# Patient Record
Sex: MALE | Race: WHITE | NOT HISPANIC OR LATINO | Employment: OTHER | URBAN - METROPOLITAN AREA
[De-identification: names, ages, dates, MRNs, and addresses within clinical notes are randomized per-mention and may not be internally consistent; named-entity substitution may affect disease eponyms.]

---

## 2018-01-30 ENCOUNTER — HOSPITAL ENCOUNTER (EMERGENCY)
Facility: CLINIC | Age: 83
Discharge: HOME | End: 2018-01-31
Attending: EMERGENCY MEDICINE | Admitting: EMERGENCY MEDICINE
Payer: MEDICARE
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
## 2018-01-30 DIAGNOSIS — T18.128A ESOPHAGEAL OBSTRUCTION DUE TO FOOD IMPACTION: ICD-10-CM

## 2018-01-30 PROCEDURE — 96374 THER/PROPH/DIAG INJ IV PUSH: CPT

## 2018-01-30 PROCEDURE — 99284 EMERGENCY DEPT VISIT MOD MDM: CPT

## 2018-01-30 NOTE — ED AVS SNAPSHOT
[facility] Emergency Department    [address]    [facility] [address]    Phone:  [phone]    Fax:  [phone]                                       Kraig Genao   MRN: [id number]    Department:  [facility] Emergency Department   Date of Visit:  1/30/2018           After Visit Summary Signature Page     I have received my discharge instructions, and my questions have been answered. I have discussed any challenges I see with this plan with the nurse or doctor.    ..........................................................................................................................................  Patient/Patient Representative Signature      ..........................................................................................................................................  Patient Representative Print Name and Relationship to Patient    ..................................................               ................................................  Date                                            Time    ..........................................................................................................................................  Reviewed by Signature/Title    ...................................................              ..............................................  Date                                                            Time

## 2018-01-30 NOTE — ED AVS SNAPSHOT
[facility] Emergency Department    [address] [place] [address]    Phone:  [phone]    Fax:  [phone]                                       Kraig Genao   MRN: [id number]    Department:  [facility] Emergency Department   Date of Visit:  1/30/2018           Patient Information     Date Of Birth          7/18/1926        Your diagnoses for this visit were:     Esophageal obstruction due to food impaction        You were seen by Toy Oliva MD.      Follow-up Information     Call Mendez Mcknight MD.    Specialty:  Gastroenterology    Why:  this morning for followup endoscopy    Contact information:    [facility]  [address]  [facility] [id number] [phone]          Discharge Instructions         Esophageal Blockage, Resolved  The esophagus is the passage that carries food from the mouth to the stomach. You had a blockage in the esophagus. This can happen after swallowing a large piece of food, taking a large pill, or swallowing foreign objects.  If this is a recurring problem, it can be a sign of disease in the esophagus, such as inflammation (swelling and irritation) or scarring. If you did not have a special procedure (endoscopy) today to treat your condition, further testing will be needed to evaluate this problem.  The blockage has cleared. You should be able to swallow normally again.  Home care    For the next 24 hours you may drink liquids and eat soft foods.    You may have been given medicine today to prevent pain and help you relax. If so, you may feel drowsy for the next 4 to 12 hours. Do not drive or operate dangerous equipment until you feel alert again.    If your esophagus was blocked by food, be sure to cut solid food into small pieces before putting it into your mouth. Chew all foods well before swallowing.    If your esophagus was blocked by an over-the-counter pill (such as a vitamin), avoid this size pill in  the future. If it was blocked by a prescription medicine, ask your healthcare provider for another form of medicine.  Follow-up care  Follow up with your healthcare provider, or as advised. If you continue to have problems, contact your doctor or this facility for advice. If this is a recurring problem, talk to your healthcare provider about it. He or she may suggest having an endoscopy. This is a look in the esophagus with a small camera and light in a narrow, flexible tube).  When to seek medical advice  Call your healthcare provider right away if any of these occur:    Unable to swallow    Significant pain on swallowing    Fever of 100.4 F (38 C) or higher, or as directed by your healthcare provider  Call 911  Call 911 or get immediate medical care if any of the following occur:     Chest pain or shortness of breath    Vomiting blood (red or black)    Blood in your stool (dark red or black color)   Date Last Reviewed: 5/1/2017 2000-2017 [employer]. [address] [place] [address]. All rights reserved. This information is not intended as a substitute for professional medical care. Always follow your healthcare professional's instructions.          24 Hour Appointment Hotline       To make an appointment at any [facility], call [phone] ([phone]). If you don't have a family doctor or clinic, we will help you find one. [facility] are conveniently located to serve the needs of you and your family.             Review of your medicines      Our records show that you are taking the medicines listed below. If these are incorrect, please call your family doctor or clinic.        Dose / Directions Last dose taken    apixaban ANTICOAGULANT 5 MG tablet   Commonly known as:  ELIQUIS   Dose:  5 mg   Quantity:  60 tablet        Take 1 tablet (5 mg) by mouth 2 times daily   Refills:  12        ascorbic acid 500 MG tablet   Commonly known as:  VITAMIN C   Dose:  1000 mg         Take 1,000 mg by mouth daily   Refills:  0        atenolol 50 MG tablet   Commonly known as:  TENORMIN   Dose:  25 mg        Take 25 mg by mouth 2 times daily   Refills:  0        budesonide-formoterol 160-4.5 MCG/ACT Inhaler   Commonly known as:  SYMBICORT   Dose:  2 puff        Inhale 2 puffs into the lungs 2 times daily   Refills:  0        calcium-vitamin D 600-400 MG-UNIT per tablet   Commonly known as:  CALTRATE   Dose:  1 tablet        Take 1 tablet by mouth daily   Refills:  0        fish Oil 1200 MG capsule        Take by mouth daily   Refills:  0        omeprazole 20 MG CR capsule   Commonly known as:  priLOSEC   Dose:  20 mg        Take 20 mg by mouth daily   Refills:  0        ONE-A-DAY MENS PO   Dose:  1 tablet        Take 1 tablet by mouth daily   Refills:  0        oxybutynin 5 MG tablet   Commonly known as:  DITROPAN   Dose:  5 mg        Take 5 mg by mouth 2 times daily   Refills:  0        terazosin 2 MG capsule   Commonly known as:  HYTRIN   Dose:  2 mg        Take 2 mg by mouth At Bedtime   Refills:  0        VITAMIN D3 PO   Dose:  1000 Units        Take 1,000 Units by mouth daily   Refills:  0                Procedures and tests performed during your visit     IV access      Orders Needing Specimen Collection     None      Pending Results     No orders found for last 3 day(s).            Pending Culture Results     No orders found for last 3 day(s).            Pending Results Instructions     If you had any lab results that were not finalized at the time of your Discharge, you can call the ED Lab Result RN at [phone]. You will be contacted by this team for any positive Lab results or changes in treatment. The nurses are available 7 days a week from 10A to 6:30P.  You can leave a message 24 hours per day and they will return your call.        Test Results From Your Hospital Stay               Clinical Quality Measure: Blood Pressure Screening     Your blood pressure was checked while you were  in the emergency department today. The last reading we obtained was  BP: (!) 147/99 . Please read the guidelines below about what these numbers mean and what you should do about them.  If your systolic blood pressure (the top number) is less than 120 and your diastolic blood pressure (the bottom number) is less than 80, then your blood pressure is normal. There is nothing more that you need to do about it.  If your systolic blood pressure (the top number) is 120-139 or your diastolic blood pressure (the bottom number) is 80-89, your blood pressure may be higher than it should be. You should have your blood pressure rechecked within a year by a primary care provider.  If your systolic blood pressure (the top number) is 140 or greater or your diastolic blood pressure (the bottom number) is 90 or greater, you may have high blood pressure. High blood pressure is treatable, but if left untreated over time it can put you at risk for heart attack, stroke, or kidney failure. You should have your blood pressure rechecked by a primary care provider within the next 4 weeks.  If your provider in the emergency department today gave you specific instructions to follow-up with your doctor or provider even sooner than that, you should follow that instruction and not wait for up to 4 weeks for your follow-up visit.        Thank you for choosing [facility]       Thank you for choosing [facility] for your care. Our goal is always to provide you with excellent care. Hearing back from our patients is one way we can continue to improve our services. Please take a few minutes to complete the written survey that you may receive in the mail after you visit with us. Thank you!        [facility] Information     [facility] gives you secure access to your electronic health record. If you see a primary care provider, you can also send messages to your care team and make appointments. If you have questions, please call your primary care clinic.  If you do  not have a primary care provider, please call [phone] and they will assist you.        Care EveryWhere ID     This is your Care EveryWhere ID. This could be used by other organizations to access your [facility] medical records  [id number]        Equal Access to Services     TULIO JAIN : Alvin Vega, pepe gamez, melia hernández. So [facility] [place] [phone].    ATENCIÓN: Si habla español, tiene a funez disposición servicios gratuitos de asistencia lingüística. Llame al [phone].    We comply with applicable federal civil rights laws and [place] laws. We do not discriminate on the basis of race, color, national origin, age, disability, sex, sexual orientation, or gender identity.            After Visit Summary       This is your record. Keep this with you and show to your community pharmacist(s) and doctor(s) at your next visit.

## 2018-01-31 VITALS
SYSTOLIC BLOOD PRESSURE: 151 MMHG | BODY MASS INDEX: 24.33 KG/M2 | DIASTOLIC BLOOD PRESSURE: 109 MMHG | HEART RATE: 100 BPM | RESPIRATION RATE: 16 BRPM | OXYGEN SATURATION: 100 % | WEIGHT: 155 LBS | HEIGHT: 67 IN | TEMPERATURE: 97.9 F

## 2018-01-31 PROCEDURE — 96374 THER/PROPH/DIAG INJ IV PUSH: CPT

## 2018-01-31 PROCEDURE — 25000128 H RX IP 250 OP 636: Performed by: EMERGENCY MEDICINE

## 2018-01-31 RX ADMIN — GLUCAGON HYDROCHLORIDE 0.5 MG: 1 INJECTION, POWDER, FOR SOLUTION INTRAMUSCULAR; INTRAVENOUS; SUBCUTANEOUS at 00:56

## 2018-01-31 ASSESSMENT — ENCOUNTER SYMPTOMS
SHORTNESS OF BREATH: 0
VOMITING: 1
TROUBLE SWALLOWING: 1

## 2018-01-31 NOTE — DISCHARGE INSTRUCTIONS
Esophageal Blockage, Resolved  The esophagus is the passage that carries food from the mouth to the stomach. You had a blockage in the esophagus. This can happen after swallowing a large piece of food, taking a large pill, or swallowing foreign objects.  If this is a recurring problem, it can be a sign of disease in the esophagus, such as inflammation (swelling and irritation) or scarring. If you did not have a special procedure (endoscopy) today to treat your condition, further testing will be needed to evaluate this problem.  The blockage has cleared. You should be able to swallow normally again.  Home care    For the next 24 hours you may drink liquids and eat soft foods.    You may have been given medicine today to prevent pain and help you relax. If so, you may feel drowsy for the next 4 to 12 hours. Do not drive or operate dangerous equipment until you feel alert again.    If your esophagus was blocked by food, be sure to cut solid food into small pieces before putting it into your mouth. Chew all foods well before swallowing.    If your esophagus was blocked by an over-the-counter pill (such as a vitamin), avoid this size pill in the future. If it was blocked by a prescription medicine, ask your healthcare provider for another form of medicine.  Follow-up care  Follow up with your healthcare provider, or as advised. If you continue to have problems, contact your doctor or this facility for advice. If this is a recurring problem, talk to your healthcare provider about it. He or she may suggest having an endoscopy. This is a look in the esophagus with a small camera and light in a narrow, flexible tube).  When to seek medical advice  Call your healthcare provider right away if any of these occur:    Unable to swallow    Significant pain on swallowing    Fever of 100.4 F (38 C) or higher, or as directed by your healthcare provider  Call 911  Call 911 or get immediate medical care if any of the following  occur:     Chest pain or shortness of breath    Vomiting blood (red or black)    Blood in your stool (dark red or black color)   Date Last Reviewed: 5/1/2017 2000-2017 [employer]. [address] [place] [address], [place] [address]. All rights reserved. This information is not intended as a substitute for professional medical care. Always follow your healthcare professional's instructions.

## 2018-01-31 NOTE — ED PROVIDER NOTES
History     Chief Complaint:  Trouble swallowing    HPI   Kraig Genao is a 89-year-old male who presents for evaluation of difficulty swallowing since about 1800 when the patient was eating. The patient reports that he was eating salmon and baked potatoes and shortly after he started to feel like he could not swallow any more. The patient reports that he has tried to drink water since but has been unable to keep this down. He states that he has been spitting up saliva since.  He states that he did have one episode of vomiting since this time; however, even after this he was not able to keep any fluids down.  He states that he has felt better in the last hour but has still been spitting up saliva.  The patient reports that he has had a history of similar problems in the past and required an endoscopy about 1.5 years ago.  The patient denies any pain.  Denies difficulty breathing.    Allergies:  No known drug allergies.      Medications:    Eliquis  Atenolol  Symbicort  Prilosec  Terazosin  Oxybutynin    Past Medical History:    Atrial fibrillation  Dyspepsia  Elevated LFTs  Erectile dysfunction  GERD  H pylori infection  Hypertrophy of prostate  Macular degeneration    Past Surgical History:    Upper GI endoscopy with dilatation  Hernia repair  Cholecystectomy  Total shoulder arthroplasty, right  Shoulder arthroplasty revision, right  [id number]-2016    Family History:    Myocardial infarction-father    Social History:  Marital Status:   Presents to the ED with wife   Tobacco Use: Never  Alcohol Use: Yes   PCP: Griselda Syed      Review of Systems   HENT: Positive for trouble swallowing.    Respiratory: Negative for shortness of breath.    Gastrointestinal: Positive for vomiting (x1).   All other systems reviewed and are negative.    Physical Exam   Patient Vitals for the past 24 hrs:   BP Temp Temp src Pulse Heart Rate Resp SpO2 Height Weight   01/31/18 0100 - - - - 95 - 98 % - -   01/31/18 0045 (!)  "147/99 - - - - - 97 % - -   01/31/18 0030 (!) 156/104 - - - - - 96 % - -   01/31/18 0015 (!) 152/102 - - - - - 97 % - -   01/31/18 0000 (!) 174/117 - - - - - 98 % - -   01/30/18 2345 (!) 169/112 - - - - - 98 % - -   01/30/18 2337 - 97.9  F (36.6  C) Oral 100 - 16 97 % 1.702 m (5' 7\") 70.3 kg (155 lb)     Physical Exam  Vital signs and nursing notes reviewed.     Constitutional: laying on gurney appears comfortable. Occasionally spitting into a bag.   HENT: Oropharynx is clear and moist  Eyes: Conjunctivae are normal bilaterally. Pupils equal  Neck: normal range of motion  Cardiovascular: Normal rate, regular rhythm, normal heart sounds.   Pulmonary/Chest: Effort normal and breath sounds normal. No respiratory distress.   Abdominal: Soft. Bowel sounds are normal. No tenderness to palpation. No rebound or guarding.   Musculoskeletal: No joint swelling or edema.   Neurological: Alert and oriented. No focal weakness  Skin: Skin is warm and dry. No rash noted.   Psych: normal affect     Emergency Department Course   Interventions:  (0056) Glucagon, 0.5 mg, IV     Emergency Department Course:  Nursing notes and vitals reviewed.    (0017) I entered the room with my scribe, obtained the history, and performed an exam of the patient as documented above.    (0133) I went to check in on the patient who feels better after the EZ gas. The patient is tolerating PO fluids. Plan is for discharge and patient is in agreement.     Findings and plan explained to the patient. Patient discharged home with instructions regarding supportive care, medications, and reasons to return. The importance of close follow-up was reviewed.     Impression & Plan    Medical Decision Making:  Kraig Genao is a 89-year-old male who presents for evaluation of difficulty swallowing and one episode of vomiting.  This is consistent with esophageal bolus/foreign body esophagus.  The offending bolus is likely either the salmon or potatoes that he was eating " for dinner.  We attempted passage with glucagon IV and EZ gas.      This medication was effective in passage of the bolus.  Patient was able to drink a large amount of liquids and discomfort was gone indicating that the bolus has passed.  We will continue patient on scheduled PPI and have them follow up with GI this week for endoscopy. Patient follows with Dr. Mcknight and will call him in the morning. Unclear if this is a stricture, mass or other etiology for the esophageal narrowing and patient understands this.  Will need formal diagnosis by endoscopy.   Liquid diet and very soft diet until endoscopy.  Return for fever, shortness of breath, or chest pain.     Diagnosis:    ICD-10-CM   1. Esophageal obstruction due to food impaction K22.2    T18.128A     Disposition:  discharged to home        [facility] EMERGENCY DEPARTMENT      Scribe disclosure:  Herman MADDEN, am serving as a scribe on 1/31/2018 at 12:17 AM to personally document services performed by Dr. Oliva based on my observations and the provider's statements to me.              Toy Oliva MD  01/31/18 4674

## 2018-01-31 NOTE — ED NOTES
Pt in with C/O ? Food bolus. Pt states he was eating a piece of salmon and thinks something got stuck. Pt states hx of stricture, food bolus in the past.  Pt reports glucagon has worked in the past

## 2018-01-31 NOTE — ED NOTES
Pt administered easy gas, unsuccessful on first attempt, second attempt pt able to swallow ez-gas

## 2018-02-11 ENCOUNTER — HOSPITAL ENCOUNTER (EMERGENCY)
Facility: CLINIC | Age: 83
Discharge: HOME | End: 2018-02-12
Attending: EMERGENCY MEDICINE | Admitting: INTERNAL MEDICINE
Payer: MEDICARE
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
## 2018-02-11 DIAGNOSIS — T18.128A FOOD IMPACTION OF ESOPHAGUS, INITIAL ENCOUNTER: ICD-10-CM

## 2018-02-11 LAB — UPPER GI ENDOSCOPY: NORMAL

## 2018-02-11 PROCEDURE — 40000104 ZZH STATISTIC MODERATE SEDATION < 10 MIN: Performed by: INTERNAL MEDICINE

## 2018-02-11 PROCEDURE — 25000128 H RX IP 250 OP 636: Performed by: EMERGENCY MEDICINE

## 2018-02-11 PROCEDURE — 25500045 ZZH RX 255: Performed by: EMERGENCY MEDICINE

## 2018-02-11 PROCEDURE — 25000128 H RX IP 250 OP 636: Performed by: INTERNAL MEDICINE

## 2018-02-11 PROCEDURE — 99285 EMERGENCY DEPT VISIT HI MDM: CPT | Mod: 25

## 2018-02-11 PROCEDURE — 96374 THER/PROPH/DIAG INJ IV PUSH: CPT

## 2018-02-11 PROCEDURE — 25000125 ZZHC RX 250: Performed by: INTERNAL MEDICINE

## 2018-02-11 PROCEDURE — 43247 EGD REMOVE FOREIGN BODY: CPT | Performed by: INTERNAL MEDICINE

## 2018-02-11 RX ORDER — FENTANYL CITRATE 50 UG/ML
25 INJECTION, SOLUTION INTRAMUSCULAR; INTRAVENOUS
Status: DISCONTINUED | OUTPATIENT
Start: 2018-02-11 | End: 2018-02-12 | Stop reason: HOSPADM

## 2018-02-11 RX ORDER — LIDOCAINE 40 MG/G
CREAM TOPICAL
Status: CANCELLED | OUTPATIENT
Start: 2018-02-11

## 2018-02-11 RX ORDER — FENTANYL CITRATE 50 UG/ML
25-50 INJECTION, SOLUTION INTRAMUSCULAR; INTRAVENOUS
Status: COMPLETED | OUTPATIENT
Start: 2018-02-11 | End: 2018-02-11

## 2018-02-11 RX ORDER — NALOXONE HYDROCHLORIDE 0.4 MG/ML
.1-.4 INJECTION, SOLUTION INTRAMUSCULAR; INTRAVENOUS; SUBCUTANEOUS
Status: DISCONTINUED | OUTPATIENT
Start: 2018-02-11 | End: 2018-02-12 | Stop reason: HOSPADM

## 2018-02-11 RX ORDER — FLUMAZENIL 0.1 MG/ML
0.2 INJECTION, SOLUTION INTRAVENOUS
Status: DISCONTINUED | OUTPATIENT
Start: 2018-02-11 | End: 2018-02-12 | Stop reason: HOSPADM

## 2018-02-11 RX ADMIN — GLUCAGON HYDROCHLORIDE 0.5 MG: 1 INJECTION, POWDER, FOR SOLUTION INTRAMUSCULAR; INTRAVENOUS; SUBCUTANEOUS at 21:59

## 2018-02-11 RX ADMIN — FENTANYL CITRATE 50 MCG: 50 INJECTION INTRAMUSCULAR; INTRAVENOUS at 23:41

## 2018-02-11 RX ADMIN — ANTACID/ANTIFLATULENT 4 G: 380; 550; 10; 10 GRANULE, EFFERVESCENT ORAL at 22:11

## 2018-02-11 RX ADMIN — MIDAZOLAM 1 MG: 1 INJECTION INTRAMUSCULAR; INTRAVENOUS at 23:41

## 2018-02-11 ASSESSMENT — ENCOUNTER SYMPTOMS
VOMITING: 1
TROUBLE SWALLOWING: 1

## 2018-02-11 NOTE — ED AVS SNAPSHOT
[facility] Emergency Department    [address]    [facility] [address]    Phone:  [phone]    Fax:  [phone]                                       Kraig Genao   MRN: [id number]    Department:  [facility] Emergency Department   Date of Visit:  2/11/2018           Patient Information     Date Of Birth          7/18/1926        Your diagnoses for this visit were:     Food impaction of esophagus, initial encounter        You were seen by Deja Ray MD.      Follow-up Information     Follow up with Mendez Mcknight MD.    Specialty:  Gastroenterology    Why:  as discussed    Contact information:    [facility]  [address]  [facility] [address] [phone]          Follow up with [facility] Emergency Department.    Specialty:  EMERGENCY MEDICINE    Why:  As needed, If symptoms worsen    Contact information:    [address]  [facility] [address] [phone]        Discharge Instructions         Esophageal Blockage, Resolved  The esophagus is the passage that carries food from the mouth to the stomach. You had a blockage in the esophagus. This can happen after swallowing a large piece of food, taking a large pill, or swallowing foreign objects.  If this is a recurring problem, it can be a sign of disease in the esophagus, such as inflammation (swelling and irritation) or scarring. If you did not have a special procedure (endoscopy) today to treat your condition, further testing will be needed to evaluate this problem.  The blockage has cleared. You should be able to swallow normally again.  Home care    For the next 24 hours you may drink liquids and eat soft foods.    You may have been given medicine today to prevent pain and help you relax. If so, you may feel drowsy for the next 4 to 12 hours. Do not drive or operate dangerous equipment until you feel alert again.    If your esophagus was blocked by  food, be sure to cut solid food into small pieces before putting it into your mouth. Chew all foods well before swallowing.    If your esophagus was blocked by an over-the-counter pill (such as a vitamin), avoid this size pill in the future. If it was blocked by a prescription medicine, ask your healthcare provider for another form of medicine.  Follow-up care  Follow up with your healthcare provider, or as advised. If you continue to have problems, contact your doctor or this facility for advice. If this is a recurring problem, talk to your healthcare provider about it. He or she may suggest having an endoscopy. This is a look in the esophagus with a small camera and light in a narrow, flexible tube).  When to seek medical advice  Call your healthcare provider right away if any of these occur:    Unable to swallow    Significant pain on swallowing    Fever of 100.4 F (38 C) or higher, or as directed by your healthcare provider  Call 911  Call 911 or get immediate medical care if any of the following occur:     Chest pain or shortness of breath    Vomiting blood (red or black)    Blood in your stool (dark red or black color)   Date Last Reviewed: 5/1/2017 2000-2017 [employer]. [address] [place] [address]. All rights reserved. This information is not intended as a substitute for professional medical care. Always follow your healthcare professional's instructions.          24 Hour Appointment Hotline       To make an appointment at any [facility], call [phone] ([phone]). If you don't have a family doctor or clinic, we will help you find one. [facility] are conveniently located to serve the needs of you and your family.             Review of your medicines      Our records show that you are taking the medicines listed below. If these are incorrect, please call your family doctor or clinic.        Dose / Directions Last dose taken    apixaban ANTICOAGULANT 5 MG  tablet   Commonly known as:  ELIQUIS   Dose:  5 mg   Quantity:  60 tablet        Take 1 tablet (5 mg) by mouth 2 times daily   Refills:  12        ascorbic acid 500 MG tablet   Commonly known as:  VITAMIN C   Dose:  1000 mg        Take 1,000 mg by mouth daily   Refills:  0        atenolol 50 MG tablet   Commonly known as:  TENORMIN   Dose:  25 mg        Take 25 mg by mouth 2 times daily   Refills:  0        budesonide-formoterol 160-4.5 MCG/ACT Inhaler   Commonly known as:  SYMBICORT   Dose:  2 puff        Inhale 2 puffs into the lungs 2 times daily   Refills:  0        calcium-vitamin D 600-400 MG-UNIT per tablet   Commonly known as:  CALTRATE   Dose:  1 tablet        Take 1 tablet by mouth daily   Refills:  0        fish Oil 1200 MG capsule        Take by mouth daily   Refills:  0        omeprazole 20 MG CR capsule   Commonly known as:  priLOSEC   Dose:  20 mg        Take 20 mg by mouth daily   Refills:  0        ONE-A-DAY MENS PO   Dose:  1 tablet        Take 1 tablet by mouth daily   Refills:  0        oxybutynin 5 MG tablet   Commonly known as:  DITROPAN   Dose:  5 mg        Take 5 mg by mouth 2 times daily   Refills:  0        terazosin 2 MG capsule   Commonly known as:  HYTRIN   Dose:  2 mg        Take 2 mg by mouth At Bedtime   Refills:  0        VITAMIN D3 PO   Dose:  1000 Units        Take 1,000 Units by mouth daily   Refills:  0                Procedures and tests performed during your visit     Transfer patient    UPPER GI ENDOSCOPY      Orders Needing Specimen Collection     None      Pending Results     No orders found for last 3 day(s).            Pending Culture Results     No orders found for last 3 day(s).            Pending Results Instructions     If you had any lab results that were not finalized at the time of your Discharge, you can call the ED Lab Result RN at [phone]. You will be contacted by this team for any positive Lab results or changes in treatment. The nurses are available 7 days  a week from 10A to 6:30P.  You can leave a message 24 hours per day and they will return your call.        Test Results From Your Hospital Stay               Clinical Quality Measure: Blood Pressure Screening     Your blood pressure was checked while you were in the emergency department today. The last reading we obtained was  BP: (!) 139/99 . Please read the guidelines below about what these numbers mean and what you should do about them.  If your systolic blood pressure (the top number) is less than 120 and your diastolic blood pressure (the bottom number) is less than 80, then your blood pressure is normal. There is nothing more that you need to do about it.  If your systolic blood pressure (the top number) is 120-139 or your diastolic blood pressure (the bottom number) is 80-89, your blood pressure may be higher than it should be. You should have your blood pressure rechecked within a year by a primary care provider.  If your systolic blood pressure (the top number) is 140 or greater or your diastolic blood pressure (the bottom number) is 90 or greater, you may have high blood pressure. High blood pressure is treatable, but if left untreated over time it can put you at risk for heart attack, stroke, or kidney failure. You should have your blood pressure rechecked by a primary care provider within the next 4 weeks.  If your provider in the emergency department today gave you specific instructions to follow-up with your doctor or provider even sooner than that, you should follow that instruction and not wait for up to 4 weeks for your follow-up visit.        Thank you for choosing [facility]       Thank you for choosing [facility] for your care. Our goal is always to provide you with excellent care. Hearing back from our patients is one way we can continue to improve our services. Please take a few minutes to complete the written survey that you may receive in the mail after you visit with us. Thank you!         [employer] Information     [employer] gives you secure access to your electronic health record. If you see a primary care provider, you can also send messages to your care team and make appointments. If you have questions, please call your primary care clinic.  If you do not have a primary care provider, please call [phone] and they will assist you.        Care EveryWhere ID     This is your Care EveryWhere ID. This could be used by other organizations to access your [facility] medical records  [id number]        Equal Access to Services     TULIO JAIN : Hadii donald fernandezo Soalexandria, waaxda luqadaha, qaybta kaalmada maribell, melia love. So [facility] [phone].    ATENCIÓN: Si habla español, tiene a funez disposición servicios gratuitos de asistencia lingüística. Llame al [phone].    We comply with applicable federal civil rights laws and [place] laws. We do not discriminate on the basis of race, color, national origin, age, disability, sex, sexual orientation, or gender identity.            After Visit Summary       This is your record. Keep this with you and show to your community pharmacist(s) and doctor(s) at your next visit.

## 2018-02-11 NOTE — ED AVS SNAPSHOT
[facility] Emergency Department    [address]    [facility] [address]    Phone:  [phone]    Fax:  [phone]                                       Kraig Genao   MRN: [id number]    Department:  [facility] Emergency Department   Date of Visit:  2/11/2018           After Visit Summary Signature Page     I have received my discharge instructions, and my questions have been answered. I have discussed any challenges I see with this plan with the nurse or doctor.    ..........................................................................................................................................  Patient/Patient Representative Signature      ..........................................................................................................................................  Patient Representative Print Name and Relationship to Patient    ..................................................               ................................................  Date                                            Time    ..........................................................................................................................................  Reviewed by Signature/Title    ...................................................              ..............................................  Date                                                            Time

## 2018-02-12 VITALS
RESPIRATION RATE: 18 BRPM | TEMPERATURE: 97.8 F | SYSTOLIC BLOOD PRESSURE: 149 MMHG | HEART RATE: 92 BPM | OXYGEN SATURATION: 94 % | DIASTOLIC BLOOD PRESSURE: 97 MMHG

## 2018-02-12 RX ORDER — FLUMAZENIL 0.1 MG/ML
0.2 INJECTION, SOLUTION INTRAVENOUS
Status: DISCONTINUED | OUTPATIENT
Start: 2018-02-12 | End: 2018-02-12 | Stop reason: HOSPADM

## 2018-02-12 RX ORDER — NALOXONE HYDROCHLORIDE 0.4 MG/ML
.1-.4 INJECTION, SOLUTION INTRAMUSCULAR; INTRAVENOUS; SUBCUTANEOUS
Status: DISCONTINUED | OUTPATIENT
Start: 2018-02-12 | End: 2018-02-12 | Stop reason: HOSPADM

## 2018-02-12 NOTE — OR NURSING
assisted in foreign body removal ,pt tolerated procedure well ,flying vito kincaid monitored pt during procedure and sedation given by flying vito Md spoken to pt and wife post procedure ,also pt was recovered by flying vito kincaid and ER RN jaimie ,pt denies pain see sedation in er mars pt received1 mg of versed and 50 mcg of fentanyl by flying padron Rn ,discharge instruction explained to pt and wife ,wife said pt vomited at home ,during procedure pt kept on his left side and also post procedure to avoid aspiration,pt is visiting with staff of er ,pt denies pain he has and on atrial fib and pt take eleques,pt seen by er Md prior to discharge and walking in hallway by er team ,pt aware he needs to be in soft diet for 1 week and come for recheck

## 2018-02-12 NOTE — ED NOTES
Patient A&Ox4. Assisted patient in sitting straight up in bed. Provided patient with several sips of water. Spouse at bedside.

## 2018-02-12 NOTE — ED PROVIDER NOTES
History     Chief Complaint:  Foreign body in throat    HPI   Kraig Genao is a 89 year old male on Eliquis for atrial fibrillation who presents to the emergency department today for evaluation of foreign body in throat. The patient reports that he was eating dinner tonight when he swallowed and then felt like nothing else could go down. Since then he has vomited up his dinner and is still unable to swallow. The patient reports that a similar situation occurred a week ago where he was given Glucagon IV and EZ gas with relief. He has had esophageal dilation procedures in the past by Dr. Mcknight.    Allergies:  No known allergies     Medications:    Eliquis  Atenolol  Symbicort  Prilosec  Hytrin  Ditropan    Past Medical History:    Atrial fibrillation   Bile duct stone   Chest pain   Dyspepsia and other specified disorders of function of stomach   Elevated LFTs   Erectile dysfunction   Esophageal reflux   Gastro-oesophageal reflux disease   Helicobacter pylori (H. pylori)   Hypertrophy (benign) of prostate   Macular degeneration (senile) of retina, unspecified    Past Surgical History:    L hernia  Lap Choly  R total shoulder  Re-do R shoulder  Endoscopic retrograde cholangiopancreatogram  Endoscopic retrograde cholangiopancreatography, lithotripsy pancrease, combined    Family History:    Father: heart disease    Social History:  The patient was accompanied to the ED by wife.  Smoking Status: Never Smoker  Smokeless Tobacco: Never Used  Alcohol Use: positive    Marital Status:   [2]     Review of Systems   HENT: Positive for trouble swallowing.    Gastrointestinal: Positive for vomiting.   All other systems reviewed and are negative.    Physical Exam     Patient Vitals for the past 24 hrs:   BP Temp Temp src Pulse Heart Rate Resp SpO2   02/12/18 0030 (!) 149/97 - - - 82 - 94 %   02/12/18 0026 - - - - - - 99 %   02/12/18 0025 (!) 139/99 - - - 82 - 94 %   02/12/18 0020 (!) 147/98 - - - 93 18 96 %    02/12/18 0015 106/83 - - - 82 18 96 %   02/12/18 0010 127/87 - - - - 18 95 %   02/12/18 0005 130/86 - - - 83 - 97 %   02/12/18 0004 - - - - 87 - 96 %   02/12/18 0000 (!) 145/92 - - - 92 - 97 %   02/11/18 2355 (!) 149/98 - - - 85 25 96 %   02/11/18 2350 (!) 169/112 - - - 89 22 97 %   02/11/18 2345 (!) 183/123 - - - 93 23 93 %   02/11/18 2340 (!) 184/116 - - - 89 25 90 %   02/11/18 2330 (!) 164/104 - - - 81 25 97 %   02/11/18 2315 - - - - 84 - 96 %   02/11/18 2304 - - - - 85 19 95 %   02/11/18 2302 - - - - - - 96 %   02/11/18 2300 (!) 175/114 - - - - - -   02/11/18 2140 - - - 92 - - 96 %   02/11/18 2121 (!) 196/129 - - - - - -   02/11/18 2117 - 97.8  F (36.6  C) Oral - - 18 100 %     Physical Exam  General: Elderly male sitting upright.  Eyes: PERRL, Conjunctive within normal limits  ENT: Moist mucous membranes, oropharynx clear. No edema. Spitting secretions.  Neck: No palpable mass. No crepitus.  CV: Normal S1S2, no murmur, rub or gallop. Regular rate and rhythm  Resp: Clear to auscultation bilaterally, no wheezes, rales or rhonchi. Normal respiratory effort.  GI: Abdomen is soft, nontender and nondistended. No palpable masses. No rebound or guarding.  MSK: No edema. Nontender. Normal active range of motion.  Skin: Warm and dry. No rashes or lesions or ecchymoses on visible skin.  Neuro: Alert and oriented. Responds appropriately to all questions and commands. No focal findings appreciated. Normal muscle tone.  Psych: Normal mood and affect. Pleasant.    Emergency Department Course     Interventions:  2159 Glucagon 0.5 mg IV  2211 EZ gas 4 g Oral    Emergency Department Course:    2121 Nursing notes and vitals reviewed.    2127 I performed an exam of the patient as documented above.     Interventions as above.    Patient failed to improve. Dr. Mcknight consulted.    Dr. Mcknight to ED for intervention. Please see his note for details.    Endoscopy successful in clearing food bolus.    Patient ambulatory. Able to  take po. Denies any current concerns.    0109 I personally reassessed the patient and answered all related questions prior to discharge.    Impression & Plan      Medical Decision Making:  Kraig Genao is a 89 year old male who presents for evaluation of chest discomfort and vomiting.  This is consistent with esophageal bolus/foreign body esophagus.  The offending bolus is likely meat from his dinner.  We attempted passage with glucagon IV and EZ gas but were unsuccessful.    This medication was not effective in passage of the bolus.  Symptoms continue and contacted GI for endoscopy.  Dr. Mcknight came to ED and performed endoscopy.   Patient was feeling well, ambulatory, and able to swallow liquids prior to discharge. Follow up with GI per their recommendations.  Liquid diet and very soft diet until follow up. Return with recurrence or new symptoms.    Diagnosis:    ICD-10-CM    1. Food impaction of esophagus, initial encounter T18.128A      Disposition:   The patient is discharged to home.      Scribe Disclosure:  I, Dilcia Brown, am serving as a scribe at 10:47 PM on 2/11/2018 to document services personally performed by Deja Ray MD based on my observations and the provider's statements to me.      [facility] EMERGENCY DEPARTMENT       Deja Ray MD  02/17/18 5933

## 2018-02-12 NOTE — DISCHARGE INSTRUCTIONS
Esophageal Blockage, Resolved  The esophagus is the passage that carries food from the mouth to the stomach. You had a blockage in the esophagus. This can happen after swallowing a large piece of food, taking a large pill, or swallowing foreign objects.  If this is a recurring problem, it can be a sign of disease in the esophagus, such as inflammation (swelling and irritation) or scarring. If you did not have a special procedure (endoscopy) today to treat your condition, further testing will be needed to evaluate this problem.  The blockage has cleared. You should be able to swallow normally again.  Home care    For the next 24 hours you may drink liquids and eat soft foods.    You may have been given medicine today to prevent pain and help you relax. If so, you may feel drowsy for the next 4 to 12 hours. Do not drive or operate dangerous equipment until you feel alert again.    If your esophagus was blocked by food, be sure to cut solid food into small pieces before putting it into your mouth. Chew all foods well before swallowing.    If your esophagus was blocked by an over-the-counter pill (such as a vitamin), avoid this size pill in the future. If it was blocked by a prescription medicine, ask your healthcare provider for another form of medicine.  Follow-up care  Follow up with your healthcare provider, or as advised. If you continue to have problems, contact your doctor or this facility for advice. If this is a recurring problem, talk to your healthcare provider about it. He or she may suggest having an endoscopy. This is a look in the esophagus with a small camera and light in a narrow, flexible tube).  When to seek medical advice  Call your healthcare provider right away if any of these occur:    Unable to swallow    Significant pain on swallowing    Fever of 100.4 F (38 C) or higher, or as directed by your healthcare provider  Call 911  Call 911 or get immediate medical care if any of the following  occur:     Chest pain or shortness of breath    Vomiting blood (red or black)    Blood in your stool (dark red or black color)   Date Last Reviewed: 5/1/2017 2000-2017 [employer]. [address] [place] [address], [place] [address]. All rights reserved. This information is not intended as a substitute for professional medical care. Always follow your healthcare professional's instructions.

## 2018-02-12 NOTE — ED NOTES
Pt was eating dinner and now c/o difficulty swallowing. Pt had an episode like this last week and was seen here. Pt told RN that it was relieved with easy gas.  No trouble breathing. 100percent room air. Not able to drink po fluids.

## 2018-02-12 NOTE — ED NOTES
Patient states struggling very much to swallow the water/EZ Gas down. Patient unable to move foreign body in throat after multiple attempts. Spouse at bedside. MD at bedside advising patient of plan.

## 2018-02-12 NOTE — H&P
Pre-Endoscopy History and Physical     Kraig Genao MRN# [id number]   YOB: 1926 Age: 89 year old     Date of Procedure: 2/11/2018  Primary care provider: Griselda Syed  Type of Endoscopy: Esophagogastroduodenoscopy with possible biopsy, possible dilation, possible foreign body removal  Reason for Procedure: foreign body  Type of Anesthesia Anticipated: Conscious Sedation    HPI:    Kraig is a 89 year old male who will be undergoing the above procedure.      A history and physical has been performed. The patient's medications and allergies have been reviewed. The risks and benefits of the procedure and the sedation options and risks were discussed with the patient.  All questions were answered and informed consent was obtained.      He denies a personal or family history of anesthesia complications or bleeding disorders.     Patient Active Problem List   Diagnosis     Atrial fibrillation (H)     Macular degeneration (senile) of retina     Helicobacter pylori infection     Esophageal reflux     CARDIOVASCULAR SCREENING; LDL GOAL LESS THAN 130     Elevated LFTs     Chest pain     ED (erectile dysfunction)     Gastroesophageal reflux disease, esophagitis presence not specified        Past Medical History:   Diagnosis Date     Atrial fibrillation (H)     episodic     Bile duct stone      Chest pain 2/18/2015     Dyspepsia and other specified disorders of function of stomach     dyspepsia; better on zantac     Elevated LFTs      Erectile dysfunction      Esophageal reflux     dilation of stricture 8/06     Gastro-oesophageal reflux disease      Helicobacter pylori (H. pylori)     had UGI endoscopy 10/00 & had LES dilitation     Hypertrophy (benign) of prostate     some voiding sx     Macular degeneration (senile) of retina, unspecified     mainly R eye        Past Surgical History:   Procedure Laterality Date     C NONSPECIFIC PROCEDURE  10/00 & 8/03    UGI endoscopy with dilitation    Abstracted  02     C NONSPECIFIC PROCEDURE      L hernia     C NONSPECIFIC PROCEDURE      lap choly     C NONSPECIFIC PROCEDURE      colonocopy      C NONSPECIFIC PROCEDURE      R total shoulder     C NONSPECIFIC PROCEDURE      re-do R shoulder     ENDOSCOPIC RETROGRADE CHOLANGIOPANCREATOGRAM  2011    Procedure:ENDOSCOPIC RETROGRADE CHOLANGIOPANCREATOGRAM; Endoscopic Retrograde Cholangiopancreatogram (c-arm), baloon dilation, stone retreival and 10fr. Solus stent placement x2 in  bile duct with the spy glass; Surgeon:EDD AUGUST; Location:[facility]     ENDOSCOPIC RETROGRADE CHOLANGIOPANCREATOGRAPHY, LITHOTRIPSY PANCREAS, COMBINED  10/27/2011    Procedure:COMBINED ENDOSCOPIC RETROGRADE CHOLANGIOPANCREATOGRAPHY, LITHOTRIPSY PANCREAS; Endoscopic Retrograde Cholangiopancreatogram with spygass scope ,Electrohydraulic Lithotripsy  baloon dilation of bile duct, stone removal (c-arm); Surgeon:EDD AUGUST; Location:[facility]     ESOPHAGOSCOPY, GASTROSCOPY, DUODENOSCOPY (EGD), COMBINED N/A 2016    Procedure: COMBINED ESOPHAGOSCOPY, GASTROSCOPY, DUODENOSCOPY (EGD), BIOPSY SINGLE OR MULTIPLE;  Surgeon: Mendez Mcknight MD;  Location:  GI       Social History   Substance Use Topics     Smoking status: Never Smoker     Smokeless tobacco: Never Used     Alcohol use 0.0 oz/week     0 Standard drinks or equivalent per week      Comment: 1-2 beer, wine, or mixed drink per night       Family History   Problem Relation Age of Onset     HEART DISEASE Father       M.I.      Mother       late 70's comp's related to hip fx     Esophageal Cancer No family hx of      GASTROINTESTINAL DISEASE No family hx of      Stomach Cancer No family hx of        Prior to Admission medications    Medication Sig Start Date End Date Taking? Authorizing Provider   Omega-3 Fatty Acids (FISH OIL) 1200 MG CAPS Take by mouth daily    Reported, Patient   Cholecalciferol (VITAMIN D3 PO) Take 1,000 Units by mouth daily    Reported,  "Patient   apixaban ANTICOAGULANT (ELIQUIS) 5 MG tablet Take 1 tablet (5 mg) by mouth 2 times daily 4/6/15   Deric Enriquez MD   atenolol (TENORMIN) 50 MG tablet Take 25 mg by mouth 2 times daily    Unknown, Entered By History   budesonide-formoterol (SYMBICORT) 160-4.5 MCG/ACT inhaler Inhale 2 puffs into the lungs 2 times daily    Unknown, Entered By History   omeprazole (PRILOSEC) 20 MG capsule Take 20 mg by mouth daily    Unknown, Entered By History   Multiple Vitamin (ONE-A-DAY MENS PO) Take 1 tablet by mouth daily    Unknown, Entered By History   terazosin (HYTRIN) 2 MG capsule Take 2 mg by mouth At Bedtime    Unknown, Entered By History   calcium-vitamin D (CALTRATE) 600-400 MG-UNIT per tablet Take 1 tablet by mouth daily    Unknown, Entered By History   ascorbic acid (VITAMIN C) 500 MG tablet Take 1,000 mg by mouth daily     Unknown, Entered By History   oxybutynin (DITROPAN) 5 MG tablet Take 5 mg by mouth 2 times daily     Reported, Patient       Allergies   Allergen Reactions     No Known Allergies         REVIEW OF SYSTEMS:   5 point ROS negative except as noted above in HPI, including Gen., Resp., CV, GI &  system review.    PHYSICAL EXAM:   BP (!) 175/114  Pulse 92  Temp 97.8  F (36.6  C) (Oral)  Resp 19  SpO2 96% Estimated body mass index is 24.28 kg/(m^2) as calculated from the following:    Height as of 1/30/18: 1.702 m (5' 7\").    Weight as of 1/30/18: 70.3 kg (155 lb).   GENERAL APPEARANCE: alert, and oriented  MENTAL STATUS: alert  AIRWAY EXAM: Mallampatti Class I (visualization of the soft palate, fauces, uvula, anterior and posterior pillars)  RESP: lungs clear to auscultation - no rales, rhonchi or wheezes  CV: regular rates and rhythm  DIAGNOSTICS:    Not indicated    IMPRESSION   ASA Class 2 - Mild systemic disease    PLAN:   Plan for Esophagogastroduodenoscopy with possible biopsy, possible dilation, possible foreign body removal. We discussed the risks, benefits and alternatives " and the patient wished to proceed.    The above has been forwarded to the consulting provider.      Signed Electronically by: Mendez Mcknight  February 11, 2018

## 2018-02-16 ENCOUNTER — HOSPITAL ENCOUNTER (OUTPATIENT)
Facility: CLINIC | Age: 83
Discharge: HOME | End: 2018-02-16
Attending: INTERNAL MEDICINE | Admitting: INTERNAL MEDICINE
Payer: MEDICARE

## 2018-02-16 VITALS
OXYGEN SATURATION: 95 % | RESPIRATION RATE: 16 BRPM | BODY MASS INDEX: 24.33 KG/M2 | HEIGHT: 67 IN | SYSTOLIC BLOOD PRESSURE: 142 MMHG | HEART RATE: 76 BPM | DIASTOLIC BLOOD PRESSURE: 112 MMHG | WEIGHT: 155 LBS

## 2018-02-16 LAB — UPPER GI ENDOSCOPY: NORMAL

## 2018-02-16 PROCEDURE — 88305 TISSUE EXAM BY PATHOLOGIST: CPT | Performed by: INTERNAL MEDICINE

## 2018-02-16 PROCEDURE — 25000125 ZZHC RX 250: Performed by: INTERNAL MEDICINE

## 2018-02-16 PROCEDURE — 43239 EGD BIOPSY SINGLE/MULTIPLE: CPT | Performed by: INTERNAL MEDICINE

## 2018-02-16 PROCEDURE — 43249 ESOPH EGD DILATION <30 MM: CPT | Performed by: INTERNAL MEDICINE

## 2018-02-16 PROCEDURE — 88305 TISSUE EXAM BY PATHOLOGIST: CPT | Mod: 26 | Performed by: INTERNAL MEDICINE

## 2018-02-16 PROCEDURE — 25000128 H RX IP 250 OP 636: Performed by: INTERNAL MEDICINE

## 2018-02-16 PROCEDURE — 40000104 ZZH STATISTIC MODERATE SEDATION < 10 MIN: Performed by: INTERNAL MEDICINE

## 2018-02-16 RX ORDER — FENTANYL CITRATE 50 UG/ML
INJECTION, SOLUTION INTRAMUSCULAR; INTRAVENOUS PRN
Status: DISCONTINUED | OUTPATIENT
Start: 2018-02-16 | End: 2018-02-16 | Stop reason: HOSPADM

## 2018-02-16 RX ORDER — ONDANSETRON 4 MG/1
4 TABLET, ORALLY DISINTEGRATING ORAL EVERY 6 HOURS PRN
Status: DISCONTINUED | OUTPATIENT
Start: 2018-02-16 | End: 2018-02-16 | Stop reason: HOSPADM

## 2018-02-16 RX ORDER — ONDANSETRON 2 MG/ML
4 INJECTION INTRAMUSCULAR; INTRAVENOUS
Status: DISCONTINUED | OUTPATIENT
Start: 2018-02-16 | End: 2018-02-16 | Stop reason: HOSPADM

## 2018-02-16 RX ORDER — NALOXONE HYDROCHLORIDE 0.4 MG/ML
.1-.4 INJECTION, SOLUTION INTRAMUSCULAR; INTRAVENOUS; SUBCUTANEOUS
Status: DISCONTINUED | OUTPATIENT
Start: 2018-02-16 | End: 2018-02-16 | Stop reason: HOSPADM

## 2018-02-16 RX ORDER — FLUMAZENIL 0.1 MG/ML
0.2 INJECTION, SOLUTION INTRAVENOUS
Status: DISCONTINUED | OUTPATIENT
Start: 2018-02-16 | End: 2018-02-16 | Stop reason: HOSPADM

## 2018-02-16 RX ORDER — ONDANSETRON 2 MG/ML
4 INJECTION INTRAMUSCULAR; INTRAVENOUS EVERY 6 HOURS PRN
Status: DISCONTINUED | OUTPATIENT
Start: 2018-02-16 | End: 2018-02-16 | Stop reason: HOSPADM

## 2018-02-16 RX ORDER — LIDOCAINE 40 MG/G
CREAM TOPICAL
Status: DISCONTINUED | OUTPATIENT
Start: 2018-02-16 | End: 2018-02-16 | Stop reason: HOSPADM

## 2018-02-16 NOTE — LETTER
February 1, 2018      Kraig Genao  [address]  [place] [facility] [id number]        Dear Kraig,         Thank you for choosing [facility]. You are scheduled for the following service.   Date:   February 16, 2018 - Friday      Procedure: UPPER ENDOSCOPY-EGD  Doctor: Mendez Mcknight           Arrival Time:  10:30 am    *check in at Emergency/Endoscopy desk*  Procedure Time: 11:00 am     Location:   [facility]        Endoscopy Department, First Floor (Enter through ER Doors) *         [address] [place], [place] [address]      [phone] or [phone] ([facility]) to reschedule        PRE-PROCEDURE CHECKLIST    If you have diabetes, ask your regular doctor for diet and medication restrictions.  If you take any antiplatelet or anticoagulant medications (such as Coumadin, Lovenox, Plavix, etc.) and have not already discussed this, please call your primary physician for advice on holding this medication.  If you take Aspirin, you may continue to do so.  If you are or may be pregnant, please discuss the risks and benefits of this procedure with your doctor.  You must arrange for a ride for the day of your exam. If you fail to arrange transportation with a responsible adult, your procedure will need to be cancelled and rescheduled. Taxi, bus and medical transport are not acceptable unless you have a responsible adult that you know & trust with you. Please arrange for this  to be able to pick you up in our department, approximately one hour after your scheduled procedure, if they are not able to stay with you.      Canceling or rescheduling   If you must cancel or reschedule your appointment, please call [phone] as soon as possible.      Upper Endoscopy or Esophagogastroduodenoscopy (EGD) is a test performed to evaluate symptoms of persistent abdominal pain, nausea, vomiting and difficulty swallowing. It may also be used to treat various conditions  of the upper gastrointestinal tract, such as bleeding, narrowing or abnormal growths.     What happens during an upper endoscopy?  On the day of your procedure, plan to spend up to one and a half hour after your arrival at the endoscopy center. The exam itself takes about 5 to 10 minutes.    Before the exam:  - You will change into a gown.   - Your medical history and medication list will be reviewed with you, unless it has already been done over the phone.   - A nurse will insert an intravenous (IV) line into your hand or arm.  - The doctor will talk to you and give you a consent form to sign.    During the exam:  - Medicine will be given through the IV line to help you relax and feel comfortable.   - Your heart rate and oxygen levels will be monitored. If your blood pressure is low, you may be given fluids through the IV line.   - The doctor will insert a flexible, hollow tube, called an endoscope, into your mouth and will advance it slowly through the esophagus, stomach and duodenum (the first part of your small intestine).   - You may have a feeling of pressure or fullness.   - If you have difficulty swallowing, and the doctor finds a narrowing in your esophagus, it may be possible for the area to be expanded-dilated during the exam.   - If abnormal tissue is found, the doctor may remove it through the endoscope (biopsy it) for closer examination. The tissue removal is painless.    After the exam:  - Any tissue samples removed during the exam will be sent to a lab for evaluation. It may take 5 to 7 working days for you to be notified of the results  - The doctor will prepare a full report for the physician who referred you for the upper endoscopy.   - The doctor will talk with you about the initial results of your exam.   - You may feel bloated after the procedure. That is normal and should not last long.   - Your throat may feel sore for a short time.   - Following the exam, you may resume your normal diet.  Avoid alcohol until the next day.   - You may resume your regular activities the day after the procedure.   - Medication given during the exam will prohibit you from driving for the rest of the day.  - A nurse will provide you with complete discharge instructions before you leave the endoscopy center. Be sure to ask the nurse for specific instructions if you take blood thinners such as Aspirin , Coumadin , Lovenox , Plavix , etc.       PREPARATION    To ensure a successful exam, please follow all instructions carefully.      The night before your exam:    STOP eating solid foods at 11:45 pm.     Clear liquids are okay to drink (examples: Gatorade , apple juice, clear broth, etc.).     DO NOT drink red liquids or alcoholic beverages.    The day of your exam:    STOP drinking clear liquids 4 hours before your exam.     You may take your usual medications with 4 oz. of water, but it needs to be at least 4 hours prior to your procedure.    When you leave for the procedure:    Bring a list of all of your current medications, including any allergy or over-the-counter medications, unless you have already reviewed that with an Endoscopy RN over the phone.     Bring a photo ID as well as up-to-date insurance information, such as your insurance card and any referral forms that might be required by your payer.           DIRECTIONS TO THE ENDOSCOPY DEPARTMENT    From the north ([facility])  Take [address] south, exit on [address] . Get into the left hand taty, turn left (east), go one-half mile to [address] and turn left. Go north to the first stoplight, take a right on [address] and follow it to the Emergency entrance.    From the south ([facility])  Take [address]N to the [address]E split and exit on [address] . On [address] , turn left (west) to [address]. Turn right (north) on [address]. Go north to the first stoplight, take a right on [address] and follow it to the  Emergency entrance.    From the east via 35E ([facility])  Take [address] south to [address]  exit. Turn right on [address]. Go west to [address]. Turn right (north) on [address]. Go to the first stoplight, take a right and follow on [address] to the Emergency entrance.    From the east via [address] ([facility])  Take [address] to [address]. Turn left (south) on [address] to [address]. Turn left (east) on [address] and follow it to the Emergency entrance.    From the west via [address] ([place])  Take [address] east to [address]. Turn right (south) on [address] to [address]. Turn left (east) on [address] and follow it to the Emergency entrance.

## 2018-02-16 NOTE — H&P
Pre-Endoscopy History and Physical     Kraig Genao MRN# [id number]   YOB: 1926 Age: 89 year old     Date of Procedure: 2/16/2018  Primary care provider: Griselda Syed  Type of Endoscopy: Esophagogastroduodenoscopy with possible biopsy, possible dilation, possible foreign body removal  Reason for Procedure: stricture  Type of Anesthesia Anticipated: Conscious Sedation    HPI:    Kraig is a 89 year old male who will be undergoing the above procedure.      A history and physical has been performed. The patient's medications and allergies have been reviewed. The risks and benefits of the procedure and the sedation options and risks were discussed with the patient.  All questions were answered and informed consent was obtained.      He denies a personal or family history of anesthesia complications or bleeding disorders.     Patient Active Problem List   Diagnosis     Atrial fibrillation (H)     Macular degeneration (senile) of retina     Helicobacter pylori infection     Esophageal reflux     CARDIOVASCULAR SCREENING; LDL GOAL LESS THAN 130     Elevated LFTs     Chest pain     ED (erectile dysfunction)     Gastroesophageal reflux disease, esophagitis presence not specified        Past Medical History:   Diagnosis Date     Atrial fibrillation (H)     episodic     Bile duct stone      Chest pain 2/18/2015     Dyspepsia and other specified disorders of function of stomach     dyspepsia; better on zantac     Elevated LFTs      Erectile dysfunction      Esophageal reflux     dilation of stricture 8/06     Gastro-oesophageal reflux disease      Helicobacter pylori (H. pylori)     had UGI endoscopy 10/00 & had LES dilitation     Hypertrophy (benign) of prostate     some voiding sx     Macular degeneration (senile) of retina, unspecified     mainly R eye        Past Surgical History:   Procedure Laterality Date     C NONSPECIFIC PROCEDURE  10/00 & 8/03    UGI endoscopy with dilitation    Abstracted  07/09/02     C NONSPECIFIC PROCEDURE  01/01    L hernia     C NONSPECIFIC PROCEDURE  06/02    lap choly     C NONSPECIFIC PROCEDURE  4/01    colonocopy 4/01     C NONSPECIFIC PROCEDURE  2/04    R total shoulder     C NONSPECIFIC PROCEDURE  4/05    re-do R shoulder     ENDOSCOPIC RETROGRADE CHOLANGIOPANCREATOGRAM  9/23/2011    Procedure:ENDOSCOPIC RETROGRADE CHOLANGIOPANCREATOGRAM; Endoscopic Retrograde Cholangiopancreatogram (c-arm), baloon dilation, stone retreival and 10fr. Solus stent placement x2 in  bile duct with the spy glass; Surgeon:EDD AUGUST; Location:[facility]     ENDOSCOPIC RETROGRADE CHOLANGIOPANCREATOGRAPHY, LITHOTRIPSY PANCREAS, COMBINED  10/27/2011    Procedure:COMBINED ENDOSCOPIC RETROGRADE CHOLANGIOPANCREATOGRAPHY, LITHOTRIPSY PANCREAS; Endoscopic Retrograde Cholangiopancreatogram with spygass scope ,Electrohydraulic Lithotripsy  baloon dilation of bile duct, stone removal (c-arm); Surgeon:EDD AUGUST; Location:[facility]     ESOPHAGOSCOPY, GASTROSCOPY, DUODENOSCOPY (EGD), COMBINED N/A 5/9/2016    Procedure: COMBINED ESOPHAGOSCOPY, GASTROSCOPY, DUODENOSCOPY (EGD), BIOPSY SINGLE OR MULTIPLE;  Surgeon: Mendez Mcknight MD;  Location:  GI     ESOPHAGOSCOPY, GASTROSCOPY, DUODENOSCOPY (EGD), COMBINED N/A 2/11/2018    Procedure: COMBINED ESOPHAGOSCOPY, GASTROSCOPY, DUODENOSCOPY (EGD), REMOVE FOREIGN BODY;  COMBINED ESOPHAGOSCOPY, GASTROSCOPY, DUODENOSCOPY (EGD) by raptor graspeing ;  Surgeon: Mendez Mcknight MD;  Location:  GI       Social History   Substance Use Topics     Smoking status: Never Smoker     Smokeless tobacco: Never Used     Alcohol use 0.0 oz/week     0 Standard drinks or equivalent per week      Comment: 1-2 beer, wine, or mixed drink per night       Family History   Problem Relation Age of Onset     HEART DISEASE Father       M.I.     Esophageal Cancer No family hx of      GASTROINTESTINAL DISEASE No family hx of      Stomach Cancer No family hx of        Prior to Admission  "medications    Medication Sig Start Date End Date Taking? Authorizing Provider   Omega-3 Fatty Acids (FISH OIL) 1200 MG CAPS Take by mouth daily   Yes Reported, Patient   Cholecalciferol (VITAMIN D3 PO) Take 1,000 Units by mouth daily   Yes Reported, Patient   atenolol (TENORMIN) 50 MG tablet Take 25 mg by mouth 2 times daily   Yes Unknown, Entered By History   budesonide-formoterol (SYMBICORT) 160-4.5 MCG/ACT inhaler Inhale 2 puffs into the lungs 2 times daily   Yes Unknown, Entered By History   omeprazole (PRILOSEC) 20 MG capsule Take 20 mg by mouth daily   Yes Unknown, Entered By History   Multiple Vitamin (ONE-A-DAY MENS PO) Take 1 tablet by mouth daily   Yes Unknown, Entered By History   calcium-vitamin D (CALTRATE) 600-400 MG-UNIT per tablet Take 1 tablet by mouth daily   Yes Unknown, Entered By History   ascorbic acid (VITAMIN C) 500 MG tablet Take 1,000 mg by mouth daily    Yes Unknown, Entered By History   oxybutynin (DITROPAN) 5 MG tablet Take 5 mg by mouth 2 times daily    Yes Reported, Patient   apixaban ANTICOAGULANT (ELIQUIS) 5 MG tablet Take 1 tablet (5 mg) by mouth 2 times daily 4/6/15   Deric Enriquez MD   terazosin (HYTRIN) 2 MG capsule Take 2 mg by mouth At Bedtime    Unknown, Entered By History       Allergies   Allergen Reactions     No Known Allergies         REVIEW OF SYSTEMS:   5 point ROS negative except as noted above in HPI, including Gen., Resp., CV, GI &  system review.    PHYSICAL EXAM:   /90  Pulse 76  Resp 16  Ht 1.702 m (5' 7\")  Wt 70.3 kg (155 lb)  SpO2 98%  BMI 24.28 kg/m2 Estimated body mass index is 24.28 kg/(m^2) as calculated from the following:    Height as of this encounter: 1.702 m (5' 7\").    Weight as of this encounter: 70.3 kg (155 lb).   GENERAL APPEARANCE: alert, and oriented  MENTAL STATUS: alert  AIRWAY EXAM: Mallampatti Class II (visualization of the soft palate, fauces, and uvula)  RESP: lungs clear to auscultation - no rales, rhonchi or " wheezes  CV: regular rates and rhythm  DIAGNOSTICS:    Not indicated    IMPRESSION   ASA Class 3 - Severe systemic disease, but not incapacitating    PLAN:   Plan for Esophagogastroduodenoscopy with possible biopsy, possible dilation, possible foreign body removal. We discussed the risks, benefits and alternatives and the patient wished to proceed.    The above has been forwarded to the consulting provider.      Signed Electronically by: Mendez Mcknight  February 16, 2018

## 2018-02-16 NOTE — IP AVS SNAPSHOT
MRN:[id number]                      After Visit Summary   2/16/2018    Kraig Genao    MRN: [id number]           Thank you!     Thank you for choosing [facility] for your care. Our goal is always to provide you with excellent care. Hearing back from our patients is one way we can continue to improve our services. Please take a few minutes to complete the written survey that you may receive in the mail after you visit. If you would like to speak to someone directly about your visit please contact Patient Relations at [phone]. Thank you!          Patient Information     Date Of Birth          7/18/1926        About your hospital stay     You were admitted on:  February 16, 2018 You last received care in the:  [facility] End[facility]    You were discharged on:  February 16, 2018       Who to Call     For medical emergencies, please call 911.  For non-urgent questions about your medical care, please call your primary care provider or clinic, [phone]  For questions related to your surgery, please call your surgery clinic        Attending Provider     Provider Mendez Goff MD Gastroenterology       Primary Care Provider Office Phone # Fax #    Griselda Abhinav Syed [phone]      Further instructions from your care team         Understanding H. pylori and Ulcers  Traditionally, ulcers, or sores in the lining of your digestive tract, were thought to be caused by too much spicy food, stress, or an anxious personality. We now know that most ulcers are probably due to infection with bacteria known as Helicobacter pylori (H. pylori).     H. pylori invade and disturb the lining of the digestive tract. Acid may weaken the area, causing an ulcer.      Common Ulcer Symptoms  Burning, cramping, or hunger-like pain in the stomach area, often one to three hours after a meal or in the middle of the night  Pain that gets better or worse with  "eating  Nausea or vomiting  Black, tarry, or bloody stools (which means the ulcer is bleeding)  Or you may have no symptoms.     An ulcer can form in two areas of the digestive tract; the stomach and the duodenum (where the stomach meets the small intestine).      Your Evaluation  An evaluation by your doctor can show if you have an ulcer and determine whether it was caused by H. pylori. Your doctor may ask you questions, examine you, and possibly do some tests. These may include:  A special X-ray called a barium upper gastrointestinal series, to help locate an ulcer. During the test, you drink a chalky liquid. This liquid helps the ulcer show up on the X-ray.  An endoscopic exam, done with a long tube passed through your mouth into your stomach, to give the doctor a closer look at your ulcer. You will be lightly sedated for this procedure. Your doctor can also take a tissue sample to test for H. pylori.  Blood, stool, and breath tests are also available to show whether you have H. pylori in your digestive tract.  Your Treatment  To kill H. pylori so your ulcer can heal, your doctor will probably prescribe antibiotics. Other ulcer medications that help reduce stomach acid may also be prescribed as well. Testing after treatment is recommended to be sure the H. pylori infection is gone. Usually, killing H. pylori helps keep the ulcer from returning.    3089-2748 [employer], [address] [place] [address], [place] [address]. All rights reserved. This information is not intended as a substitute for professional medical care. Always follow your healthcare professional's instructions.    Pending Results     No orders found from 2/14/2018 to 2/17/2018.            Admission Information     Date & Time Provider Department Dept. Phone    2/16/2018 Mendez Mcknight MD [facility] Endoscopy [phone]      Your Vitals Were     Blood Pressure Pulse Respirations Height Weight Pulse Oximetry    159/98 76 16 1.702 m (5' 7\") " 70.3 kg (155 lb) 97%    BMI (Body Mass Index)                   24.28 kg/m2           [employer] Information     [employer] gives you secure access to your electronic health record. If you see a primary care provider, you can also send messages to your care team and make appointments. If you have questions, please call your primary care clinic.  If you do not have a primary care provider, please call [phone] and they will assist you.        Care EveryWhere ID     This is your Care EveryWhere ID. This could be used by other organizations to access your [facility] medical records  [id number]        Equal Access to Services     : Hadmurali Vega, pepe gamez, chris reyna, melia love. So [facility] [phone].    ATENCIÓN: Si habla español, tiene a funez disposición servicios gratuitos de asistencia lingüística. [facility] [phone].    We comply with applicable federal civil rights laws and [place] laws. We do not discriminate on the basis of race, color, national origin, age, disability, sex, sexual orientation, or gender identity.               Review of your medicines      CONTINUE these medicines which have NOT CHANGED        Dose / Directions    apixaban ANTICOAGULANT 5 MG tablet   Commonly known as:  ELIQUIS   Used for:  Atrial fibrillation (H)        Dose:  5 mg   Take 1 tablet (5 mg) by mouth 2 times daily   Quantity:  60 tablet   Refills:  12       ascorbic acid 500 MG tablet   Commonly known as:  VITAMIN C        Dose:  1000 mg   Take 1,000 mg by mouth daily   Refills:  0       atenolol 50 MG tablet   Commonly known as:  TENORMIN        Dose:  25 mg   Take 25 mg by mouth 2 times daily   Refills:  0       budesonide-formoterol 160-4.5 MCG/ACT Inhaler   Commonly known as:  SYMBICORT        Dose:  2 puff   Inhale 2 puffs into the lungs 2 times daily   Refills:  0       calcium-vitamin D 600-400 MG-UNIT per tablet   Commonly known as:   CALTRATE        Dose:  1 tablet   Take 1 tablet by mouth daily   Refills:  0       fish Oil 1200 MG capsule        Take by mouth daily   Refills:  0       omeprazole 20 MG CR capsule   Commonly known as:  priLOSEC        Dose:  20 mg   Take 20 mg by mouth daily   Refills:  0       ONE-A-DAY MENS PO        Dose:  1 tablet   Take 1 tablet by mouth daily   Refills:  0       oxybutynin 5 MG tablet   Commonly known as:  DITROPAN        Dose:  5 mg   Take 5 mg by mouth 2 times daily   Refills:  0       terazosin 2 MG capsule   Commonly known as:  HYTRIN        Dose:  2 mg   Take 2 mg by mouth At Bedtime   Refills:  0       VITAMIN D3 PO        Dose:  1000 Units   Take 1,000 Units by mouth daily   Refills:  0                Protect others around you: Learn how to safely use, store and throw away your medicines at www.disposemymeds.org.             Medication List: This is a list of all your medications and when to take them. Check marks below indicate your daily home schedule. Keep this list as a reference.      Medications           Morning Afternoon Evening Bedtime As Needed    apixaban ANTICOAGULANT 5 MG tablet   Commonly known as:  ELIQUIS   Take 1 tablet (5 mg) by mouth 2 times daily                                ascorbic acid 500 MG tablet   Commonly known as:  VITAMIN C   Take 1,000 mg by mouth daily                                atenolol 50 MG tablet   Commonly known as:  TENORMIN   Take 25 mg by mouth 2 times daily                                budesonide-formoterol 160-4.5 MCG/ACT Inhaler   Commonly known as:  SYMBICORT   Inhale 2 puffs into the lungs 2 times daily                                calcium-vitamin D 600-400 MG-UNIT per tablet   Commonly known as:  CALTRATE   Take 1 tablet by mouth daily                                fish Oil 1200 MG capsule   Take by mouth daily                                omeprazole 20 MG CR capsule   Commonly known as:  priLOSEC   Take 20 mg by mouth daily                                 ONE-A-DAY MENS PO   Take 1 tablet by mouth daily                                oxybutynin 5 MG tablet   Commonly known as:  DITROPAN   Take 5 mg by mouth 2 times daily                                terazosin 2 MG capsule   Commonly known as:  HYTRIN   Take 2 mg by mouth At Bedtime                                VITAMIN D3 PO   Take 1,000 Units by mouth daily

## 2018-02-16 NOTE — DISCHARGE INSTRUCTIONS
Understanding H. pylori and Ulcers  Traditionally, ulcers, or sores in the lining of your digestive tract, were thought to be caused by too much spicy food, stress, or an anxious personality. We now know that most ulcers are probably due to infection with bacteria known as Helicobacter pylori (H. pylori).     H. pylori invade and disturb the lining of the digestive tract. Acid may weaken the area, causing an ulcer.      Common Ulcer Symptoms  Burning, cramping, or hunger-like pain in the stomach area, often one to three hours after a meal or in the middle of the night  Pain that gets better or worse with eating  Nausea or vomiting  Black, tarry, or bloody stools (which means the ulcer is bleeding)  Or you may have no symptoms.     An ulcer can form in two areas of the digestive tract; the stomach and the duodenum (where the stomach meets the small intestine).      Your Evaluation  An evaluation by your doctor can show if you have an ulcer and determine whether it was caused by H. pylori. Your doctor may ask you questions, examine you, and possibly do some tests. These may include:  A special X-ray called a barium upper gastrointestinal series, to help locate an ulcer. During the test, you drink a chalky liquid. This liquid helps the ulcer show up on the X-ray.  An endoscopic exam, done with a long tube passed through your mouth into your stomach, to give the doctor a closer look at your ulcer. You will be lightly sedated for this procedure. Your doctor can also take a tissue sample to test for H. pylori.  Blood, stool, and breath tests are also available to show whether you have H. pylori in your digestive tract.  Your Treatment  To kill H. pylori so your ulcer can heal, your doctor will probably prescribe antibiotics. Other ulcer medications that help reduce stomach acid may also be prescribed as well. Testing after treatment is recommended to be sure the H. pylori infection is gone. Usually, killing H. pylori  helps keep the ulcer from returning.    5460-3927 [employer], [address] [place] [address], [place] [address]. All rights reserved. This information is not intended as a substitute for professional medical care. Always follow your healthcare professional's instructions.

## 2018-02-19 LAB — COPATH REPORT: NORMAL

## 2018-10-30 ENCOUNTER — HOSPITAL ENCOUNTER (EMERGENCY)
Facility: CLINIC | Age: 83
Discharge: HOME | End: 2018-10-30
Attending: EMERGENCY MEDICINE | Admitting: EMERGENCY MEDICINE
Payer: MEDICARE

## 2018-10-30 VITALS
RESPIRATION RATE: 16 BRPM | TEMPERATURE: 98.7 F | SYSTOLIC BLOOD PRESSURE: 151 MMHG | HEART RATE: 84 BPM | OXYGEN SATURATION: 97 % | DIASTOLIC BLOOD PRESSURE: 114 MMHG

## 2018-10-30 DIAGNOSIS — R04.0 EPISTAXIS: ICD-10-CM

## 2018-10-30 PROCEDURE — 99284 EMERGENCY DEPT VISIT MOD MDM: CPT

## 2018-10-30 PROCEDURE — 30901 CONTROL OF NOSEBLEED: CPT

## 2018-10-30 RX ORDER — OXYMETAZOLINE HYDROCHLORIDE 0.05 G/100ML
2 SPRAY NASAL ONCE
Status: DISCONTINUED | OUTPATIENT
Start: 2018-10-30 | End: 2018-10-30 | Stop reason: HOSPADM

## 2018-10-30 NOTE — ED TRIAGE NOTES
"Nose bleed 2 nights ago, resolved \"after I shoved kleenex up there\". Today nose bleed started again and was going down the back of his throat, \"still going down there somewhat\". He is on eloquis but he is not sure what for.   "

## 2018-10-30 NOTE — ED AVS SNAPSHOT
[facility] Emergency Department    [address]    [facility] [address]    Phone:  [phone]    Fax:  [phone]                                       Kraig Genao   MRN: [id number]    Department:  [facility] Emergency Department   Date of Visit:  10/30/2018           After Visit Summary Signature Page     I have received my discharge instructions, and my questions have been answered. I have discussed any challenges I see with this plan with the nurse or doctor.    ..........................................................................................................................................  Patient/Patient Representative Signature      ..........................................................................................................................................  Patient Representative Print Name and Relationship to Patient    ..................................................               ................................................  Date                                   Time    ..........................................................................................................................................  Reviewed by Signature/Title    ...................................................              ..............................................  Date                                               Time          22EPIC Rev 08/18

## 2018-10-30 NOTE — ED AVS SNAPSHOT
" [facility] Emergency Department    [address]    [facility] [address]    Phone:  [phone]    Fax:  [phone]                                       Kraig Genao   MRN: [id number]    Department:  [facility] Emergency Department   Date of Visit:  10/30/2018           Patient Information     Date Of Birth          7/18/1926        Your diagnoses for this visit were:     Epistaxis        You were seen by Diana Linares MD.      Follow-up Information     Follow up with Griselda Syed NP.    Specialty:  Nurse Practitioner - Adult Health    Why:  As needed    Contact information:    [address] [facility] [address]  [phone]          Schedule an appointment as soon as possible for a visit with ent specialty care .    Contact information:    Main Phone:  [phone]  Address:  [address] [facility] [address] [place] [address]        Follow up with [facility] Emergency Department.    Specialty:  EMERGENCY MEDICINE    Why:  As needed, If symptoms worsen    Contact information:    [address]  [facility] [id number] [phone]        Discharge Instructions       Discharge Instructions  Epistaxis    Today you were seen for a nosebleed.   Nosebleeds (the medical term is \"epistaxis\") are very common. Almost every person has had at least one in their lifetime.  Although the amount of blood loss can appear dramatic, nosebleeds rarely cause serious problems.  The most common causes are dry air or nose picking, but they also are common in people who have allergies, high blood pressure, or are on blood thinners (such as Coumadin, Aspirin or Plavix). If you or your child gets a nosebleed, the important thing is to know how to take care of it. With the right self-care, most nosebleeds will stop on their own.    Generally, every Emergency Department visit should have a follow-up clinic visit with " either a primary or a specialty clinic/provider. Please follow-up as instructed by your emergency provider today.    Return to the Emergency Department if:    Your nose is bleeding a very large amount of blood and you are unable to stop it.    You get very pale, faint, or tired.    You cannot get the bleeding to stop after following these instructions.    Treatment:    Your provider may tell you to use a decongestant nose spray, like Afrin  (oxymetazoline), in both nostrils in the morning and at night for the next three days. Do not use this medicine for more than three days at a time.  If you do, it will cause nasal congestion.     Use a moisturizer. A small amount of Vaseline  to the inside of your nostrils for moisture before bed is one option. There are nasal sprays available over-the-counter for this purpose as well.  Using a humidifier in your bedroom or home will help as well when the air is dry.    For the next three days, do not blow your nose or put anything in your nose. You may sniffle, or dab the outside of your nose.    Do not bend with your head below your waist for the next three days. Do not lift anything so heavy that you have to strain.     If you received nasal packing, please do not remove the packing until seen by an Ear, Nose, and Throat (ENT) specialist.  If antibiotics have been given with the packing, please take as directed.    If your nose starts to bleed again:    Blow your nose to get rid of some of the clots that have formed inside your nostrils. This may increase the bleeding temporarily, but that is okay.    Spray decongestant nose spray (like Afrin ) into both nostrils to constrict the blood vessels.    Sit or stand while bending forward slightly at the waist. Do not lie down or tilt your head back. This may cause you to swallow blood and can lead to vomiting.     the soft part of BOTH nostrils at the bottom of your nose and squeeze your nose closed for at least 5 minutes (for  children) or 10 to 15 minutes (for adults). Use a clock to time yourself. Do not release the pressure every so often to check whether the bleeding has stopped. Many people hurt their chances of stopping the bleeding by releasing the pressure too soon or too often.    If you follow the steps outlined above, and your nose continues to bleed, repeat all the steps once more. Apply pressure for a total of at least 30 minutes. If you continue to bleed even then, seek medical attention.  If you were given a prescription for medicine here today, be sure to read all of the information (including the package insert) that comes with your prescription.  This will include important information about the medicine, its side effects, and any warnings that you need to know about.  The pharmacist who fills the prescription can provide more information and answer questions you may have about the medicine.  If you have questions or concerns that the pharmacist cannot address, please call or return to the Emergency Department.   Remember that you can always come back to the Emergency Department if you are not able to see your regular provider in the amount of time listed above, if you get any new symptoms, or if there is anything that worries you.      24 Hour Appointment Hotline       To make an appointment at any [facility], call [phone] ([phone]). If you don't have a family doctor or clinic, we will help you find one. [facility] clinics are conveniently located to serve the needs of you and your family.             Review of your medicines      Our records show that you are taking the medicines listed below. If these are incorrect, please call your family doctor or clinic.        Dose / Directions Last dose taken    apixaban ANTICOAGULANT 5 MG tablet   Commonly known as:  ELIQUIS   Dose:  5 mg   Quantity:  60 tablet        Take 1 tablet (5 mg) by mouth 2 times daily   Refills:  12        ascorbic acid 500 MG tablet    Commonly known as:  VITAMIN C   Dose:  1000 mg        Take 1,000 mg by mouth daily   Refills:  0        atenolol 50 MG tablet   Commonly known as:  TENORMIN   Dose:  25 mg        Take 25 mg by mouth 2 times daily   Refills:  0        budesonide-formoterol 160-4.5 MCG/ACT Inhaler   Commonly known as:  SYMBICORT   Dose:  2 puff        Inhale 2 puffs into the lungs 2 times daily   Refills:  0        calcium carbonate 600 mg-vitamin D 400 units 600-400 MG-UNIT per tablet   Commonly known as:  CALTRATE   Dose:  1 tablet        Take 1 tablet by mouth daily   Refills:  0        fish Oil 1200 MG capsule        Take by mouth daily   Refills:  0        omeprazole 20 MG CR capsule   Commonly known as:  priLOSEC   Dose:  20 mg        Take 20 mg by mouth daily   Refills:  0        ONE-A-DAY MENS PO   Dose:  1 tablet        Take 1 tablet by mouth daily   Refills:  0        oxybutynin 5 MG tablet   Commonly known as:  DITROPAN   Dose:  5 mg        Take 5 mg by mouth 2 times daily   Refills:  0        terazosin 2 MG capsule   Commonly known as:  HYTRIN   Dose:  2 mg        Take 2 mg by mouth At Bedtime   Refills:  0        VITAMIN D3 PO   Dose:  1000 Units        Take 1,000 Units by mouth daily   Refills:  0                Orders Needing Specimen Collection     None      Pending Results     No orders found from 10/28/2018 to 10/31/2018.            Pending Culture Results     No orders found from 10/28/2018 to 10/31/2018.            Pending Results Instructions     If you had any lab results that were not finalized at the time of your Discharge, you can call the ED Lab Result RN at [phone]. You will be contacted by this team for any positive Lab results or changes in treatment. The nurses are available 7 days a week from 10A to 6:30P.  You can leave a message 24 hours per day and they will return your call.        Test Results From Your Hospital Stay               Clinical Quality Measure: Blood Pressure Screening     Your  blood pressure was checked while you were in the emergency department today. The last reading we obtained was  BP: (!) 151/114 . Please read the guidelines below about what these numbers mean and what you should do about them.  If your systolic blood pressure (the top number) is less than 120 and your diastolic blood pressure (the bottom number) is less than 80, then your blood pressure is normal. There is nothing more that you need to do about it.  If your systolic blood pressure (the top number) is 120-139 or your diastolic blood pressure (the bottom number) is 80-89, your blood pressure may be higher than it should be. You should have your blood pressure rechecked within a year by a primary care provider.  If your systolic blood pressure (the top number) is 140 or greater or your diastolic blood pressure (the bottom number) is 90 or greater, you may have high blood pressure. High blood pressure is treatable, but if left untreated over time it can put you at risk for heart attack, stroke, or kidney failure. You should have your blood pressure rechecked by a primary care provider within the next 4 weeks.  If your provider in the emergency department today gave you specific instructions to follow-up with your doctor or provider even sooner than that, you should follow that instruction and not wait for up to 4 weeks for your follow-up visit.        Thank you for choosing [facility]       Thank you for choosing [facility] for your care. Our goal is always to provide you with excellent care. Hearing back from our patients is one way we can continue to improve our services. Please take a few minutes to complete the written survey that you may receive in the mail after you visit with us. Thank you!        [employer] Information     [facility] gives you secure access to your electronic health record. If you see a primary care provider, you can also send messages to your care team and make appointments. If you have questions, please  call your primary care clinic.  If you do not have a primary care provider, please call [phone] and they will assist you.        Care EveryWhere ID     This is your Care EveryWhere ID. This could be used by other organizations to access your [place] medical records  [id number]        Equal Access to Services     TULIO JAIN : Alvin Vega, pepe gamez, melia hernández. So [facility] [phone].    ATENCIÓN: Si habla español, tiene a funez disposición servicios gratuitos de asistencia lingüística. Llame al [phone].    We comply with applicable federal civil rights laws and [place] laws. We do not discriminate on the basis of race, color, national origin, age, disability, sex, sexual orientation, or gender identity.            After Visit Summary       This is your record. Keep this with you and show to your community pharmacist(s) and doctor(s) at your next visit.

## 2018-10-31 NOTE — ED PROVIDER NOTES
History     Chief Complaint:  Epistaxis    The history is provided by the patient.      Kraig Genao is a 89 year old male with a history of atrial fibrillation on Eliquis, who presents with his son for the evaluation of epistaxis. The patient reports that two and a half hours ago he started to experience epistaxis out of his right nare that he has been unable to control. The patient notes that two nights ago he had a 45 minute episode of epistaxis as well after sticking his finger in his nose and hitting 'something dry.' The patient denies other trauma to the nose. He reports he typically uses Vaseline on the nares but has not for the last few days. Denies any other concerns at this time.    Allergies:  No known drug allergies     Medications:    Eliquis  Tenormin  Symbicort  Caltrate  Prilosec  Ditropan  Hytrin    Past Medical History:    Atrial fibrillation  Bile duct stone  Chest pain  Dyspepsia  Elevated LFTs  Erectile dysfunction  Esophageal reflux  Gastro-oesophageal reflux  Helicobacter pylori   Hypertrophy  Macular degeneration of retina    Past Surgical History:    The patient does not have any past pertinent medical history.    Family History:    Heart disease    Social History:  Smoking status: Never Smoker  Alcohol use: Yes  Marital Status:    Lives at home with his wife. Presents with son.    Review of Systems   HENT: Positive for nosebleeds.    Hematological: Bruises/bleeds easily.   All other systems reviewed and are negative.    Physical Exam   Patient Vitals for the past 24 hrs:   BP Temp Temp src Pulse Heart Rate Resp SpO2   10/30/18 1838 (!) 186/121 98.7  F (37.1  C) Temporal 84 84 16 99 %       Physical Exam  General: Well-developed and well-nourished. Well appearing elderly  man. Cooperative.  Head:  Atraumatic.  Eyes:  Conjunctivae, lids, and sclerae are normal.  ENT:    Evidence of recent epistaxis from right nare with focal area in Kiesselbach's plexus with slight  oozing. No septal hematoma. Moist mucous membranes.  Neck:  Supple. Normal range of motion.  CV:  Regular rate. Normal heart sounds.  Resp:  No respiratory distress.   GI:  Non-distended.    MS:  Normal ROM.   Skin:  Warm. Non-diaphoretic. No pallor.  Neuro: Awake. A&Ox3. Normal strength.  Psych:  Normal mood and affect. Normal speech.  Vitals reviewed.    Emergency Department Course   Procedure Note:  Epistaxis Management    Informed verbal consent obtained.  Silver nitrate was applied to anterior bleeding area located on septal area.  Bleeding well controlled and watched patient for 15 minutes after with no return in bleeding.   Patient tolerated procedure well, no complications.      Emergency Department Course:  Past medical records, nursing notes, and vitals reviewed.  1902: I performed an exam of the patient and obtained history, as documented above.    Cautery as above.    2010: I rechecked the patient. No bleeding. Explained findings to patient and son.    I rechecked the patient.  Findings and plan explained to the patient and son. Patient discharged home with instructions regarding supportive care, medications, and reasons to return. The importance of close follow-up was reviewed.     Impression & Plan    Medical Decision Making:  Al is a 89-year-old man on Eliquis with a history of atrial fibrillation who presents with epistaxis from the right nare.  He has similar episode a few days ago that resolved after approximately 45 minutes but today's episode of loss of greater than 2 hours prompting his evaluation.  He has no other complaints and on exam appears quite well.  He does have an focal area in Kiesselbach's plexus in the right nare with evidence of recent bleeding with very slight oozing.  It has drastically slowed after the use of an nose clamp and I was then able to cauterize this area with cessation of bleeding.  Patient was monitored in the emergency department and had no return of bleeding and  was feeling quite well.  His initial hypertension improved though he remains with diastolic hypertension.  He has no symptoms with this.  I believe his epistaxis is unlikely to be related to his hypertension and is more likely related to dry air and digital trauma.  This is likely complicated by the fact that he is on Eliquis.  Because patient's bleeding has subsided and he is feeling well, I believe he is safe for discharge. Without other concerns, I do not believe labs are indicated.  I have discussed resuming use of Vaseline in the nares (he has stopped doing this the last week) as well as a humidifier and discussed avoiding digital trauma.  I provided ENT referral as needed should epistaxis become recurrent though I provided strict return precautions in the interim should he have a recurrent epistaxis that will not subside at home.  We did discuss techniques to use at home should he have recurrence and he was discharged with nasal clamp.  All the patient and his son's questions were answered and they verbalized understanding.  Amenable to discharge.    Diagnosis:    ICD-10-CM    1. Epistaxis R04.0        Disposition:  discharged home      Mayco Rodriguez  10/30/2018   [facility] EMERGENCY DEPARTMENT  Scribe Disclosure:  I, Mayco Rodriguez, am serving as a scribe at 7:02 PM on 10/30/2018 to document services personally performed by Diana Linares MD based on my observations and the provider's statements to me.      Diana Linares MD  11/01/18 0970

## 2018-11-01 ASSESSMENT — ENCOUNTER SYMPTOMS: BRUISES/BLEEDS EASILY: 1

## 2019-02-01 ENCOUNTER — OFFICE VISIT (OUTPATIENT)
Dept: CARDIOLOGY | Facility: CLINIC | Age: 84
End: 2019-02-01
Payer: MEDICARE

## 2019-02-01 VITALS
WEIGHT: 164 LBS | HEIGHT: 67 IN | HEART RATE: 78 BPM | SYSTOLIC BLOOD PRESSURE: 130 MMHG | BODY MASS INDEX: 25.74 KG/M2 | DIASTOLIC BLOOD PRESSURE: 80 MMHG

## 2019-02-01 DIAGNOSIS — I48.20 CHRONIC ATRIAL FIBRILLATION (H): Primary | ICD-10-CM

## 2019-02-01 PROCEDURE — 99213 OFFICE O/P EST LOW 20 MIN: CPT | Performed by: INTERNAL MEDICINE

## 2019-02-01 RX ORDER — BIOTIN 1 MG
TABLET ORAL DAILY
COMMUNITY

## 2019-02-01 ASSESSMENT — MIFFLIN-ST. JEOR: SCORE: 1352.53

## 2019-02-01 NOTE — LETTER
2/1/2019    Griselda Syed NP  [address] [facility] [place] [address]    RE: Kraig Genao       Dear Colleague,    I had the pleasure of seeing Kraig Genao in the [facility].    HPI and Plan:   See dictation:[id number]    Orders Placed This Encounter   Procedures     Follow-Up with Cardiologist       Orders Placed This Encounter   Medications     biotin 1000 MCG TABS tablet     Sig: Take by mouth daily       Medications Discontinued During This Encounter   Medication Reason     ascorbic acid (VITAMIN C) 500 MG tablet          Encounter Diagnosis   Name Primary?     Chronic atrial fibrillation (H) Yes       CURRENT MEDICATIONS:  Current Outpatient Medications   Medication Sig Dispense Refill     apixaban ANTICOAGULANT (ELIQUIS) 5 MG tablet Take 1 tablet (5 mg) by mouth 2 times daily 60 tablet 12     atenolol (TENORMIN) 50 MG tablet Take 25 mg by mouth 2 times daily       biotin 1000 MCG TABS tablet Take by mouth daily       budesonide-formoterol (SYMBICORT) 160-4.5 MCG/ACT inhaler Inhale 2 puffs into the lungs 2 times daily       Cholecalciferol (VITAMIN D3 PO) Take 1,000 Units by mouth daily       Multiple Vitamin (ONE-A-DAY MENS PO) Take 1 tablet by mouth daily       omeprazole (PRILOSEC) 20 MG capsule Take 20 mg by mouth daily       terazosin (HYTRIN) 2 MG capsule Take 2 mg by mouth At Bedtime       calcium-vitamin D (CALTRATE) 600-400 MG-UNIT per tablet Take 1 tablet by mouth daily       Omega-3 Fatty Acids (FISH OIL) 1200 MG CAPS Take by mouth daily       oxybutynin (DITROPAN) 5 MG tablet Take 5 mg by mouth 2 times daily          ALLERGIES     Allergies   Allergen Reactions     No Known Allergies        PAST MEDICAL HISTORY:  Past Medical History:   Diagnosis Date     Atrial fibrillation (H)     episodic     Bile duct stone      Chest pain 2/18/2015     Dyspepsia and other specified disorders of function of stomach     dyspepsia; better on zantac     Elevated LFTs       Epistaxis      Erectile dysfunction      Esophageal reflux     dilation of stricture 8/06     Gastroesophageal reflux disease, esophagitis presence not specified 5/4/2016     Helicobacter pylori (H. pylori)     had UGI endoscopy 10/00 & had LES dilitation     Hypertrophy (benign) of prostate     some voiding sx     Macular degeneration (senile) of retina, unspecified     mainly R eye       PAST SURGICAL HISTORY:  Past Surgical History:   Procedure Laterality Date     C NONSPECIFIC PROCEDURE  10/00 & 8/03    UGI endoscopy with dilitation    Abstracted 07/09/02     C NONSPECIFIC PROCEDURE  01/01    L hernia     C NONSPECIFIC PROCEDURE  06/02    lap choly     C NONSPECIFIC PROCEDURE  4/01    colonocopy 4/01     C NONSPECIFIC PROCEDURE  2/04    R total shoulder     C NONSPECIFIC PROCEDURE  4/05    re-do R shoulder     ENDOSCOPIC RETROGRADE CHOLANGIOPANCREATOGRAM  9/23/2011    Procedure:ENDOSCOPIC RETROGRADE CHOLANGIOPANCREATOGRAM; Endoscopic Retrograde Cholangiopancreatogram (c-arm), baloon dilation, stone retreival and 10fr. Solus stent placement x2 in  bile duct with the spy glass; Surgeon:EDD AUGUST; Location:[facility]     ENDOSCOPIC RETROGRADE CHOLANGIOPANCREATOGRAPHY, LITHOTRIPSY PANCREAS, COMBINED  10/27/2011    Procedure:COMBINED ENDOSCOPIC RETROGRADE CHOLANGIOPANCREATOGRAPHY, LITHOTRIPSY PANCREAS; Endoscopic Retrograde Cholangiopancreatogram with spygass scope ,Electrohydraulic Lithotripsy  baloon dilation of bile duct, stone removal (c-arm); Surgeon:EDD AUGUST; Location:[facility]     ESOPHAGOSCOPY, GASTROSCOPY, DUODENOSCOPY (EGD), COMBINED N/A 5/9/2016    Procedure: COMBINED ESOPHAGOSCOPY, GASTROSCOPY, DUODENOSCOPY (EGD), BIOPSY SINGLE OR MULTIPLE;  Surgeon: Mendez Mcknight MD;  Location:  GI     ESOPHAGOSCOPY, GASTROSCOPY, DUODENOSCOPY (EGD), COMBINED N/A 2/11/2018    Procedure: COMBINED ESOPHAGOSCOPY, GASTROSCOPY, DUODENOSCOPY (EGD), REMOVE FOREIGN BODY;  COMBINED ESOPHAGOSCOPY, GASTROSCOPY, DUODENOSCOPY  (EGD) by maury mendoza ;  Surgeon: Mendez Mcknight MD;  Location:  GI     ESOPHAGOSCOPY, GASTROSCOPY, DUODENOSCOPY (EGD), COMBINED Left 2/16/2018    Procedure: COMBINED ESOPHAGOSCOPY, GASTROSCOPY, DUODENOSCOPY (EGD), BIOPSY SINGLE OR MULTIPLE;  ESOPHAGOSCOPY, GASTROSCOPY, DUODENOSCOPY (EGD) (Nicci) with biopsies using cold bx forcep and tts balloon dilation 12-15mm;  Surgeon: Mendez Mcknight MD;  Location:  GI       FAMILY HISTORY:  Family History   Problem Relation Age of Onset     Heart Disease Father          M.I.     Esophageal Cancer No family hx of      Gastrointestinal Disease No family hx of      Stomach Cancer No family hx of        SOCIAL HISTORY:  Social History     Socioeconomic History     Marital status:      Spouse name: Patrica     Number of children: 4     Years of education: None     Highest education level: None   Social Needs     Financial resource strain: None     Food insecurity - worry: None     Food insecurity - inability: None     Transportation needs - medical: None     Transportation needs - non-medical: None   Occupational History     Occupation: administrative; [employer]     Comment: retired   Tobacco Use     Smoking status: Never Smoker     Smokeless tobacco: Never Used   Substance and Sexual Activity     Alcohol use: Yes     Alcohol/week: 0.0 oz     Comment: 1-2 beer, wine, or mixed drink per night     Drug use: No     Sexual activity: None   Other Topics Concern     Parent/sibling w/ CABG, MI or angioplasty before 65F 55M? Not Asked      Service Not Asked     Blood Transfusions Not Asked     Caffeine Concern No     Comment: 4-5 cups daily     Occupational Exposure Not Asked     Hobby Hazards Not Asked     Sleep Concern Not Asked     Stress Concern Not Asked     Weight Concern Not Asked     Special Diet No     Comment: regular     Back Care Not Asked     Exercise Yes     Comment: walking, golfing     Bike Helmet Not Asked     Seat Belt Not Asked      "Self-Exams Not Asked   Social History Narrative     None       Review of Systems:  Skin:  Positive for       Eyes:  Positive for glasses    ENT:  Positive for   nose bleed a couple of weeks ago  Respiratory:  Negative for       Cardiovascular:    Positive for strange feeling in the left chest area and arm  Gastroenterology: Negative for      Genitourinary:  Positive for urinary frequency    Musculoskeletal:  Negative      Neurologic:  Negative      Psychiatric:  Positive for sleep disturbances    Heme/Lymph/Imm:  Positive for allergies    Endocrine:  Negative        Physical Exam:  Vitals: /80   Pulse 78   Ht 1.702 m (5' 7\")   Wt 74.4 kg (164 lb)   BMI 25.69 kg/m       Constitutional:  cooperative, alert and oriented, well developed, well nourished, in no acute distress        Skin:  warm and dry to the touch, no apparent skin lesions or masses noted          Head:  normocephalic, no masses or lesions        Eyes:  pupils equal and round, conjunctivae and lids unremarkable, sclera white, no xanthalasma, EOMS intact, no nystagmus        Lymph:      ENT:  no pallor or cyanosis, dentition good hearing aide(s) present      Neck:  carotid pulses are full and equal bilaterally, JVP normal, no carotid bruit        Respiratory:  normal breath sounds, clear to auscultation, normal A-P diameter, normal symmetry, normal respiratory excursion, no use of accessory muscles         Cardiac: normal S1 and S2;no murmurs, gallops or rubs detected;no S3 or S4;apical impulse not displaced irregular rhythm   no presence of murmur          pulses full and equal, no bruits auscultated                                        GI:  abdomen soft, non-tender, BS normoactive, no mass, no HSM, no bruits        Extremities and Muscular Skeletal:  no deformities, clubbing, cyanosis, erythema observed;no edema              Neurological:  no gross motor deficits;affect appropriate        Psych:  Alert and Oriented x 3        CC  No " referring provider defined for this encounter.                Thank you for allowing me to participate in the care of your patient.      Sincerely,     Pineda Edmondson MD     [facility] [place]    cc:   No referring provider defined for this encounter.

## 2019-02-01 NOTE — PROGRESS NOTES
Service Date: 02/01/2019      HISTORY OF PRESENT ILLNESS:  I had the pleasure of seeing your patient, Kraig Genao, at [facility] for evaluation of chronic atrial fibrillation.  Mr. Genao was previously followed by my associate, Dr. Deric Enriquez.  He saw Dr. Enriquez in 2015 and preoperatively saw my associate, Dr. Clemente, in 2016.  This patient has had documented atrial fibrillation since 2002.  He had not received anticoagulant therapy until he was seen in 2015.  His ventricular response rate was well controlled.  The patient was hospitalized in 02/2015 after experiencing epigastric burning and myocardial infarction was ruled out.  A stress echocardiogram was negative for ischemia and resting echo was unremarkable except for the presence of atrial fibrillation.  Continued medical therapy was advised.  The patient denies exertional angina.  He denies significant shortness of breath.  He has been treated with Eliquis 5 mg twice a day since 2015.  His creatinine clearance has been normal and he weighs greater than 60 kg, suggesting this is the proper dose.  The patient denies bleeding diathesis other than occasional epistaxis.  He denies palpitations except rarely when he is lying in bed or in a chair.  He denies syncope or presyncope.  He notes that he remains active and plays golf in the summertime.  He denies hospitalizations or surgery over the last 1 year.  He notes varicose veins in his left leg but denies significant edema.  He had vein stripping in 09/2016.      His wife is a patient of mine, and when they were here last week, she fell in the parking lot and fractured her left wrist.  I greeted her in the reception area and told her how sorry I was for her fall and fracture.      PHYSICAL EXAMINATION:  As listed below.      ASSESSMENT:   1.  Yehuda Genao is a delightful 89-year-old male with chronic atrial fibrillation.  He is asymptomatic and has excellent ventricular  response rate control.  His CHADS-VASc score is 3 based on hypertension and age of 89.  He continues on his anticoagulant drug, free of any bleeding diathesis.  He is rate controlled with atenolol alone.   2.  Hypertension, currently well controlled.   3.  I have congratulated this patient on his good health and suggested he continue with weight control and exercise.   4.  I have asked the patient to follow up in my office in 1 year to evaluate his ongoing atrial fibrillation.  The patient will call for any bleeding diathesis.      It is my pleasure to assist in the care of Kraig Pena.  All his questions were answered to his satisfaction.      Seth Villa MD      cc:   Griselda Syed NP   [facility]   [address], [place] [address]         [facility] MD, [facility]             D: 2019   T: 2019   MT: ary      Name:     KRAIG PENA   MRN:      [id number]        Account:      [id number]   :      1926           Service Date: 2019      Document: [id number]

## 2019-02-01 NOTE — LETTER
2/1/2019      Griselda Syed NP  [address] [facility] [address]      RE: Kraig Genao       Dear Colleague,    I had the pleasure of seeing Kraig Genao in the [facility].    Service Date: 02/01/2019      HISTORY OF PRESENT ILLNESS:  I had the pleasure of seeing your patient, Kraig Genao, at [facility] for evaluation of chronic atrial fibrillation.  Mr. Genao was previously followed by my associate, Dr. Deric Enriquez.  He saw Dr. Enriquez in 2015 and preoperatively saw my associate, Dr. Clemente, in 2016.  This patient has had documented atrial fibrillation since 2002.  He had not received anticoagulant therapy until he was seen in 2015.  His ventricular response rate was well controlled.  The patient was hospitalized in 02/2015 after experiencing epigastric burning and myocardial infarction was ruled out.  A stress echocardiogram was negative for ischemia and resting echo was unremarkable except for the presence of atrial fibrillation.  Continued medical therapy was advised.  The patient denies exertional angina.  He denies significant shortness of breath.  He has been treated with Eliquis 5 mg twice a day since 2015.  His creatinine clearance has been normal and he weighs greater than 60 kg, suggesting this is the proper dose.  The patient denies bleeding diathesis other than occasional epistaxis.  He denies palpitations except rarely when he is lying in bed or in a chair.  He denies syncope or presyncope.  He notes that he remains active and plays golf in the summertime.  He denies hospitalizations or surgery over the last 1 year.  He notes varicose veins in his left leg but denies significant edema.  He had vein stripping in 09/2016.      His wife is a patient of mine, and when they were here last week, she fell in the parking lot and fractured her left wrist.  I greeted her in the reception area and told her how sorry I was  for her fall and fracture.      PHYSICAL EXAMINATION:  As listed below.      ASSESSMENT:   1.  Yehuda Pena is a delightful 89-year-old male with chronic atrial fibrillation.  He is asymptomatic and has excellent ventricular response rate control.  His CHADS-VASc score is 3 based on hypertension and age of 89.  He continues on his anticoagulant drug, free of any bleeding diathesis.  He is rate controlled with atenolol alone.   2.  Hypertension, currently well controlled.   3.  I have congratulated this patient on his good health and suggested he continue with weight control and exercise.   4.  I have asked the patient to follow up in my office in 1 year to evaluate his ongoing atrial fibrillation.  The patient will call for any bleeding diathesis.      It is my pleasure to assist in the care of Kraig Pena.  All his questions were answered to his satisfaction.      Seth Villa MD      cc:   Griselda Syed NP   [facility]   [address], [facility] [address]   [place]  [address]         SETH VILLA MD, [facility]             D: 2019   T: 2019   MT: ary      Name:     KRAIG PENA   MRN:      [id number]        Account:      [id number]   :      1926           Service Date: 2019      Document: [id number]         Outpatient Encounter Medications as of 2019   Medication Sig Dispense Refill     apixaban ANTICOAGULANT (ELIQUIS) 5 MG tablet Take 1 tablet (5 mg) by mouth 2 times daily 60 tablet 12     atenolol (TENORMIN) 50 MG tablet Take 25 mg by mouth 2 times daily       biotin 1000 MCG TABS tablet Take by mouth daily       budesonide-formoterol (SYMBICORT) 160-4.5 MCG/ACT inhaler Inhale 2 puffs into the lungs 2 times daily       Cholecalciferol (VITAMIN D3 PO) Take 1,000 Units by mouth daily       Multiple Vitamin (ONE-A-DAY MENS PO) Take 1 tablet by mouth daily       omeprazole (PRILOSEC) 20 MG capsule Take 20 mg by mouth daily       terazosin (HYTRIN) 2 MG capsule Take 2  mg by mouth At Bedtime       calcium-vitamin D (CALTRATE) 600-400 MG-UNIT per tablet Take 1 tablet by mouth daily       Omega-3 Fatty Acids (FISH OIL) 1200 MG CAPS Take by mouth daily       oxybutynin (DITROPAN) 5 MG tablet Take 5 mg by mouth 2 times daily        [DISCONTINUED] ascorbic acid (VITAMIN C) 500 MG tablet Take 1,000 mg by mouth daily        No facility-administered encounter medications on file as of 2/1/2019.        Again, thank you for allowing me to participate in the care of your patient.      Sincerely,    Pineda Edmondson MD     [facility]

## 2019-09-06 ENCOUNTER — TELEPHONE (OUTPATIENT)
Dept: CARDIOLOGY | Facility: CLINIC | Age: 84
End: 2019-09-06

## 2019-09-06 DIAGNOSIS — I48.91 A-FIB (H): Primary | ICD-10-CM

## 2019-09-06 NOTE — TELEPHONE ENCOUNTER
"Patient called to report that in the past few weeks he has episodes of intermittent headaches. Patient also reports that he has felt a \"tick\" in his upper left chest and shoulder. Patient denies any SOB, chest pain or pressure either at rest or with activity. Patient unable to report BP or HR at this time. Patient reports he is still playing at least 9 holes of gold a week without issue. Patient wanted evaluation by cardiology just to be sure his symptoms are not related to his heart. RN offered to have patient see one of Dr. Edmondson's RILEY's and patient is in agreement with this plan. RN transferred patient to scheduling to arrange OV with RILEY.         2/1/19  ASSESSMENT:   1.  Yehuda Genao is a delightful 89-year-old male with chronic atrial fibrillation.  He is asymptomatic and has excellent ventricular response rate control.  His CHADS-VASc score is 3 based on hypertension and age of 89.  He continues on his anticoagulant drug, free of any bleeding diathesis.  He is rate controlled with atenolol alone.   2.  Hypertension, currently well controlled.   3.  I have congratulated this patient on his good health and suggested he continue with weight control and exercise.   4.  I have asked the patient to follow up in my office in 1 year to evaluate his ongoing atrial fibrillation.  The patient will call for any bleeding diathesis.      It is my pleasure to assist in the care of Kraig Genao.  All his questions were answered to his satisfaction.      Pineda Edmondson MD  "

## 2019-09-23 ENCOUNTER — OFFICE VISIT (OUTPATIENT)
Dept: CARDIOLOGY | Facility: CLINIC | Age: 84
End: 2019-09-23
Payer: MEDICARE

## 2019-09-23 ENCOUNTER — HOSPITAL ENCOUNTER (OUTPATIENT)
Dept: CARDIOLOGY | Facility: CLINIC | Age: 84
Discharge: HOME | End: 2019-09-23
Attending: NURSE PRACTITIONER | Admitting: NURSE PRACTITIONER
Payer: MEDICARE

## 2019-09-23 VITALS
BODY MASS INDEX: 25.27 KG/M2 | WEIGHT: 161 LBS | HEART RATE: 100 BPM | HEIGHT: 67 IN | DIASTOLIC BLOOD PRESSURE: 60 MMHG | SYSTOLIC BLOOD PRESSURE: 130 MMHG

## 2019-09-23 DIAGNOSIS — R00.2 PALPITATIONS: ICD-10-CM

## 2019-09-23 DIAGNOSIS — I48.20 CHRONIC ATRIAL FIBRILLATION (H): ICD-10-CM

## 2019-09-23 DIAGNOSIS — I48.20 CHRONIC ATRIAL FIBRILLATION (H): Primary | ICD-10-CM

## 2019-09-23 PROCEDURE — 0296T ZIO PATCH HOLTER ADULT PEDIATRIC GREATER THAN 48 HRS: CPT

## 2019-09-23 PROCEDURE — 99214 OFFICE O/P EST MOD 30 MIN: CPT | Mod: 25 | Performed by: NURSE PRACTITIONER

## 2019-09-23 PROCEDURE — 0298T ZIO PATCH HOLTER ADULT PEDIATRIC GREATER THAN 48 HRS: CPT | Performed by: INTERNAL MEDICINE

## 2019-09-23 ASSESSMENT — MIFFLIN-ST. JEOR: SCORE: 1333.92

## 2019-09-23 NOTE — PROGRESS NOTES
HPI and Plan:   I had the pleasure of seeing Kraig Genao today at [facility] for evaluation of chronic atrial fibrillation. He is a pleasant 89 year old patient of Dr. Edmondson.     Mr. Genao has a past medical history significant for chronic atrial fibrillation, CHADS-VASc score is 4, hypertension, and GERD.    Patient has a long-standing history of chronic atrial fibrillation dating back to 2002.  He has a CHADS-VASc score is 3 based on hypertension and age of 89.   He is on Eliquis for stroke prevention. He is rate controlled with atenolol.     The patient does not have a history of coronary disease. He was hospitalized in 02/2015 after experiencing epigastric burning and myocardial infarction was ruled out.  A stress echocardiogram was negative for ischemia and resting echo was unremarkable except for the presence of atrial fibrillation.  Continued medical therapy was advised.      Today he presents the cardiology clinic with complaints of palpitations.  Palpitations have been ongoing for 2 to 3 months.  He states that they are most prominent when lying in bed at night.  He describes it as a headache in his chest lasting less than 5 seconds then subsides.  He denies lightheadedness, dizziness, presyncope or syncopal episodes.  He tells me these episodes are different than atrial fibrillation.  He denies associated symptoms like chest pain shortness of breath.  He continues to remain active.  He walks daily and plays up to 9 holes of golf 2-3 times a week.  He denies palpitations with activity.      Physical Exam  Please see Below     Assessment and Plan  1. Chronic atrial fibrillation with a CHADS-VASc score of 3 based on hypertension and age of 89.  Continue Eliquis for stroke prevention and Atenolol for rate control.    2.  Palpitations.  Ongoing palpitations of the last couple of months.  He notices mostly at night laying in bed.  He describes as a tick in his upper chest lasting  5 seconds.  They occur daily.  He has known chronic atrial fibrillation.  I think he would benefit from a 3-day ZIO patch monitor to rule out PACs/PVCs and/or other arrhythmias.  We discussed staying hydrated and eating potassium and magnesium rich diet.  call him with the results as he does not want to come back for another visit.    3. Hypertension. Well controlled. Continue Atenolol as above.      Thank you for allowing me to care for Kraig Genao today.    TRAMAINE Flynn, CNP  Cardiology    Voice recognition software was used for this note, I have reviewed this note, but errors may have been missed.    Orders Placed This Encounter   Procedures     Zio Patch Holter Adult Pediatric Greater than 48 hrs     No orders of the defined types were placed in this encounter.    There are no discontinued medications.      CURRENT MEDICATIONS:  Current Outpatient Medications   Medication Sig Dispense Refill     apixaban ANTICOAGULANT (ELIQUIS) 5 MG tablet Take 1 tablet (5 mg) by mouth 2 times daily 60 tablet 12     atenolol (TENORMIN) 50 MG tablet Take 25 mg by mouth 2 times daily       biotin 1000 MCG TABS tablet Take by mouth daily       budesonide-formoterol (SYMBICORT) 160-4.5 MCG/ACT inhaler Inhale 2 puffs into the lungs 2 times daily       calcium-vitamin D (CALTRATE) 600-400 MG-UNIT per tablet Take 1 tablet by mouth daily       Cholecalciferol (VITAMIN D3 PO) Take 1,000 Units by mouth daily       Multiple Vitamin (ONE-A-DAY MENS PO) Take 1 tablet by mouth daily       omeprazole (PRILOSEC) 20 MG capsule Take 20 mg by mouth daily       terazosin (HYTRIN) 2 MG capsule Take 2 mg by mouth At Bedtime       Omega-3 Fatty Acids (FISH OIL) 1200 MG CAPS Take by mouth daily       oxybutynin (DITROPAN) 5 MG tablet Take 5 mg by mouth 2 times daily          ALLERGIES     Allergies   Allergen Reactions     No Known Allergies        PAST MEDICAL HISTORY:  Past Medical History:   Diagnosis Date     Atrial fibrillation (H)      episodic     Bile duct stone      Chest pain 2/18/2015     Chronic atrial fibrillation (H)      Dyspepsia and other specified disorders of function of stomach     dyspepsia; better on zantac     Elevated LFTs      Epistaxis      Erectile dysfunction      Esophageal reflux     dilation of stricture 8/06     Gastroesophageal reflux disease, esophagitis presence not specified 5/4/2016     Helicobacter pylori (H. pylori)     had UGI endoscopy 10/00 & had LES dilitation     Hypertrophy (benign) of prostate     some voiding sx     Macular degeneration (senile) of retina, unspecified     mainly R eye       PAST SURGICAL HISTORY:  Past Surgical History:   Procedure Laterality Date     C NONSPECIFIC PROCEDURE  10/00 & 8/03    UGI endoscopy with dilitation    Abstracted 07/09/02     C NONSPECIFIC PROCEDURE  01/01    L hernia     C NONSPECIFIC PROCEDURE  06/02    lap choly     C NONSPECIFIC PROCEDURE  4/01    colonocopy 4/01     C NONSPECIFIC PROCEDURE  2/04    R total shoulder     C NONSPECIFIC PROCEDURE  4/05    re-do R shoulder     ENDOSCOPIC RETROGRADE CHOLANGIOPANCREATOGRAM  9/23/2011    Procedure:ENDOSCOPIC RETROGRADE CHOLANGIOPANCREATOGRAM; Endoscopic Retrograde Cholangiopancreatogram (c-arm), baloon dilation, stone retreival and 10fr. Solus stent placement x2 in  bile duct with the spy glass; Surgeon:EDD AUGUST; Location:[facility]     ENDOSCOPIC RETROGRADE CHOLANGIOPANCREATOGRAPHY, LITHOTRIPSY PANCREAS, COMBINED  10/27/2011    Procedure:COMBINED ENDOSCOPIC RETROGRADE CHOLANGIOPANCREATOGRAPHY, LITHOTRIPSY PANCREAS; Endoscopic Retrograde Cholangiopancreatogram with spygass scope ,Electrohydraulic Lithotripsy  baloon dilation of bile duct, stone removal (c-arm); Surgeon:EDD AUGUST; Location: OR     ESOPHAGOSCOPY, GASTROSCOPY, DUODENOSCOPY (EGD), COMBINED N/A 5/9/2016    Procedure: COMBINED ESOPHAGOSCOPY, GASTROSCOPY, DUODENOSCOPY (EGD), BIOPSY SINGLE OR MULTIPLE;  Surgeon: Mendez Mcknight MD;  Location:  GI      ESOPHAGOSCOPY, GASTROSCOPY, DUODENOSCOPY (EGD), COMBINED N/A 2/11/2018    Procedure: COMBINED ESOPHAGOSCOPY, GASTROSCOPY, DUODENOSCOPY (EGD), REMOVE FOREIGN BODY;  COMBINED ESOPHAGOSCOPY, GASTROSCOPY, DUODENOSCOPY (EGD) by raptor graspeing ;  Surgeon: Mendez Mcknight MD;  Location: [facility]     ESOPHAGOSCOPY, GASTROSCOPY, DUODENOSCOPY (EGD), COMBINED Left 2/16/2018    Procedure: COMBINED ESOPHAGOSCOPY, GASTROSCOPY, DUODENOSCOPY (EGD), BIOPSY SINGLE OR MULTIPLE;  ESOPHAGOSCOPY, GASTROSCOPY, DUODENOSCOPY (EGD) (Nicci) with biopsies using cold bx forcep and tts balloon dilation 12-15mm;  Surgeon: Mendez Mcknight MD;  Location:  GI       FAMILY HISTORY:  Family History   Problem Relation Age of Onset     Heart Disease Father          M.I.     Esophageal Cancer No family hx of      Gastrointestinal Disease No family hx of      Stomach Cancer No family hx of        SOCIAL HISTORY:  Social History     Socioeconomic History     Marital status:      Spouse name: Patrica     Number of children: 4     Years of education: None     Highest education level: None   Occupational History     Occupation: administrative; [employer]     Comment: retired   Social Needs     Financial resource strain: None     Food insecurity:     Worry: None     Inability: None     Transportation needs:     Medical: None     Non-medical: None   Tobacco Use     Smoking status: Never Smoker     Smokeless tobacco: Never Used   Substance and Sexual Activity     Alcohol use: Yes     Alcohol/week: 0.0 standard drinks     Comment: 1-2 beer, wine, or mixed drink per night     Drug use: No     Sexual activity: None   Lifestyle     Physical activity:     Days per week: None     Minutes per session: None     Stress: None   Relationships     Social connections:     Talks on phone: None     Gets together: None     Attends Holiness service: None     Active member of club or organization: None     Attends meetings of clubs or organizations: None      "Relationship status: None     Intimate partner violence:     Fear of current or ex partner: None     Emotionally abused: None     Physically abused: None     Forced sexual activity: None   Other Topics Concern     Parent/sibling w/ CABG, MI or angioplasty before 65F 55M? Not Asked      Service Not Asked     Blood Transfusions Not Asked     Caffeine Concern No     Comment: 4-5 cups daily     Occupational Exposure Not Asked     Hobby Hazards Not Asked     Sleep Concern Not Asked     Stress Concern Not Asked     Weight Concern Not Asked     Special Diet No     Comment: regular     Back Care Not Asked     Exercise Yes     Comment: walking, golfing     Bike Helmet Not Asked     Seat Belt Not Asked     Self-Exams Not Asked   Social History Narrative     None       Review of Systems:  Skin:  Negative for       Eyes:  Positive for glasses    ENT:  Positive for hearing loss    Respiratory:  Negative for       Cardiovascular:    Positive for;palpitations strange feeling in the left chest area and arm  Gastroenterology: Negative for      Genitourinary:  not assessed      Musculoskeletal:  Negative for      Neurologic:  Negative for      Psychiatric:  Negative for      Heme/Lymph/Imm:  Positive for allergies    Endocrine:  Negative for        Physical Exam:  Vitals: /60   Pulse 100   Ht 1.702 m (5' 7\")   Wt 73 kg (161 lb)   BMI 25.22 kg/m      Constitutional:  cooperative, alert and oriented, well developed, well nourished, in no acute distress        Skin:  warm and dry to the touch          Head:  normocephalic        Eyes:  pupils equal and round;sclera white;conjunctivae and lids unremarkable        Lymph:      ENT:  no pallor or cyanosis hearing aide(s) present      Neck:  carotid pulses are full and equal bilaterally, JVP normal, no carotid bruit        Respiratory:  normal breath sounds, clear to auscultation, normal A-P diameter, normal symmetry, normal respiratory excursion, no use of accessory " muscles         Cardiac: normal S1 and S2;no murmurs, gallops or rubs detected;no S3 or S4;apical impulse not displaced irregular rhythm   no presence of murmur          pulses full and equal     right radial artery;2+             left radial artery;2+                    GI:  abdomen soft;non-tender        Extremities and Muscular Skeletal:  no deformities, clubbing, cyanosis, erythema observed;no edema              Neurological:  no gross motor deficits;affect appropriate        Psych:  Alert and Oriented x 3    Encounter Diagnoses   Name Primary?     Palpitations      Chronic atrial fibrillation (H) Yes       Recent Lab Results:  LIPID RESULTS:  Lab Results   Component Value Date    CHOL 172 03/27/2012    HDL 55 03/27/2012    LDL 99 03/27/2012    TRIG 92 03/27/2012    CHOLHDLRATIO 4.6 12/16/2004       LIVER ENZYME RESULTS:  Lab Results   Component Value Date    AST 19 05/19/2015    ALT 20 05/19/2015       CBC RESULTS:  Lab Results   Component Value Date    WBC 6.6 02/18/2015    RBC 4.54 02/18/2015    HGB 15.0 02/18/2015    HCT 44.6 02/18/2015    MCV 98 02/18/2015    MCH 33.0 02/18/2015    MCHC 33.6 02/18/2015    RDW 12.4 02/18/2015     02/18/2015       BMP RESULTS:  Lab Results   Component Value Date     02/18/2015    POTASSIUM 3.7 05/19/2015    CHLORIDE 104 02/18/2015    CO2 30 02/18/2015    ANIONGAP 6 02/18/2015     (A) 05/19/2015    BUN 12 02/18/2015    CR 0.9 05/19/2015    GFRESTIMATED 80 05/19/2015    GFRESTBLACK 69 02/18/2015    RAZ 8.5 02/18/2015        A1C RESULTS:  Lab Results   Component Value Date    A1C 5.5 05/19/2015       INR RESULTS:  Lab Results   Component Value Date    INR 1.04 02/18/2015    INR 1.12 10/27/2011           CC  No referring provider defined for this encounter.

## 2019-09-23 NOTE — LETTER
9/23/2019    Griselda Syed NP  [address] [facility] [address]    RE: Kraig Barryer       Dear Colleague,    I had the pleasure of seeing Kraig Genao in the [facility].    HPI and Plan:   I had the pleasure of seeing Kraig Genao today at [facility] for evaluation of chronic atrial fibrillation. He is a pleasant 89 year old patient of Dr. Edmondson.     Mr. Genao has a past medical history significant for chronic atrial fibrillation, CHADS-VASc score is 4, hypertension, and GERD.    Patient has a long-standing history of chronic atrial fibrillation dating back to 2002.  He has a CHADS-VASc score is 3 based on hypertension and age of 89.   He is on Eliquis for stroke prevention. He is rate controlled with atenolol.     The patient does not have a history of coronary disease. He was hospitalized in 02/2015 after experiencing epigastric burning and myocardial infarction was ruled out.  A stress echocardiogram was negative for ischemia and resting echo was unremarkable except for the presence of atrial fibrillation.  Continued medical therapy was advised.      Today he presents the cardiology clinic with complaints of palpitations.  Palpitations have been ongoing for 2 to 3 months.  He states that they are most prominent when lying in bed at night.  He describes it as a headache in his chest lasting less than 5 seconds then subsides.  He denies lightheadedness, dizziness, presyncope or syncopal episodes.  He tells me these episodes are different than atrial fibrillation.  He denies associated symptoms like chest pain shortness of breath.  He continues to remain active.  He walks daily and plays up to 9 holes of golf 2-3 times a week.  He denies palpitations with activity.      Physical Exam  Please see Below     Assessment and Plan  1. Chronic atrial fibrillation with a CHADS-VASc score of 3 based on hypertension and age of 89.  Continue  Eliquis for stroke prevention and Atenolol for rate control.    2.  Palpitations.  Ongoing palpitations of the last couple of months.  He notices mostly at night laying in bed.  He describes as a tick in his upper chest lasting 5 seconds.  They occur daily.  He has known chronic atrial fibrillation.  I think he would benefit from a 3-day ZIO patch monitor to rule out PACs/PVCs and/or other arrhythmias.  We discussed staying hydrated and eating potassium and magnesium rich diet.  call him with the results as he does not want to come back for another visit.    3. Hypertension. Well controlled. Continue Atenolol as above.      Thank you for allowing me to care for Kraig Genao today.    TRAMAINE Flynn, CNP  Cardiology    Voice recognition software was used for this note, I have reviewed this note, but errors may have been missed.    Orders Placed This Encounter   Procedures     Zio Patch Holter Adult Pediatric Greater than 48 hrs     No orders of the defined types were placed in this encounter.    There are no discontinued medications.      CURRENT MEDICATIONS:  Current Outpatient Medications   Medication Sig Dispense Refill     apixaban ANTICOAGULANT (ELIQUIS) 5 MG tablet Take 1 tablet (5 mg) by mouth 2 times daily 60 tablet 12     atenolol (TENORMIN) 50 MG tablet Take 25 mg by mouth 2 times daily       biotin 1000 MCG TABS tablet Take by mouth daily       budesonide-formoterol (SYMBICORT) 160-4.5 MCG/ACT inhaler Inhale 2 puffs into the lungs 2 times daily       calcium-vitamin D (CALTRATE) 600-400 MG-UNIT per tablet Take 1 tablet by mouth daily       Cholecalciferol (VITAMIN D3 PO) Take 1,000 Units by mouth daily       Multiple Vitamin (ONE-A-DAY MENS PO) Take 1 tablet by mouth daily       omeprazole (PRILOSEC) 20 MG capsule Take 20 mg by mouth daily       terazosin (HYTRIN) 2 MG capsule Take 2 mg by mouth At Bedtime       Omega-3 Fatty Acids (FISH OIL) 1200 MG CAPS Take by mouth daily       oxybutynin  (DITROPAN) 5 MG tablet Take 5 mg by mouth 2 times daily          ALLERGIES     Allergies   Allergen Reactions     No Known Allergies        PAST MEDICAL HISTORY:  Past Medical History:   Diagnosis Date     Atrial fibrillation (H)     episodic     Bile duct stone      Chest pain 2/18/2015     Chronic atrial fibrillation (H)      Dyspepsia and other specified disorders of function of stomach     dyspepsia; better on zantac     Elevated LFTs      Epistaxis      Erectile dysfunction      Esophageal reflux     dilation of stricture 8/06     Gastroesophageal reflux disease, esophagitis presence not specified 5/4/2016     Helicobacter pylori (H. pylori)     had UGI endoscopy 10/00 & had LES dilitation     Hypertrophy (benign) of prostate     some voiding sx     Macular degeneration (senile) of retina, unspecified     mainly R eye       PAST SURGICAL HISTORY:  Past Surgical History:   Procedure Laterality Date     C NONSPECIFIC PROCEDURE  10/00 & 8/03    UGI endoscopy with dilitation    Abstracted 07/09/02     C NONSPECIFIC PROCEDURE  01/01    L hernia     C NONSPECIFIC PROCEDURE  06/02    lap choly     C NONSPECIFIC PROCEDURE  4/01    colonocopy 4/01     C NONSPECIFIC PROCEDURE  2/04    R total shoulder     C NONSPECIFIC PROCEDURE  4/05    re-do R shoulder     ENDOSCOPIC RETROGRADE CHOLANGIOPANCREATOGRAM  9/23/2011    Procedure:ENDOSCOPIC RETROGRADE CHOLANGIOPANCREATOGRAM; Endoscopic Retrograde Cholangiopancreatogram (c-arm), baloon dilation, stone retreival and 10fr. Solus stent placement x2 in  bile duct with the spy glass; Surgeon:EDD AUGUST; Location:[facility]     ENDOSCOPIC RETROGRADE CHOLANGIOPANCREATOGRAPHY, LITHOTRIPSY PANCREAS, COMBINED  10/27/2011    Procedure:COMBINED ENDOSCOPIC RETROGRADE CHOLANGIOPANCREATOGRAPHY, LITHOTRIPSY PANCREAS; Endoscopic Retrograde Cholangiopancreatogram with spygass scope ,Electrohydraulic Lithotripsy  baloon dilation of bile duct, stone removal (c-arm); Surgeon:EDD AUGUST;  Location:[facility]     ESOPHAGOSCOPY, GASTROSCOPY, DUODENOSCOPY (EGD), COMBINED N/A 5/9/2016    Procedure: COMBINED ESOPHAGOSCOPY, GASTROSCOPY, DUODENOSCOPY (EGD), BIOPSY SINGLE OR MULTIPLE;  Surgeon: Mendez Mcknight MD;  Location: [facility]     ESOPHAGOSCOPY, GASTROSCOPY, DUODENOSCOPY (EGD), COMBINED N/A 2/11/2018    Procedure: COMBINED ESOPHAGOSCOPY, GASTROSCOPY, DUODENOSCOPY (EGD), REMOVE FOREIGN BODY;  COMBINED ESOPHAGOSCOPY, GASTROSCOPY, DUODENOSCOPY (EGD) by raptor graspeing ;  Surgeon: Mendez Mcknight MD;  Location: [facility]     ESOPHAGOSCOPY, GASTROSCOPY, DUODENOSCOPY (EGD), COMBINED Left 2/16/2018    Procedure: COMBINED ESOPHAGOSCOPY, GASTROSCOPY, DUODENOSCOPY (EGD), BIOPSY SINGLE OR MULTIPLE;  ESOPHAGOSCOPY, GASTROSCOPY, DUODENOSCOPY (EGD) (Nicci) with biopsies using cold bx forcep and tts balloon dilation 12-15mm;  Surgeon: Mendez Mcknight MD;  Location: [facility]       FAMILY HISTORY:  Family History   Problem Relation Age of Onset     Heart Disease Father          M.I.     Esophageal Cancer No family hx of      Gastrointestinal Disease No family hx of      Stomach Cancer No family hx of        SOCIAL HISTORY:  Social History     Socioeconomic History     Marital status:      Spouse name: Patrica     Number of children: 4     Years of education: None     Highest education level: None   Occupational History     Occupation: administrative; [employer]     Comment: retired   Social Needs     Financial resource strain: None     Food insecurity:     Worry: None     Inability: None     Transportation needs:     Medical: None     Non-medical: None   Tobacco Use     Smoking status: Never Smoker     Smokeless tobacco: Never Used   Substance and Sexual Activity     Alcohol use: Yes     Alcohol/week: 0.0 standard drinks     Comment: 1-2 beer, wine, or mixed drink per night     Drug use: No     Sexual activity: None   Lifestyle     Physical activity:     Days per week: None     Minutes per session: None      "Stress: None   Relationships     Social connections:     Talks on phone: None     Gets together: None     Attends Orthodox service: None     Active member of club or organization: None     Attends meetings of clubs or organizations: None     Relationship status: None     Intimate partner violence:     Fear of current or ex partner: None     Emotionally abused: None     Physically abused: None     Forced sexual activity: None   Other Topics Concern     Parent/sibling w/ CABG, MI or angioplasty before 65F 55M? Not Asked      Service Not Asked     Blood Transfusions Not Asked     Caffeine Concern No     Comment: 4-5 cups daily     Occupational Exposure Not Asked     Hobby Hazards Not Asked     Sleep Concern Not Asked     Stress Concern Not Asked     Weight Concern Not Asked     Special Diet No     Comment: regular     Back Care Not Asked     Exercise Yes     Comment: walking, golfing     Bike Helmet Not Asked     Seat Belt Not Asked     Self-Exams Not Asked   Social History Narrative     None       Review of Systems:  Skin:  Negative for       Eyes:  Positive for glasses    ENT:  Positive for hearing loss    Respiratory:  Negative for       Cardiovascular:    Positive for;palpitations strange feeling in the left chest area and arm  Gastroenterology: Negative for      Genitourinary:  not assessed      Musculoskeletal:  Negative for      Neurologic:  Negative for      Psychiatric:  Negative for      Heme/Lymph/Imm:  Positive for allergies    Endocrine:  Negative for        Physical Exam:  Vitals: /60   Pulse 100   Ht 1.702 m (5' 7\")   Wt 73 kg (161 lb)   BMI 25.22 kg/m       Constitutional:  cooperative, alert and oriented, well developed, well nourished, in no acute distress        Skin:  warm and dry to the touch          Head:  normocephalic        Eyes:  pupils equal and round;sclera white;conjunctivae and lids unremarkable        Lymph:      ENT:  no pallor or cyanosis hearing aide(s) present  "     Neck:  carotid pulses are full and equal bilaterally, JVP normal, no carotid bruit        Respiratory:  normal breath sounds, clear to auscultation, normal A-P diameter, normal symmetry, normal respiratory excursion, no use of accessory muscles         Cardiac: normal S1 and S2;no murmurs, gallops or rubs detected;no S3 or S4;apical impulse not displaced irregular rhythm   no presence of murmur          pulses full and equal     right radial artery;2+             left radial artery;2+                    GI:  abdomen soft;non-tender        Extremities and Muscular Skeletal:  no deformities, clubbing, cyanosis, erythema observed;no edema              Neurological:  no gross motor deficits;affect appropriate        Psych:  Alert and Oriented x 3    Encounter Diagnoses   Name Primary?     Palpitations      Chronic atrial fibrillation (H) Yes       Recent Lab Results:  LIPID RESULTS:  Lab Results   Component Value Date    CHOL 172 03/27/2012    HDL 55 03/27/2012    LDL 99 03/27/2012    TRIG 92 03/27/2012    CHOLHDLRATIO 4.6 12/16/2004       LIVER ENZYME RESULTS:  Lab Results   Component Value Date    AST 19 05/19/2015    ALT 20 05/19/2015       CBC RESULTS:  Lab Results   Component Value Date    WBC 6.6 02/18/2015    RBC 4.54 02/18/2015    HGB 15.0 02/18/2015    HCT 44.6 02/18/2015    MCV 98 02/18/2015    MCH 33.0 02/18/2015    MCHC 33.6 02/18/2015    RDW 12.4 02/18/2015     02/18/2015       BMP RESULTS:  Lab Results   Component Value Date     02/18/2015    POTASSIUM 3.7 05/19/2015    CHLORIDE 104 02/18/2015    CO2 30 02/18/2015    ANIONGAP 6 02/18/2015     (A) 05/19/2015    BUN 12 02/18/2015    CR 0.9 05/19/2015    GFRESTIMATED 80 05/19/2015    GFRESTBLACK 69 02/18/2015    RAZ 8.5 02/18/2015        A1C RESULTS:  Lab Results   Component Value Date    A1C 5.5 05/19/2015       INR RESULTS:  Lab Results   Component Value Date    INR 1.04 02/18/2015    INR 1.12 10/27/2011             Thank you for  allowing me to participate in the care of your patient.    Sincerely,     TRAMAINE Flynn CNP     [facility]

## 2019-09-23 NOTE — LETTER
9/23/2019    Griselda Syed NP  [address] [facility] [address]    RE: Kraig Barryer       Dear Colleague,    I had the pleasure of seeing Kraig Genao in the [facility].    HPI and Plan:   I had the pleasure of seeing Kraig Genao today at [facility] for evaluation of chronic atrial fibrillation. He is a pleasant 89 year old patient of Dr. Edmondson.     Mr. Genao has a past medical history significant for chronic atrial fibrillation, CHADS-VASc score is 4, hypertension, and GERD.    Patient has a long-standing history of chronic atrial fibrillation dating back to 2002.  He has a CHADS-VASc score is 3 based on hypertension and age of 89.   He is on Eliquis for stroke prevention. He is rate controlled with atenolol.     The patient does not have a history of coronary disease. He was hospitalized in 02/2015 after experiencing epigastric burning and myocardial infarction was ruled out.  A stress echocardiogram was negative for ischemia and resting echo was unremarkable except for the presence of atrial fibrillation.  Continued medical therapy was advised.      Today he presents the cardiology clinic with complaints of palpitations.  Palpitations have been ongoing for 2 to 3 months.  He states that they are most prominent when lying in bed at night.  He describes it as a headache in his chest lasting less than 5 seconds then subsides.  He denies lightheadedness, dizziness, presyncope or syncopal episodes.  He tells me these episodes are different than atrial fibrillation.  He denies associated symptoms like chest pain shortness of breath.  He continues to remain active.  He walks daily and plays up to 9 holes of golf 2-3 times a week.  He denies palpitations with activity.      Physical Exam  Please see Below     Assessment and Plan  1. Chronic atrial fibrillation with a CHADS-VASc score of 3 based on hypertension and age of 89.  Continue  Eliquis for stroke prevention and Atenolol for rate control.    2.  Palpitations.  Ongoing palpitations of the last couple of months.  He notices mostly at night laying in bed.  He describes as a tick in his upper chest lasting 5 seconds.  They occur daily.  He has known chronic atrial fibrillation.  I think he would benefit from a 3-day ZIO patch monitor to rule out PACs/PVCs and/or other arrhythmias.  We discussed staying hydrated and eating potassium and magnesium rich diet.  call him with the results as he does not want to come back for another visit.    3. Hypertension. Well controlled. Continue Atenolol as above.      Thank you for allowing me to care for Kraig Genao today.    TRAMAINE Flynn, CNP  Cardiology    Voice recognition software was used for this note, I have reviewed this note, but errors may have been missed.    Orders Placed This Encounter   Procedures     Zio Patch Holter Adult Pediatric Greater than 48 hrs     No orders of the defined types were placed in this encounter.    There are no discontinued medications.      CURRENT MEDICATIONS:  Current Outpatient Medications   Medication Sig Dispense Refill     apixaban ANTICOAGULANT (ELIQUIS) 5 MG tablet Take 1 tablet (5 mg) by mouth 2 times daily 60 tablet 12     atenolol (TENORMIN) 50 MG tablet Take 25 mg by mouth 2 times daily       biotin 1000 MCG TABS tablet Take by mouth daily       budesonide-formoterol (SYMBICORT) 160-4.5 MCG/ACT inhaler Inhale 2 puffs into the lungs 2 times daily       calcium-vitamin D (CALTRATE) 600-400 MG-UNIT per tablet Take 1 tablet by mouth daily       Cholecalciferol (VITAMIN D3 PO) Take 1,000 Units by mouth daily       Multiple Vitamin (ONE-A-DAY MENS PO) Take 1 tablet by mouth daily       omeprazole (PRILOSEC) 20 MG capsule Take 20 mg by mouth daily       terazosin (HYTRIN) 2 MG capsule Take 2 mg by mouth At Bedtime       Omega-3 Fatty Acids (FISH OIL) 1200 MG CAPS Take by mouth daily       oxybutynin  (DITROPAN) 5 MG tablet Take 5 mg by mouth 2 times daily          ALLERGIES     Allergies   Allergen Reactions     No Known Allergies        PAST MEDICAL HISTORY:  Past Medical History:   Diagnosis Date     Atrial fibrillation (H)     episodic     Bile duct stone      Chest pain 2/18/2015     Chronic atrial fibrillation (H)      Dyspepsia and other specified disorders of function of stomach     dyspepsia; better on zantac     Elevated LFTs      Epistaxis      Erectile dysfunction      Esophageal reflux     dilation of stricture 8/06     Gastroesophageal reflux disease, esophagitis presence not specified 5/4/2016     Helicobacter pylori (H. pylori)     had UGI endoscopy 10/00 & had LES dilitation     Hypertrophy (benign) of prostate     some voiding sx     Macular degeneration (senile) of retina, unspecified     mainly R eye       PAST SURGICAL HISTORY:  Past Surgical History:   Procedure Laterality Date     C NONSPECIFIC PROCEDURE  10/00 & 8/03    UGI endoscopy with dilitation    Abstracted 07/09/02     C NONSPECIFIC PROCEDURE  01/01    L hernia     C NONSPECIFIC PROCEDURE  06/02    lap choly     C NONSPECIFIC PROCEDURE  4/01    colonocopy 4/01     C NONSPECIFIC PROCEDURE  2/04    R total shoulder     C NONSPECIFIC PROCEDURE  4/05    re-do R shoulder     ENDOSCOPIC RETROGRADE CHOLANGIOPANCREATOGRAM  9/23/2011    Procedure:ENDOSCOPIC RETROGRADE CHOLANGIOPANCREATOGRAM; Endoscopic Retrograde Cholangiopancreatogram (c-arm), baloon dilation, stone retreival and 10fr. Solus stent placement x2 in  bile duct with the spy glass; Surgeon:EDD AUGUST; Location:[facility]     ENDOSCOPIC RETROGRADE CHOLANGIOPANCREATOGRAPHY, LITHOTRIPSY PANCREAS, COMBINED  10/27/2011    Procedure:COMBINED ENDOSCOPIC RETROGRADE CHOLANGIOPANCREATOGRAPHY, LITHOTRIPSY PANCREAS; Endoscopic Retrograde Cholangiopancreatogram with spygass scope ,Electrohydraulic Lithotripsy  baloon dilation of bile duct, stone removal (c-arm); Surgeon:EDD AUGUST;  Location:[facility]     ESOPHAGOSCOPY, GASTROSCOPY, DUODENOSCOPY (EGD), COMBINED N/A 5/9/2016    Procedure: COMBINED ESOPHAGOSCOPY, GASTROSCOPY, DUODENOSCOPY (EGD), BIOPSY SINGLE OR MULTIPLE;  Surgeon: Mendez Mcknight MD;  Location: [facility]     ESOPHAGOSCOPY, GASTROSCOPY, DUODENOSCOPY (EGD), COMBINED N/A 2/11/2018    Procedure: COMBINED ESOPHAGOSCOPY, GASTROSCOPY, DUODENOSCOPY (EGD), REMOVE FOREIGN BODY;  COMBINED ESOPHAGOSCOPY, GASTROSCOPY, DUODENOSCOPY (EGD) by raptor graspeing ;  Surgeon: Mendez Mcknight MD;  Location: [facility]     ESOPHAGOSCOPY, GASTROSCOPY, DUODENOSCOPY (EGD), COMBINED Left 2/16/2018    Procedure: COMBINED ESOPHAGOSCOPY, GASTROSCOPY, DUODENOSCOPY (EGD), BIOPSY SINGLE OR MULTIPLE;  ESOPHAGOSCOPY, GASTROSCOPY, DUODENOSCOPY (EGD) (Nicci) with biopsies using cold bx forcep and tts balloon dilation 12-15mm;  Surgeon: Mendez Mcknight MD;  Location: [facility]       FAMILY HISTORY:  Family History   Problem Relation Age of Onset     Heart Disease Father          M.I.     Esophageal Cancer No family hx of      Gastrointestinal Disease No family hx of      Stomach Cancer No family hx of        SOCIAL HISTORY:  Social History     Socioeconomic History     Marital status:      Spouse name: Patrica     Number of children: 4     Years of education: None     Highest education level: None   Occupational History     Occupation: administrative; [employer]     Comment: retired   Social Needs     Financial resource strain: None     Food insecurity:     Worry: None     Inability: None     Transportation needs:     Medical: None     Non-medical: None   Tobacco Use     Smoking status: Never Smoker     Smokeless tobacco: Never Used   Substance and Sexual Activity     Alcohol use: Yes     Alcohol/week: 0.0 standard drinks     Comment: 1-2 beer, wine, or mixed drink per night     Drug use: No     Sexual activity: None   Lifestyle     Physical activity:     Days per week: None     Minutes per session: None      "Stress: None   Relationships     Social connections:     Talks on phone: None     Gets together: None     Attends Cheondoism service: None     Active member of club or organization: None     Attends meetings of clubs or organizations: None     Relationship status: None     Intimate partner violence:     Fear of current or ex partner: None     Emotionally abused: None     Physically abused: None     Forced sexual activity: None   Other Topics Concern     Parent/sibling w/ CABG, MI or angioplasty before 65F 55M? Not Asked      Service Not Asked     Blood Transfusions Not Asked     Caffeine Concern No     Comment: 4-5 cups daily     Occupational Exposure Not Asked     Hobby Hazards Not Asked     Sleep Concern Not Asked     Stress Concern Not Asked     Weight Concern Not Asked     Special Diet No     Comment: regular     Back Care Not Asked     Exercise Yes     Comment: walking, golfing     Bike Helmet Not Asked     Seat Belt Not Asked     Self-Exams Not Asked   Social History Narrative     None       Review of Systems:  Skin:  Negative for       Eyes:  Positive for glasses    ENT:  Positive for hearing loss    Respiratory:  Negative for       Cardiovascular:    Positive for;palpitations strange feeling in the left chest area and arm  Gastroenterology: Negative for      Genitourinary:  not assessed      Musculoskeletal:  Negative for      Neurologic:  Negative for      Psychiatric:  Negative for      Heme/Lymph/Imm:  Positive for allergies    Endocrine:  Negative for        Physical Exam:  Vitals: /60   Pulse 100   Ht 1.702 m (5' 7\")   Wt 73 kg (161 lb)   BMI 25.22 kg/m       Constitutional:  cooperative, alert and oriented, well developed, well nourished, in no acute distress        Skin:  warm and dry to the touch          Head:  normocephalic        Eyes:  pupils equal and round;sclera white;conjunctivae and lids unremarkable        Lymph:      ENT:  no pallor or cyanosis hearing aide(s) present  "     Neck:  carotid pulses are full and equal bilaterally, JVP normal, no carotid bruit        Respiratory:  normal breath sounds, clear to auscultation, normal A-P diameter, normal symmetry, normal respiratory excursion, no use of accessory muscles         Cardiac: normal S1 and S2;no murmurs, gallops or rubs detected;no S3 or S4;apical impulse not displaced irregular rhythm   no presence of murmur          pulses full and equal     right radial artery;2+             left radial artery;2+                    GI:  abdomen soft;non-tender        Extremities and Muscular Skeletal:  no deformities, clubbing, cyanosis, erythema observed;no edema              Neurological:  no gross motor deficits;affect appropriate        Psych:  Alert and Oriented x 3    Encounter Diagnoses   Name Primary?     Palpitations      Chronic atrial fibrillation (H) Yes       Recent Lab Results:  LIPID RESULTS:  Lab Results   Component Value Date    CHOL 172 03/27/2012    HDL 55 03/27/2012    LDL 99 03/27/2012    TRIG 92 03/27/2012    CHOLHDLRATIO 4.6 12/16/2004       LIVER ENZYME RESULTS:  Lab Results   Component Value Date    AST 19 05/19/2015    ALT 20 05/19/2015       CBC RESULTS:  Lab Results   Component Value Date    WBC 6.6 02/18/2015    RBC 4.54 02/18/2015    HGB 15.0 02/18/2015    HCT 44.6 02/18/2015    MCV 98 02/18/2015    MCH 33.0 02/18/2015    MCHC 33.6 02/18/2015    RDW 12.4 02/18/2015     02/18/2015       BMP RESULTS:  Lab Results   Component Value Date     02/18/2015    POTASSIUM 3.7 05/19/2015    CHLORIDE 104 02/18/2015    CO2 30 02/18/2015    ANIONGAP 6 02/18/2015     (A) 05/19/2015    BUN 12 02/18/2015    CR 0.9 05/19/2015    GFRESTIMATED 80 05/19/2015    GFRESTBLACK 69 02/18/2015    RAZ 8.5 02/18/2015        A1C RESULTS:  Lab Results   Component Value Date    A1C 5.5 05/19/2015       INR RESULTS:  Lab Results   Component Value Date    INR 1.04 02/18/2015    INR 1.12 10/27/2011           CC  No referring  provider defined for this encounter.                      Thank you for allowing me to participate in the care of your patient.      Sincerely,     TRAMAIEN Flynn [facility]    cc:   No referring provider defined for this encounter.

## 2019-09-29 ENCOUNTER — HEALTH MAINTENANCE LETTER (OUTPATIENT)
Age: 84
End: 2019-09-29

## 2019-10-04 ENCOUNTER — TELEPHONE (OUTPATIENT)
Dept: CARDIOLOGY | Facility: CLINIC | Age: 84
End: 2019-10-04

## 2019-10-04 NOTE — TELEPHONE ENCOUNTER
Notes recorded by Meredith Hansen APRN CNP on 10/4/2019 at 12:16 PM CDT  No new palpations. Only persistent AF noted. Continue current medications    Contacted patient to review results and Meredith's comments. Patient verbalized understanding and agreed with plan of care.

## 2019-12-06 ENCOUNTER — TRANSFERRED RECORDS (OUTPATIENT)
Dept: HEALTH INFORMATION MANAGEMENT | Facility: CLINIC | Age: 84
End: 2019-12-06

## 2019-12-13 ENCOUNTER — TRANSFERRED RECORDS (OUTPATIENT)
Dept: HEALTH INFORMATION MANAGEMENT | Facility: CLINIC | Age: 84
End: 2019-12-13

## 2020-01-09 ENCOUNTER — TRANSFERRED RECORDS (OUTPATIENT)
Dept: HEALTH INFORMATION MANAGEMENT | Facility: CLINIC | Age: 85
End: 2020-01-09

## 2020-01-23 ENCOUNTER — TRANSFERRED RECORDS (OUTPATIENT)
Dept: HEALTH INFORMATION MANAGEMENT | Facility: CLINIC | Age: 85
End: 2020-01-23

## 2020-01-31 ENCOUNTER — TRANSFERRED RECORDS (OUTPATIENT)
Dept: HEALTH INFORMATION MANAGEMENT | Facility: CLINIC | Age: 85
End: 2020-01-31

## 2020-02-13 ENCOUNTER — TRANSFERRED RECORDS (OUTPATIENT)
Dept: HEALTH INFORMATION MANAGEMENT | Facility: CLINIC | Age: 85
End: 2020-02-13

## 2020-02-27 ENCOUNTER — TRANSFERRED RECORDS (OUTPATIENT)
Dept: HEALTH INFORMATION MANAGEMENT | Facility: CLINIC | Age: 85
End: 2020-02-27

## 2020-03-10 ENCOUNTER — TRANSFERRED RECORDS (OUTPATIENT)
Dept: HEALTH INFORMATION MANAGEMENT | Facility: CLINIC | Age: 85
End: 2020-03-10

## 2020-03-15 ENCOUNTER — HEALTH MAINTENANCE LETTER (OUTPATIENT)
Age: 85
End: 2020-03-15

## 2020-04-01 ENCOUNTER — VIRTUAL VISIT (OUTPATIENT)
Dept: CARDIOLOGY | Facility: CLINIC | Age: 85
End: 2020-04-01
Payer: MEDICARE

## 2020-04-01 VITALS
SYSTOLIC BLOOD PRESSURE: 130 MMHG | BODY MASS INDEX: 24.28 KG/M2 | DIASTOLIC BLOOD PRESSURE: 86 MMHG | WEIGHT: 155 LBS | HEART RATE: 73 BPM

## 2020-04-01 DIAGNOSIS — I48.20 CHRONIC ATRIAL FIBRILLATION (H): Primary | ICD-10-CM

## 2020-04-01 PROCEDURE — 99441 ZZC PHYSICIAN TELEPHONE EVALUATION 5-10 MIN: CPT | Performed by: INTERNAL MEDICINE

## 2020-04-01 RX ORDER — TAMSULOSIN HYDROCHLORIDE 0.4 MG/1
0.4 CAPSULE ORAL DAILY
COMMUNITY

## 2020-04-01 NOTE — PROGRESS NOTES
"Kraig Genao is a 89 year old male who is being evaluated via a billable telephone visit.      The patient has been notified of following:     \"This telephone visit will be conducted via a call between you and your physician/provider. We have found that certain health care needs can be provided without the need for a physical exam.  This service lets us provide the care you need with a short phone conversation.  If a prescription is necessary we can send it directly to your pharmacy.  If lab work is needed we can place an order for that and you can then stop by our lab to have the test done at a later time.    If during the course of the call the physician/provider feels a telephone visit is not appropriate, you will not be charged for this service.\"     Patient has given verbal consent for Telephone visit?  Yes    Kraig Genao complains of  No chief complaint on file.      I have reviewed and updated the patient's Past Medical History, Social History, Family History and Medication List.    ALLERGIES  No known allergies     Review Of Systems  Skin: negative  Eyes: negative  Ears/Nose/Throat: negative  Respiratory: No shortness of breath, dyspnea on exertion, cough, or hemoptysis  Cardiovascular: chest pain and Upper left chest feels a tick tick sometimes, also in upper left shoulder blades feels something feels when laying down  And is concerning him,  mentioned this last fall, wore a monitor for a few days and the adhesive did not stick...  Gastrointestinal: negative  Genitourinary: negative  Musculoskeletal: arthritis and joint pain  Neurologic: negative  Psychiatric: negative  Hematologic/Lymphatic/Immunologic: negative  Endocrine: negative    -- chest pain and Upper left chest feels a tick tick sometimes, also in upper left shoulder blades feels something feels when laying down  And is concerning him,    Patient reported vitals:  BP:130/86  Heart rate:73  Weight:155lb     Fabi Analisa CMA    Additional " provider notes:      89 yr old gentleman who I am following for chronic afib.    His history is nicely documented by Dr Edmondson and is as follows:    This patient has had documented atrial fibrillation since 2002.  He had not received anticoagulant therapy until he was seen in 2015.  His ventricular response rate was well controlled.  The patient was hospitalized in 02/2015 after experiencing epigastric burning and myocardial infarction was ruled out.  A stress echocardiogram was negative for ischemia and resting echo was unremarkable except for the presence of atrial fibrillation.  Continued medical therapy was advised.  He has been treated with Eliquis 5 mg twice a day since 2015.  His creatinine clearance has been normal and he weighs greater than 60 kg, suggesting this is the proper dose.  The patient denies bleeding diathesis other than occasional epistaxis.     He denies bleeding.  I am sorry to hear that he slipped and fell down the stairs and has a fractured right kneecap but he is able to do physical therapy at home and he is on the way to recovery.    He describes occasional chest discomfort involving his left upper chest prickly when he lays down at night.  This lasts no more than a few seconds.  He was seen by Iris several months ago and I am happy to see that a Zile patch monitor showed adequate heart rate control and there were no other significant arrhythmias.  Rhythm is chronic atrial fibrillation.    Overall I think this gentleman's cardiac condition is stable.  I look forward to seeing him again in person in 1 years time.              Phone call duration: 8 minutes    DR FRANTZ BERMEO MD

## 2020-04-07 ENCOUNTER — TRANSFERRED RECORDS (OUTPATIENT)
Dept: HEALTH INFORMATION MANAGEMENT | Facility: CLINIC | Age: 85
End: 2020-04-07

## 2020-05-26 ENCOUNTER — TRANSFERRED RECORDS (OUTPATIENT)
Dept: HEALTH INFORMATION MANAGEMENT | Facility: CLINIC | Age: 85
End: 2020-05-26

## 2020-06-17 ENCOUNTER — TRANSFERRED RECORDS (OUTPATIENT)
Dept: HEALTH INFORMATION MANAGEMENT | Facility: CLINIC | Age: 85
End: 2020-06-17

## 2021-01-14 ENCOUNTER — HEALTH MAINTENANCE LETTER (OUTPATIENT)
Age: 86
End: 2021-01-14

## 2021-02-06 ENCOUNTER — HOSPITAL ENCOUNTER (EMERGENCY)
Facility: CLINIC | Age: 86
Discharge: HOME | End: 2021-02-06
Attending: EMERGENCY MEDICINE | Admitting: EMERGENCY MEDICINE
Payer: MEDICARE

## 2021-02-06 ENCOUNTER — APPOINTMENT (OUTPATIENT)
Dept: GENERAL RADIOLOGY | Facility: CLINIC | Age: 86
End: 2021-02-06
Attending: EMERGENCY MEDICINE
Payer: MEDICARE

## 2021-02-06 VITALS
SYSTOLIC BLOOD PRESSURE: 127 MMHG | OXYGEN SATURATION: 95 % | TEMPERATURE: 97.3 F | RESPIRATION RATE: 14 BRPM | DIASTOLIC BLOOD PRESSURE: 87 MMHG | HEART RATE: 70 BPM

## 2021-02-06 DIAGNOSIS — R07.89 ATYPICAL CHEST PAIN: ICD-10-CM

## 2021-02-06 LAB
ALBUMIN SERPL-MCNC: 3.6 G/DL (ref 3.4–5)
ALP SERPL-CCNC: 87 U/L (ref 40–150)
ALT SERPL W P-5'-P-CCNC: 20 U/L (ref 0–70)
ANION GAP SERPL CALCULATED.3IONS-SCNC: 5 MMOL/L (ref 3–14)
AST SERPL W P-5'-P-CCNC: 26 U/L (ref 0–45)
BASOPHILS # BLD AUTO: 0.1 10E9/L (ref 0–0.2)
BASOPHILS NFR BLD AUTO: 1 %
BILIRUB SERPL-MCNC: 0.6 MG/DL (ref 0.2–1.3)
BUN SERPL-MCNC: 21 MG/DL (ref 7–30)
CALCIUM SERPL-MCNC: 8.4 MG/DL (ref 8.5–10.1)
CHLORIDE SERPL-SCNC: 103 MMOL/L (ref 94–109)
CO2 SERPL-SCNC: 29 MMOL/L (ref 20–32)
CREAT SERPL-MCNC: 1.1 MG/DL (ref 0.66–1.25)
DIFFERENTIAL METHOD BLD: NORMAL
EOSINOPHIL # BLD AUTO: 0.3 10E9/L (ref 0–0.7)
EOSINOPHIL NFR BLD AUTO: 3.1 %
ERYTHROCYTE [DISTWIDTH] IN BLOOD BY AUTOMATED COUNT: 12.2 % (ref 10–15)
GFR SERPL CREATININE-BSD FRML MDRD: 57 ML/MIN/{1.73_M2}
GLUCOSE SERPL-MCNC: 92 MG/DL (ref 70–99)
HCT VFR BLD AUTO: 47.1 % (ref 40–53)
HGB BLD-MCNC: 15.1 G/DL (ref 13.3–17.7)
IMM GRANULOCYTES # BLD: 0.3 10E9/L (ref 0–0.4)
IMM GRANULOCYTES NFR BLD: 3.1 %
LYMPHOCYTES # BLD AUTO: 1.5 10E9/L (ref 0.8–5.3)
LYMPHOCYTES NFR BLD AUTO: 18.4 %
MCH RBC QN AUTO: 32.2 PG (ref 26.5–33)
MCHC RBC AUTO-ENTMCNC: 32.1 G/DL (ref 31.5–36.5)
MCV RBC AUTO: 100 FL (ref 78–100)
MONOCYTES # BLD AUTO: 0.9 10E9/L (ref 0–1.3)
MONOCYTES NFR BLD AUTO: 10.2 %
NEUTROPHILS # BLD AUTO: 5.4 10E9/L (ref 1.6–8.3)
NEUTROPHILS NFR BLD AUTO: 64.2 %
NRBC # BLD AUTO: 0 10*3/UL
NRBC BLD AUTO-RTO: 0 /100
PLATELET # BLD AUTO: 231 10E9/L (ref 150–450)
POTASSIUM SERPL-SCNC: 4.1 MMOL/L (ref 3.4–5.3)
PROT SERPL-MCNC: 7.9 G/DL (ref 6.8–8.8)
RBC # BLD AUTO: 4.69 10E12/L (ref 4.4–5.9)
SODIUM SERPL-SCNC: 137 MMOL/L (ref 133–144)
TROPONIN I SERPL-MCNC: <0.015 UG/L (ref 0–0.04)
TROPONIN I SERPL-MCNC: <0.015 UG/L (ref 0–0.04)
WBC # BLD AUTO: 8.3 10E9/L (ref 4–11)

## 2021-02-06 PROCEDURE — 85025 COMPLETE CBC W/AUTO DIFF WBC: CPT | Performed by: EMERGENCY MEDICINE

## 2021-02-06 PROCEDURE — 250N000013 HC RX MED GY IP 250 OP 250 PS 637: Mod: GY | Performed by: EMERGENCY MEDICINE

## 2021-02-06 PROCEDURE — 84484 ASSAY OF TROPONIN QUANT: CPT | Mod: 91 | Performed by: EMERGENCY MEDICINE

## 2021-02-06 PROCEDURE — 99285 EMERGENCY DEPT VISIT HI MDM: CPT | Mod: 25

## 2021-02-06 PROCEDURE — 93005 ELECTROCARDIOGRAM TRACING: CPT

## 2021-02-06 PROCEDURE — 71045 X-RAY EXAM CHEST 1 VIEW: CPT

## 2021-02-06 PROCEDURE — 80053 COMPREHEN METABOLIC PANEL: CPT | Performed by: EMERGENCY MEDICINE

## 2021-02-06 PROCEDURE — 93005 ELECTROCARDIOGRAM TRACING: CPT | Mod: 76

## 2021-02-06 RX ORDER — ASPIRIN 81 MG/1
324 TABLET, CHEWABLE ORAL ONCE
Status: COMPLETED | OUTPATIENT
Start: 2021-02-06 | End: 2021-02-06

## 2021-02-06 RX ADMIN — ASPIRIN 81 MG CHEWABLE TABLET 324 MG: 81 TABLET CHEWABLE at 02:09

## 2021-02-06 ASSESSMENT — ENCOUNTER SYMPTOMS
VOMITING: 0
SHORTNESS OF BREATH: 0
NAUSEA: 0
DIAPHORESIS: 0

## 2021-02-06 NOTE — ED PROVIDER NOTES
History     Chief Complaint:  Chest Pain      HPI  Kraig Genao is a 89 year old male with a history of A fib, anticoagulated with Eliquis who presents to the emergency department for evaluation of chest pain. Patient states he woke up an hour PTA with burning chest pain that lasted for less than a minute before resolving on its own. He denies nausea, vomiting, sweats, and shortness of breath. No history of MI or cardiac stents. Patient currently denies any pain.      Allergies:  No known drug allergies    Medications:    Eliquis  Atenolol  Biotin  Symbicort  Omeprazole  Oxybutynin  Flomax    Past Medical History:    A fib  Bile duct stone  Dyspepsia  ED  GERD  H. Pylori  BPH    Past Surgical History:    Right total shoulder arthroplasty and revision  UGI endoscopy with dilatation  Left hernia repair  Cholecystectomy  Lithotripsy  EGD with biopsy x2    Family History:    MI    Social History:  The patient was brought to the emergency department by his son.  The patient currently lives alone. Wife is currently at [facility].   Marital Status:     Review of Systems   Constitutional: Negative for diaphoresis.   Respiratory: Negative for shortness of breath.    Cardiovascular: Positive for chest pain.   Gastrointestinal: Negative for nausea and vomiting.   All other systems reviewed and are negative.    Physical Exam     Patient Vitals for the past 24 hrs:   BP Temp Temp src Pulse Resp SpO2   02/06/21 0315 (!) 132/93 -- -- 78 15 97 %   02/06/21 0300 128/83 -- -- 79 14 96 %   02/06/21 0245 134/88 -- -- 78 14 95 %   02/06/21 0215 (!) 137/94 -- -- 80 15 95 %   02/06/21 0130 (!) 158/108 -- -- -- -- 99 %   02/06/21 0122 (!) 188/70 97.3  F (36.3  C) Temporal 98 18 99 %         Physical Exam  Constitutional:  Oriented to person, place, and time.   HENT:   Head:    Normocephalic.   Mouth/Throat:   Oropharynx is clear and moist.   Eyes:    EOM are normal. Pupils are equal, round, and reactive to light.   Neck:    Neck  supple.   Cardiovascular:  Normal rate, regular rhythm and normal heart sounds.      Exam reveals no gallop and no friction rub.       No murmur heard.  Pulmonary/Chest:  Effort normal and breath sounds normal.      No respiratory distress. No wheezes. No rales.      No reproducible chest wall pain.  Abdominal:   Soft. No distension. No tenderness. No rebound and no guarding.   Musculoskeletal:  Normal range of motion. 2+ distal equal pulses. No leg calf tenderness, swelling or edema.  Neurological:   Alert and oriented to person, place, and time.           Moves all 4 extremities spontaneously    Skin:    No rash noted. No pallor.     Emergency Department Course   EKG  Indication: Chest Pain  Time: 1:30:12  Rate 80 bpm. MO interval *. QRS duration 108. QT/QTc 382/446. P-R-T axes * -44 62  Atrial Fibrillation. Left axis deviation. Anterior infarct, age undetermined. Inferior infarct, age undetermined. Abnormal ECG.   Interpreted at 130 by Reynaldo Corona MD.    EKG  Indication: Chest Pain  Time: 3:10:13  Rate 76 bpm. MO interval *. QRS duration 108. QT/QTc 402/452. P-R-T axes * -42 59  Atrial Fibrillation. Left axis deviation. Anterior infarct, age undetermined. Inferior infarct, age undetermined. Abnormal ECG.  Interpreted at 310 by Reynaldo Corona MD.    Imaging:  XR Chest 1 view, portable:   IMPRESSION: Minimal cardiac enlargement. Normal pulmonary vascularity. Moderate elevation right hemidiaphragm. Atherosclerotic vascular calcification. No focal infiltrate or consolidation. Right shoulder arthroplasty. as per radiology.    Laboratory:  CBC: WBC: 8.3, HGB: 15.1, PLT: 231  CMP: Glucose 92, GFR: 57 (L), Calcium: 8.4 (L), o/w WNL (Creatinine: 1.10)  Troponin (134): <0.015  Repeat Troponin (343): <0.015    Emergency Department Course:  Reviewed:  I reviewed the patient's nursing notes, vitals, past medical records, Care Everywhere.     Assessments:  129 I assessed the patient. Exam findings described  above.    415 I reassessed the patient and discussed the results of his workup.      Interventions:  209 aspirin 324 mg Oral    Disposition:  Discharged to home.    Impression & Plan    Medical Decision Making:  Kraig Genao is a 89 year old male who presents with chest pain.  The work up in the Emergency Department is negative.  I considered a broad differential diagnosis in this patient including life-threatening etiologies such as acute coronary syndrome, myocardial infarction, pulmonary embolism, acute aortic dissection, myocarditis, pericarditis, acute valvular insufficiency amongst others.  Other causes considered for this patient included pneumonia, pneumothorax, chest wall source, pericarditis, pleurisy, esophageal spasm, etc.  No serious etiology for the chest pain were detected today during this visit. HEART score of 3. Patient came in chest pain free. Pain is non-pleuritic, thus I would highly doubt PE. Close follow up with primary care is indicated should the pain continue, as further work up may be performed; this was made clear to the patient, who understands.     Critical Care time:  none    Diagnosis:    ICD-10-CM    1. Atypical chest pain  R07.89        Disposition:  Discharged to home    Juan Zavala  2/6/2021   EMERGENCY DEPARTMENT  Scribe Disclosure:  I, Juan Zavala, am serving as a scribe at 1:29 AM on 2/6/2021 to document services personally performed by Reynaldo Corona MD based on my observations and the provider's statements to me.          Reynaldo Corona MD  02/06/21 0606

## 2021-02-08 LAB
INTERPRETATION ECG - MUSE: NORMAL
INTERPRETATION ECG - MUSE: NORMAL

## 2021-05-09 ENCOUNTER — HEALTH MAINTENANCE LETTER (OUTPATIENT)
Age: 86
End: 2021-05-09

## 2021-08-09 PROCEDURE — 99285 EMERGENCY DEPT VISIT HI MDM: CPT | Mod: 25

## 2021-08-09 PROCEDURE — 96375 TX/PRO/DX INJ NEW DRUG ADDON: CPT

## 2021-08-09 PROCEDURE — 250N000013 HC RX MED GY IP 250 OP 250 PS 637: Performed by: EMERGENCY MEDICINE

## 2021-08-09 PROCEDURE — 96374 THER/PROPH/DIAG INJ IV PUSH: CPT

## 2021-08-09 RX ORDER — ACETAMINOPHEN 500 MG
1000 TABLET ORAL ONCE
Status: COMPLETED | OUTPATIENT
Start: 2021-08-09 | End: 2021-08-09

## 2021-08-09 RX ADMIN — ACETAMINOPHEN 500 MG: 500 TABLET, FILM COATED ORAL at 23:09

## 2021-08-10 ENCOUNTER — HOSPITAL ENCOUNTER (EMERGENCY)
Facility: CLINIC | Age: 86
Discharge: HOME | End: 2021-08-10
Attending: EMERGENCY MEDICINE | Admitting: EMERGENCY MEDICINE
Payer: MEDICARE

## 2021-08-10 VITALS
HEART RATE: 92 BPM | RESPIRATION RATE: 21 BRPM | TEMPERATURE: 97.6 F | SYSTOLIC BLOOD PRESSURE: 108 MMHG | DIASTOLIC BLOOD PRESSURE: 76 MMHG | OXYGEN SATURATION: 95 %

## 2021-08-10 DIAGNOSIS — K22.2 ESOPHAGEAL OBSTRUCTION: ICD-10-CM

## 2021-08-10 LAB — UPPER GI ENDOSCOPY: NORMAL

## 2021-08-10 PROCEDURE — 250N000011 HC RX IP 250 OP 636: Performed by: EMERGENCY MEDICINE

## 2021-08-10 PROCEDURE — 999N000099 HC STATISTIC MODERATE SEDATION < 10 MIN: Performed by: INTERNAL MEDICINE

## 2021-08-10 PROCEDURE — 250N000009 HC RX 250: Performed by: INTERNAL MEDICINE

## 2021-08-10 PROCEDURE — 255N000001 HC RX 255: Performed by: EMERGENCY MEDICINE

## 2021-08-10 PROCEDURE — 250N000011 HC RX IP 250 OP 636: Performed by: INTERNAL MEDICINE

## 2021-08-10 PROCEDURE — 43235 EGD DIAGNOSTIC BRUSH WASH: CPT | Performed by: INTERNAL MEDICINE

## 2021-08-10 RX ORDER — NALOXONE HYDROCHLORIDE 0.4 MG/ML
0.2 INJECTION, SOLUTION INTRAMUSCULAR; INTRAVENOUS; SUBCUTANEOUS
Status: DISCONTINUED | OUTPATIENT
Start: 2021-08-10 | End: 2021-08-10 | Stop reason: HOSPADM

## 2021-08-10 RX ORDER — SIMETHICONE 40MG/0.6ML
133 SUSPENSION, DROPS(FINAL DOSAGE FORM)(ML) ORAL
Status: DISCONTINUED | OUTPATIENT
Start: 2021-08-10 | End: 2021-08-10 | Stop reason: HOSPADM

## 2021-08-10 RX ORDER — FENTANYL CITRATE 50 UG/ML
25-50 INJECTION, SOLUTION INTRAMUSCULAR; INTRAVENOUS
Status: COMPLETED | OUTPATIENT
Start: 2021-08-10 | End: 2021-08-10

## 2021-08-10 RX ORDER — FLUMAZENIL 0.1 MG/ML
0.2 INJECTION, SOLUTION INTRAVENOUS
Status: DISCONTINUED | OUTPATIENT
Start: 2021-08-10 | End: 2021-08-10 | Stop reason: HOSPADM

## 2021-08-10 RX ORDER — EPINEPHRINE 1 MG/ML
0.1 INJECTION, SOLUTION, CONCENTRATE INTRAVENOUS
Status: DISCONTINUED | OUTPATIENT
Start: 2021-08-10 | End: 2021-08-10 | Stop reason: HOSPADM

## 2021-08-10 RX ORDER — LIDOCAINE 40 MG/G
CREAM TOPICAL
Status: DISCONTINUED | OUTPATIENT
Start: 2021-08-10 | End: 2021-08-10 | Stop reason: HOSPADM

## 2021-08-10 RX ORDER — ATROPINE SULFATE 0.4 MG/ML
0.4 AMPUL (ML) INJECTION
Status: DISCONTINUED | OUTPATIENT
Start: 2021-08-10 | End: 2021-08-10 | Stop reason: HOSPADM

## 2021-08-10 RX ORDER — NALOXONE HYDROCHLORIDE 0.4 MG/ML
0.4 INJECTION, SOLUTION INTRAMUSCULAR; INTRAVENOUS; SUBCUTANEOUS
Status: DISCONTINUED | OUTPATIENT
Start: 2021-08-10 | End: 2021-08-10 | Stop reason: HOSPADM

## 2021-08-10 RX ORDER — ONDANSETRON 2 MG/ML
4 INJECTION INTRAMUSCULAR; INTRAVENOUS
Status: DISCONTINUED | OUTPATIENT
Start: 2021-08-10 | End: 2021-08-10 | Stop reason: HOSPADM

## 2021-08-10 RX ORDER — FENTANYL CITRATE 50 UG/ML
25 INJECTION, SOLUTION INTRAMUSCULAR; INTRAVENOUS
Status: DISCONTINUED | OUTPATIENT
Start: 2021-08-10 | End: 2021-08-10 | Stop reason: HOSPADM

## 2021-08-10 RX ADMIN — FENTANYL CITRATE 50 MCG: 50 INJECTION, SOLUTION INTRAMUSCULAR; INTRAVENOUS at 03:53

## 2021-08-10 RX ADMIN — ANTACID/ANTIFLATULENT 4 G: 380; 550; 10; 10 GRANULE, EFFERVESCENT ORAL at 01:50

## 2021-08-10 RX ADMIN — MIDAZOLAM 1 MG: 1 INJECTION INTRAMUSCULAR; INTRAVENOUS at 03:54

## 2021-08-10 RX ADMIN — GLUCAGON HYDROCHLORIDE 1 MG: KIT at 02:00

## 2021-08-10 RX ADMIN — TOPICAL ANESTHETIC 1 SPRAY: 200 SPRAY DENTAL; PERIODONTAL at 03:52

## 2021-08-10 ASSESSMENT — ENCOUNTER SYMPTOMS: TROUBLE SWALLOWING: 1

## 2021-08-10 NOTE — ED TRIAGE NOTES
Food stuck in throat while eating beef stroganoff at dinner. Hx of esophageal stretching and food bolus in the past. Spitting up oral secretions at this time. Hypertensive in triage otherwise VSS on RA. Airway patent.

## 2021-08-10 NOTE — OR NURSING
Patient tolerated EGD in emergency department. Sedation given by SASHA cross. Patient stable throughout procedure and in recovery. Discharge paperwork and provation report discussed with patient and patient's daughter at bedside. Charisse Martinez on 8/10/2021 at 4:46 AM

## 2021-08-10 NOTE — ED PROVIDER NOTES
History   Chief Complaint:  Food in Throat and Food Bolus       HPI   Kraig Genao is a 89 year old male with history of esophageal reflux who presents with inability to swallow and producing excess sputum.  He explained that around 1715 he ate beef stroganoff and has been unable to swallow since.     He noted a similar episode in the past requiring intervention.    Review of Systems   HENT: Positive for trouble swallowing.         + esophageal obstruction   All other systems reviewed and are negative.        Allergies:  The patient has no known allergies.     Medications:  Eliquis  Atenolol  Biotin  Symbicort  Omeprazole  Oxybutynin  Flomax    Past Medical History:    A fib  Bile duct stone  Dyspepsia  Elevated LFTs  Epistaxis  ED  GERD  Helicobacter pylori  Hypertrophy of prostate  Macular degeneration     Past Surgical History:    Cholangiopancreatogram  EGD x2  UGI endoscopy  L hernia  Lap choly    Family History:    Father: MI    Social History:  Presents with family member.    Physical Exam     Patient Vitals for the past 24 hrs:   BP Temp Temp src Pulse Resp SpO2   08/10/21 0430 -- -- -- 94 23 95 %   08/10/21 0420 125/86 -- -- 89 22 95 %   08/10/21 0400 123/75 -- -- 85 17 94 %   08/10/21 0355 (!) 139/114 -- -- 84 20 95 %   08/10/21 0350 126/89 -- -- 89 (!) 31 96 %   08/10/21 0300 (!) 113/100 -- -- 90 -- 95 %   08/10/21 0250 (!) 130/93 -- -- -- -- 95 %   08/10/21 0240 -- -- -- -- -- 98 %   08/10/21 0230 (!) 159/106 -- -- 94 -- 92 %   08/10/21 0200 (!) 139/110 -- -- 100 -- (!) 82 %   08/09/21 2304 (!) 166/117 -- -- -- -- --   08/09/21 2303 -- 97.6  F (36.4  C) Temporal 89 20 99 %       Physical Exam  Constitutional:  Oriented to person, place, and time.   HENT:   Head:    Normocephalic.   Mouth/Throat:   Oropharynx is clear and moist.   Eyes:    EOM are normal. Pupils are equal, round, and reactive to light.   Neck:    Neck supple.   Cardiovascular:  Normal rate, regular rhythm and normal heart sounds.       Exam reveals no gallop and no friction rub.       No murmur heard.  Pulmonary/Chest:  Effort normal and breath sounds normal.      No respiratory distress. No wheezes. No rales.      No reproducible chest wall pain.  Abdominal:   Soft. No distension. No tenderness. No rebound and no guarding.   Musculoskeletal:  Normal range of motion.   Neurological:   Alert and oriented to person, place, and time.           Moves all 4 extremities spontaneously    Skin:    No rash noted. No pallor.       Emergency Department Course     Emergency Department Course:    Reviewed:  I reviewed nursing notes, vitals, past medical history and care everywhere    Assessments:  125 I obtained history and examined the patient as noted above.   Attempted po challenge. Water came back up.  400 GI arrived for endoscopy  After glucagon and versed, obstruction passed.  Endoscopy not performed.    Consults:   0221 I consulted with GI regarding the patient.     Interventions:  2308 Tylenol 500 mg PO  0150 EZ gas 4 g PO  0200 Glucagon 1 mg IV  0353 Sublimaze 50 mcg IV  0354 Versed 1 mg IV    Disposition:  The patient was discharged to home.       Impression & Plan     Medical Decision Making:  Kraig Genao is a 89 year old male who presents with food bolus impaction. Initially did not respond to glucagon and versed. GI was consulted and at the bed side about to perform upper endoscopy. After pretreatment with sedation of fetanol and versed, obstruction passed and patient was tolerating PO and endoscopy was never performed. He has been observed tolerating PO without incident. He is awake and alert, appropriate for outpatient management. He is instructed to follow up with primary doctor and GI as needed. Return for new or worsening symptoms.     Diagnosis:    ICD-10-CM    1. Esophageal obstruction  K22.2        Scribe Disclosure:  Karolina MADDEN am serving as a scribe at 1:27 AM on 8/10/2021 to document services personally performed by  Reynaldo Corona MD based on my observations and the provider's statements to me.              Reynaldo Corona MD  08/10/21 0529

## 2021-08-10 NOTE — H&P
Pre-Endoscopy History and Physical     Kraig Genao MRN# [id number]   YOB: 1926 Age: 89 year old     Date of Procedure: 8/10/2021  Primary care provider: Griselda Syed  Type of Endoscopy: Esophagogastroduodenoscopy with possible biopsy, possible dilation, possible foreign body removal  Reason for Procedure: foreign body  Type of Anesthesia Anticipated: Conscious Sedation    HPI:    Kraig is a 89 year old male who will be undergoing the above procedure.      A history and physical has been performed. The patient's medications and allergies have been reviewed. The risks and benefits of the procedure and the sedation options and risks were discussed with the patient.  All questions were answered and informed consent was obtained.      He denies a personal or family history of anesthesia complications or bleeding disorders.     Patient Active Problem List   Diagnosis     Atrial fibrillation (H)     Macular degeneration (senile) of retina     Helicobacter pylori infection     Esophageal reflux     CARDIOVASCULAR SCREENING; LDL GOAL LESS THAN 130     Elevated LFTs     Chest pain     ED (erectile dysfunction)     Gastroesophageal reflux disease, esophagitis presence not specified        Past Medical History:   Diagnosis Date     Atrial fibrillation (H)     episodic     Bile duct stone      Chest pain 2/18/2015     Chronic atrial fibrillation      Dyspepsia and other specified disorders of function of stomach     dyspepsia; better on zantac     Elevated LFTs      Epistaxis      Erectile dysfunction      Esophageal reflux     dilation of stricture 8/06     Gastroesophageal reflux disease, esophagitis presence not specified 5/4/2016     Helicobacter pylori (H. pylori)     had UGI endoscopy 10/00 & had LES dilitation     Hypertrophy (benign) of prostate     some voiding sx     Macular degeneration (senile) of retina, unspecified     mainly R eye        Past Surgical History:   Procedure Laterality  Date     ENDOSCOPIC RETROGRADE CHOLANGIOPANCREATOGRAM  9/23/2011    Procedure:ENDOSCOPIC RETROGRADE CHOLANGIOPANCREATOGRAM; Endoscopic Retrograde Cholangiopancreatogram (c-arm), baloon dilation, stone retreival and 10fr. Solus stent placement x2 in  bile duct with the spy glass; Surgeon:EDD AUGUST; Location:[facility]     ENDOSCOPIC RETROGRADE CHOLANGIOPANCREATOGRAPHY, LITHOTRIPSY PANCREAS, COMBINED  10/27/2011    Procedure:COMBINED ENDOSCOPIC RETROGRADE CHOLANGIOPANCREATOGRAPHY, LITHOTRIPSY PANCREAS; Endoscopic Retrograde Cholangiopancreatogram with spygass scope ,Electrohydraulic Lithotripsy  baloon dilation of bile duct, stone removal (c-arm); Surgeon:EDD AUGUST; Location:[facility]     ESOPHAGOSCOPY, GASTROSCOPY, DUODENOSCOPY (EGD), COMBINED N/A 5/9/2016    Procedure: COMBINED ESOPHAGOSCOPY, GASTROSCOPY, DUODENOSCOPY (EGD), BIOPSY SINGLE OR MULTIPLE;  Surgeon: Mendez Mcknight MD;  Location:  GI     ESOPHAGOSCOPY, GASTROSCOPY, DUODENOSCOPY (EGD), COMBINED N/A 2/11/2018    Procedure: COMBINED ESOPHAGOSCOPY, GASTROSCOPY, DUODENOSCOPY (EGD), REMOVE FOREIGN BODY;  COMBINED ESOPHAGOSCOPY, GASTROSCOPY, DUODENOSCOPY (EGD) by raptor graspeing ;  Surgeon: Mendez Mcknight MD;  Location:  GI     ESOPHAGOSCOPY, GASTROSCOPY, DUODENOSCOPY (EGD), COMBINED Left 2/16/2018    Procedure: COMBINED ESOPHAGOSCOPY, GASTROSCOPY, DUODENOSCOPY (EGD), BIOPSY SINGLE OR MULTIPLE;  ESOPHAGOSCOPY, GASTROSCOPY, DUODENOSCOPY (EGD) (Nicci) with biopsies using cold bx forcep and tts balloon dilation 12-15mm;  Surgeon: Mendez Mcknight MD;  Location: [facility]     ZZC NONSPECIFIC PROCEDURE  10/00 & 8/03    UGI endoscopy with dilitation    Abstracted 07/09/02     ZZC NONSPECIFIC PROCEDURE  01/01    L hernia     ZZC NONSPECIFIC PROCEDURE  06/02    lap choly     ZZC NONSPECIFIC PROCEDURE  4/01    colonocopy 4/01     ZZC NONSPECIFIC PROCEDURE  2/04    R total shoulder     ZZC NONSPECIFIC PROCEDURE  4/05    re-do R shoulder       Social History      Tobacco Use     Smoking status: Never Smoker     Smokeless tobacco: Never Used   Substance Use Topics     Alcohol use: Yes     Alcohol/week: 0.0 standard drinks     Comment: 1-2 beer, wine, or mixed drink per night       Family History   Problem Relation Age of Onset     Heart Disease Father          M.I.     Esophageal Cancer No family hx of      Gastrointestinal Disease No family hx of      Stomach Cancer No family hx of        Prior to Admission medications    Medication Sig Start Date End Date Taking? Authorizing Provider   apixaban ANTICOAGULANT (ELIQUIS) 5 MG tablet Take 1 tablet (5 mg) by mouth 2 times daily 4/6/15   Deric Enriquez MD   atenolol (TENORMIN) 50 MG tablet Take 25 mg by mouth 2 times daily    Unknown, Entered By History   biotin 1000 MCG TABS tablet Take by mouth daily    Reported, Patient   budesonide-formoterol (SYMBICORT) 160-4.5 MCG/ACT inhaler Inhale 2 puffs into the lungs 2 times daily    Unknown, Entered By History   calcium-vitamin D (CALTRATE) 600-400 MG-UNIT per tablet Take 1 tablet by mouth daily    Unknown, Entered By History   Cholecalciferol (VITAMIN D3 PO) Take 1,000 Units by mouth daily    Reported, Patient   Multiple Vitamin (ONE-A-DAY MENS PO) Take 1 tablet by mouth daily    Unknown, Entered By History   Omega-3 Fatty Acids (FISH OIL) 1200 MG CAPS Take by mouth daily    Reported, Patient   omeprazole (PRILOSEC) 20 MG capsule Take 20 mg by mouth daily    Unknown, Entered By History   oxybutynin (DITROPAN) 5 MG tablet Take 5 mg by mouth 2 times daily     Reported, Patient   tamsulosin (FLOMAX) 0.4 MG capsule Take 0.4 mg by mouth daily    Reported, Patient       Allergies   Allergen Reactions     No Known Allergies         REVIEW OF SYSTEMS:   5 point ROS negative except as noted above in HPI, including Gen., Resp., CV, GI &  system review.    PHYSICAL EXAM:   BP (!) 113/100   Pulse 90   Temp 97.6  F (36.4  C) (Temporal)   Resp 20   SpO2 95%  Estimated body mass  "index is 24.28 kg/m  as calculated from the following:    Height as of 9/23/19: 1.702 m (5' 7\").    Weight as of 4/1/20: 70.3 kg (155 lb).   GENERAL APPEARANCE: alert, and oriented  MENTAL STATUS: alert  AIRWAY EXAM: Mallampatti Class I (visualization of the soft palate, fauces, uvula, anterior and posterior pillars)  RESP: lungs clear to auscultation - no rales, rhonchi or wheezes  CV: regular rates and rhythm  DIAGNOSTICS:    Not indicated    IMPRESSION   ASA Class 2 - Mild systemic disease    PLAN:   Plan for Esophagogastroduodenoscopy with possible biopsy, possible dilation, possible foreign body removal. We discussed the risks, benefits and alternatives and the patient wished to proceed.    The above has been forwarded to the consulting provider.      Signed Electronically by: Mendez Mcknight MD  August 10, 2021          "

## 2021-10-24 ENCOUNTER — HEALTH MAINTENANCE LETTER (OUTPATIENT)
Age: 86
End: 2021-10-24

## 2021-12-09 NOTE — PROGRESS NOTES
HPI and Plan:   See dictation:[id number]    Orders Placed This Encounter   Procedures     Follow-Up with Cardiologist       Orders Placed This Encounter   Medications     biotin 1000 MCG TABS tablet     Sig: Take by mouth daily       Medications Discontinued During This Encounter   Medication Reason     ascorbic acid (VITAMIN C) 500 MG tablet          Encounter Diagnosis   Name Primary?     Chronic atrial fibrillation (H) Yes       CURRENT MEDICATIONS:  Current Outpatient Medications   Medication Sig Dispense Refill     apixaban ANTICOAGULANT (ELIQUIS) 5 MG tablet Take 1 tablet (5 mg) by mouth 2 times daily 60 tablet 12     atenolol (TENORMIN) 50 MG tablet Take 25 mg by mouth 2 times daily       biotin 1000 MCG TABS tablet Take by mouth daily       budesonide-formoterol (SYMBICORT) 160-4.5 MCG/ACT inhaler Inhale 2 puffs into the lungs 2 times daily       Cholecalciferol (VITAMIN D3 PO) Take 1,000 Units by mouth daily       Multiple Vitamin (ONE-A-DAY MENS PO) Take 1 tablet by mouth daily       omeprazole (PRILOSEC) 20 MG capsule Take 20 mg by mouth daily       terazosin (HYTRIN) 2 MG capsule Take 2 mg by mouth At Bedtime       calcium-vitamin D (CALTRATE) 600-400 MG-UNIT per tablet Take 1 tablet by mouth daily       Omega-3 Fatty Acids (FISH OIL) 1200 MG CAPS Take by mouth daily       oxybutynin (DITROPAN) 5 MG tablet Take 5 mg by mouth 2 times daily          ALLERGIES     Allergies   Allergen Reactions     No Known Allergies        PAST MEDICAL HISTORY:  Past Medical History:   Diagnosis Date     Atrial fibrillation (H)     episodic     Bile duct stone      Chest pain 2/18/2015     Dyspepsia and other specified disorders of function of stomach     dyspepsia; better on zantac     Elevated LFTs      Epistaxis      Erectile dysfunction      Esophageal reflux     dilation of stricture 8/06     Gastroesophageal reflux disease, esophagitis presence not specified 5/4/2016     Helicobacter pylori (H. pylori)     had UGI  MA/RN note reviewed and approved.      Past medical history, medications and allergies reviewed.    Phone   This call was made to Manjit Adams to discuss No chief complaint on file.    He is an established patient of mine.  He is in [place] and his identity has been established.   Manjit understands that we are limiting office visits due to the coronavirus pandemic and he consents to a virtual visit with charges submitted to his insurance.   21-30 minutes were spent in this encounter.  Without the patient being seen and evaluated in person, there is a risk that the information and/or assessment may be incomplete or inaccurate.     HISTORY OF PRESENT ILLNESS:     Manjit Adams is a 23 year old male who was seen for abdominal pressure, loose stools and foul-smelling gas.    EGD November 2021 showed gastritis  Colonoscopy November 2021  Findings-  Cecum-ulcerations noted with a deformed ileocecal valve and terminal ileum could not be intubated-cannot rule out underlying strictures.  Ulcerations along with exudates, active erythema consistent with active colitis-extending into the ascending colon- the proximal transverse colon-multiple biopsies were taken.   Distal transverse colon, descending colon, sigmoid colon, rectum has a normal appearance and biopsy taken rule out microscopic colitis.  D.   Right colon, endoscopic biopsies:  -Focal chronic active colitis with ulcer.    Left colon, 50 cm to rectum, endoscopic biopsies:  -Benign fragments of colonic mucosa with no significant pathologic changes.     He was started on Uceris 9 mg daily and will start Remicade and Imuran    IBD 7 serology was negative. TPMT is normal.    Hepatitis B, C and TB quant negative. MRE pending    Phone conversation today to discuss the findings.  Chief complaint is one episode of right lower quadrant abd pain after Thanksgiving but it resolved on its own after several hours.  Denies any loose stools, rectal bleeding.  .    PHYSICAL EXAMINATION: No exam    IMPRESSION/PLAN:     1. Severe right-sided colitis  Differential Diagnosis:  Crohn's versus ulcerative colitis      Continue Uceris 9 mg daily   Discussed chronic relapsing nature of the disease and medications will be lifelong   He wants to do some research before starting a biologic.  Discussed injectables like Humira.  Information sent via my [facility]  Advised to contact me with his decision as soon as possible due to severity of disease and risk of obstruction requiring surgery.    Booster shots are advised.   Avoid NSAIDS such as Advil, Ibuprofen and Aleve.  Take Tylenol for pain or discomfort.   Remain current with Flu vaccines and Pneumovax.   Obtain a bone density every 2-3 years.   Reinforce the importance of sleep quality and avoiding sleep fragmentation or trouble sleeping.   Discussed the need for a yearly eye and skin exams because patients may develop eye and skin manifestations concurrently during the natural course of their disease     Multiple questions answered.   The patient agrees with the plan.  The case was discussed with Dr Briscoe and he agrees with my note.     STEPHANIE POP NP       endoscopy 10/00 & had LES dilitation     Hypertrophy (benign) of prostate     some voiding sx     Macular degeneration (senile) of retina, unspecified     mainly R eye       PAST SURGICAL HISTORY:  Past Surgical History:   Procedure Laterality Date     C NONSPECIFIC PROCEDURE  10/00 & 8/03    UGI endoscopy with dilitation    Abstracted 07/09/02     C NONSPECIFIC PROCEDURE  01/01    L hernia     C NONSPECIFIC PROCEDURE  06/02    lap choly     C NONSPECIFIC PROCEDURE  4/01    colonocopy 4/01     C NONSPECIFIC PROCEDURE  2/04    R total shoulder     C NONSPECIFIC PROCEDURE  4/05    re-do R shoulder     ENDOSCOPIC RETROGRADE CHOLANGIOPANCREATOGRAM  9/23/2011    Procedure:ENDOSCOPIC RETROGRADE CHOLANGIOPANCREATOGRAM; Endoscopic Retrograde Cholangiopancreatogram (c-arm), baloon dilation, stone retreival and 10fr. Solus stent placement x2 in  bile duct with the spy glass; Surgeon:EDD AUGUST; Location:[facility]     ENDOSCOPIC RETROGRADE CHOLANGIOPANCREATOGRAPHY, LITHOTRIPSY PANCREAS, COMBINED  10/27/2011    Procedure:COMBINED ENDOSCOPIC RETROGRADE CHOLANGIOPANCREATOGRAPHY, LITHOTRIPSY PANCREAS; Endoscopic Retrograde Cholangiopancreatogram with spygass scope ,Electrohydraulic Lithotripsy  baloon dilation of bile duct, stone removal (c-arm); Surgeon:EDD AUGUST; Location[facility]     ESOPHAGOSCOPY, GASTROSCOPY, DUODENOSCOPY (EGD), COMBINED N/A 5/9/2016    Procedure: COMBINED ESOPHAGOSCOPY, GASTROSCOPY, DUODENOSCOPY (EGD), BIOPSY SINGLE OR MULTIPLE;  Surgeon: Mendez Mcknight MD;  Location:  GI     ESOPHAGOSCOPY, GASTROSCOPY, DUODENOSCOPY (EGD), COMBINED N/A 2/11/2018    Procedure: COMBINED ESOPHAGOSCOPY, GASTROSCOPY, DUODENOSCOPY (EGD), REMOVE FOREIGN BODY;  COMBINED ESOPHAGOSCOPY, GASTROSCOPY, DUODENOSCOPY (EGD) by raptor graspeing ;  Surgeon: Mendez Mcknight MD;  Location:  GI     ESOPHAGOSCOPY, GASTROSCOPY, DUODENOSCOPY (EGD), COMBINED Left 2/16/2018    Procedure: COMBINED ESOPHAGOSCOPY, GASTROSCOPY, DUODENOSCOPY  (EGD), BIOPSY SINGLE OR MULTIPLE;  ESOPHAGOSCOPY, GASTROSCOPY, DUODENOSCOPY (EGD) (Nicci) with biopsies using cold bx forcep and tts balloon dilation 12-15mm;  Surgeon: Mendez Mcknight MD;  Location:  GI       FAMILY HISTORY:  Family History   Problem Relation Age of Onset     Heart Disease Father          M.I.     Esophageal Cancer No family hx of      Gastrointestinal Disease No family hx of      Stomach Cancer No family hx of        SOCIAL HISTORY:  Social History     Socioeconomic History     Marital status:      Spouse name: Patrica     Number of children: 4     Years of education: None     Highest education level: None   Social Needs     Financial resource strain: None     Food insecurity - worry: None     Food insecurity - inability: None     Transportation needs - medical: None     Transportation needs - non-medical: None   Occupational History     Occupation: administrative; [employer]     Comment: retired   Tobacco Use     Smoking status: Never Smoker     Smokeless tobacco: Never Used   Substance and Sexual Activity     Alcohol use: Yes     Alcohol/week: 0.0 oz     Comment: 1-2 beer, wine, or mixed drink per night     Drug use: No     Sexual activity: None   Other Topics Concern     Parent/sibling w/ CABG, MI or angioplasty before 65F 55M? Not Asked      Service Not Asked     Blood Transfusions Not Asked     Caffeine Concern No     Comment: 4-5 cups daily     Occupational Exposure Not Asked     Hobby Hazards Not Asked     Sleep Concern Not Asked     Stress Concern Not Asked     Weight Concern Not Asked     Special Diet No     Comment: regular     Back Care Not Asked     Exercise Yes     Comment: walking, golfing     Bike Helmet Not Asked     Seat Belt Not Asked     Self-Exams Not Asked   Social History Narrative     None       Review of Systems:  Skin:  Positive for       Eyes:  Positive for glasses    ENT:  Positive for   nose bleed a couple of weeks ago  Respiratory:  Negative for    "    Cardiovascular:    Positive for strange feeling in the left chest area and arm  Gastroenterology: Negative for      Genitourinary:  Positive for urinary frequency    Musculoskeletal:  Negative      Neurologic:  Negative      Psychiatric:  Positive for sleep disturbances    Heme/Lymph/Imm:  Positive for allergies    Endocrine:  Negative        Physical Exam:  Vitals: /80   Pulse 78   Ht 1.702 m (5' 7\")   Wt 74.4 kg (164 lb)   BMI 25.69 kg/m      Constitutional:  cooperative, alert and oriented, well developed, well nourished, in no acute distress        Skin:  warm and dry to the touch, no apparent skin lesions or masses noted          Head:  normocephalic, no masses or lesions        Eyes:  pupils equal and round, conjunctivae and lids unremarkable, sclera white, no xanthalasma, EOMS intact, no nystagmus        Lymph:      ENT:  no pallor or cyanosis, dentition good hearing aide(s) present      Neck:  carotid pulses are full and equal bilaterally, JVP normal, no carotid bruit        Respiratory:  normal breath sounds, clear to auscultation, normal A-P diameter, normal symmetry, normal respiratory excursion, no use of accessory muscles         Cardiac: normal S1 and S2;no murmurs, gallops or rubs detected;no S3 or S4;apical impulse not displaced irregular rhythm   no presence of murmur          pulses full and equal, no bruits auscultated                                        GI:  abdomen soft, non-tender, BS normoactive, no mass, no HSM, no bruits        Extremities and Muscular Skeletal:  no deformities, clubbing, cyanosis, erythema observed;no edema              Neurological:  no gross motor deficits;affect appropriate        Psych:  Alert and Oriented x 3        CC  No referring provider defined for this encounter.              "

## 2022-06-05 ENCOUNTER — HEALTH MAINTENANCE LETTER (OUTPATIENT)
Age: 87
End: 2022-06-05

## 2022-10-15 ENCOUNTER — HEALTH MAINTENANCE LETTER (OUTPATIENT)
Age: 87
End: 2022-10-15

## 2022-10-27 ENCOUNTER — HOSPITAL ENCOUNTER (EMERGENCY)
Facility: CLINIC | Age: 87
Discharge: HOME | End: 2022-10-27
Attending: EMERGENCY MEDICINE | Admitting: EMERGENCY MEDICINE
Payer: MEDICARE

## 2022-10-27 ENCOUNTER — APPOINTMENT (OUTPATIENT)
Dept: CT IMAGING | Facility: CLINIC | Age: 87
End: 2022-10-27
Attending: EMERGENCY MEDICINE
Payer: MEDICARE

## 2022-10-27 VITALS
SYSTOLIC BLOOD PRESSURE: 121 MMHG | TEMPERATURE: 99 F | OXYGEN SATURATION: 100 % | DIASTOLIC BLOOD PRESSURE: 74 MMHG | RESPIRATION RATE: 18 BRPM | HEART RATE: 74 BPM
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
## 2022-10-27 DIAGNOSIS — Z79.01 ANTICOAGULATED: ICD-10-CM
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
## 2022-10-27 DIAGNOSIS — S05.12XA PERIORBITAL CONTUSION OF LEFT EYE, INITIAL ENCOUNTER: ICD-10-CM

## 2022-10-27 DIAGNOSIS — S01.81XA FACIAL LACERATION, INITIAL ENCOUNTER: ICD-10-CM

## 2022-10-27 PROCEDURE — 70450 CT HEAD/BRAIN W/O DYE: CPT | Mod: MA

## 2022-10-27 PROCEDURE — 12011 RPR F/E/E/N/L/M 2.5 CM/<: CPT

## 2022-10-27 PROCEDURE — 70486 CT MAXILLOFACIAL W/O DYE: CPT | Mod: MA

## 2022-10-27 PROCEDURE — 99284 EMERGENCY DEPT VISIT MOD MDM: CPT | Mod: 25

## 2022-10-27 PROCEDURE — 250N000013 HC RX MED GY IP 250 OP 250 PS 637: Performed by: EMERGENCY MEDICINE

## 2022-10-27 PROCEDURE — 12052 INTMD RPR FACE/MM 2.6-5.0 CM: CPT

## 2022-10-27 PROCEDURE — 250N000011 HC RX IP 250 OP 636: Performed by: EMERGENCY MEDICINE

## 2022-10-27 PROCEDURE — 90715 TDAP VACCINE 7 YRS/> IM: CPT | Performed by: EMERGENCY MEDICINE

## 2022-10-27 PROCEDURE — 90471 IMMUNIZATION ADMIN: CPT | Performed by: EMERGENCY MEDICINE

## 2022-10-27 RX ORDER — ACETAMINOPHEN 500 MG
1000 TABLET ORAL ONCE
Status: COMPLETED | OUTPATIENT
Start: 2022-10-27 | End: 2022-10-27

## 2022-10-27 RX ORDER — LIDOCAINE HYDROCHLORIDE AND EPINEPHRINE 10; 10 MG/ML; UG/ML
INJECTION, SOLUTION INFILTRATION; PERINEURAL
Status: DISCONTINUED
Start: 2022-10-27 | End: 2022-10-27 | Stop reason: HOSPADM

## 2022-10-27 RX ADMIN — CLOSTRIDIUM TETANI TOXOID ANTIGEN (FORMALDEHYDE INACTIVATED), CORYNEBACTERIUM DIPHTHERIAE TOXOID ANTIGEN (FORMALDEHYDE INACTIVATED), BORDETELLA PERTUSSIS TOXOID ANTIGEN (GLUTARALDEHYDE INACTIVATED), BORDETELLA PERTUSSIS FILAMENTOUS HEMAGGLUTININ ANTIGEN (FORMALDEHYDE INACTIVATED), BORDETELLA PERTUSSIS PERTACTIN ANTIGEN, AND BORDETELLA PERTUSSIS FIMBRIAE 2/3 ANTIGEN 0.5 ML: 5; 2; 2.5; 5; 3; 5 INJECTION, SUSPENSION INTRAMUSCULAR at 08:13

## 2022-10-27 RX ADMIN — ACETAMINOPHEN 1000 MG: 500 TABLET ORAL at 08:12

## 2022-10-27 ASSESSMENT — ENCOUNTER SYMPTOMS
NECK PAIN: 0
NUMBNESS: 0
MYALGIAS: 0
WOUND: 1

## 2022-10-27 ASSESSMENT — ACTIVITIES OF DAILY LIVING (ADL): ADLS_ACUITY_SCORE: 35

## 2022-10-27 NOTE — ED PROVIDER NOTES
"  History   Chief Complaint:  Fall and Laceration       The history is provided by the patient.      Kraig Genao is a 89 year old male with history of atrial fibrillation on Eliquis who presents via EMS for evaluation after a fall. Al was getting out of bed this morning and then tried to turn. He stumbled over his feet and fell to the floor. He hit his face on a baseboard and sustained a left cheek laceration which has surrounding swelling and bruising. He denies any chest pain before the fall and he did not lose consciousness. He was able to get up on his own and called EMS because the bleeding from his laceration was persistent. He denies headache, neck pain, numbness or tingling, and any other pain. His left eye vision is \"watery and foggy\". Al is unsure of his last tetanus booster, estimating it was 8-10 years ago.     Review of Systems   HENT: Positive for facial swelling.    Eyes: Positive for visual disturbance (left eye, foggy).   Cardiovascular: Negative for chest pain.   Gastrointestinal: Negative for vomiting.   Musculoskeletal: Negative for myalgias and neck pain.   Skin: Positive for color change (bruising) and wound (facial laceration).   Neurological: Negative for syncope, numbness and headaches.   All other systems reviewed and are negative.    Allergies:  The patient has no known allergies.     Medications:  Eliquis  Atenolol  Symbicort  Prilosec  Ditropan    Past Medical History:     Atrial fibrillation  Macular degeneration  H pylori infection  Esophageal reflux  Elevated LFTs  GERD     Past Surgical History:    Endoscopic retrograde cholangiopancreatogram x2, lithotripsy pancreas  EGD x4  Left hernia repair  Right total shoulder and repair   Colonoscopy   Laparoscopic cholecystectomy     Family History:    Myocardial infarction     Social History:  The patient presents to the ED via EMS.  His daughter joined him at the bedside.   The patient has 4 children, grandkids, and great-grandkids. "   He lives alone in a senior living apartment.  [facility]    Physical Exam     Patient Vitals for the past 24 hrs:   BP Temp Temp src Pulse Resp SpO2   10/27/22 1024 121/74 -- -- 74 -- 100 %   10/27/22 0857 (!) 149/96 -- -- 68 -- 99 %   10/27/22 0744 -- -- -- -- -- 99 %   10/27/22 0742 (!) 176/98 99  F (37.2  C) Temporal 79 18 --       Physical Exam  General: Well-developed and well-nourished. Well appearing elderly  man. Cooperative.  Head:  There is a 3.5 cm left cheek laceration with left periorbital ecchymosis and edema. There is a smaller, 1.5 cm, laceration of the lateral left upper eyelid. There is no palpable stepoff nor significant facial tenderness. There is no scalp hematoma.  Eyes:  Conjunctivae and sclerae are normal. Left periorbital ecchymosis and edema with above lid laceration.  ENT:    Normal nose. Moist mucous membranes.  Neck:  Arrived in C-collar. Supple. Normal range of motion. No midline tenderness.  CV:  Regular rate. Normal heart sounds with no murmurs, rubs, or gallops detected.  Resp:  No respiratory distress. Clear to auscultation bilaterally without decreased breath sounds, wheezing, rales, or rhonchi.  GI:  Soft. Non-distended. Non-tender.    MS:  Normal ROM. No bony tenderness to the extremities.  Skin:  Warm. Non-diaphoretic. No pallor. Facial lacerations as above.  Neuro:  Awake. A&Ox3. Normal strength.  Psych: Normal mood and affect. Normal speech.  Vitals reviewed.    Emergency Department Course     Imaging:  CT Facial Bones without Contrast   Preliminary Result   IMPRESSION:   1. No acute maxillofacial fracture identified.   2. Left periorbital/premaxillary/temporal scalp superficial soft   tissue contusion and probable laceration.      CT Head w/o Contrast   Preliminary Result   IMPRESSION:   1. No CT findings of acute intracranial process.   2. Brain atrophy and presumed chronic small vessel ischemic changes,   as described.   3. Partially visualized  left periorbital swelling and soft tissue air.   Please see separate report from facial CT of same day for additional   details.      Report per radiology       Procedures    Laceration Repair      Procedure: Laceration Repair    Indication: Laceration    Consent: Verbal    Location: Left Cheek    Length: 3.5 cm    Preparation: Irrigation with Sterile Saline.    Anesthesia/Sedation: Lidocaine with Epinephrine - 1%      Treatment/Exploration: Wound explored, no foreign bodies found     Closure: The wound was closed with two layers. Skin/superficial layer was closed with 5 x 6-0 Polypropylene (prolene) using interrupted sutures. Subcutaneous layer was closed with 1 x 5-0 Vicryl suture.     Patient Status: The patient tolerated the procedure well: Yes. There were no complications.      Laceration Repair      Procedure: Laceration Repair    Indication: Laceration    Consent: Verbal    Location: Left Eyelid    Length: 1.5 cm    Preparation: Irrigation with Sterile Saline.    Anesthesia/Sedation: Lidocaine with Epinephrine - 1%      Treatment/Exploration: Wound explored, no foreign bodies found     Closure: The wound was closed with one layer. Skin/superficial layer was closed with 3 x 6-0 Polypropylene (prolene)  using Interrupted sutures.     Patient Status: The patient tolerated the procedure well: Yes. There were no complications.      Emergency Department Course:  Reviewed:  I reviewed nursing notes, vitals, past medical history, and Care Everywhere.    Assessments:  0743 I obtained history and examined the patient as noted above. Applied anesthetic and sutured the left cheek to stop bleeding before imaging.  0951 I rechecked the patient and explained findings. His daughter is now at the bedside. I performed two laceration repairs, see procedure notes above.     Interventions:  0812 Tylenol 1,000 mg PO  0813 Tdap 0.5 mL IM     Disposition:  The patient was discharged.     Impression & Plan   Medical Decision  "Making:  Al is a 89 year old man who had a mechanical fall this morning when he tripped over his own feet.  He hit his face on the baseboard and sustained a laceration to his cheek which he could not get to stop bleeding at home. This is likely related to his use of Eliquis for atrial fibrillation.  He has a second laceration over the lateral left upper eyelid and there is left periorbital edema and ecchymosis. He was able to get up on his own.  He denies all other injuries and I find no other external evidence of trauma.  He arrived in a cervical collar but I was able to clear this clinically as he has no midline tenderness.  He was sent for CT of the head which, fortunately, does not reveal intracranial hemorrhage or skull fracture.  I included the face which reveals no fracture with left periorbital/premaxillary/temporal scalp soft tissue swelling and laceration as evident on exam.  It should be noted he felt the vision in his left eye was \"watery\".  This is likely due to increased lacrimation from his injuries and his surrounding edema.  I do not believe there is meaningful eye injury.  He was given Tylenol and his tetanus was updated.  His wounds were cleaned and repaired, as above, which he tolerated quite well.  I counseled him and his daughter on wound care and suture removal as well as indications for return.  All of their questions were answered and they verbalized understanding.  Amenable to discharge.    Diagnosis:    ICD-10-CM    1. Fall in home, initial encounter  W19.XXXA     Y92.009       2. Facial laceration, initial encounter  S01.81XA       3. Periorbital contusion of left eye, initial encounter  S05.12XA       4. Anticoagulated  Z79.01           Scribe Disclosure:  I, Mila Pagek, am serving as a scribe at 7:43 AM on 10/27/2022 to document services personally performed by Diana Linares MD based on my observations and the provider's statements to me.        Diana Linares MD  11/12/22 " [id number]

## 2022-10-27 NOTE — DISCHARGE INSTRUCTIONS
Keep wound clean and dry. May shower in 24 hours and pat dry. Do not keep submerged.  Follow up with primary care with sutures/staples to be removed in 5 days.  Return with signs of infection such as redness, discharge, or fever >101F, or if you have new pains, lightheadedness, another fall, or any other new or concerning symptoms.

## 2022-10-27 NOTE — ED NOTES
Bed: ED14  Expected date:   Expected time:   Means of arrival:   Comments:  A592. Fall 89M ?thinners

## 2022-11-02 ENCOUNTER — OFFICE VISIT (OUTPATIENT)
Dept: PEDIATRICS | Facility: CLINIC | Age: 87
End: 2022-11-02
Payer: MEDICARE

## 2022-11-02 VITALS
HEIGHT: 67 IN | DIASTOLIC BLOOD PRESSURE: 72 MMHG | SYSTOLIC BLOOD PRESSURE: 118 MMHG | WEIGHT: 152 LBS | OXYGEN SATURATION: 96 % | HEART RATE: 68 BPM | BODY MASS INDEX: 23.86 KG/M2 | RESPIRATION RATE: 16 BRPM | TEMPERATURE: 97.6 F

## 2022-11-02 DIAGNOSIS — Z48.02 VISIT FOR SUTURE REMOVAL: Primary | ICD-10-CM

## 2022-11-02 PROCEDURE — 99213 OFFICE O/P EST LOW 20 MIN: CPT | Performed by: INTERNAL MEDICINE

## 2022-11-02 ASSESSMENT — PAIN SCALES - GENERAL: PAINLEVEL: NO PAIN (0)

## 2022-11-02 NOTE — PROGRESS NOTES
"  Assessment & Plan     (Z48.02) Visit for suture removal  (primary encounter diagnosis)  Comment:   Plan: Suture removal as noted below, follow-up as needed.      Mendez Keenan MD  [facility] EDELMIRA Blackman is a 89 year old, presenting for the following health issues:      Suture Removal    History of Present Illness       Reason for visit:  To have stitches removed    He eats 2-3 servings of fruits and vegetables daily.He consumes 0 sweetened beverage(s) daily.He exercises with enough effort to increase his heart rate 9 or less minutes per day.  He exercises with enough effort to increase his heart rate 3 or less days per week.   He is taking medications regularly.     89-year-old male presents today for suture removal following an emergency room visit on 10/27/2022.  Patient sustained lacerations to his left upper eyelid and left cheek during the fall.  Patient denies headache vision changes or nausea today.          Review of Systems         Objective    /72 (Cuff Size: Adult Regular)   Pulse 68   Temp 97.6  F (36.4  C) (Tympanic)   Resp 16   Ht 1.702 m (5' 7\")   Wt 68.9 kg (152 lb)   SpO2 96%   BMI 23.81 kg/m    Body mass index is 23.81 kg/m .  Physical Exam   General: No acute distress  HEENT: Extensive ecchymoses across the cheeks nose and brow.  Lacerations: Left upper eyelid and left cheek.    Procedure: Suture removal: Total of 5 sutures removed from both lacerations.  Lacerations appear appear to be healing well.                    "

## 2022-11-12 ASSESSMENT — ENCOUNTER SYMPTOMS
VOMITING: 0
HEADACHES: 0
FACIAL SWELLING: 1
COLOR CHANGE: 1

## 2023-01-24 ENCOUNTER — APPOINTMENT (OUTPATIENT)
Dept: GENERAL RADIOLOGY | Facility: CLINIC | Age: 88
End: 2023-01-24
Attending: EMERGENCY MEDICINE
Payer: MEDICARE

## 2023-01-24 ENCOUNTER — HOSPITAL ENCOUNTER (EMERGENCY)
Facility: CLINIC | Age: 88
Discharge: HOME | End: 2023-01-24
Attending: EMERGENCY MEDICINE | Admitting: EMERGENCY MEDICINE
Payer: MEDICARE

## 2023-01-24 VITALS
HEART RATE: 75 BPM | OXYGEN SATURATION: 95 % | DIASTOLIC BLOOD PRESSURE: 90 MMHG | RESPIRATION RATE: 14 BRPM | TEMPERATURE: 98.4 F | SYSTOLIC BLOOD PRESSURE: 117 MMHG

## 2023-01-24 DIAGNOSIS — R07.89 INTERMITTENT LEFT-SIDED CHEST PAIN: ICD-10-CM

## 2023-01-24 LAB
ANION GAP SERPL CALCULATED.3IONS-SCNC: 11 MMOL/L (ref 7–15)
ATRIAL RATE - MUSE: 83 BPM
BASOPHILS # BLD AUTO: 0.1 10E3/UL (ref 0–0.2)
BASOPHILS NFR BLD AUTO: 1 %
BUN SERPL-MCNC: 19.3 MG/DL (ref 8–23)
CALCIUM SERPL-MCNC: 8.9 MG/DL (ref 8.2–9.6)
CHLORIDE SERPL-SCNC: 101 MMOL/L (ref 98–107)
CREAT SERPL-MCNC: 1.15 MG/DL (ref 0.67–1.17)
DEPRECATED HCO3 PLAS-SCNC: 26 MMOL/L (ref 22–29)
DIASTOLIC BLOOD PRESSURE - MUSE: NORMAL MMHG
EOSINOPHIL # BLD AUTO: 0.2 10E3/UL (ref 0–0.7)
EOSINOPHIL NFR BLD AUTO: 3 %
ERYTHROCYTE [DISTWIDTH] IN BLOOD BY AUTOMATED COUNT: 12.6 % (ref 10–15)
GFR SERPL CREATININE-BSD FRML MDRD: 58 ML/MIN/1.73M2
GLUCOSE SERPL-MCNC: 153 MG/DL (ref 70–99)
HCT VFR BLD AUTO: 42 % (ref 40–53)
HGB BLD-MCNC: 13.4 G/DL (ref 13.3–17.7)
HOLD SPECIMEN: NORMAL
IMM GRANULOCYTES # BLD: 0.1 10E3/UL
IMM GRANULOCYTES NFR BLD: 2 %
INTERPRETATION ECG - MUSE: NORMAL
LYMPHOCYTES # BLD AUTO: 1.1 10E3/UL (ref 0.8–5.3)
LYMPHOCYTES NFR BLD AUTO: 16 %
MCH RBC QN AUTO: 32.1 PG (ref 26.5–33)
MCHC RBC AUTO-ENTMCNC: 31.9 G/DL (ref 31.5–36.5)
MCV RBC AUTO: 101 FL (ref 78–100)
MONOCYTES # BLD AUTO: 0.6 10E3/UL (ref 0–1.3)
MONOCYTES NFR BLD AUTO: 9 %
NEUTROPHILS # BLD AUTO: 4.7 10E3/UL (ref 1.6–8.3)
NEUTROPHILS NFR BLD AUTO: 69 %
NRBC # BLD AUTO: 0 10E3/UL
NRBC BLD AUTO-RTO: 0 /100
P AXIS - MUSE: NORMAL DEGREES
PLATELET # BLD AUTO: 232 10E3/UL (ref 150–450)
POTASSIUM SERPL-SCNC: 3.9 MMOL/L (ref 3.4–5.3)
PR INTERVAL - MUSE: NORMAL MS
QRS DURATION - MUSE: 114 MS
QT - MUSE: 392 MS
QTC - MUSE: 446 MS
R AXIS - MUSE: -53 DEGREES
RBC # BLD AUTO: 4.18 10E6/UL (ref 4.4–5.9)
SODIUM SERPL-SCNC: 138 MMOL/L (ref 136–145)
SYSTOLIC BLOOD PRESSURE - MUSE: NORMAL MMHG
T AXIS - MUSE: 68 DEGREES
TROPONIN T SERPL HS-MCNC: 50 NG/L
TROPONIN T SERPL HS-MCNC: 53 NG/L
VENTRICULAR RATE- MUSE: 78 BPM
WBC # BLD AUTO: 6.7 10E3/UL (ref 4–11)

## 2023-01-24 PROCEDURE — 99285 EMERGENCY DEPT VISIT HI MDM: CPT | Mod: 25

## 2023-01-24 PROCEDURE — 71046 X-RAY EXAM CHEST 2 VIEWS: CPT

## 2023-01-24 PROCEDURE — 84484 ASSAY OF TROPONIN QUANT: CPT | Performed by: EMERGENCY MEDICINE

## 2023-01-24 PROCEDURE — 80048 BASIC METABOLIC PNL TOTAL CA: CPT | Performed by: EMERGENCY MEDICINE

## 2023-01-24 PROCEDURE — 85025 COMPLETE CBC W/AUTO DIFF WBC: CPT | Performed by: EMERGENCY MEDICINE

## 2023-01-24 PROCEDURE — 93005 ELECTROCARDIOGRAM TRACING: CPT

## 2023-01-24 PROCEDURE — 36415 COLL VENOUS BLD VENIPUNCTURE: CPT | Performed by: EMERGENCY MEDICINE

## 2023-01-24 ASSESSMENT — ACTIVITIES OF DAILY LIVING (ADL)
ADLS_ACUITY_SCORE: 35
ADLS_ACUITY_SCORE: 35

## 2023-01-24 NOTE — ED TRIAGE NOTES
"A&O x4, ABCs intact. Pt presents with \"a weird feeling\" in his chest that started yesterday, but he's had over the years, but it's been more noticeable. Pt is unable to tell me what it feels like. Pt denies SOB.       Triage Assessment     Row Name 01/24/23 0723       Triage Assessment (Adult)    Airway WDL WDL       Respiratory WDL    Respiratory WDL WDL       Cardiac WDL    Cardiac WDL WDL              "

## 2023-01-24 NOTE — ED PROVIDER NOTES
History     Chief Complaint:  Chest Pain       HPI   Kraig Genao is a 89 year old male with a history of atrial fibrillation on Eliquis presenting to the ER for evaluation of intermittent left-sided chest pain.  Patient reports she has had this problem for several years.  Over the last week, he is noticed that it has lasted longer than usual.  Describes the sensation as a sharp stabbing sensation to the left side of the chest that does not radiate and lasts for a few seconds.  It happened last night and closer succession in the previous.  It happened again this morning when he turned over in bed.  He notices it more when he twists his body.  He denies associated nausea or diaphoresis.  Denies any fever, abdominal pain, cough, pain with breathing, shortness of breath, leg swelling, history of PE.    ROS:  Review of Systems    Allergies:  The patient has no known allergies.    Medications:    Eliquis  Tenormin  Symbicort inhaler  Prilosec  Ditropan  Flomax    Past Medical History:    Chronic a-fib  Bile duct stone  Dyspepsia   ED  GERD  H. Pylori infection  Benign hypertrophy of prostate  Senile macular degeneration of retina    Past Surgical History:    Endoscopic retrograde cholangiopancreatogram  Endoscopic retrograde cholangiopancreatogram, pancreas lithotripsy combined  EGD x4  UGI endoscopy w/ dilation  Left hernia repair  Laparoscopic cholecystectomy  Colonoscopy  Right shoulder arthroplasty  Right shoulder arthroplasty revision     Family History:    Heart disease    Social History:  The patient presents to the ED with son.  PCP: Hubert Rogers     Physical Exam     Patient Vitals for the past 24 hrs:   BP Temp Temp src Pulse Resp SpO2   01/24/23 0954 -- -- -- 75 25 95 %   01/24/23 0846 -- -- -- 74 15 97 %   01/24/23 0724 (!) 145/81 98.4  F (36.9  C) Temporal 88 18 100 %      Physical Exam  General: Alert, no acute distress  HEENT:  Moist mucous membranes.  Conjunctiva normal.   CV:  RRR, no m/r/g,  skin warm and well perfused  Pulm:  CTAB, no wheezes/ronchi/rales.  No acute distress, breathing comfortably  GI:  Soft, nontender, nondistended.  No rebound or guarding.    MSK:  Moving all extremities.  No focal areas of edema, erythema, or tenderness  Skin:  WWP, no rashes, no lower extremity edema, skin color normal, no diaphoresis  Psych:  Well-appearing, normal affect, regular speech    Emergency Department Course   ECG  ECG taken at 0719, ECG read at 0724  Undetermined rhythm.  Left axis deviation.  No significant change as compared to prior, dated 02/06/21.  Rate 78 bpm. ID interval * ms. QRS duration 114 ms. QT/QTc 392/446/ ms. P-R-T axes * -53 68    Imaging:  Chest XR,  PA & LAT   Preliminary Result   IMPRESSION: No acute cardiopulmonary disease. Stable elevated right   hemidiaphragm. Right shoulder arthroplasty.         Report per radiology    Laboratory:  Labs Ordered and Resulted from Time of ED Arrival to Time of ED Departure   BASIC METABOLIC PANEL - Abnormal       Result Value    Sodium 138      Potassium 3.9      Chloride 101      Carbon Dioxide (CO2) 26      Anion Gap 11      Urea Nitrogen 19.3      Creatinine 1.15      Calcium 8.9      Glucose 153 (*)     GFR Estimate 58 (*)    TROPONIN T, HIGH SENSITIVITY - Abnormal    Troponin T, High Sensitivity 53 (*)    CBC WITH PLATELETS AND DIFFERENTIAL - Abnormal    WBC Count 6.7      RBC Count 4.18 (*)     Hemoglobin 13.4      Hematocrit 42.0       (*)     MCH 32.1      MCHC 31.9      RDW 12.6      Platelet Count 232      % Neutrophils 69      % Lymphocytes 16      % Monocytes 9      % Eosinophils 3      % Basophils 1      % Immature Granulocytes 2      NRBCs per 100 WBC 0      Absolute Neutrophils 4.7      Absolute Lymphocytes 1.1      Absolute Monocytes 0.6      Absolute Eosinophils 0.2      Absolute Basophils 0.1      Absolute Immature Granulocytes 0.1      Absolute NRBCs 0.0     TROPONIN T, HIGH SENSITIVITY - Abnormal    Troponin T, High  Sensitivity 50 (*)         Procedures   None    Emergency Department Course & Assessments:     Interventions:  Medications - No data to display       Assessments:  I performed an exam of the patient and obtained history, as documented above.     Disposition:  The patient was discharged to home.     Impression & Plan      Medical Decision Makin-year-old male with history of atrial fibrillation on chronic anticoagulation on Eliquis presenting to the ER for evaluation of intermittent left-sided chest pain.  Please see above for details of HPI and exam.  Patient is afebrile vitally stable.  Broad differentials considered including ACS, PE, acute or dissection, pneumonia, pneumothorax, musculoskeletal etiology, GI etiology amongst other things.  EKG shows what appears to be A. fib, normal rate without acute ischemic changes.  No signs suggestive of pericarditis.  Basic lab studies above are largely unremarkable.  High-sensitivity troponin is detectable but delta troponin downtrending.  Overall suspicion for ACS is low given description of symptoms and overall workup.  Likewise, I have very low suspicion for PE given patient's anticoagulated status and no shortness of breath or pleuritic pain.  Chest x-ray shows no acute findings.  Doubt acute or dissection based on history and exam.  Given overall work-up and findings, with shared decision making patient and son elected to be discharged home with close follow-up with his PCP which I feel is reasonable.  There is probably a mechanical component or musculoskeletal etiology given pain seems to be very brief, sharp, worse with twisting or turning.  Regardless, we will have him follow-up with his PCP as discussed.  They are comfortable with this plan will return for any new or worsening symptom discussed at bedside.    Diagnosis:    ICD-10-CM    1. Intermittent left-sided chest pain  R07.89            Discharge Medications:  New Prescriptions    No medications on file         Scribe Disclosure:  I, Joyce Michelle, am serving as a scribe at 8:11 AM on 1/24/2023 to document services personally performed by No att. providers found based on my observations and the provider's statements to me.     1/24/2023   No att. providers found      Thomas Danielle MD  01/24/23 8087

## 2023-03-09 ENCOUNTER — OFFICE VISIT (OUTPATIENT)
Dept: FAMILY MEDICINE | Facility: CLINIC | Age: 88
End: 2023-03-09
Payer: MEDICARE

## 2023-03-09 VITALS
HEART RATE: 72 BPM | DIASTOLIC BLOOD PRESSURE: 50 MMHG | OXYGEN SATURATION: 96 % | RESPIRATION RATE: 14 BRPM | SYSTOLIC BLOOD PRESSURE: 100 MMHG | BODY MASS INDEX: 23.21 KG/M2 | HEIGHT: 66 IN | TEMPERATURE: 97.4 F | WEIGHT: 144.4 LBS

## 2023-03-09 DIAGNOSIS — I48.20 CHRONIC ATRIAL FIBRILLATION (H): ICD-10-CM

## 2023-03-09 DIAGNOSIS — J44.9 MODERATE CHRONIC OBSTRUCTIVE PULMONARY DISEASE (H): ICD-10-CM

## 2023-03-09 DIAGNOSIS — N18.31 CHRONIC KIDNEY DISEASE, STAGE 3A (H): ICD-10-CM

## 2023-03-09 DIAGNOSIS — R39.89 URINARY PROBLEM: Primary | ICD-10-CM

## 2023-03-09 DIAGNOSIS — M62.81 GENERALIZED MUSCLE WEAKNESS: ICD-10-CM

## 2023-03-09 DIAGNOSIS — R41.3 MEMORY CHANGES: ICD-10-CM

## 2023-03-09 LAB
ALBUMIN UR-MCNC: 100 MG/DL
ANION GAP SERPL CALCULATED.3IONS-SCNC: 9 MMOL/L (ref 7–15)
APPEARANCE UR: CLEAR
BACTERIA #/AREA URNS HPF: ABNORMAL /HPF
BILIRUB UR QL STRIP: ABNORMAL
BUN SERPL-MCNC: 26.9 MG/DL (ref 8–23)
CALCIUM SERPL-MCNC: 8.9 MG/DL (ref 8.2–9.6)
CHLORIDE SERPL-SCNC: 99 MMOL/L (ref 98–107)
COLOR UR AUTO: YELLOW
CREAT SERPL-MCNC: 1.62 MG/DL (ref 0.67–1.17)
DEPRECATED HCO3 PLAS-SCNC: 28 MMOL/L (ref 22–29)
ERYTHROCYTE [DISTWIDTH] IN BLOOD BY AUTOMATED COUNT: 13.1 % (ref 10–15)
GFR SERPL CREATININE-BSD FRML MDRD: 39 ML/MIN/1.73M2
GLUCOSE SERPL-MCNC: 126 MG/DL (ref 70–99)
GLUCOSE UR STRIP-MCNC: NEGATIVE MG/DL
HCT VFR BLD AUTO: 42.7 % (ref 40–53)
HGB BLD-MCNC: 13.6 G/DL (ref 13.3–17.7)
HGB UR QL STRIP: ABNORMAL
KETONES UR STRIP-MCNC: 15 MG/DL
LEUKOCYTE ESTERASE UR QL STRIP: NEGATIVE
MCH RBC QN AUTO: 31.3 PG (ref 26.5–33)
MCHC RBC AUTO-ENTMCNC: 31.9 G/DL (ref 31.5–36.5)
MCV RBC AUTO: 98 FL (ref 78–100)
NITRATE UR QL: NEGATIVE
PH UR STRIP: 5.5 [PH] (ref 5–7)
PLATELET # BLD AUTO: 241 10E3/UL (ref 150–450)
POTASSIUM SERPL-SCNC: 4.4 MMOL/L (ref 3.4–5.3)
RBC # BLD AUTO: 4.34 10E6/UL (ref 4.4–5.9)
RBC #/AREA URNS AUTO: ABNORMAL /HPF
SODIUM SERPL-SCNC: 136 MMOL/L (ref 136–145)
SP GR UR STRIP: 1.02 (ref 1–1.03)
SQUAMOUS #/AREA URNS AUTO: ABNORMAL /LPF
UROBILINOGEN UR STRIP-ACNC: 0.2 E.U./DL
WBC # BLD AUTO: 15 10E3/UL (ref 4–11)
WBC #/AREA URNS AUTO: ABNORMAL /HPF

## 2023-03-09 PROCEDURE — 87086 URINE CULTURE/COLONY COUNT: CPT | Performed by: NURSE PRACTITIONER

## 2023-03-09 PROCEDURE — 85027 COMPLETE CBC AUTOMATED: CPT | Performed by: NURSE PRACTITIONER

## 2023-03-09 PROCEDURE — 80048 BASIC METABOLIC PNL TOTAL CA: CPT | Performed by: NURSE PRACTITIONER

## 2023-03-09 PROCEDURE — 36415 COLL VENOUS BLD VENIPUNCTURE: CPT | Performed by: NURSE PRACTITIONER

## 2023-03-09 PROCEDURE — 99214 OFFICE O/P EST MOD 30 MIN: CPT | Performed by: NURSE PRACTITIONER

## 2023-03-09 PROCEDURE — 81001 URINALYSIS AUTO W/SCOPE: CPT | Performed by: NURSE PRACTITIONER

## 2023-03-09 NOTE — PROGRESS NOTES
Assessment & Plan     Urinary problem  Negative UA today.  - UA Macro with Reflex to Micro and Culture - lab collect  - UA Macro with Reflex to Micro and Culture - lab collect  - UA Microscopic with Reflex to Culture  - Urine Culture Aerobic Bacterial - lab collect  - Urine Culture Aerobic Bacterial - lab collect    Moderate chronic obstructive pulmonary disease (H)  Stable currently, no increased SOB or cough.    Chronic atrial fibrillation (H)  Stable on medicaitons.     Chronic kidney disease, stage 3a (H)  Labs done and worsening function-recommend ADS.    Generalized muscle weakness  - CBC with platelets  - Basic metabolic panel  (Ca, Cl, CO2, Creat, Gluc, K, Na, BUN)  - CBC with platelets  - Basic metabolic panel  (Ca, Cl, CO2, Creat, Gluc, K, Na, BUN)    Memory changes  Referral to neurology to discuss options.  - Adult Neurology  Referral      Review of the result(s) of each unique test - lab  Ordering of each unique test  Prescription drug management      No follow-ups on file.    Yakelin Duong, [facility] [place]    Subjective   Al is a 89 year old accompanied by his daughter, presenting for the following health issues:  Urinary Problem      History of Present Illness       Reason for visit:  Suspected uti or dhydration  Symptom onset:  1-3 days ago    He eats 2-3 servings of fruits and vegetables daily.He consumes 1 sweetened beverage(s) daily.He exercises with enough effort to increase his heart rate 9 or less minutes per day.  He exercises with enough effort to increase his heart rate 3 or less days per week. He is missing 7 dose(s) of medications per week.     Al is also getting over a bit of a cold.    More confusion last night and this morning. Had episode of incontinence on the couch at home.     Has someone who sets up medications and she checked vital signs today.     Patient himself has not noticed confusion when he states but he stated to daughter on phone this  "morning.     Review of Systems   Constitutional, HEENT, cardiovascular, pulmonary, GI, , musculoskeletal, neuro, skin, endocrine and psych systems are negative, except as otherwise noted.      Objective    /50   Pulse 72   Temp 97.4  F (36.3  C) (Tympanic)   Resp 14   Ht 1.664 m (5' 5.5\")   Wt 65.5 kg (144 lb 6.4 oz)   SpO2 96%   BMI 23.66 kg/m    Body mass index is 23.66 kg/m .  Physical Exam   GENERAL: healthy, alert and no distress  NECK: no adenopathy, no asymmetry, masses, or scars and thyroid normal to palpation  RESP: lungs clear to auscultation - no rales, rhonchi or wheezes  CV: regular rate and rhythm, normal S1 S2, no S3 or S4, no murmur, click or rub, no peripheral edema and peripheral pulses strong  ABDOMEN: soft, nontender, no hepatosplenomegaly, no masses and bowel sounds normal  MS: no gross musculoskeletal defects noted, no edema    Admission on 01/24/2023, Discharged on 01/24/2023   Component Date Value Ref Range Status     Sodium 01/24/2023 138  136 - 145 mmol/L Final     Potassium 01/24/2023 3.9  3.4 - 5.3 mmol/L Final     Chloride 01/24/2023 101  98 - 107 mmol/L Final     Carbon Dioxide (CO2) 01/24/2023 26  22 - 29 mmol/L Final     Anion Gap 01/24/2023 11  7 - 15 mmol/L Final     Urea Nitrogen 01/24/2023 19.3  8.0 - 23.0 mg/dL Final     Creatinine 01/24/2023 1.15  0.67 - 1.17 mg/dL Final     Calcium 01/24/2023 8.9  8.2 - 9.6 mg/dL Final     Glucose 01/24/2023 153 (H)  70 - 99 mg/dL Final     GFR Estimate 01/24/2023 58 (L)  >60 mL/min/1.73m2 Final    eGFR calculated using 2021 CKD-EPI equation.     Troponin T, High Sensitivity 01/24/2023 53 (H)  <=22 ng/L Final    Either a High Sensitivity Troponin T baseline (0 hours) value = 100 ng/mL, or an increase in High Sensitivity Troponin T = 7 ng/mL at 2 hours compared to 0 hours (2-0 hours), suggests myocardial injury, and urgent clinical attention is required.    If the 2-0 hours increase is<7 ng/mL, a High Sensitivity Troponin T result " above gender-specific reference ranges warrants further evaluation.   Recommendations for further evaluation include correlation with clinical decision-making tool (e.g., HEART), a 3rd High Sensitivity Troponin T test 2 hours after the 2nd (a 20% change from baseline would represent concern), admission for observation, close PCC/cardiology follow-up, or urgent outpatient provocative testing.     WBC Count 01/24/2023 6.7  4.0 - 11.0 10e3/uL Final     RBC Count 01/24/2023 4.18 (L)  4.40 - 5.90 10e6/uL Final     Hemoglobin 01/24/2023 13.4  13.3 - 17.7 g/dL Final     Hematocrit 01/24/2023 42.0  40.0 - 53.0 % Final     MCV 01/24/2023 101 (H)  78 - 100 fL Final     MCH 01/24/2023 32.1  26.5 - 33.0 pg Final     MCHC 01/24/2023 31.9  31.5 - 36.5 g/dL Final     RDW 01/24/2023 12.6  10.0 - 15.0 % Final     Platelet Count 01/24/2023 232  150 - 450 10e3/uL Final     % Neutrophils 01/24/2023 69  % Final     % Lymphocytes 01/24/2023 16  % Final     % Monocytes 01/24/2023 9  % Final     % Eosinophils 01/24/2023 3  % Final     % Basophils 01/24/2023 1  % Final     % Immature Granulocytes 01/24/2023 2  % Final     NRBCs per 100 WBC 01/24/2023 0  <1 /100 Final     Absolute Neutrophils 01/24/2023 4.7  1.6 - 8.3 10e3/uL Final     Absolute Lymphocytes 01/24/2023 1.1  0.8 - 5.3 10e3/uL Final     Absolute Monocytes 01/24/2023 0.6  0.0 - 1.3 10e3/uL Final     Absolute Eosinophils 01/24/2023 0.2  0.0 - 0.7 10e3/uL Final     Absolute Basophils 01/24/2023 0.1  0.0 - 0.2 10e3/uL Final     Absolute Immature Granulocytes 01/24/2023 0.1  <=0.4 10e3/uL Final     Absolute NRBCs 01/24/2023 0.0  10e3/uL Final     Hold Specimen 01/24/2023 JIC   Final     Ventricular Rate 01/24/2023 78  BPM Final     Atrial Rate 01/24/2023 83  BPM Final     QRS Duration 01/24/2023 114  ms Final     QT 01/24/2023 392  ms Final     QTc 01/24/2023 446  ms Final     R AXIS 01/24/2023 -53  degrees Final     T Axis 01/24/2023 68  degrees Final     Interpretation ECG  01/24/2023    Final                    Value:Undetermined rhythm  Left axis deviation  Anterior infarct (cited on or before 06-FEB-2021)  Abnormal ECG  When compared with ECG of 06-FEB-2021 03:10,  Current undetermined rhythm precludes rhythm comparison, needs review  Criteria for Inferior infarct are no longer Present  Confirmed by - EMERGENCY ROOM, PHYSICIAN ([place]),  JING BERTRAND ([id number]) on 1/24/2023 12:09:59 PM       Troponin T, High Sensitivity 01/24/2023 50 (H)  <=22 ng/L Final    Either a High Sensitivity Troponin T baseline (0 hours) value = 100 ng/mL, or an increase in High Sensitivity Troponin T = 7 ng/mL at 2 hours compared to 0 hours (2-0 hours), suggests myocardial injury, and urgent clinical attention is required.    If the 2-0 hours increase is<7 ng/mL, a High Sensitivity Troponin T result above gender-specific reference ranges warrants further evaluation.   Recommendations for further evaluation include correlation with clinical decision-making tool (e.g., HEART), a 3rd High Sensitivity Troponin T test 2 hours after the 2nd (a 20% change from baseline would represent concern), admission for observation, close PCC/cardiology follow-up, or urgent outpatient provocative testing.             JACOB Villa     [address] [address]  [email or web address]   Office: [phone]

## 2023-03-10 ENCOUNTER — ANCILLARY PROCEDURE (OUTPATIENT)
Dept: GENERAL RADIOLOGY | Facility: CLINIC | Age: 88
End: 2023-03-10
Attending: NURSE PRACTITIONER
Payer: MEDICARE

## 2023-03-10 ENCOUNTER — OFFICE VISIT (OUTPATIENT)
Dept: PEDIATRICS | Facility: CLINIC | Age: 88
End: 2023-03-10
Payer: MEDICARE

## 2023-03-10 ENCOUNTER — TELEPHONE (OUTPATIENT)
Dept: FAMILY MEDICINE | Facility: CLINIC | Age: 88
End: 2023-03-10
Payer: MEDICARE

## 2023-03-10 VITALS
RESPIRATION RATE: 18 BRPM | WEIGHT: 142 LBS | OXYGEN SATURATION: 96 % | TEMPERATURE: 97.6 F | HEART RATE: 84 BPM | BODY MASS INDEX: 23.27 KG/M2 | SYSTOLIC BLOOD PRESSURE: 93 MMHG | DIASTOLIC BLOOD PRESSURE: 62 MMHG

## 2023-03-10 DIAGNOSIS — R53.83 FATIGUE, UNSPECIFIED TYPE: ICD-10-CM

## 2023-03-10 DIAGNOSIS — R05.2 SUBACUTE COUGH: ICD-10-CM

## 2023-03-10 DIAGNOSIS — D72.828 OTHER ELEVATED WHITE BLOOD CELL (WBC) COUNT: ICD-10-CM

## 2023-03-10 DIAGNOSIS — M62.81 GENERALIZED MUSCLE WEAKNESS: ICD-10-CM

## 2023-03-10 DIAGNOSIS — R41.0 CONFUSION: ICD-10-CM

## 2023-03-10 DIAGNOSIS — R41.3 MEMORY CHANGES: Primary | ICD-10-CM

## 2023-03-10 DIAGNOSIS — M62.81 MUSCLE WEAKNESS (GENERALIZED): Primary | ICD-10-CM

## 2023-03-10 LAB
ALBUMIN SERPL BCG-MCNC: 3.7 G/DL (ref 3.5–5.2)
ALBUMIN UR-MCNC: 20 MG/DL
ALP SERPL-CCNC: 73 U/L (ref 40–129)
ALT SERPL W P-5'-P-CCNC: 23 U/L (ref 10–50)
AMORPH CRY #/AREA URNS HPF: ABNORMAL /HPF
ANION GAP SERPL CALCULATED.3IONS-SCNC: 14 MMOL/L (ref 7–15)
APPEARANCE UR: CLEAR
AST SERPL W P-5'-P-CCNC: 58 U/L (ref 10–50)
BASOPHILS # BLD AUTO: 0 10E3/UL (ref 0–0.2)
BASOPHILS NFR BLD AUTO: 0 %
BILIRUB SERPL-MCNC: 1.1 MG/DL
BILIRUB UR QL STRIP: NEGATIVE
BUN SERPL-MCNC: 34.2 MG/DL (ref 8–23)
CALCIUM SERPL-MCNC: 8.8 MG/DL (ref 8.2–9.6)
CHLORIDE SERPL-SCNC: 94 MMOL/L (ref 98–107)
COLOR UR AUTO: ABNORMAL
CREAT SERPL-MCNC: 1.64 MG/DL (ref 0.67–1.17)
CRP SERPL-MCNC: 122.2 MG/L
DEPRECATED HCO3 PLAS-SCNC: 24 MMOL/L (ref 22–29)
EOSINOPHIL # BLD AUTO: 0 10E3/UL (ref 0–0.7)
EOSINOPHIL NFR BLD AUTO: 0 %
ERYTHROCYTE [DISTWIDTH] IN BLOOD BY AUTOMATED COUNT: 13.1 % (ref 10–15)
GFR SERPL CREATININE-BSD FRML MDRD: 38 ML/MIN/1.73M2
GLUCOSE SERPL-MCNC: 124 MG/DL (ref 70–99)
GLUCOSE UR STRIP-MCNC: NEGATIVE MG/DL
HCT VFR BLD AUTO: 40.4 % (ref 40–53)
HGB BLD-MCNC: 13.3 G/DL (ref 13.3–17.7)
HGB UR QL STRIP: NEGATIVE
IMM GRANULOCYTES # BLD: 0.1 10E3/UL
IMM GRANULOCYTES NFR BLD: 1 %
KETONES UR STRIP-MCNC: NEGATIVE MG/DL
LEUKOCYTE ESTERASE UR QL STRIP: NEGATIVE
LYMPHOCYTES # BLD AUTO: 0.8 10E3/UL (ref 0.8–5.3)
LYMPHOCYTES NFR BLD AUTO: 7 %
MCH RBC QN AUTO: 31.9 PG (ref 26.5–33)
MCHC RBC AUTO-ENTMCNC: 32.9 G/DL (ref 31.5–36.5)
MCV RBC AUTO: 97 FL (ref 78–100)
MONOCYTES # BLD AUTO: 0.7 10E3/UL (ref 0–1.3)
MONOCYTES NFR BLD AUTO: 6 %
MUCOUS THREADS #/AREA URNS LPF: PRESENT /LPF
NEUTROPHILS # BLD AUTO: 9.8 10E3/UL (ref 1.6–8.3)
NEUTROPHILS NFR BLD AUTO: 86 %
NITRATE UR QL: NEGATIVE
NRBC # BLD AUTO: 0 10E3/UL
NRBC BLD AUTO-RTO: 0 /100
PH UR STRIP: 5 [PH] (ref 5–7)
PLATELET # BLD AUTO: 225 10E3/UL (ref 150–450)
POTASSIUM SERPL-SCNC: 3.7 MMOL/L (ref 3.4–5.3)
PROCALCITONIN SERPL IA-MCNC: 1.15 NG/ML
PROT SERPL-MCNC: 6.9 G/DL (ref 6.4–8.3)
RBC # BLD AUTO: 4.17 10E6/UL (ref 4.4–5.9)
RBC URINE: 1 /HPF
SODIUM SERPL-SCNC: 132 MMOL/L (ref 136–145)
SP GR UR STRIP: 1.01 (ref 1–1.03)
UROBILINOGEN UR STRIP-MCNC: NORMAL MG/DL
WBC # BLD AUTO: 11.3 10E3/UL (ref 4–11)
WBC URINE: 1 /HPF

## 2023-03-10 PROCEDURE — 36415 COLL VENOUS BLD VENIPUNCTURE: CPT | Performed by: NURSE PRACTITIONER

## 2023-03-10 PROCEDURE — 81001 URINALYSIS AUTO W/SCOPE: CPT | Performed by: NURSE PRACTITIONER

## 2023-03-10 PROCEDURE — 99207 REFERRAL TO ACUTE AND DIAGNOSTIC SERVICES: CPT | Performed by: NURSE PRACTITIONER

## 2023-03-10 PROCEDURE — 96361 HYDRATE IV INFUSION ADD-ON: CPT | Performed by: NURSE PRACTITIONER

## 2023-03-10 PROCEDURE — 86140 C-REACTIVE PROTEIN: CPT | Performed by: NURSE PRACTITIONER

## 2023-03-10 PROCEDURE — 84145 PROCALCITONIN (PCT): CPT | Performed by: NURSE PRACTITIONER

## 2023-03-10 PROCEDURE — 71046 X-RAY EXAM CHEST 2 VIEWS: CPT | Mod: TC | Performed by: RADIOLOGY

## 2023-03-10 PROCEDURE — 99214 OFFICE O/P EST MOD 30 MIN: CPT | Mod: 25 | Performed by: NURSE PRACTITIONER

## 2023-03-10 PROCEDURE — 80053 COMPREHEN METABOLIC PANEL: CPT | Performed by: NURSE PRACTITIONER

## 2023-03-10 PROCEDURE — 96360 HYDRATION IV INFUSION INIT: CPT | Performed by: NURSE PRACTITIONER

## 2023-03-10 PROCEDURE — 85025 COMPLETE CBC W/AUTO DIFF WBC: CPT | Performed by: NURSE PRACTITIONER

## 2023-03-10 RX ORDER — AMOXICILLIN 875 MG
875 TABLET ORAL 2 TIMES DAILY
Qty: 14 TABLET | Refills: 0 | Status: SHIPPED | OUTPATIENT
Start: 2023-03-10 | End: 2023-03-17

## 2023-03-10 RX ADMIN — Medication 1000 ML: at 14:47

## 2023-03-10 NOTE — PROGRESS NOTES
Assessment & Plan     Muscle weakness (generalized)  - CBC with platelets differential; Future  - Comprehensive metabolic panel; Future  - CRP inflammation; Future  - IV access  - 0.9% sodium chloride BOLUS  - Procalcitonin; Future  - CBC with platelets differential  - Comprehensive metabolic panel  - CRP inflammation  - Procalcitonin    Confusion  - CBC with platelets differential; Future  - Comprehensive metabolic panel; Future  - CRP inflammation; Future  - IV access  - 0.9% sodium chloride BOLUS  - Procalcitonin; Future  - UA with Microscopic reflex to Culture; Future  - CBC with platelets differential  - Comprehensive metabolic panel  - CRP inflammation  - Procalcitonin  - UA with Microscopic reflex to Culture    Fatigue, unspecified type  - XR Chest 2 Views; Future  - UA with Microscopic reflex to Culture; Future  - UA with Microscopic reflex to Culture    Subacute cough  - CBC with platelets differential; Future  - Comprehensive metabolic panel; Future  - CRP inflammation; Future  - Procalcitonin; Future  - XR Chest 2 Views; Future    Other elevated white blood cell (WBC) count    Patient Instructions     Results for orders placed or performed in visit on 03/10/23   XR Chest 2 Views     Status: None    Narrative    CHEST TWO VIEWS    3/10/2023 2:21 PM     HISTORY: Fatigue, unspecified type    COMPARISON: Chest x-ray on 1/24/2023      Impression    IMPRESSION: PA and lateral views of the chest were obtained. Enlarged  cardiac silhouette. Atherosclerotic vascular calcification of the  aortic knob. Mild asymmetric elevation of the right hemidiaphragm as  compared to the left. Mild left basilar pulmonary opacities, likely  atelectasis. No significant pleural effusion or pneumothorax. Post  surgical changes of right shoulder hemiarthroplasty.    MANFRED BHARDWAJ MD         SYSTEM ID:  [id number]   Results for orders placed or performed in visit on 03/10/23   Comprehensive metabolic panel     Status: Abnormal    Result Value Ref Range    Sodium 132 (L) 136 - 145 mmol/L    Potassium 3.7 3.4 - 5.3 mmol/L    Chloride 94 (L) 98 - 107 mmol/L    Carbon Dioxide (CO2) 24 22 - 29 mmol/L    Anion Gap 14 7 - 15 mmol/L    Urea Nitrogen 34.2 (H) 8.0 - 23.0 mg/dL    Creatinine 1.64 (H) 0.67 - 1.17 mg/dL    Calcium 8.8 8.2 - 9.6 mg/dL    Glucose 124 (H) 70 - 99 mg/dL    Alkaline Phosphatase 73 40 - 129 U/L    AST 58 (H) 10 - 50 U/L    ALT 23 10 - 50 U/L    Protein Total 6.9 6.4 - 8.3 g/dL    Albumin 3.7 3.5 - 5.2 g/dL    Bilirubin Total 1.1 <=1.2 mg/dL    GFR Estimate 38 (L) >60 mL/min/1.73m2   CRP inflammation     Status: Abnormal   Result Value Ref Range    CRP Inflammation 122.20 (H) <5.00 mg/L   Procalcitonin     Status: Abnormal   Result Value Ref Range    Procalcitonin 1.15 (H) <0.05 ng/mL   UA with Microscopic reflex to Culture     Status: Abnormal    Specimen: Urine, Clean Catch   Result Value Ref Range    Color Urine Light Yellow Colorless, Straw, Light Yellow, Yellow    Appearance Urine Clear Clear    Glucose Urine Negative Negative mg/dL    Bilirubin Urine Negative Negative    Ketones Urine Negative Negative mg/dL    Specific Gravity Urine 1.010 1.003 - 1.035    Blood Urine Negative Negative    pH Urine 5.0 5.0 - 7.0    Protein Albumin Urine 20 (A) Negative mg/dL    Urobilinogen Urine Normal Normal, 2.0 mg/dL    Nitrite Urine Negative Negative    Leukocyte Esterase Urine Negative Negative    Mucus Urine Present (A) None Seen /LPF    Amorphous Crystals Urine Few (A) None Seen /HPF    RBC Urine 1 <=2 /HPF    WBC Urine 1 <=5 /HPF    Narrative    Urine Culture not indicated   CBC with platelets and differential     Status: Abnormal   Result Value Ref Range    WBC Count 11.3 (H) 4.0 - 11.0 10e3/uL    RBC Count 4.17 (L) 4.40 - 5.90 10e6/uL    Hemoglobin 13.3 13.3 - 17.7 g/dL    Hematocrit 40.4 40.0 - 53.0 %    MCV 97 78 - 100 fL    MCH 31.9 26.5 - 33.0 pg    MCHC 32.9 31.5 - 36.5 g/dL    RDW 13.1 10.0 - 15.0 %    Platelet Count 225  150 - 450 10e3/uL    % Neutrophils 86 %    % Lymphocytes 7 %    % Monocytes 6 %    % Eosinophils 0 %    % Basophils 0 %    % Immature Granulocytes 1 %    NRBCs per 100 WBC 0 <1 /100    Absolute Neutrophils 9.8 (H) 1.6 - 8.3 10e3/uL    Absolute Lymphocytes 0.8 0.8 - 5.3 10e3/uL    Absolute Monocytes 0.7 0.0 - 1.3 10e3/uL    Absolute Eosinophils 0.0 0.0 - 0.7 10e3/uL    Absolute Basophils 0.0 0.0 - 0.2 10e3/uL    Absolute Immature Granulocytes 0.1 <=0.4 10e3/uL    Absolute NRBCs 0.0 10e3/uL   CBC with platelets differential     Status: Abnormal    Narrative    The following orders were created for panel order CBC with platelets differential.  Procedure                               Abnormality         Status                     ---------                               -----------         ------                     CBC with platelets and d...[[id number]]  Abnormal            Final result                 Please view results for these tests on the individual orders.   Discussed normal VS and normal physical exam and as pt is a poor historian difficult to evaluate underlying etiology.    WBC trending down but procalcitonin elevated indicating need for antibiotics.   Start amoxicillin twice a day for 7 days for presumed cough/CAP.      UA today normal.  Urine culture from yesterday in process.   Will call in 1-2 days if need to treat for UTI.    Push fluids.    Follow up in urgent care if new symptoms emerge or if he is worsening over the weekend.        Return in about 1 week (around 3/17/2023) for with regular provider if symptoms persist.    TRAMAINE Alcala CNP  [facility] is a 89 year old accompanied by his son, presenting  urgently to [facility] by referral from Yakelin Duong  for the following health issues:  Fatigue (Fatigue, weakness, elevated WBC, confusion X 4 days)      HPI     Acute Illness  Acute illness concerns: Fatigued, weakness, elevated WBC,  confusion  Onset/Duration: X 4 days  Symptoms:  Fever: No  Decreased energy level: YES  Conjunctivitis: No  Ear Pain: No  Rhinorrhea: YES  Congestion: YES  Sore Throat: No  Cough: YES-non-productive, productive of gray sputum  Wheeze: YES- yesterday only  Breathing fast: No           Decreased Appetite/Intake: YES  Nausea: YES  Vomiting: No  Diarrhea: YES- X 2 days, but notes taking constipation tablets   Progression of Symptoms: same intermittent  Sick/Strep Exposure: No  Therapies tried and outcome: Laxative for constipation, some cold medicine last night, this helped with sleep. No COVID testing recently    Baseline confusion but worse recently.    Not like himself  UTI?  Son reports he had a cold last week but seems better.    Seen in the clinic yesterday  Possible dehydration?  Sent to [facility] for IVF and recheck labs.        Review of Systems   Constitutional, HEENT, cardiovascular, pulmonary, GI, , musculoskeletal, neuro, skin, endocrine and psych systems are negative, except as otherwise noted.      Objective    BP 93/62 (BP Location: Right arm, Patient Position: Chair, Cuff Size: Adult Regular)   Pulse 84   Temp 97.6  F (36.4  C) (Oral)   Resp 18   Wt 64.4 kg (142 lb)   SpO2 96%   BMI 23.27 kg/m    Body mass index is 23.27 kg/m .  Physical Exam   GENERAL: healthy, alert and no distress  EYES: Eyes grossly normal to inspection, PERRL and conjunctivae and sclerae normal  HENT: ear canals and TM's normal, nose and mouth without ulcers or lesions  NECK: no adenopathy, no asymmetry, masses, or scars and thyroid normal to palpation  RESP: lungs clear to auscultation - no rales, rhonchi or wheezes  CV: regular rate and rhythm, normal S1 S2, no S3 or S4, no murmur, click or rub, no peripheral edema and peripheral pulses strong  MS: no gross musculoskeletal defects noted, no edema  SKIN: no suspicious lesions or rashes

## 2023-03-10 NOTE — PATIENT INSTRUCTIONS
Results for orders placed or performed in visit on 03/10/23   XR Chest 2 Views     Status: None    Narrative    CHEST TWO VIEWS    3/10/2023 2:21 PM     HISTORY: Fatigue, unspecified type    COMPARISON: Chest x-ray on 1/24/2023      Impression    IMPRESSION: PA and lateral views of the chest were obtained. Enlarged  cardiac silhouette. Atherosclerotic vascular calcification of the  aortic knob. Mild asymmetric elevation of the right hemidiaphragm as  compared to the left. Mild left basilar pulmonary opacities, likely  atelectasis. No significant pleural effusion or pneumothorax. Post  surgical changes of right shoulder hemiarthroplasty.    MANFRED BHARDWAJ MD         SYSTEM ID:  [id number]   Results for orders placed or performed in visit on 03/10/23   Comprehensive metabolic panel     Status: Abnormal   Result Value Ref Range    Sodium 132 (L) 136 - 145 mmol/L    Potassium 3.7 3.4 - 5.3 mmol/L    Chloride 94 (L) 98 - 107 mmol/L    Carbon Dioxide (CO2) 24 22 - 29 mmol/L    Anion Gap 14 7 - 15 mmol/L    Urea Nitrogen 34.2 (H) 8.0 - 23.0 mg/dL    Creatinine 1.64 (H) 0.67 - 1.17 mg/dL    Calcium 8.8 8.2 - 9.6 mg/dL    Glucose 124 (H) 70 - 99 mg/dL    Alkaline Phosphatase 73 40 - 129 U/L    AST 58 (H) 10 - 50 U/L    ALT 23 10 - 50 U/L    Protein Total 6.9 6.4 - 8.3 g/dL    Albumin 3.7 3.5 - 5.2 g/dL    Bilirubin Total 1.1 <=1.2 mg/dL    GFR Estimate 38 (L) >60 mL/min/1.73m2   CRP inflammation     Status: Abnormal   Result Value Ref Range    CRP Inflammation 122.20 (H) <5.00 mg/L   Procalcitonin     Status: Abnormal   Result Value Ref Range    Procalcitonin 1.15 (H) <0.05 ng/mL   UA with Microscopic reflex to Culture     Status: Abnormal    Specimen: Urine, Clean Catch   Result Value Ref Range    Color Urine Light Yellow Colorless, Straw, Light Yellow, Yellow    Appearance Urine Clear Clear    Glucose Urine Negative Negative mg/dL    Bilirubin Urine Negative Negative    Ketones Urine Negative Negative mg/dL    Specific  Gravity Urine 1.010 1.003 - 1.035    Blood Urine Negative Negative    pH Urine 5.0 5.0 - 7.0    Protein Albumin Urine 20 (A) Negative mg/dL    Urobilinogen Urine Normal Normal, 2.0 mg/dL    Nitrite Urine Negative Negative    Leukocyte Esterase Urine Negative Negative    Mucus Urine Present (A) None Seen /LPF    Amorphous Crystals Urine Few (A) None Seen /HPF    RBC Urine 1 <=2 /HPF    WBC Urine 1 <=5 /HPF    Narrative    Urine Culture not indicated   CBC with platelets and differential     Status: Abnormal   Result Value Ref Range    WBC Count 11.3 (H) 4.0 - 11.0 10e3/uL    RBC Count 4.17 (L) 4.40 - 5.90 10e6/uL    Hemoglobin 13.3 13.3 - 17.7 g/dL    Hematocrit 40.4 40.0 - 53.0 %    MCV 97 78 - 100 fL    MCH 31.9 26.5 - 33.0 pg    MCHC 32.9 31.5 - 36.5 g/dL    RDW 13.1 10.0 - 15.0 %    Platelet Count 225 150 - 450 10e3/uL    % Neutrophils 86 %    % Lymphocytes 7 %    % Monocytes 6 %    % Eosinophils 0 %    % Basophils 0 %    % Immature Granulocytes 1 %    NRBCs per 100 WBC 0 <1 /100    Absolute Neutrophils 9.8 (H) 1.6 - 8.3 10e3/uL    Absolute Lymphocytes 0.8 0.8 - 5.3 10e3/uL    Absolute Monocytes 0.7 0.0 - 1.3 10e3/uL    Absolute Eosinophils 0.0 0.0 - 0.7 10e3/uL    Absolute Basophils 0.0 0.0 - 0.2 10e3/uL    Absolute Immature Granulocytes 0.1 <=0.4 10e3/uL    Absolute NRBCs 0.0 10e3/uL   CBC with platelets differential     Status: Abnormal    Narrative    The following orders were created for panel order CBC with platelets differential.  Procedure                               Abnormality         Status                     ---------                               -----------         ------                     CBC with platelets and d...[[id number]]  Abnormal            Final result                 Please view results for these tests on the individual orders.   Discussed normal VS and normal physical exam and as pt is a poor historian difficult to evaluate underlying etiology.    WBC trending down but procalcitonin  elevated indicating need for antibiotics.   Start amoxicillin twice a day for 7 days for presumed cough/CAP.      UA today normal.  Urine culture from yesterday in process.   Will call in 1-2 days if need to treat for UTI.    Push fluids.    Follow up in urgent care if new symptoms emerge or if he is worsening over the weekend.

## 2023-03-10 NOTE — TELEPHONE ENCOUNTER
Referral to Acute and Diagnostic Services    [phone] ([place]) [place] [facility] [address] [facility], [address], [place] [address]      Transition to Acute & Diagnostic Services Clinic has been discussed with patient, and he agrees with next level of care.     Patient understands that evaluation/treatment at [facility] typically takes significantly longer than in clinic/urgent care (>2 hours).    The [facility] Acute and Diagnostics Services Clinic has been contacted by provider/staff to confirm patient acceptance.         Special issues:      None             The following provider has assessed this patient for intervention at [facility], and directed the patient for referral: Yakelin Duong, JOE Omalley RN [place] Triage

## 2023-03-11 LAB — BACTERIA UR CULT: NO GROWTH

## 2023-03-17 ENCOUNTER — HOSPITAL ENCOUNTER (EMERGENCY)
Facility: CLINIC | Age: 88
Discharge: HOME | End: 2023-03-17
Attending: EMERGENCY MEDICINE | Admitting: EMERGENCY MEDICINE
Payer: MEDICARE

## 2023-03-17 ENCOUNTER — APPOINTMENT (OUTPATIENT)
Dept: GENERAL RADIOLOGY | Facility: CLINIC | Age: 88
End: 2023-03-17
Attending: EMERGENCY MEDICINE
Payer: MEDICARE

## 2023-03-17 VITALS
HEART RATE: 79 BPM | OXYGEN SATURATION: 96 % | TEMPERATURE: 98 F | SYSTOLIC BLOOD PRESSURE: 168 MMHG | DIASTOLIC BLOOD PRESSURE: 102 MMHG | RESPIRATION RATE: 18 BRPM

## 2023-03-17 DIAGNOSIS — R07.89 NON-CARDIAC CHEST PAIN: ICD-10-CM

## 2023-03-17 LAB
ANION GAP SERPL CALCULATED.3IONS-SCNC: 9 MMOL/L (ref 7–15)
BASOPHILS # BLD AUTO: 0.1 10E3/UL (ref 0–0.2)
BASOPHILS NFR BLD AUTO: 1 %
BUN SERPL-MCNC: 17.1 MG/DL (ref 8–23)
CALCIUM SERPL-MCNC: 8.9 MG/DL (ref 8.2–9.6)
CHLORIDE SERPL-SCNC: 101 MMOL/L (ref 98–107)
CREAT SERPL-MCNC: 1.14 MG/DL (ref 0.67–1.17)
DEPRECATED HCO3 PLAS-SCNC: 28 MMOL/L (ref 22–29)
EOSINOPHIL # BLD AUTO: 0.3 10E3/UL (ref 0–0.7)
EOSINOPHIL NFR BLD AUTO: 3 %
ERYTHROCYTE [DISTWIDTH] IN BLOOD BY AUTOMATED COUNT: 13.1 % (ref 10–15)
GFR SERPL CREATININE-BSD FRML MDRD: 59 ML/MIN/1.73M2
GLUCOSE SERPL-MCNC: 114 MG/DL (ref 70–99)
HCT VFR BLD AUTO: 36 % (ref 40–53)
HGB BLD-MCNC: 11.7 G/DL (ref 13.3–17.7)
IMM GRANULOCYTES # BLD: 0.4 10E3/UL
IMM GRANULOCYTES NFR BLD: 3 %
LYMPHOCYTES # BLD AUTO: 1.3 10E3/UL (ref 0.8–5.3)
LYMPHOCYTES NFR BLD AUTO: 11 %
MCH RBC QN AUTO: 32.3 PG (ref 26.5–33)
MCHC RBC AUTO-ENTMCNC: 32.5 G/DL (ref 31.5–36.5)
MCV RBC AUTO: 99 FL (ref 78–100)
MONOCYTES # BLD AUTO: 1.2 10E3/UL (ref 0–1.3)
MONOCYTES NFR BLD AUTO: 10 %
NEUTROPHILS # BLD AUTO: 9 10E3/UL (ref 1.6–8.3)
NEUTROPHILS NFR BLD AUTO: 72 %
NRBC # BLD AUTO: 0 10E3/UL
NRBC BLD AUTO-RTO: 0 /100
PLATELET # BLD AUTO: 326 10E3/UL (ref 150–450)
POTASSIUM SERPL-SCNC: 4 MMOL/L (ref 3.4–5.3)
RBC # BLD AUTO: 3.62 10E6/UL (ref 4.4–5.9)
SODIUM SERPL-SCNC: 138 MMOL/L (ref 136–145)
TROPONIN T SERPL HS-MCNC: 54 NG/L
WBC # BLD AUTO: 12.4 10E3/UL (ref 4–11)

## 2023-03-17 PROCEDURE — 99285 EMERGENCY DEPT VISIT HI MDM: CPT | Mod: 25

## 2023-03-17 PROCEDURE — 82310 ASSAY OF CALCIUM: CPT | Performed by: EMERGENCY MEDICINE

## 2023-03-17 PROCEDURE — 93005 ELECTROCARDIOGRAM TRACING: CPT

## 2023-03-17 PROCEDURE — 85025 COMPLETE CBC W/AUTO DIFF WBC: CPT | Performed by: EMERGENCY MEDICINE

## 2023-03-17 PROCEDURE — 36415 COLL VENOUS BLD VENIPUNCTURE: CPT | Performed by: EMERGENCY MEDICINE

## 2023-03-17 PROCEDURE — 84484 ASSAY OF TROPONIN QUANT: CPT | Performed by: EMERGENCY MEDICINE

## 2023-03-17 PROCEDURE — 71046 X-RAY EXAM CHEST 2 VIEWS: CPT

## 2023-03-17 RX ORDER — LIDOCAINE 40 MG/G
CREAM TOPICAL
Status: DISCONTINUED | OUTPATIENT
Start: 2023-03-17 | End: 2023-03-17 | Stop reason: HOSPADM

## 2023-03-17 ASSESSMENT — ENCOUNTER SYMPTOMS
SHORTNESS OF BREATH: 0
COUGH: 1
FEVER: 0
ABDOMINAL PAIN: 0

## 2023-03-17 ASSESSMENT — ACTIVITIES OF DAILY LIVING (ADL)
ADLS_ACUITY_SCORE: 35
ADLS_ACUITY_SCORE: 35

## 2023-03-17 NOTE — ED TRIAGE NOTES
Pt presents to ED via EMS for concern of sharp sudden 'twinge' pain in left lower front rib.

## 2023-03-17 NOTE — ED PROVIDER NOTES
"  History     Chief Complaint:  Rib Pain     HPI   Kraig Genao is a 89 year old male on Eliquis with a history of chronic atrial fibrillation who presents with chest pain. He explains that he was in bed at home when he experienced a sudden \"aggressive thing\" in his chest. He does not necessarily report this as a pain but just as noted above. This resolved quickly with one more shorter episode after the first. Currently, pain has subsided completely. He reports having a cough. He denies shortness of breath, fever, or abdominal pain. No recent falls or trauma. Of note, he was seen at the ED on January 24, 2023 for left-sided chest pain that was similar.  Serial troponins stable at ~50.    Independent Historian:   None - Patient Only    Review of External Notes: Reviewed Care Everywhere notes and ED visit from 01/24/2023.    Review of Systems   Constitutional: Negative for fever.   Respiratory: Positive for cough. Negative for shortness of breath.    Cardiovascular: Positive for chest pain.   Gastrointestinal: Negative for abdominal pain.   All other systems reviewed and are negative.    Allergies:  No Known Allergies     Medications:    Apixaban ANTICOAGULANT (ELIQUIS) 5 MG tablet  Atenolol (TENORMIN) 50 MG tablet  Biotin 1000 MCG TABS tablet  Budesonide-formoterol (SYMBICORT) 160-4.5 MCG/ACT inhaler  Calcium-vitamin D (CALTRATE) 600-400 MG-UNIT per tablet  Cholecalciferol (VITAMIN D3 PO)  Multiple Vitamin (ONE-A-DAY MENS PO)  Omega-3 Fatty Acids (FISH OIL) 1200 MG CAPS  Omeprazole (PRILOSEC) 20 MG capsule  Oxybutynin (DITROPAN) 5 MG tablet  Tamsulosin (FLOMAX) 0.4 MG capsule    Past Medical History:    Diagnosis     Bile duct stone     Chronic atrial fibrillation     Elevated LFTs     Erectile dysfunction     Gastroesophageal reflux disease     h/o Helicobacter pylori infection     Hypertrophy (benign) of prostate     Macular degeneration (senile) of retina     Past Surgical History:   Procedure Laterality Date "     ARTHROPLASTY SHOULDER Right 2004     COLONOSCOPY  2001     ENDOSCOPIC RETROGRADE CHOLANGIOPANCREATOGRAM  09/23/2011    Procedure:ENDOSCOPIC RETROGRADE CHOLANGIOPANCREATOGRAM; Endoscopic Retrograde Cholangiopancreatogram (c-arm), baloon dilation, stone retreival and 10fr. Solus stent placement x2 in  bile duct with the spy glass; Surgeon:EDD AUGUST; Location:[facility] OR     ENDOSCOPIC RETROGRADE CHOLANGIOPANCREATOGRAPHY, LITHOTRIPSY PANCREAS, COMBINED  10/27/2011    Procedure:COMBINED ENDOSCOPIC RETROGRADE CHOLANGIOPANCREATOGRAPHY, LITHOTRIPSY PANCREAS; Endoscopic Retrograde Cholangiopancreatogram with spygass scope ,Electrohydraulic Lithotripsy  baloon dilation of bile duct, stone removal (c-arm); Surgeon:EDD AUGUST; Location:[facility] OR     ESOPHAGOSCOPY, GASTROSCOPY, DUODENOSCOPY (EGD), COMBINED N/A 05/09/2016    Procedure: COMBINED ESOPHAGOSCOPY, GASTROSCOPY, DUODENOSCOPY (EGD), BIOPSY SINGLE OR MULTIPLE;  Surgeon: Mendez Mcknight MD;  Location:  GI     ESOPHAGOSCOPY, GASTROSCOPY, DUODENOSCOPY (EGD), COMBINED N/A 02/11/2018    Procedure: COMBINED ESOPHAGOSCOPY, GASTROSCOPY, DUODENOSCOPY (EGD), REMOVE FOREIGN BODY;  COMBINED ESOPHAGOSCOPY, GASTROSCOPY, DUODENOSCOPY (EGD) by raptor graspeing ;  Surgeon: Mendez Mcknight MD;  Location: [facility] GI     ESOPHAGOSCOPY, GASTROSCOPY, DUODENOSCOPY (EGD), COMBINED Left 02/16/2018    Procedure: COMBINED ESOPHAGOSCOPY, GASTROSCOPY, DUODENOSCOPY (EGD), BIOPSY SINGLE OR MULTIPLE;  ESOPHAGOSCOPY, GASTROSCOPY, DUODENOSCOPY (EGD) (Nicci) with biopsies using cold bx forcep and tts balloon dilation 12-15mm;  Surgeon: Mendez Mcknight MD;  Location: [facility] GI     ESOPHAGOSCOPY, GASTROSCOPY, DUODENOSCOPY (EGD), COMBINED N/A 08/10/2021    Procedure: ESOPHAGOGASTRODUODENOSCOPY (EGD);  Surgeon: Mendez Mcknight MD;  Location:  GI     INGUINAL HERNIA REPAIR       LAPAROSCOPIC CHOLECYSTECTOMY  2002     Repeat Shoulder arthroplasty Right 2005     Family History:    Father - heart  disease    Social History:  Presents to the ED via EMS with a family member.  PCP: Hubert Rogers     Physical Exam     Patient Vitals for the past 24 hrs:   BP Temp Temp src Pulse Resp SpO2   03/17/23 0511 -- -- -- -- -- 96 %   03/17/23 0501  168/102 -- -- 79 -- 99 %   03/17/23 0446  166/107 -- -- 85 -- 99 %   03/17/23 0400  145/85 -- -- 80 -- 97 %   03/17/23 0350  148/88 -- -- -- -- 96 %   03/17/23 0330  145/96 -- -- 80 -- 94 %   03/17/23 0315 139/85 98  F (36.7  C) Oral 74 18 96 %   03/17/23 0310 -- -- -- -- -- 96 %   03/17/23 0305 -- -- -- -- -- 97 %     Physical Exam  Nursing note and vitals reviewed.  Constitutional: Cooperative.   HENT:   Mouth/Throat: Mucous membranes are normal.   Cardiovascular: Normal rate, irregularly irregular rhythm and normal heart sounds.  No murmur.  Pulmonary/Chest: Effort normal and breath sounds normal. No respiratory distress. No wheezes. No rales. No tenderness on chest palpation.  Abdominal: Soft. Normal appearance. There is no tenderness. There is no rigidity and no guarding.   Neurological: Alert. Oriented x3  Skin: Skin is warm and dry. No rash noted on left chest wall.   Psychiatric: Normal mood and affect.     Emergency Department Course     ECG  ECG taken at 0348, ECG read at 0353  Atrial fibrillation  Rate 75 bpm. TN interval * ms. QRS duration 88 ms. QT/QTc 392/437 ms. P-R-T axes * -17 17.     Imaging:  Chest XR,  PA & LAT   Final Result   IMPRESSION: Chronic elevation of the right hemidiaphragm and mild right basilar atelectasis. Left lung clear. Normal heart size and pulmonary vascularity. No pneumothorax. Right glenohumeral arthroplasty.       Report per radiology    Laboratory:  Labs Ordered and Resulted from Time of ED Arrival to Time of ED Departure   BASIC METABOLIC PANEL - Abnormal       Result Value    Sodium 138      Potassium 4.0      Chloride 101      Carbon Dioxide (CO2) 28      Anion Gap 9      Urea Nitrogen 17.1      Creatinine 1.14      Calcium 8.9       Glucose 114 (*)     GFR Estimate 59 (*)    TROPONIN T, HIGH SENSITIVITY - Abnormal    Troponin T, High Sensitivity 54 (*)    CBC WITH PLATELETS AND DIFFERENTIAL - Abnormal    WBC Count 12.4 (*)     RBC Count 3.62 (*)     Hemoglobin 11.7 (*)     Hematocrit 36.0 (*)     MCV 99      MCH 32.3      MCHC 32.5      RDW 13.1      Platelet Count 326      % Neutrophils 72      % Lymphocytes 11      % Monocytes 10      % Eosinophils 3      % Basophils 1      % Immature Granulocytes 3      NRBCs per 100 WBC 0      Absolute Neutrophils 9.0 (*)     Absolute Lymphocytes 1.3      Absolute Monocytes 1.2      Absolute Eosinophils 0.3      Absolute Basophils 0.1      Absolute Immature Granulocytes 0.4      Absolute NRBCs 0.0       Interventions:  None    Assessments:  0318 I obtained history and examined the patient as noted above.     Independent Interpretation (X-rays, CTs, rhythm strip):  CXR reviewed.  No pneumothorax or pneumonia    Consultations/Discussion of Management or Tests:  None     Social Determinants of Health affecting care:   None    Disposition:  The patient was discharged to home.     Impression & Plan      Medical Decision Making:  Kraig Genao is a 89 year old male who presents with chest pain.  The work up in the Emergency Department is negative.  I considered a broad differential diagnosis in this patient including life-threatening etiologies such as acute coronary syndrome, myocardial infarction, pulmonary embolism, acute aortic dissection, myocarditis, pericarditis,  amongst others.  Other causes considered for this patient included pneumonia, pneumothorax, chest wall source, pericarditis, pleurisy, esophageal spasm, etc.  No serious etiology for the chest pain were detected today during this visit.  Troponin stable from last visit for similar pain.  He is anticoagulated for chronic atrial fibrillation.  Close follow up with primary care is indicated should the pain continue, as further work up may be  performed; this was made clear to the patient, who understands and is in agreement with plan for discharge.     Diagnosis:    ICD-10-CM    1. Non-cardiac chest pain  R07.89         Scribe Disclosure:  I, Agusto Lima, am serving as a scribe at 3:26 AM on 3/17/2023 to document services personally performed by Jose Eduardo Jordan MD based on my observations and the provider's statements to me.   3/17/2023   Jose Eduardo Jordan MD Amdahl, John, MD  03/17/23 0535

## 2023-03-20 LAB
ATRIAL RATE - MUSE: 82 BPM
DIASTOLIC BLOOD PRESSURE - MUSE: NORMAL MMHG
INTERPRETATION ECG - MUSE: NORMAL
P AXIS - MUSE: NORMAL DEGREES
PR INTERVAL - MUSE: NORMAL MS
QRS DURATION - MUSE: 88 MS
QT - MUSE: 392 MS
QTC - MUSE: 437 MS
R AXIS - MUSE: -17 DEGREES
SYSTOLIC BLOOD PRESSURE - MUSE: NORMAL MMHG
T AXIS - MUSE: 17 DEGREES
VENTRICULAR RATE- MUSE: 75 BPM

## 2023-05-27 ENCOUNTER — HOSPITAL ENCOUNTER (EMERGENCY)
Facility: CLINIC | Age: 88
Discharge: HOME | End: 2023-05-27
Attending: EMERGENCY MEDICINE | Admitting: EMERGENCY MEDICINE
Payer: MEDICARE

## 2023-05-27 VITALS
DIASTOLIC BLOOD PRESSURE: 93 MMHG | RESPIRATION RATE: 18 BRPM | HEART RATE: 78 BPM | TEMPERATURE: 96.5 F | OXYGEN SATURATION: 98 % | SYSTOLIC BLOOD PRESSURE: 153 MMHG
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
## 2023-05-27 DIAGNOSIS — T18.128A FOOD IMPACTION OF ESOPHAGUS, INITIAL ENCOUNTER: ICD-10-CM

## 2023-05-27 PROCEDURE — 250N000013 HC RX MED GY IP 250 OP 250 PS 637: Performed by: EMERGENCY MEDICINE

## 2023-05-27 PROCEDURE — 99283 EMERGENCY DEPT VISIT LOW MDM: CPT

## 2023-05-27 RX ADMIN — ANTACID/ANTIFLATULENT 4 G: 380; 550; 10; 10 GRANULE, EFFERVESCENT ORAL at 20:28

## 2023-05-27 ASSESSMENT — ACTIVITIES OF DAILY LIVING (ADL): ADLS_ACUITY_SCORE: 35

## 2023-05-28 NOTE — ED PROVIDER NOTES
"  History     Chief Complaint:  No chief complaint on file.       HPI   Kraig Genao is a 89 year old male with a history of atrial fibrillation, CKD, COPD, H. pylori, esophageal reflux, GERD who presents with his daughter for esophageal food impaction.  The patient states he has a history of the same.  He was having dinner last night when he had the sensation that he could no longer swallow and was unable after that point to get food down.  He went to bed and woke up this morning and \"felt great.\"  He had breakfast this morning without any difficulty.  He had dinner this evening and again had the sensation that he could no longer swallow and had a subsequent episode of vomiting.  He presents to the ED due to the persistent sensation of retained food bolus.  He denies abdominal pain, no obstipation, no constipation.      Independent Historian:   None - Patient Only    Review of External Notes:   None      Medications:    apixaban ANTICOAGULANT (ELIQUIS) 5 MG tablet  atenolol (TENORMIN) 50 MG tablet  biotin 1000 MCG TABS tablet  budesonide-formoterol (SYMBICORT) 160-4.5 MCG/ACT inhaler  calcium-vitamin D (CALTRATE) 600-400 MG-UNIT per tablet  Cholecalciferol (VITAMIN D3 PO)  Multiple Vitamin (ONE-A-DAY MENS PO)  Omega-3 Fatty Acids (FISH OIL) 1200 MG CAPS  omeprazole (PRILOSEC) 20 MG capsule  oxybutynin (DITROPAN) 5 MG tablet  tamsulosin (FLOMAX) 0.4 MG capsule        Past Medical History:    Past Medical History:   Diagnosis Date     Bile duct stone      Chronic atrial fibrillation      Elevated LFTs      Erectile dysfunction      Gastroesophageal reflux disease 05/04/2016     h/o Helicobacter pylori infection      Hypertrophy (benign) of prostate      Macular degeneration (senile) of retina        Past Surgical History:    Past Surgical History:   Procedure Laterality Date     ARTHROPLASTY SHOULDER Right 2004     COLONOSCOPY  2001     ENDOSCOPIC RETROGRADE CHOLANGIOPANCREATOGRAM  09/23/2011    " Procedure:ENDOSCOPIC RETROGRADE CHOLANGIOPANCREATOGRAM; Endoscopic Retrograde Cholangiopancreatogram (c-arm), baloon dilation, stone retreival and 10fr. Solus stent placement x2 in  bile duct with the spy glass; Surgeon:EDD AUGUST; Location:[facility]     ENDOSCOPIC RETROGRADE CHOLANGIOPANCREATOGRAPHY, LITHOTRIPSY PANCREAS, COMBINED  10/27/2011    Procedure:COMBINED ENDOSCOPIC RETROGRADE CHOLANGIOPANCREATOGRAPHY, LITHOTRIPSY PANCREAS; Endoscopic Retrograde Cholangiopancreatogram with spygass scope ,Electrohydraulic Lithotripsy  baloon dilation of bile duct, stone removal (c-arm); Surgeon:EDD AUGUST; Location:[facility]     ESOPHAGOSCOPY, GASTROSCOPY, DUODENOSCOPY (EGD), COMBINED N/A 05/09/2016    Procedure: COMBINED ESOPHAGOSCOPY, GASTROSCOPY, DUODENOSCOPY (EGD), BIOPSY SINGLE OR MULTIPLE;  Surgeon: Mendez Mcknight MD;  Location:  GI     ESOPHAGOSCOPY, GASTROSCOPY, DUODENOSCOPY (EGD), COMBINED N/A 02/11/2018    Procedure: COMBINED ESOPHAGOSCOPY, GASTROSCOPY, DUODENOSCOPY (EGD), REMOVE FOREIGN BODY;  COMBINED ESOPHAGOSCOPY, GASTROSCOPY, DUODENOSCOPY (EGD) by raptor graspeing ;  Surgeon: Mendez Mcknight MD;  Location:  GI     ESOPHAGOSCOPY, GASTROSCOPY, DUODENOSCOPY (EGD), COMBINED Left 02/16/2018    Procedure: COMBINED ESOPHAGOSCOPY, GASTROSCOPY, DUODENOSCOPY (EGD), BIOPSY SINGLE OR MULTIPLE;  ESOPHAGOSCOPY, GASTROSCOPY, DUODENOSCOPY (EGD) (Nicci) with biopsies using cold bx forcep and tts balloon dilation 12-15mm;  Surgeon: Mendez Mcknight MD;  Location:  GI     ESOPHAGOSCOPY, GASTROSCOPY, DUODENOSCOPY (EGD), COMBINED N/A 08/10/2021    Procedure: ESOPHAGOGASTRODUODENOSCOPY (EGD);  Surgeon: Mendez Mcknight MD;  Location:  GI     INGUINAL HERNIA REPAIR       LAPAROSCOPIC CHOLECYSTECTOMY  2002     Repeat Shoulder arthroplasty Right 2005        Physical Exam     Patient Vitals for the past 24 hrs:   BP Temp Temp src Pulse Resp SpO2   05/27/23 1924 (!) 153/93 -- -- 78 -- --   05/27/23 1922 (!) 145/111 --  -- 80 -- --   23 (!) 150/93 (!) 96.5  F (35.8  C) Temporal 82 18 98 %        Physical Exam  General: Sitting on the ED bed, no distress  HEENT: Normocephalic, atraumatic, mucous membranes moist, no visible food in the oropharynx  Cardiac: Warm and well perfused  Pulm: Breathing comfortably, no accessory muscle usage, no conversational dyspnea  GI: Abdomen soft, nontender, no rigidity or guarding  MSK: No deformities  Skin: Warm and dry  Neuro: Moves all extremities  Psych: Normal mood and affect     Emergency Department Course     Emergency Department Course & Assessments:             Interventions:  Medications   sod bicarbonate-citric acid-simethicone (EZ GAS) 2.21-1.53-0.04 g packet 4 g (4 g Oral $Given 23)        Assessments:   I performed my initial history and physical exam   I reevaluate the patient and discussed plans for discharge home    Independent Interpretation (X-rays, CTs, rhythm strip):  None    Consultations/Discussion of Management or Tests:  None        Social Determinants of Health affecting care:   None    Disposition:  The patient was discharged to home.     Impression & Plan    CMS Diagnoses: None      Medical Decision Makin-year-old male presents with symptoms as above.  He is well-appearing on examination and has stable vital signs.  He has a benign abdominal exam.  We planned to give a dose of EZ gas to see if we get his symptoms to resolve.  Shortly before the administration of that medication, the patient experienced spontaneous resolution of his symptoms.  He did subsequently take the EZ gas along with a can of Angelita without difficulty.  Upon reevaluation, the patient stated that his symptoms had completely resolved and that he was feeling back to baseline.  Plan is discharge home with PCP follow-up for further care.    Critical Care time:  was 0 minutes for this patient excluding procedures.    Diagnosis:    ICD-10-CM    1. Food impaction of  esophagus, initial encounter  T18.128A            5/27/2023   Jackson Crawley MD King, Colin, MD  05/27/23 5815

## 2023-05-28 NOTE — ED NOTES
Able to drink the EZ gas without vomiting. Applesauce provided for po challenge per MD.    Pt noted to be resting comfortably. Pt updated on plan of care, has no questions or concerns at this time.

## 2023-05-28 NOTE — ED TRIAGE NOTES
"Arrives from home with family. States that the patient has a chronic esophogeal hernia. States doctor kinjal should \"just be called right away.\".     States unable to swallow, states started today.     Controlling secretions without issues in triage. Speaking in full sentences.        "

## 2023-06-11 ENCOUNTER — HEALTH MAINTENANCE LETTER (OUTPATIENT)
Age: 88
End: 2023-06-11

## 2023-12-23 ENCOUNTER — HOSPITAL ENCOUNTER (EMERGENCY)
Facility: CLINIC | Age: 88
Discharge: HOME | End: 2023-12-23
Attending: EMERGENCY MEDICINE | Admitting: EMERGENCY MEDICINE
Payer: MEDICARE

## 2023-12-23 VITALS
TEMPERATURE: 98.5 F | DIASTOLIC BLOOD PRESSURE: 82 MMHG | SYSTOLIC BLOOD PRESSURE: 161 MMHG | HEART RATE: 75 BPM | RESPIRATION RATE: 16 BRPM | OXYGEN SATURATION: 96 %

## 2023-12-23 DIAGNOSIS — T18.128A ESOPHAGEAL OBSTRUCTION DUE TO FOOD IMPACTION: ICD-10-CM
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
## 2023-12-23 LAB — UPPER GI ENDOSCOPY: NORMAL

## 2023-12-23 PROCEDURE — 99282 EMERGENCY DEPT VISIT SF MDM: CPT | Mod: 25

## 2023-12-23 PROCEDURE — 999N000099 HC STATISTIC MODERATE SEDATION < 10 MIN: Performed by: INTERNAL MEDICINE

## 2023-12-23 PROCEDURE — 250N000011 HC RX IP 250 OP 636: Performed by: INTERNAL MEDICINE

## 2023-12-23 PROCEDURE — 43247 EGD REMOVE FOREIGN BODY: CPT | Performed by: INTERNAL MEDICINE

## 2023-12-23 PROCEDURE — 250N000009 HC RX 250: Performed by: INTERNAL MEDICINE

## 2023-12-23 RX ORDER — FENTANYL CITRATE 50 UG/ML
50-100 INJECTION, SOLUTION INTRAMUSCULAR; INTRAVENOUS EVERY 5 MIN PRN
Status: DISCONTINUED | OUTPATIENT
Start: 2023-12-23 | End: 2023-12-23 | Stop reason: HOSPADM

## 2023-12-23 RX ORDER — FLUMAZENIL 0.1 MG/ML
0.2 INJECTION, SOLUTION INTRAVENOUS
Status: DISCONTINUED | OUTPATIENT
Start: 2023-12-23 | End: 2023-12-23 | Stop reason: HOSPADM

## 2023-12-23 RX ORDER — SIMETHICONE 40MG/0.6ML
133 SUSPENSION, DROPS(FINAL DOSAGE FORM)(ML) ORAL
Status: DISCONTINUED | OUTPATIENT
Start: 2023-12-23 | End: 2023-12-23 | Stop reason: HOSPADM

## 2023-12-23 RX ORDER — NALOXONE HYDROCHLORIDE 0.4 MG/ML
0.2 INJECTION, SOLUTION INTRAMUSCULAR; INTRAVENOUS; SUBCUTANEOUS
Status: DISCONTINUED | OUTPATIENT
Start: 2023-12-23 | End: 2023-12-23 | Stop reason: HOSPADM

## 2023-12-23 RX ORDER — ATROPINE SULFATE 0.1 MG/ML
1 INJECTION INTRAVENOUS
Status: DISCONTINUED | OUTPATIENT
Start: 2023-12-23 | End: 2023-12-23 | Stop reason: HOSPADM

## 2023-12-23 RX ORDER — DIPHENHYDRAMINE HYDROCHLORIDE 50 MG/ML
25-50 INJECTION INTRAMUSCULAR; INTRAVENOUS
Status: DISCONTINUED | OUTPATIENT
Start: 2023-12-23 | End: 2023-12-23 | Stop reason: HOSPADM

## 2023-12-23 RX ORDER — LIDOCAINE 40 MG/G
CREAM TOPICAL
Status: DISCONTINUED | OUTPATIENT
Start: 2023-12-23 | End: 2023-12-23 | Stop reason: HOSPADM

## 2023-12-23 RX ORDER — NALOXONE HYDROCHLORIDE 0.4 MG/ML
0.4 INJECTION, SOLUTION INTRAMUSCULAR; INTRAVENOUS; SUBCUTANEOUS
Status: DISCONTINUED | OUTPATIENT
Start: 2023-12-23 | End: 2023-12-23 | Stop reason: HOSPADM

## 2023-12-23 RX ORDER — EPINEPHRINE 1 MG/ML
0.1 INJECTION, SOLUTION, CONCENTRATE INTRAVENOUS
Status: DISCONTINUED | OUTPATIENT
Start: 2023-12-23 | End: 2023-12-23 | Stop reason: HOSPADM

## 2023-12-23 RX ADMIN — TOPICAL ANESTHETIC 0.5 ML: 200 SPRAY DENTAL; PERIODONTAL at 12:50

## 2023-12-23 RX ADMIN — FENTANYL CITRATE 50 MCG: 50 INJECTION, SOLUTION INTRAMUSCULAR; INTRAVENOUS at 12:49

## 2023-12-23 RX ADMIN — MIDAZOLAM 1 MG: 1 INJECTION INTRAMUSCULAR; INTRAVENOUS at 12:48

## 2023-12-23 ASSESSMENT — ACTIVITIES OF DAILY LIVING (ADL): ADLS_ACUITY_SCORE: 35

## 2023-12-23 NOTE — H&P
Pre-Endoscopy History and Physical     Kraig Genao MRN# [id number]   YOB: 1926 Age: 89 year old     Date of Procedure: 12/23/2023  Primary care provider: Avril Easley  Type of Endoscopy: Gastroscopy with possible biopsy, possible dilation  Reason for Procedure: foreign body  Type of Anesthesia Anticipated: Conscious Sedation    HPI:    Kraig is a 89 year old male who will be undergoing the above procedure.      A history and physical has been performed. The patient's medications and allergies have been reviewed. The risks and benefits of the procedure and the sedation options and risks were discussed with the patient.  All questions were answered and informed consent was obtained.      He denies a personal or family history of anesthesia complications or bleeding disorders.     Patient Active Problem List   Diagnosis    Atrial fibrillation (H)    Macular degeneration (senile) of retina    Helicobacter pylori infection    Esophageal reflux    CARDIOVASCULAR SCREENING; LDL GOAL LESS THAN 130    Elevated LFTs    Chest pain    ED (erectile dysfunction)    Gastroesophageal reflux disease, esophagitis presence not specified    Moderate chronic obstructive pulmonary disease (H)    Chronic kidney disease, stage 3a (H)        Past Medical History:   Diagnosis Date    Bile duct stone     Chronic atrial fibrillation     Elevated LFTs     Erectile dysfunction     Gastroesophageal reflux disease 05/04/2016    h/o Helicobacter pylori infection     had UGI endoscopy 10/00 & had LES dilitation    Hypertrophy (benign) of prostate     some voiding sx    Macular degeneration (senile) of retina     mainly R eye        Past Surgical History:   Procedure Laterality Date    ARTHROPLASTY SHOULDER Right 2004    COLONOSCOPY  2001    ENDOSCOPIC RETROGRADE CHOLANGIOPANCREATOGRAM  09/23/2011    Procedure:ENDOSCOPIC RETROGRADE CHOLANGIOPANCREATOGRAM; Endoscopic Retrograde Cholangiopancreatogram (c-arm), baloon dilation,  stone retreival and 10fr. Solus stent placement x2 in  bile duct with the spy glass; Surgeon:EDD AUGUST; Location:[facility]    ENDOSCOPIC RETROGRADE CHOLANGIOPANCREATOGRAPHY, LITHOTRIPSY PANCREAS, COMBINED  10/27/2011    Procedure:COMBINED ENDOSCOPIC RETROGRADE CHOLANGIOPANCREATOGRAPHY, LITHOTRIPSY PANCREAS; Endoscopic Retrograde Cholangiopancreatogram with spygass scope ,Electrohydraulic Lithotripsy  baloon dilation of bile duct, stone removal (c-arm); Surgeon:EDD AUGUST; Location:[facility]    ESOPHAGOSCOPY, GASTROSCOPY, DUODENOSCOPY (EGD), COMBINED N/A 05/09/2016    Procedure: COMBINED ESOPHAGOSCOPY, GASTROSCOPY, DUODENOSCOPY (EGD), BIOPSY SINGLE OR MULTIPLE;  Surgeon: Mendez Mcknight MD;  Location: [facility]    ESOPHAGOSCOPY, GASTROSCOPY, DUODENOSCOPY (EGD), COMBINED N/A 02/11/2018    Procedure: COMBINED ESOPHAGOSCOPY, GASTROSCOPY, DUODENOSCOPY (EGD), REMOVE FOREIGN BODY;  COMBINED ESOPHAGOSCOPY, GASTROSCOPY, DUODENOSCOPY (EGD) by raptor graspeing ;  Surgeon: Mendez Mcknight MD;  Location: [facility]    ESOPHAGOSCOPY, GASTROSCOPY, DUODENOSCOPY (EGD), COMBINED Left 02/16/2018    Procedure: COMBINED ESOPHAGOSCOPY, GASTROSCOPY, DUODENOSCOPY (EGD), BIOPSY SINGLE OR MULTIPLE;  ESOPHAGOSCOPY, GASTROSCOPY, DUODENOSCOPY (EGD) (Nicci) with biopsies using cold bx forcep and tts balloon dilation 12-15mm;  Surgeon: Mendez Mcknight MD;  Location: [facility]    ESOPHAGOSCOPY, GASTROSCOPY, DUODENOSCOPY (EGD), COMBINED N/A 08/10/2021    Procedure: ESOPHAGOGASTRODUODENOSCOPY (EGD);  Surgeon: Mendez Mcknight MD;  Location:  GI    INGUINAL HERNIA REPAIR      LAPAROSCOPIC CHOLECYSTECTOMY  2002    Repeat Shoulder arthroplasty Right 2005       Social History     Tobacco Use    Smoking status: Never    Smokeless tobacco: Never   Substance Use Topics    Alcohol use: Yes     Alcohol/week: 0.0 standard drinks of alcohol     Comment: 1-2 beer, wine, or mixed drink per night       Family History   Problem Relation Age of Onset    Heart  "Disease Father          MNahidI.    Esophageal Cancer No family hx of     Gastrointestinal Disease No family hx of     Stomach Cancer No family hx of        Prior to Admission medications    Medication Sig Start Date End Date Taking? Authorizing Provider   apixaban ANTICOAGULANT (ELIQUIS) 5 MG tablet Take 1 tablet (5 mg) by mouth 2 times daily 4/6/15   Deric Enriquez MD   atenolol (TENORMIN) 50 MG tablet Take 25 mg by mouth 2 times daily    Unknown, Entered By History   biotin 1000 MCG TABS tablet Take by mouth daily    Reported, Patient   budesonide-formoterol (SYMBICORT) 160-4.5 MCG/ACT inhaler Inhale 2 puffs into the lungs 2 times daily    Unknown, Entered By History   calcium-vitamin D (CALTRATE) 600-400 MG-UNIT per tablet Take 1 tablet by mouth daily    Unknown, Entered By History   Cholecalciferol (VITAMIN D3 PO) Take 1,000 Units by mouth daily    Reported, Patient   Multiple Vitamin (ONE-A-DAY MENS PO) Take 1 tablet by mouth daily    Unknown, Entered By History   Omega-3 Fatty Acids (FISH OIL) 1200 MG CAPS Take by mouth daily    Reported, Patient   omeprazole (PRILOSEC) 20 MG capsule Take 20 mg by mouth daily    Unknown, Entered By History   oxybutynin (DITROPAN) 5 MG tablet Take 5 mg by mouth 2 times daily     Reported, Patient   tamsulosin (FLOMAX) 0.4 MG capsule Take 0.4 mg by mouth daily    Reported, Patient       Allergies   Allergen Reactions    No Known Allergies         REVIEW OF SYSTEMS:   5 point ROS negative except as noted above in HPI, including Gen., Resp., CV, GI &  system review.    PHYSICAL EXAM:   BP (!) 163/84   Pulse 72   Temp 98.5  F (36.9  C) (Temporal)   Resp 22   SpO2 98%  Estimated body mass index is 23.27 kg/m  as calculated from the following:    Height as of 3/9/23: 1.664 m (5' 5.5\").    Weight as of 3/10/23: 64.4 kg (142 lb).   GENERAL APPEARANCE: alert, and oriented  MENTAL STATUS: alert  AIRWAY EXAM: Mallampatti Class I (visualization of the soft palate, fauces, uvula, " anterior and posterior pillars)  RESP: lungs clear to auscultation - no rales, rhonchi or wheezes  CV: regular rates and rhythm  DIAGNOSTICS:    Not indicated    IMPRESSION   ASA Class 2 - Mild systemic disease    PLAN:   Plan for Esophagogastroduodenoscopy with possible biopsy, possible dilation, possible foreign body removal. We discussed the risks, benefits and alternatives and the patient wished to proceed.    The above has been forwarded to the consulting provider.      Signed Electronically by: Mendez Mcknight MD  December 23, 2023

## 2023-12-23 NOTE — ED TRIAGE NOTES
"Pt arrives with trouble swallowing food. States it began last night. Has hx of \"needing his esophagus stretched\" and has had issues with food boluses in the past. Denies SOB. Able to manage secretions. ABCs intact.     BP (!) 163/84   Pulse 72   Temp 98.5  F (36.9  C) (Temporal)   Resp 22   SpO2 98%        Triage Assessment (Adult)       Row Name 12/23/23 1111          Triage Assessment    Airway WDL WDL        Respiratory WDL    Respiratory WDL WDL        Cardiac WDL    Cardiac WDL WDL        Cognitive/Neuro/Behavioral WDL    Cognitive/Neuro/Behavioral WDL WDL                     "

## 2023-12-23 NOTE — ED PROVIDER NOTES
History     Chief Complaint:  Throat discomfort     The history is provided by the patient and a relative (daughter).      Kraig Genao is a 89 year old male on Apixaban who presents to the ED for throat discomfort. The patient reports that yesterday evening he began to experience the sensation as if something is stuck in his throat. He states that he is unable to consume anything as he will vomit it back up. He reports that in the past when he has had this the sensation lasts the whole day. He states that he stopped eating anything without relief of his symptoms. Daughter of the patient reports that this is an intermittent issue the patient is experiencing. She adds that the patient was able to get down one Omeprazole this morning. Patient denies recent difficulty breathing, diarrhea, or fever.  He reports a history of esophageal stricture which has required dilation.    Independent Historian:   Daughter - They report supplemental history.     Review of External Notes:   I reviewed gastroenterology note from here on 08/10/21 for Esophagitis.  Upper endoscopy revealed esophagitis.      Medications:    Apixaban  Atenolol  Biotin  Budesonide-formoterol Inhaler  Calcium-vitamin D  Cholecalciferol  Multiple Vitamin   Omega-3 Fatty Acids  Omeprazole  Oxybutynin  Tamsulosin    Past Medical History:    Bile duct stone  Chronic atrial fibrillation  Elevated LFTs  Erectile dysfunction  Gastroesophageal reflux disease  Helicobacter pylori infection  Hypertrophy (benign) of prostate  Macular degeneration (senile) of retina  Moderate chronic obstructive pulmonary disease   Chronic kidney disease, stage 3a     Past Surgical History:    Endoscopic retrograde cholangiopancreatography, lithotripsy pancreas, combined   Endoscopic retrograde cholangiopancreatogram   Repeat Shoulder arthroplasty [Other] (Right)  Arthroplasty shoulder (Right)  Laparoscopic cholecystectomy  Colonoscopy  Inguinal hernia repair  EGD x4     Physical  Exam   Patient Vitals for the past 24 hrs:   BP Temp Temp src Pulse Resp SpO2   12/23/23 1355 -- -- -- -- -- 96 %   12/23/23 1350 (!) 161/82 -- -- 75 -- --   12/23/23 1340 126/86 -- -- 79 16 95 %   12/23/23 1330 126/79 -- -- 87 18 94 %   12/23/23 1320 (!) 138/96 -- -- 79 18 93 %   12/23/23 1310 (!) 162/94 -- -- 76 13 95 %   12/23/23 1305 (!) 152/89 -- -- 75 16 93 %   12/23/23 1300 (!) 152/89 -- -- 83 18 96 %   12/23/23 1255 (!) 163/90 -- -- 80 16 98 %   12/23/23 1250 (!) 192/104 -- -- 80 12 100 %   12/23/23 1249 (!) 158/107 -- -- 92 14 100 %   12/23/23 1245 (!) 158/107 -- -- 83 16 97 %   12/23/23 1110 (!) 163/84 -- -- -- -- --   12/23/23 1109 -- 98.5  F (36.9  C) Temporal 72 22 98 %        Physical Exam    GEN:   Patient holding a Styrofoam cup as he is nauseated and having difficulty with his saliva  HEENT:    Oropharynx is moist  Eyes:    Conjunctiva normal  Neck:     Supple, no meningismus.     CV:     Regular rate and rhythm.      No murmurs, rubs or gallops.     No lower extremity edema.  PULM:    Clear to auscultation bilateral.       No respiratory distress.      Good air exchange.     No rales or wheezing.     No stridor.  ABD:    Soft, non-distended.       No abdominal tenderness.     No pulsatile masses.       No rebound, guarding or rigidity.  MSK:     No gross deformity to all four extremities.   LYMPH:   No cervical lymphadenopathy.  NEURO:   Alert.  Good muscular tone, no atrophy.   Skin:    Warm, dry and intact.    Psych:    Mood is good and affect is appropriate.    Emergency Department Course   Emergency Department Course & Assessments:           Interventions:  Medications   sod bicarbonate-citric acid-simethicone (EZ GAS) 2.21-1.53-0.04 g packet 4 g (has no administration in time range)   benzocaine 20% (HURRICAINE/TOPEX) 20 % spray 0.5 mL (0.5 mLs Mouth/Throat $Given 12/23/23 1250)      Independent Interpretation (X-rays, CTs, rhythm strip):  None    Assessments/Consultations/Discussion of  Management or Tests:  ED Course as of 23 1450   Sat Dec 23, 2023   1122 I obtained history and examined the patient as noted above.    1157 I spoke with Dr. Luna, of the GI service, regarding the patient.        Social Determinants of Health affecting care:   None    Disposition:      Impression & Plan    Medical Decision Makin-year-old male with a history of esophageal stricture and esophagitis presents with inability to swallow and throat discomfort.  Evaluation consistent with esophageal food bolus.  He was given EZ gas without success.  Dr. Mcknight of gastroenterology graciously came to the ED and performed upper endoscopy. H removed the esophageal food bolus.  Patient is now able to tolerate oral fluids.  He is safe to discharge home and continue PPI as previously prescribed.      Diagnosis:    ICD-10-CM    1. Esophageal obstruction due to food impaction  T18.128A     [id number].F3XA            Discharge Medications:  Discharge Medication List as of 2023  2:26 PM             Scribe Disclosure:  Cate MADDEN, am serving as a scribe at 11:50 AM on 2023 to document services personally performed by Javier Paz MD based on my observations and the provider's statements to me.     2023   Javier Paz MD Matthews, Jeremiah R, MD  23 [phone]

## 2023-12-23 NOTE — ED NOTES
Bed: ED32  Expected date:   Expected time:   Means of arrival:   Comments:  Hold for ED 19- needs a  scope

## 2024-05-06 ENCOUNTER — OFFICE VISIT (OUTPATIENT)
Dept: INTERNAL MEDICINE | Facility: CLINIC | Age: 89
End: 2024-05-06
Payer: MEDICARE

## 2024-05-06 ENCOUNTER — ANCILLARY PROCEDURE (OUTPATIENT)
Dept: GENERAL RADIOLOGY | Facility: CLINIC | Age: 89
End: 2024-05-06
Payer: MEDICARE

## 2024-05-06 VITALS
RESPIRATION RATE: 16 BRPM | OXYGEN SATURATION: 98 % | HEART RATE: 79 BPM | DIASTOLIC BLOOD PRESSURE: 92 MMHG | SYSTOLIC BLOOD PRESSURE: 160 MMHG | TEMPERATURE: 97.5 F | HEIGHT: 66 IN | BODY MASS INDEX: 24.73 KG/M2 | WEIGHT: 153.9 LBS

## 2024-05-06 DIAGNOSIS — J18.9 PNEUMONIA OF BOTH LUNGS DUE TO INFECTIOUS ORGANISM, UNSPECIFIED PART OF LUNG: ICD-10-CM

## 2024-05-06 DIAGNOSIS — J44.9 MODERATE CHRONIC OBSTRUCTIVE PULMONARY DISEASE (H): ICD-10-CM

## 2024-05-06 DIAGNOSIS — N18.31 CHRONIC KIDNEY DISEASE, STAGE 3A (H): ICD-10-CM

## 2024-05-06 DIAGNOSIS — J18.9 PNEUMONIA OF BOTH LUNGS DUE TO INFECTIOUS ORGANISM, UNSPECIFIED PART OF LUNG: Primary | ICD-10-CM

## 2024-05-06 PROCEDURE — 99215 OFFICE O/P EST HI 40 MIN: CPT

## 2024-05-06 PROCEDURE — 71046 X-RAY EXAM CHEST 2 VIEWS: CPT | Mod: TC | Performed by: RADIOLOGY

## 2024-05-06 RX ORDER — AZITHROMYCIN 250 MG/1
TABLET, FILM COATED ORAL
Qty: 6 TABLET | Refills: 0 | Status: SHIPPED | OUTPATIENT
Start: 2024-05-06 | End: 2024-05-06

## 2024-05-06 RX ORDER — RESPIRATORY SYNCYTIAL VIRUS VACCINE 120MCG/0.5
0.5 KIT INTRAMUSCULAR ONCE
Qty: 1 EACH | Refills: 0 | Status: CANCELLED | OUTPATIENT
Start: 2024-05-06 | End: 2024-05-06

## 2024-05-06 RX ORDER — ALBUTEROL SULFATE 90 UG/1
2 AEROSOL, METERED RESPIRATORY (INHALATION) EVERY 4 HOURS PRN
Qty: 18 G | Refills: 0 | Status: SHIPPED | OUTPATIENT
Start: 2024-05-06 | End: 2024-05-06

## 2024-05-06 RX ORDER — AZITHROMYCIN 250 MG/1
TABLET, FILM COATED ORAL
Qty: 6 TABLET | Refills: 0 | Status: SHIPPED | OUTPATIENT
Start: 2024-05-06 | End: 2024-05-11

## 2024-05-06 RX ORDER — ALBUTEROL SULFATE 90 UG/1
2 AEROSOL, METERED RESPIRATORY (INHALATION) EVERY 4 HOURS PRN
Qty: 18 G | Refills: 0 | Status: SHIPPED | OUTPATIENT
Start: 2024-05-06

## 2024-05-06 ASSESSMENT — ENCOUNTER SYMPTOMS: COUGH: 1

## 2024-05-06 ASSESSMENT — PAIN SCALES - GENERAL: PAINLEVEL: NO PAIN (0)

## 2024-05-06 NOTE — PATIENT INSTRUCTIONS
Take antibiotic with food to reduce stomach upset, take with probiotic or yogurt to further reduce stomach upset. Return if he starts having a fever, or becomes more confused.  Someone NEEDS to be with him for supervision until his infection clears and he is able to navigate around without developing dizziness and falling

## 2024-05-06 NOTE — PROGRESS NOTES
"  Assessment & Plan     (J18.9) Pneumonia of both lungs due to infectious organism, unspecified part of lung, (primary encounter diagnosis)  Comment: pt has fine ins crackles at bases bilaterally, ins wheezes scattered throughout bilaterally and coarse rhonchi auscultated in the upper airways  Plan: XR Chest 2 Views, CBC with platelets and         differential, amoxicillin-clavulanate         (AUGMENTIN) 875-125 MG tablet, azithromycin         (ZITHROMAX) 250 MG tablet, DISCONTINUED:         amoxicillin-clavulanate (AUGMENTIN) 875-125 MG         tablet, DISCONTINUED: azithromycin (ZITHROMAX)         250 MG tablet        sent    (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.9) Moderate chronic obstructive pulmonary disease (H)  Comment: pt is on symbicort, pt is having acute episodes of persistent unproductive cough   Plan: albuterol (PROAIR HFA/PROVENTIL HFA/VENTOLIN         HFA) 108 (90 Base) MCG/ACT inhaler,         DISCONTINUED: albuterol (PROAIR HFA/PROVENTIL         HFA/VENTOLIN HFA) 108 (90 Base) MCG/ACT inhaler        sent    (N18.31) Chronic kidney disease, stage 3a (H)    Comment: 3/17/2023 GFR: 59, BUN 17.1  Plan: Albumin Random Urine Quantitative with Creat         Ratio, BASIC METABOLIC PANEL        sent          40 minutes spent by me on the date of the encounter doing chart review, review of outside records, review of test results, interpretation of tests, patient visit, documentation, discussion with other provider(s), discussion with family, and providing education and guidance to the next few days of maintaining pt's safety        BMI  Estimated body mass index is 25.22 kg/m  as calculated from the following:    Height as of this encounter: 1.664 m (5' 5.5\").    Weight as of this encounter: 69.8 kg (153 lb 14.4 oz).             Subjective   Al is a 89 year old, presenting for the following health issues: 5/3 pt fell in the morning and again in the evening in the shower. Pt's has suffered an acute cognitive decline and confusion. Pt has " "been having SOB and coughing, no fever, night sweats. Has had temporary moments of chills on 5/4/2024. Denies sore throat, pt admits to rhinorrhea with gray and yellow secretions, pt admits to coughing but not successfully getting it up. Pt denies ear pain,no head ache    Discussed needing to have someone stay with him if he is going back home due to his confusion, being >65. Discussed how to use the albuterol inhaler and its purpose. Discussed the antibiotics and how they can cause stomach upset and to take with food, probiotics or yogurt to reduce stomach upset or diarrhea.  Cough (Cough for 1 week. Patient has also fallen 3 time within the last week.)      5/6/2024     4:01 PM   Additional Questions   Roomed by Christel Read   Accompanied by son         5/6/2024     4:01 PM   Patient Reported Additional Medications   Patient reports taking the following new medications no     Cough    History of Present Illness       Reason for visit:  Cough  Symptom onset:  3-7 days ago  Symptoms include:  Wet cough, congestion.Falling the last week  Symptom intensity:  Moderate  Symptom progression:  Worsening  Had these symptoms before:  No  What makes it worse:  No  What makes it better:  No    He eats 4 or more servings of fruits and vegetables daily.He consumes 0 sweetened beverage(s) daily.He exercises with enough effort to increase his heart rate 9 or less minutes per day.  He exercises with enough effort to increase his heart rate 3 or less days per week.   He is taking medications regularly.                 Review of Systems  Constitutional, HEENT, cardiovascular, pulmonary, gi and gu systems are negative, except as otherwise noted.      Objective    BP (!) 160/92 (BP Location: Left arm, Patient Position: Sitting, Cuff Size: Adult Regular)   Pulse 79   Temp 97.5  F (36.4  C) (Oral)   Resp 16   Ht 1.664 m (5' 5.5\")   Wt 69.8 kg (153 lb 14.4 oz)   SpO2 98%   BMI 25.22 kg/m    Body mass index is 25.22 " kg/m .  Physical Exam   GENERAL: alert and no distress  HENT: ear canals and TM's normal, nose and mouth without ulcers or lesions, denies any sinus pressure, oropharynx erythematous with cobblestone throat  NECK: no adenopathy, no asymmetry, masses, or scars  RESP: pt has fine ins crackles at bases bilaterally posteriorly, ins wheezes scattered throughout bilaterally posteriorly and coarse rhonchi auscultated in the upper airways anteriorly and posteriorly  CV: regular rate and rhythm, normal S1 S2, no S3 or S4, no murmur, click or rub, no peripheral edema  ABDOMEN: soft, nontender, no hepatosplenomegaly, no masses and bowel sounds normal  MS: no gross musculoskeletal defects noted, no edema  NEURO: Normal strength and tone, and speech normal  PSYCH: concentration poor, confused, disoriented, affect normal/bright, and judgement and insight impaired            Signed Electronically by: TRAMAINE Adkins CNP

## 2024-05-07 ENCOUNTER — LAB (OUTPATIENT)
Dept: LAB | Facility: CLINIC | Age: 89
End: 2024-05-07
Payer: MEDICARE

## 2024-05-07 DIAGNOSIS — J18.9 PNEUMONIA OF BOTH LUNGS DUE TO INFECTIOUS ORGANISM, UNSPECIFIED PART OF LUNG: ICD-10-CM

## 2024-05-07 DIAGNOSIS — N18.31 CHRONIC KIDNEY DISEASE, STAGE 3A (H): ICD-10-CM

## 2024-05-07 LAB
BASOPHILS # BLD AUTO: 0 10E3/UL (ref 0–0.2)
BASOPHILS NFR BLD AUTO: 0 %
EOSINOPHIL # BLD AUTO: 0 10E3/UL (ref 0–0.7)
EOSINOPHIL NFR BLD AUTO: 0 %
ERYTHROCYTE [DISTWIDTH] IN BLOOD BY AUTOMATED COUNT: 12.5 % (ref 10–15)
HCT VFR BLD AUTO: 36.1 % (ref 40–53)
HGB BLD-MCNC: 11.6 G/DL (ref 13.3–17.7)
IMM GRANULOCYTES # BLD: 0.1 10E3/UL
IMM GRANULOCYTES NFR BLD: 1 %
LYMPHOCYTES # BLD AUTO: 0.7 10E3/UL (ref 0.8–5.3)
LYMPHOCYTES NFR BLD AUTO: 7 %
MCH RBC QN AUTO: 31.1 PG (ref 26.5–33)
MCHC RBC AUTO-ENTMCNC: 32.1 G/DL (ref 31.5–36.5)
MCV RBC AUTO: 97 FL (ref 78–100)
MONOCYTES # BLD AUTO: 0.5 10E3/UL (ref 0–1.3)
MONOCYTES NFR BLD AUTO: 6 %
NEUTROPHILS # BLD AUTO: 7.9 10E3/UL (ref 1.6–8.3)
NEUTROPHILS NFR BLD AUTO: 85 %
PLATELET # BLD AUTO: 271 10E3/UL (ref 150–450)
RBC # BLD AUTO: 3.73 10E6/UL (ref 4.4–5.9)
WBC # BLD AUTO: 9.3 10E3/UL (ref 4–11)

## 2024-05-07 PROCEDURE — 85025 COMPLETE CBC W/AUTO DIFF WBC: CPT

## 2024-05-07 PROCEDURE — 82043 UR ALBUMIN QUANTITATIVE: CPT

## 2024-05-07 PROCEDURE — 36415 COLL VENOUS BLD VENIPUNCTURE: CPT

## 2024-05-07 PROCEDURE — 80048 BASIC METABOLIC PNL TOTAL CA: CPT

## 2024-05-07 PROCEDURE — 82570 ASSAY OF URINE CREATININE: CPT

## 2024-05-08 ENCOUNTER — TELEPHONE (OUTPATIENT)
Dept: INTERNAL MEDICINE | Facility: CLINIC | Age: 89
End: 2024-05-08
Payer: MEDICARE

## 2024-05-08 LAB
ANION GAP SERPL CALCULATED.3IONS-SCNC: 11 MMOL/L (ref 7–15)
BUN SERPL-MCNC: 16.5 MG/DL (ref 8–23)
CALCIUM SERPL-MCNC: 8.5 MG/DL (ref 8.2–9.6)
CHLORIDE SERPL-SCNC: 98 MMOL/L (ref 98–107)
CREAT SERPL-MCNC: 1.23 MG/DL (ref 0.67–1.17)
CREAT UR-MCNC: 61.9 MG/DL
DEPRECATED HCO3 PLAS-SCNC: 25 MMOL/L (ref 22–29)
EGFRCR SERPLBLD CKD-EPI 2021: 53 ML/MIN/1.73M2
GLUCOSE SERPL-MCNC: 132 MG/DL (ref 70–99)
MICROALBUMIN UR-MCNC: 93.4 MG/L
MICROALBUMIN/CREAT UR: 150.89 MG/G CR (ref 0–17)
POTASSIUM SERPL-SCNC: 3.9 MMOL/L (ref 3.4–5.3)
SODIUM SERPL-SCNC: 134 MMOL/L (ref 135–145)

## 2024-05-08 NOTE — TELEPHONE ENCOUNTER
"I spoke with the daughter and reinforced the use of his symbicort which he has not been taking regularly as prescribed and that the albuterol inhaler is used as a \"rescue\" inhaler when he has wheezing and SOB in-between.     Discussed that the elevated albuminuria is due to persistent hypertension and a possible prediabetic condition as evidenced by his elevated BGL over the several last visits which causes a stress to his kidneys. Discussed the importance of having an antihypertensive that is kidney protective such as an ACEI like lisinopril or an ARB such as losartan that will reduce his BP and protect his kidneys.    Daughter was told to reach out if she has any further questions and to ensure there is a visit with his PCP about BP management and assessing his A1C for DM diagnosis.    TRAMAINE Ram, CNP  Patient Returning Call    Reason for call:  The Daughter will like call back in regards to patient Lab result from yesterday 05/07/24    Information relayed to patient:      Patient has additional questions:  Yes    What are your questions/concerns:  when call back    Who does the patient want to speak with:      Is an  needed?:  No      Could we send this information to you in [facility] or would you prefer to receive a phone call?:   Patient would prefer a phone call   Okay to leave a detailed message?: Yes at Other phone number:  [phone]  "

## 2024-08-04 ENCOUNTER — HEALTH MAINTENANCE LETTER (OUTPATIENT)
Age: 89
End: 2024-08-04

## 2024-10-03 ENCOUNTER — APPOINTMENT (OUTPATIENT)
Dept: GENERAL RADIOLOGY | Facility: CLINIC | Age: 89
End: 2024-10-03
Attending: EMERGENCY MEDICINE
Payer: MEDICARE

## 2024-10-03 ENCOUNTER — APPOINTMENT (OUTPATIENT)
Dept: CT IMAGING | Facility: CLINIC | Age: 89
End: 2024-10-03
Attending: EMERGENCY MEDICINE
Payer: MEDICARE

## 2024-10-03 ENCOUNTER — HOSPITAL ENCOUNTER (OUTPATIENT)
Facility: CLINIC | Age: 89
Setting detail: OBSERVATION
Discharge: HOME | End: 2024-10-04
Attending: EMERGENCY MEDICINE | Admitting: INTERNAL MEDICINE
Payer: MEDICARE

## 2024-10-03 VITALS
TEMPERATURE: 97.6 F | RESPIRATION RATE: 20 BRPM | DIASTOLIC BLOOD PRESSURE: 108 MMHG | BODY MASS INDEX: 24.01 KG/M2 | HEART RATE: 76 BPM | WEIGHT: 153 LBS | OXYGEN SATURATION: 89 % | SYSTOLIC BLOOD PRESSURE: 194 MMHG | HEIGHT: 67 IN

## 2024-10-03 DIAGNOSIS — R07.89 MUSCULOSKELETAL CHEST PAIN: ICD-10-CM

## 2024-10-03 PROBLEM — R07.9 CHEST PAIN, UNSPECIFIED TYPE: Status: ACTIVE | Noted: 2024-10-03

## 2024-10-03 LAB
ALBUMIN SERPL BCG-MCNC: 3.8 G/DL (ref 3.5–5.2)
ALP SERPL-CCNC: 85 U/L (ref 40–150)
ALT SERPL W P-5'-P-CCNC: 12 U/L (ref 0–70)
ANION GAP SERPL CALCULATED.3IONS-SCNC: 12 MMOL/L (ref 7–15)
AST SERPL W P-5'-P-CCNC: 21 U/L (ref 0–45)
BASOPHILS # BLD AUTO: 0 10E3/UL (ref 0–0.2)
BASOPHILS NFR BLD AUTO: 0 %
BILIRUB DIRECT SERPL-MCNC: 0.29 MG/DL (ref 0–0.3)
BILIRUB SERPL-MCNC: 0.9 MG/DL
BUN SERPL-MCNC: 23.1 MG/DL (ref 8–23)
CALCIUM SERPL-MCNC: 8.9 MG/DL (ref 8.8–10.4)
CHLORIDE SERPL-SCNC: 93 MMOL/L (ref 98–107)
CREAT SERPL-MCNC: 1.2 MG/DL (ref 0.67–1.17)
D DIMER PPP FEU-MCNC: 0.74 UG/ML FEU (ref 0–0.5)
EGFRCR SERPLBLD CKD-EPI 2021: 55 ML/MIN/1.73M2
EOSINOPHIL # BLD AUTO: 0 10E3/UL (ref 0–0.7)
EOSINOPHIL NFR BLD AUTO: 0 %
ERYTHROCYTE [DISTWIDTH] IN BLOOD BY AUTOMATED COUNT: 13.2 % (ref 10–15)
GLUCOSE SERPL-MCNC: 131 MG/DL (ref 70–99)
HCO3 SERPL-SCNC: 23 MMOL/L (ref 22–29)
HCT VFR BLD AUTO: 37.6 % (ref 40–53)
HGB BLD-MCNC: 12 G/DL (ref 13.3–17.7)
HOLD SPECIMEN: NORMAL
IMM GRANULOCYTES # BLD: 0.1 10E3/UL
IMM GRANULOCYTES NFR BLD: 1 %
LIPASE SERPL-CCNC: 31 U/L (ref 13–60)
LYMPHOCYTES # BLD AUTO: 0.6 10E3/UL (ref 0.8–5.3)
LYMPHOCYTES NFR BLD AUTO: 9 %
MAGNESIUM SERPL-MCNC: 1.7 MG/DL (ref 1.7–2.3)
MCH RBC QN AUTO: 31.3 PG (ref 26.5–33)
MCHC RBC AUTO-ENTMCNC: 31.9 G/DL (ref 31.5–36.5)
MCV RBC AUTO: 98 FL (ref 78–100)
MONOCYTES # BLD AUTO: 0.5 10E3/UL (ref 0–1.3)
MONOCYTES NFR BLD AUTO: 8 %
NEUTROPHILS # BLD AUTO: 5.8 10E3/UL (ref 1.6–8.3)
NEUTROPHILS NFR BLD AUTO: 82 %
NRBC # BLD AUTO: 0 10E3/UL
NRBC BLD AUTO-RTO: 0 /100
PLATELET # BLD AUTO: 294 10E3/UL (ref 150–450)
POTASSIUM SERPL-SCNC: 4.2 MMOL/L (ref 3.4–5.3)
PROT SERPL-MCNC: 6.8 G/DL (ref 6.4–8.3)
RBC # BLD AUTO: 3.83 10E6/UL (ref 4.4–5.9)
SODIUM SERPL-SCNC: 128 MMOL/L (ref 135–145)
TROPONIN T SERPL HS-MCNC: 75 NG/L
TROPONIN T SERPL HS-MCNC: 79 NG/L
WBC # BLD AUTO: 7.1 10E3/UL (ref 4–11)

## 2024-10-03 PROCEDURE — 85379 FIBRIN DEGRADATION QUANT: CPT | Performed by: EMERGENCY MEDICINE

## 2024-10-03 PROCEDURE — 250N000009 HC RX 250: Performed by: EMERGENCY MEDICINE

## 2024-10-03 PROCEDURE — 83690 ASSAY OF LIPASE: CPT | Performed by: INTERNAL MEDICINE

## 2024-10-03 PROCEDURE — 93005 ELECTROCARDIOGRAM TRACING: CPT

## 2024-10-03 PROCEDURE — 84484 ASSAY OF TROPONIN QUANT: CPT | Performed by: EMERGENCY MEDICINE

## 2024-10-03 PROCEDURE — 99285 EMERGENCY DEPT VISIT HI MDM: CPT | Mod: 25

## 2024-10-03 PROCEDURE — 36415 COLL VENOUS BLD VENIPUNCTURE: CPT | Performed by: EMERGENCY MEDICINE

## 2024-10-03 PROCEDURE — 80048 BASIC METABOLIC PNL TOTAL CA: CPT | Performed by: EMERGENCY MEDICINE

## 2024-10-03 PROCEDURE — 83735 ASSAY OF MAGNESIUM: CPT | Performed by: EMERGENCY MEDICINE

## 2024-10-03 PROCEDURE — 80076 HEPATIC FUNCTION PANEL: CPT | Performed by: INTERNAL MEDICINE

## 2024-10-03 PROCEDURE — 85025 COMPLETE CBC W/AUTO DIFF WBC: CPT | Performed by: EMERGENCY MEDICINE

## 2024-10-03 PROCEDURE — G1010 CDSM STANSON: HCPCS

## 2024-10-03 PROCEDURE — G0378 HOSPITAL OBSERVATION PER HR: HCPCS

## 2024-10-03 PROCEDURE — 71046 X-RAY EXAM CHEST 2 VIEWS: CPT

## 2024-10-03 PROCEDURE — 250N000011 HC RX IP 250 OP 636: Performed by: EMERGENCY MEDICINE

## 2024-10-03 RX ORDER — IOPAMIDOL 755 MG/ML
500 INJECTION, SOLUTION INTRAVASCULAR ONCE
Status: COMPLETED | OUTPATIENT
Start: 2024-10-03 | End: 2024-10-03

## 2024-10-03 RX ORDER — METHOCARBAMOL 500 MG/1
500 TABLET, FILM COATED ORAL 3 TIMES DAILY PRN
Status: DISCONTINUED | OUTPATIENT
Start: 2024-10-03 | End: 2024-10-04 | Stop reason: HOSPADM

## 2024-10-03 RX ADMIN — SODIUM CHLORIDE 89 ML: 9 INJECTION, SOLUTION INTRAVENOUS at 21:00

## 2024-10-03 RX ADMIN — IOPAMIDOL 68 ML: 755 INJECTION, SOLUTION INTRAVENOUS at 21:00

## 2024-10-03 ASSESSMENT — COLUMBIA-SUICIDE SEVERITY RATING SCALE - C-SSRS
6. HAVE YOU EVER DONE ANYTHING, STARTED TO DO ANYTHING, OR PREPARED TO DO ANYTHING TO END YOUR LIFE?: NO
1. IN THE PAST MONTH, HAVE YOU WISHED YOU WERE DEAD OR WISHED YOU COULD GO TO SLEEP AND NOT WAKE UP?: NO
2. HAVE YOU ACTUALLY HAD ANY THOUGHTS OF KILLING YOURSELF IN THE PAST MONTH?: NO

## 2024-10-03 ASSESSMENT — ACTIVITIES OF DAILY LIVING (ADL)
ADLS_ACUITY_SCORE: 35

## 2024-10-03 NOTE — ED TRIAGE NOTES
"Pt states that today around dinner time he got a pain in his \"left rib cage\" that gets worse with cough or movement.  He states that it is hard to catch his breath and he feels SOB.      Triage Assessment (Adult)       Row Name 10/03/24 5707          Triage Assessment    Airway WDL WDL        Respiratory WDL    Respiratory WDL X;rhythm/pattern     Rhythm/Pattern, Respiratory shortness of breath        Cardiac WDL    Cardiac WDL WDL                     "

## 2024-10-04 LAB
ATRIAL RATE - MUSE: NORMAL BPM
DIASTOLIC BLOOD PRESSURE - MUSE: NORMAL MMHG
INTERPRETATION ECG - MUSE: NORMAL
P AXIS - MUSE: NORMAL DEGREES
PR INTERVAL - MUSE: NORMAL MS
QRS DURATION - MUSE: 94 MS
QT - MUSE: 388 MS
QTC - MUSE: 427 MS
R AXIS - MUSE: -33 DEGREES
SYSTOLIC BLOOD PRESSURE - MUSE: NORMAL MMHG
T AXIS - MUSE: 68 DEGREES
VENTRICULAR RATE- MUSE: 73 BPM

## 2024-10-04 PROCEDURE — 250N000013 HC RX MED GY IP 250 OP 250 PS 637: Performed by: INTERNAL MEDICINE

## 2024-10-04 RX ORDER — METHOCARBAMOL 500 MG/1
500 TABLET, FILM COATED ORAL 3 TIMES DAILY PRN
Qty: 21 TABLET | Refills: 0 | Status: SHIPPED | OUTPATIENT
Start: 2024-10-04

## 2024-10-04 RX ADMIN — METHOCARBAMOL 500 MG: 500 TABLET ORAL at 00:02

## 2024-10-04 NOTE — ED PROVIDER NOTES
"  Emergency Department Note      History of Present Illness     Chief Complaint   Shortness of Breath    HPI   Kraig Genao is a 89 year old male with a history as noted below who presents to the emergency department for shortness of breath. The patient states that today at approximately [id number], he began experiencing a sudden onset of shortness of breath and a \"severe\" left-sided chest pain, near his left ribs. This pain is exacerbated by movement, coughing, and upon inspiration. Denies cough, fever, chills, or diaphoresis. Denies leg pain or leg swelling. Denies hx of DVT/PE. Denies any known cardiac issues. He is on Eliquis.    Independent Historian   None    Review of External Notes   Progress note from 5/6/2024 reviewed. History of COPD, also history of pneumonia.     Past Medical History     Medical History and Problem List   Bile duct stone  Chronic AFIB  CKD stage 3a  COPD  Elevated LFTs  Erectile dysfunction  GERD  H. Pylori  BPH  Macular degeneration (senile) of retina    Medications   albuterol (PROAIR HFA/PROVENTIL HFA/VENTOLIN HFA) 108 (90 Base) MCG/ACT inhaler  amoxicillin-clavulanate (AUGMENTIN) 875-125 MG tablet  apixaban ANTICOAGULANT (ELIQUIS) 5 MG tablet  atenolol (TENORMIN) 50 MG tablet  budesonide-formoterol (SYMBICORT) 160-4.5 MCG/ACT inhaler  omeprazole (PRILOSEC) 20 MG capsule  tamsulosin (FLOMAX) 0.4 MG capsule    Surgical History   R shoulder arthroplasty  ERCP with lithotripsy of pancreas  Inguinal hernia repair  LAP cholecystectomy    Physical Exam     Patient Vitals for the past 24 hrs:   BP Temp Temp src Pulse Resp SpO2 Height Weight   10/03/24 2000 (!) 155/98 -- -- 73 -- 96 % -- --   10/03/24 1945 (!) 163/94 -- -- 73 -- 94 % -- --   10/03/24 1930 (!) 166/103 -- -- 68 -- 97 % -- --   10/03/24 1915 (!) 178/127 -- -- 73 -- 100 % -- --   10/03/24 1848 (!) 179/103 -- -- -- -- -- -- --   10/03/24 1846 -- 97.6  F (36.4  C) Temporal 72 20 98 % 1.702 m (5' 7\") 69.4 kg (153 lb)     Physical " Exam  General: Laying on the ED bed  HEENT: Normocephalic, atraumatic  Cardiac: Radial pulses 2+, irregularly irregular  Pulm: Breathing comfortably, no accessory muscle usage, no conversational dyspnea, and lungs clear bilaterally  MSK: No bony deformities, no edema BLE, no calf TTP BLE  Skin: Warm and dry  Neuro: Moves all extremities  Psych: Pleasant mood and affect    Diagnostics     Lab Results   Labs Ordered and Resulted from Time of ED Arrival to Time of ED Departure   BASIC METABOLIC PANEL - Abnormal       Result Value    Sodium 128 (*)     Potassium 4.2      Chloride 93 (*)     Carbon Dioxide (CO2) 23      Anion Gap 12      Urea Nitrogen 23.1 (*)     Creatinine 1.20 (*)     GFR Estimate 55 (*)     Calcium 8.9      Glucose 131 (*)    TROPONIN T, HIGH SENSITIVITY - Abnormal    Troponin T, High Sensitivity 75 (*)    D DIMER QUANTITATIVE - Abnormal    D-Dimer Quantitative 0.74 (*)    CBC WITH PLATELETS AND DIFFERENTIAL - Abnormal    WBC Count 7.1      RBC Count 3.83 (*)     Hemoglobin 12.0 (*)     Hematocrit 37.6 (*)     MCV 98      MCH 31.3      MCHC 31.9      RDW 13.2      Platelet Count 294      % Neutrophils 82      % Lymphocytes 9      % Monocytes 8      % Eosinophils 0      % Basophils 0      % Immature Granulocytes 1      NRBCs per 100 WBC 0      Absolute Neutrophils 5.8      Absolute Lymphocytes 0.6 (*)     Absolute Monocytes 0.5      Absolute Eosinophils 0.0      Absolute Basophils 0.0      Absolute Immature Granulocytes 0.1      Absolute NRBCs 0.0     TROPONIN T, HIGH SENSITIVITY - Abnormal    Troponin T, High Sensitivity 79 (*)    MAGNESIUM - Normal    Magnesium 1.7       Imaging   CT Chest Pulmonary Embolism w Contrast   Final Result   IMPRESSION:   1.  No pulmonary embolism.      2.  Cardiac enlargement.      3.  Mild mosaic attenuation can be seen with air trapping.      4.  Small esophageal hiatal hernia.      5.  Cholecystectomy with pneumobilia.      6.  Large duodenal diverticulum.      7.   Isodense nodule in the lateral left mid kidney probably small complex cyst. This could be confirmed with ultrasound. Multiple additional simple cysts in the kidneys which do not warrant follow-up.      8.  Compression inferior endplate of T12. Bones are demineralized.      XR Chest 2 Views   Final Result   IMPRESSION: Right shoulder arthroplasty. Stable asymmetric elevation of the right hemidiaphragm. Mild bibasilar opacities likely reflect atelectasis or scarring. No focal pneumonic consolidation or pleural effusion. Stable heart size.        EKG   ECG results from 10/03/24   EKG 12-lead, tracing only     Value    Systolic Blood Pressure     Diastolic Blood Pressure     Ventricular Rate 73    Atrial Rate     OK Interval     QRS Duration 94        QTc 427    P Axis     R AXIS -33    T Axis 68    Interpretation ECG      Atrial fibrillation with premature ventricular or aberrantly conducted complexes  Left axis deviation  Inferior infarct , age undetermined  Abnormal ECG  When compared with ECG of 17-Mar-2023 03:48,  Inferior infarct is now Present  Nonspecific T wave abnormality now evident in Lateral leads       Independent Interpretation   CXR: No pneumothorax or infiltrate.    ED Course      Medications Administered   Medications   iopamidol (ISOVUE-370) solution 500 mL (68 mLs Intravenous $Given 10/3/24 2100)   CT scan flush (89 mLs Intravenous $Given 10/3/24 2100)     Discussion of Management   Admitting Hospitalist, Dr. Arroyo    ED Course   ED Course as of 10/03/24 2231   Thu Oct 03, 2024   1914 I obtained history and examined the patient as noted above.        Additional Documentation  None    Medical Decision Making / Diagnosis     CMS Diagnoses: None    MIPS    CT for PE was ordered because the patient had an abnormal d-dimer.    SHANIQUA   Kraig Genao is a 89 year old male who presents with shortness of breath and associated left-sided chest pain as described above.  He describes quite severe chest  "pain at the time of onset, seems that it is eased off a bit on my evaluation.  He had similar pain in the past when he had pneumonia according to his daughter.  EKG is thankfully nonischemic.  Troponin is slightly above the patient's baseline and increases very slightly on repeat.  A chest x-ray is obtained and unremarkable.  We did obtain a D-dimer given the pleuritic aspect of the patient's chest pain.  D-dimer was borderline elevated so we proceeded with CT PA which thankfully does not show any PE or other emergent findings.  The patient's family is aware of the T12 fracture this is not new.  Upon reevaluation, the patient does not have any ongoing chest pain, states that the left lower costal margin feels \"weird.'  He is already on apixaban and with the atypical symptoms I do not think that heparinization is warranted.  Plan is for observation on the hospitalist service for further cardiac evaluation.    Disposition   The patient was admitted to the hospital.     Diagnosis   No diagnosis found.     Discharge Medications   New Prescriptions    No medications on file     Scribe Disclosure:  I, Robson Payne, am serving as a scribe at 7:12 PM on 10/3/2024 to document services personally performed by Jackson Crawley MD based on my observations and the provider's statements to me.        Jackson Crawley MD  10/03/24 7607    "

## 2024-10-04 NOTE — ED NOTES
[facility]  ED Nurse Handoff Report    ED Chief complaint: Shortness of Breath  . ED Diagnosis:   Final diagnoses:   Chest pain, unspecified type       Allergies:   Allergies   Allergen Reactions    No Known Allergies        Code Status: Full Code, see hospitalist orders for updated code status    Activity level - Baseline/Home:  assist of 2 and wheelchair  Activity Level - Current:   assist of 2. And WC  Lift room needed: No.   Bariatric: No   Needed: No   Isolation: No.   Infection: Not Applicable.     Respiratory status: Room air    Vital Signs (within 30 minutes):   Vitals:    10/03/24 2045 10/03/24 2215 10/03/24 2245 10/03/24 2250   BP:   (!) 158/97    Pulse: 71 72 71    Resp:       Temp:       TempSrc:       SpO2: 92% 97%  97%   Weight:       Height:           Cardiac Rhythm:  ,   Cardiac  Cardiac Rhythm: Atrial fibrillation (Chronic Afib with PVC's on EKG)  Pain level:    Patient confused: No.   Patient Falls Risk: nonskid shoes/slippers when out of bed. Pt and family education  Elimination Status: Has voided     Patient Report - Initial Complaint: shortness of breath.   Focused Assessment:     Cardiac (Adult)Cardiac WDL: .WDL exceptCardiac Rhythm: Atrial fibrillation (Chronic Afib with PVC's on EKG)  Chest Pain AssessmentChest Pain Location: other (see comments) (Pt reports that earlier today he had chest pain, he shared with this writer that currently it is intermittently felt below his left rib cage area)Alleviating Factors: restChest Pain Intervention: cardiac biomarkers drawn; 12-lead ECG obtained; cardiac monitoring continued; cardiac monitor placed   Respiratory WDLRespiratory WDL:  (Pt denies SOB at initial onset of assessment but felt sob earlier today)      Abnormal Results:   Labs Ordered and Resulted from Time of ED Arrival to Time of ED Departure   BASIC METABOLIC PANEL - Abnormal       Result Value    Sodium 128 (*)     Potassium 4.2      Chloride 93 (*)      Carbon Dioxide (CO2) 23      Anion Gap 12      Urea Nitrogen 23.1 (*)     Creatinine 1.20 (*)     GFR Estimate 55 (*)     Calcium 8.9      Glucose 131 (*)    TROPONIN T, HIGH SENSITIVITY - Abnormal    Troponin T, High Sensitivity 75 (*)    D DIMER QUANTITATIVE - Abnormal    D-Dimer Quantitative 0.74 (*)    CBC WITH PLATELETS AND DIFFERENTIAL - Abnormal    WBC Count 7.1      RBC Count 3.83 (*)     Hemoglobin 12.0 (*)     Hematocrit 37.6 (*)     MCV 98      MCH 31.3      MCHC 31.9      RDW 13.2      Platelet Count 294      % Neutrophils 82      % Lymphocytes 9      % Monocytes 8      % Eosinophils 0      % Basophils 0      % Immature Granulocytes 1      NRBCs per 100 WBC 0      Absolute Neutrophils 5.8      Absolute Lymphocytes 0.6 (*)     Absolute Monocytes 0.5      Absolute Eosinophils 0.0      Absolute Basophils 0.0      Absolute Immature Granulocytes 0.1      Absolute NRBCs 0.0     TROPONIN T, HIGH SENSITIVITY - Abnormal    Troponin T, High Sensitivity 79 (*)    MAGNESIUM - Normal    Magnesium 1.7     HEPATIC FUNCTION PANEL   LIPASE        CT Chest Pulmonary Embolism w Contrast   Final Result   IMPRESSION:   1.  No pulmonary embolism.      2.  Cardiac enlargement.      3.  Mild mosaic attenuation can be seen with air trapping.      4.  Small esophageal hiatal hernia.      5.  Cholecystectomy with pneumobilia.      6.  Large duodenal diverticulum.      7.  Isodense nodule in the lateral left mid kidney probably small complex cyst. This could be confirmed with ultrasound. Multiple additional simple cysts in the kidneys which do not warrant follow-up.      8.  Compression inferior endplate of T12. Bones are demineralized.      XR Chest 2 Views   Final Result   IMPRESSION: Right shoulder arthroplasty. Stable asymmetric elevation of the right hemidiaphragm. Mild bibasilar opacities likely reflect atelectasis or scarring. No focal pneumonic consolidation or pleural effusion. Stable heart size.          Treatments  provided: See MAR  Family Comments: at bedside  OBS brochure/video discussed/provided to patient:  N/A  ED Medications:   Medications   iopamidol (ISOVUE-370) solution 500 mL (68 mLs Intravenous $Given 10/3/24 2100)   CT scan flush (89 mLs Intravenous $Given 10/3/24 2100)       Drips infusing:  No  For the majority of the shift this patient was Green.   Interventions performed were na.    Sepsis treatment initiated: No    Cares/treatment/interventions/medications to be completed following ED care: NA    ED Nurse Name: Leila Short RN  11:31 PM

## 2024-10-04 NOTE — DISCHARGE INSTRUCTIONS
Your chest pain is from an intercostal muscle strain in between your ribs.  I recommend taking Tylenol as we discussed and applying a lidocaine patch.  Topical voltaren gel can be used as well.  If symptoms fail to improve see your primary care doctor next week.  If symptoms severely worse and are present at rest, then return for evaluation.

## 2024-10-04 NOTE — PROGRESS NOTES
I was called with request for observation admission for patient with chest pain and elevated troponin.  I evaluated his chart thoroughly and then went to evaluate him at the bedside and spoke with his daughter as well who was present.  He is a 89-year-old man with history of A-fib on Eliquis, COPD, HTN, anemia, CKD stage IIIa, hyponatremia, choledocholithiasis, esophageal stricture, GERD, and BPH.  He is moderately hypertensive in the ER with systolic pressure in the 160s otherwise vital signs are stable.  His troponin T was elevated at 75 with repeat of 79.  Had similar levels in the past in the 50s and he has chronic kidney disease with creatinine 1.2.  His sodium was mildly low at 128, has been in the low 130s in the past.  His D-dimer was slightly elevated at 0.74 so he underwent a CT PE protocol that was negative for PE and showed a small esophageal hiatal hernia, cholecystectomy with pneumobilia, large duodenal diverticulum, T12 compression fracture age-indeterminate.  He is EKG showed A-fib with no ST elevation or depression, PVCs, and and rate in the 70s.  Given his history of choledocholithiasis I did add on LFTs and lipase which were not elevated.    I went and spoke with the patient and his daughter in the ER after speaking with Dr. Crawley regarding a potential observation admission.  He states earlier today around noon he was reaching over on the sofa to grab something and he developed severe acute sharp pain in his left lateral lower chest wall.  He tried using heating pad and taking acetaminophen.  He has had ongoing pain with movement, palpation of the left chest wall, cough, and deep breaths.  Denies any chest pressure sensation.  On exam he is very focally tender in the left lateral chest wall in the intercostal space.  He almost certainly has a strain of the chest wall musculature.  Alternatively he could be having some pleuritic chest pain.  He has already been ruled out for PE.  He has no history  of coronary artery disease and his story, exam, and EKG are not consistent with cardiac chest pain. His mild troponin elevation is similar to previous levels and likely due to his advanced age, chronic kidney disease, and elevated blood pressure.  He has no symptoms at rest currently and I feel he is appropriate for discharge home.  I did offer observation admission to see if some topical agents would help with his pain and that we can keep an eye on his blood pressure and recheck a sodium and troponin level in the morning.  I explained that with his advanced age and story not convincing for an acute MI or coronary ischemia I did not feel cardiac stress testing is warranted nor would he likely be recommended a coronary angiogram given his advanced age and medical management would be preferred in this situation as he is not having a STEMI.  Alternatively, I offered that I could discharge him home with Voltaren gel and methocarbamol as needed for muscle pain.  He already has lidocaine patches at home and he can apply this to the chest wall.  He is elected to return home.  His daughter agrees and the will keep an eye on him tonight.  I discussed with him returning to the ER if severely worsening chest pressure at rest or other new symptoms that are concerning to them.  Otherwise he can follow-up with his primary care doctor next week if needed if having ongoing chest wall pain.

## 2025-02-23 ENCOUNTER — HOSPITAL ENCOUNTER (INPATIENT)
Facility: CLINIC | Age: OVER 89
End: 2025-02-23
Attending: EMERGENCY MEDICINE | Admitting: INTERNAL MEDICINE
Payer: MEDICARE

## 2025-02-23 ENCOUNTER — APPOINTMENT (OUTPATIENT)
Dept: GENERAL RADIOLOGY | Facility: CLINIC | Age: OVER 89
End: 2025-02-23
Attending: EMERGENCY MEDICINE
Payer: MEDICARE

## 2025-02-23 ENCOUNTER — TELEPHONE (OUTPATIENT)
Dept: NURSING | Facility: CLINIC | Age: OVER 89
End: 2025-02-23
Payer: MEDICARE

## 2025-02-23 ENCOUNTER — APPOINTMENT (OUTPATIENT)
Dept: CT IMAGING | Facility: CLINIC | Age: OVER 89
End: 2025-02-23
Attending: EMERGENCY MEDICINE
Payer: MEDICARE

## 2025-02-23 DIAGNOSIS — R52 MILD PAIN: ICD-10-CM

## 2025-02-23 DIAGNOSIS — F02.818 DEMENTIA DUE TO MEDICAL CONDITION WITH BEHAVIORAL DISTURBANCE (H): ICD-10-CM

## 2025-02-23 DIAGNOSIS — R53.1 GENERALIZED WEAKNESS: ICD-10-CM

## 2025-02-23 DIAGNOSIS — J44.9 CHRONIC OBSTRUCTIVE PULMONARY DISEASE, UNSPECIFIED COPD TYPE (H): ICD-10-CM

## 2025-02-23 DIAGNOSIS — N40.1 BENIGN PROSTATIC HYPERPLASIA WITH LOWER URINARY TRACT SYMPTOMS, SYMPTOM DETAILS UNSPECIFIED: ICD-10-CM

## 2025-02-23 DIAGNOSIS — K59.00 CONSTIPATION, UNSPECIFIED CONSTIPATION TYPE: ICD-10-CM

## 2025-02-23 DIAGNOSIS — F17.200 TOBACCO DEPENDENCE SYNDROME: ICD-10-CM

## 2025-02-23 DIAGNOSIS — R41.89 COGNITIVE DECLINE: ICD-10-CM

## 2025-02-23 DIAGNOSIS — I44.7 LBBB (LEFT BUNDLE BRANCH BLOCK): ICD-10-CM

## 2025-02-23 DIAGNOSIS — N40.0 BENIGN PROSTATIC HYPERPLASIA, UNSPECIFIED WHETHER LOWER URINARY TRACT SYMPTOMS PRESENT: ICD-10-CM

## 2025-02-23 DIAGNOSIS — R41.0 CONFUSION: ICD-10-CM

## 2025-02-23 DIAGNOSIS — I48.11 LONGSTANDING PERSISTENT ATRIAL FIBRILLATION (H): Primary | ICD-10-CM

## 2025-02-23 LAB
ALBUMIN SERPL BCG-MCNC: 4.4 G/DL (ref 3.5–5.2)
ALBUMIN UR-MCNC: 50 MG/DL
ALP SERPL-CCNC: 91 U/L (ref 40–150)
ALT SERPL W P-5'-P-CCNC: 14 U/L (ref 0–70)
ANION GAP SERPL CALCULATED.3IONS-SCNC: 17 MMOL/L (ref 7–15)
APPEARANCE UR: CLEAR
AST SERPL W P-5'-P-CCNC: 29 U/L (ref 0–45)
BASOPHILS # BLD AUTO: 0 10E3/UL (ref 0–0.2)
BASOPHILS NFR BLD AUTO: 0 %
BILIRUB SERPL-MCNC: 1.7 MG/DL
BILIRUB UR QL STRIP: NEGATIVE
BUN SERPL-MCNC: 28.3 MG/DL (ref 8–23)
CALCIUM SERPL-MCNC: 9.3 MG/DL (ref 8.8–10.4)
CHLORIDE SERPL-SCNC: 97 MMOL/L (ref 98–107)
COLOR UR AUTO: YELLOW
CREAT SERPL-MCNC: 1.26 MG/DL (ref 0.67–1.17)
EGFRCR SERPLBLD CKD-EPI 2021: 52 ML/MIN/1.73M2
EOSINOPHIL # BLD AUTO: 0 10E3/UL (ref 0–0.7)
EOSINOPHIL NFR BLD AUTO: 0 %
ERYTHROCYTE [DISTWIDTH] IN BLOOD BY AUTOMATED COUNT: 12.7 % (ref 10–15)
FLUAV RNA SPEC QL NAA+PROBE: NEGATIVE
FLUBV RNA RESP QL NAA+PROBE: NEGATIVE
GLUCOSE SERPL-MCNC: 130 MG/DL (ref 70–99)
GLUCOSE UR STRIP-MCNC: NEGATIVE MG/DL
HCO3 SERPL-SCNC: 22 MMOL/L (ref 22–29)
HCT VFR BLD AUTO: 39.7 % (ref 40–53)
HGB BLD-MCNC: 12.8 G/DL (ref 13.3–17.7)
HGB UR QL STRIP: NEGATIVE
IMM GRANULOCYTES # BLD: 0.1 10E3/UL
IMM GRANULOCYTES NFR BLD: 1 %
KETONES UR STRIP-MCNC: 20 MG/DL
LACTATE SERPL-SCNC: 1 MMOL/L (ref 0.7–2)
LEUKOCYTE ESTERASE UR QL STRIP: NEGATIVE
LYMPHOCYTES # BLD AUTO: 0.4 10E3/UL (ref 0.8–5.3)
LYMPHOCYTES NFR BLD AUTO: 2 %
MAGNESIUM SERPL-MCNC: 1.8 MG/DL (ref 1.7–2.3)
MCH RBC QN AUTO: 31.6 PG (ref 26.5–33)
MCHC RBC AUTO-ENTMCNC: 32.2 G/DL (ref 31.5–36.5)
MCV RBC AUTO: 98 FL (ref 78–100)
MONOCYTES # BLD AUTO: 1.1 10E3/UL (ref 0–1.3)
MONOCYTES NFR BLD AUTO: 6 %
MUCOUS THREADS #/AREA URNS LPF: PRESENT /LPF
NEUTROPHILS # BLD AUTO: 15.4 10E3/UL (ref 1.6–8.3)
NEUTROPHILS NFR BLD AUTO: 91 %
NITRATE UR QL: NEGATIVE
NRBC # BLD AUTO: 0 10E3/UL
NRBC BLD AUTO-RTO: 0 /100
PH UR STRIP: 5.5 [PH] (ref 5–7)
PLATELET # BLD AUTO: 257 10E3/UL (ref 150–450)
POTASSIUM SERPL-SCNC: 4 MMOL/L (ref 3.4–5.3)
PROT SERPL-MCNC: 7.6 G/DL (ref 6.4–8.3)
RBC # BLD AUTO: 4.05 10E6/UL (ref 4.4–5.9)
RBC URINE: 1 /HPF
RSV RNA SPEC NAA+PROBE: NEGATIVE
SARS-COV-2 RNA RESP QL NAA+PROBE: NEGATIVE
SODIUM SERPL-SCNC: 136 MMOL/L (ref 135–145)
SP GR UR STRIP: 1.02 (ref 1–1.03)
SQUAMOUS EPITHELIAL: <1 /HPF
TROPONIN T SERPL HS-MCNC: 57 NG/L
TROPONIN T SERPL HS-MCNC: 60 NG/L
UROBILINOGEN UR STRIP-MCNC: NORMAL MG/DL
WBC # BLD AUTO: 17 10E3/UL (ref 4–11)
WBC URINE: 1 /HPF

## 2025-02-23 PROCEDURE — 81001 URINALYSIS AUTO W/SCOPE: CPT | Performed by: EMERGENCY MEDICINE

## 2025-02-23 PROCEDURE — 85025 COMPLETE CBC W/AUTO DIFF WBC: CPT | Performed by: EMERGENCY MEDICINE

## 2025-02-23 PROCEDURE — 71046 X-RAY EXAM CHEST 2 VIEWS: CPT

## 2025-02-23 PROCEDURE — 85014 HEMATOCRIT: CPT | Performed by: EMERGENCY MEDICINE

## 2025-02-23 PROCEDURE — 87040 BLOOD CULTURE FOR BACTERIA: CPT | Performed by: INTERNAL MEDICINE

## 2025-02-23 PROCEDURE — 83605 ASSAY OF LACTIC ACID: CPT | Performed by: EMERGENCY MEDICINE

## 2025-02-23 PROCEDURE — 70450 CT HEAD/BRAIN W/O DYE: CPT

## 2025-02-23 PROCEDURE — 93005 ELECTROCARDIOGRAM TRACING: CPT

## 2025-02-23 PROCEDURE — 250N000011 HC RX IP 250 OP 636: Performed by: INTERNAL MEDICINE

## 2025-02-23 PROCEDURE — 82040 ASSAY OF SERUM ALBUMIN: CPT | Performed by: EMERGENCY MEDICINE

## 2025-02-23 PROCEDURE — G0378 HOSPITAL OBSERVATION PER HR: HCPCS

## 2025-02-23 PROCEDURE — 36415 COLL VENOUS BLD VENIPUNCTURE: CPT | Performed by: INTERNAL MEDICINE

## 2025-02-23 PROCEDURE — 250N000013 HC RX MED GY IP 250 OP 250 PS 637: Performed by: INTERNAL MEDICINE

## 2025-02-23 PROCEDURE — 36415 COLL VENOUS BLD VENIPUNCTURE: CPT | Performed by: EMERGENCY MEDICINE

## 2025-02-23 PROCEDURE — 84484 ASSAY OF TROPONIN QUANT: CPT | Performed by: EMERGENCY MEDICINE

## 2025-02-23 PROCEDURE — 258N000003 HC RX IP 258 OP 636: Performed by: EMERGENCY MEDICINE

## 2025-02-23 PROCEDURE — 96365 THER/PROPH/DIAG IV INF INIT: CPT

## 2025-02-23 PROCEDURE — 87637 SARSCOV2&INF A&B&RSV AMP PRB: CPT | Performed by: EMERGENCY MEDICINE

## 2025-02-23 PROCEDURE — 84155 ASSAY OF PROTEIN SERUM: CPT | Performed by: EMERGENCY MEDICINE

## 2025-02-23 PROCEDURE — 99222 1ST HOSP IP/OBS MODERATE 55: CPT | Performed by: INTERNAL MEDICINE

## 2025-02-23 PROCEDURE — 96361 HYDRATE IV INFUSION ADD-ON: CPT

## 2025-02-23 PROCEDURE — 96360 HYDRATION IV INFUSION INIT: CPT

## 2025-02-23 PROCEDURE — 83735 ASSAY OF MAGNESIUM: CPT | Performed by: EMERGENCY MEDICINE

## 2025-02-23 PROCEDURE — 99285 EMERGENCY DEPT VISIT HI MDM: CPT | Mod: 25

## 2025-02-23 RX ORDER — ONDANSETRON 4 MG/1
4 TABLET, ORALLY DISINTEGRATING ORAL EVERY 6 HOURS PRN
Status: DISCONTINUED | OUTPATIENT
Start: 2025-02-23 | End: 2025-02-23

## 2025-02-23 RX ORDER — SODIUM CHLORIDE, SODIUM LACTATE, POTASSIUM CHLORIDE, CALCIUM CHLORIDE 600; 310; 30; 20 MG/100ML; MG/100ML; MG/100ML; MG/100ML
INJECTION, SOLUTION INTRAVENOUS CONTINUOUS
Status: ACTIVE | OUTPATIENT
Start: 2025-02-23 | End: 2025-02-24

## 2025-02-23 RX ORDER — LIDOCAINE 4 G/G
2 PATCH TOPICAL EVERY 24 HOURS
COMMUNITY

## 2025-02-23 RX ORDER — DOXYCYCLINE 100 MG/1
100 CAPSULE ORAL EVERY 12 HOURS SCHEDULED
Status: DISCONTINUED | OUTPATIENT
Start: 2025-02-23 | End: 2025-02-27

## 2025-02-23 RX ORDER — GABAPENTIN 400 MG/1
400 CAPSULE ORAL 2 TIMES DAILY
Status: ON HOLD | COMMUNITY
End: 2025-03-18

## 2025-02-23 RX ORDER — TAMSULOSIN HYDROCHLORIDE 0.4 MG/1
0.4 CAPSULE ORAL DAILY
Status: DISCONTINUED | OUTPATIENT
Start: 2025-02-24 | End: 2025-02-25

## 2025-02-23 RX ORDER — ATENOLOL 50 MG/1
50 TABLET ORAL DAILY
Status: DISCONTINUED | OUTPATIENT
Start: 2025-02-24 | End: 2025-03-19 | Stop reason: HOSPADM

## 2025-02-23 RX ORDER — AMOXICILLIN 250 MG
1 CAPSULE ORAL 2 TIMES DAILY PRN
Status: DISCONTINUED | OUTPATIENT
Start: 2025-02-23 | End: 2025-03-19 | Stop reason: HOSPADM

## 2025-02-23 RX ORDER — IPRATROPIUM BROMIDE AND ALBUTEROL SULFATE 2.5; .5 MG/3ML; MG/3ML
3 SOLUTION RESPIRATORY (INHALATION) EVERY 4 HOURS PRN
Status: DISCONTINUED | OUTPATIENT
Start: 2025-02-23 | End: 2025-03-19 | Stop reason: HOSPADM

## 2025-02-23 RX ORDER — PANTOPRAZOLE SODIUM 40 MG/1
40 TABLET, DELAYED RELEASE ORAL DAILY
COMMUNITY

## 2025-02-23 RX ORDER — PROCHLORPERAZINE MALEATE 5 MG/1
5 TABLET ORAL EVERY 6 HOURS PRN
Status: DISCONTINUED | OUTPATIENT
Start: 2025-02-23 | End: 2025-02-23

## 2025-02-23 RX ORDER — ONDANSETRON 2 MG/ML
4 INJECTION INTRAMUSCULAR; INTRAVENOUS EVERY 6 HOURS PRN
Status: DISCONTINUED | OUTPATIENT
Start: 2025-02-23 | End: 2025-02-23

## 2025-02-23 RX ORDER — KETOCONAZOLE 20 MG/ML
SHAMPOO, SUSPENSION TOPICAL
COMMUNITY

## 2025-02-23 RX ORDER — ACETAMINOPHEN 325 MG/1
650 TABLET ORAL EVERY 4 HOURS PRN
Status: DISCONTINUED | OUTPATIENT
Start: 2025-02-23 | End: 2025-03-19 | Stop reason: HOSPADM

## 2025-02-23 RX ORDER — POLYETHYLENE GLYCOL 3350 17 G
2 POWDER IN PACKET (EA) ORAL
Status: DISCONTINUED | OUTPATIENT
Start: 2025-02-23 | End: 2025-03-19 | Stop reason: HOSPADM

## 2025-02-23 RX ORDER — CEFTRIAXONE 2 G/1
2 INJECTION, POWDER, FOR SOLUTION INTRAMUSCULAR; INTRAVENOUS EVERY 24 HOURS
Status: DISCONTINUED | OUTPATIENT
Start: 2025-02-23 | End: 2025-02-27

## 2025-02-23 RX ORDER — ACETAMINOPHEN 650 MG/1
650 SUPPOSITORY RECTAL EVERY 4 HOURS PRN
Status: DISCONTINUED | OUTPATIENT
Start: 2025-02-23 | End: 2025-03-19 | Stop reason: HOSPADM

## 2025-02-23 RX ORDER — LORATADINE 10 MG/1
10 TABLET ORAL DAILY PRN
COMMUNITY

## 2025-02-23 RX ORDER — AMOXICILLIN 250 MG
2 CAPSULE ORAL 2 TIMES DAILY PRN
Status: DISCONTINUED | OUTPATIENT
Start: 2025-02-23 | End: 2025-03-19 | Stop reason: HOSPADM

## 2025-02-23 RX ORDER — FLUTICASONE PROPIONATE 50 MCG
1 SPRAY, SUSPENSION (ML) NASAL 2 TIMES DAILY
COMMUNITY

## 2025-02-23 RX ORDER — ACETAMINOPHEN 500 MG
1000 TABLET ORAL 2 TIMES DAILY
Status: ON HOLD | COMMUNITY
End: 2025-03-18

## 2025-02-23 RX ORDER — PANTOPRAZOLE SODIUM 40 MG/1
40 TABLET, DELAYED RELEASE ORAL DAILY
Status: DISCONTINUED | OUTPATIENT
Start: 2025-02-24 | End: 2025-03-19 | Stop reason: HOSPADM

## 2025-02-23 RX ORDER — MULTIVITAMIN WITH IRON
500 TABLET ORAL DAILY
COMMUNITY

## 2025-02-23 RX ADMIN — SODIUM CHLORIDE 1000 ML: 0.9 INJECTION, SOLUTION INTRAVENOUS at 19:11

## 2025-02-23 RX ADMIN — APIXABAN 5 MG: 5 TABLET, FILM COATED ORAL at 23:28

## 2025-02-23 RX ADMIN — CEFTRIAXONE 2 G: 2 INJECTION, POWDER, FOR SOLUTION INTRAMUSCULAR; INTRAVENOUS at 23:28

## 2025-02-23 ASSESSMENT — COLUMBIA-SUICIDE SEVERITY RATING SCALE - C-SSRS
1. IN THE PAST MONTH, HAVE YOU WISHED YOU WERE DEAD OR WISHED YOU COULD GO TO SLEEP AND NOT WAKE UP?: NO
2. HAVE YOU ACTUALLY HAD ANY THOUGHTS OF KILLING YOURSELF IN THE PAST MONTH?: NO
6. HAVE YOU EVER DONE ANYTHING, STARTED TO DO ANYTHING, OR PREPARED TO DO ANYTHING TO END YOUR LIFE?: NO

## 2025-02-23 ASSESSMENT — ACTIVITIES OF DAILY LIVING (ADL)
ADLS_ACUITY_SCORE: 41

## 2025-02-23 NOTE — TELEPHONE ENCOUNTER
Patient's son calling. Consent to communicate is in the chart, and patient is with him.     Patient has memory and cognition problems for his baseline. Today at approximately 1330, patient's daughter was called by staff, because patient was not using his assistive devices, and he was more confused than his baseline, and he had not fastened his clothing. He was escorted back to his apartment. Patient's other son came over, and patient was staring/glassy-eyed/not noticing activity of others. He has been unable to perform his normal ADLs, and is more confused than his baseline.     Patient's sons are unable to provide care for him. [facility] contacted [facility], who told them to have patient transported to the hospital Son is calling to ask if an ambulance needs to have lights and sirens. He was reassured that if he requests no lights/sirens, that EMS would do their best to comply.     Urvashi Triana RN  [facility]  February 23, 2025, 5:34 PM

## 2025-02-24 ENCOUNTER — DOCUMENTATION ONLY (OUTPATIENT)
Dept: OTHER | Facility: CLINIC | Age: OVER 89
End: 2025-02-24
Payer: MEDICARE

## 2025-02-24 LAB
ANION GAP SERPL CALCULATED.3IONS-SCNC: 13 MMOL/L (ref 7–15)
ATRIAL RATE - MUSE: NORMAL BPM
BUN SERPL-MCNC: 25.5 MG/DL (ref 8–23)
C PNEUM DNA SPEC QL NAA+PROBE: NOT DETECTED
CALCIUM SERPL-MCNC: 9.1 MG/DL (ref 8.8–10.4)
CHLORIDE SERPL-SCNC: 98 MMOL/L (ref 98–107)
CREAT SERPL-MCNC: 1.07 MG/DL (ref 0.67–1.17)
DIASTOLIC BLOOD PRESSURE - MUSE: NORMAL MMHG
EGFRCR SERPLBLD CKD-EPI 2021: 63 ML/MIN/1.73M2
ERYTHROCYTE [DISTWIDTH] IN BLOOD BY AUTOMATED COUNT: 12.8 % (ref 10–15)
FLUAV H1 2009 PAND RNA SPEC QL NAA+PROBE: NOT DETECTED
FLUAV H1 RNA SPEC QL NAA+PROBE: NOT DETECTED
FLUAV H3 RNA SPEC QL NAA+PROBE: NOT DETECTED
FLUAV RNA SPEC QL NAA+PROBE: NOT DETECTED
FLUBV RNA SPEC QL NAA+PROBE: NOT DETECTED
GLUCOSE SERPL-MCNC: 103 MG/DL (ref 70–99)
HADV DNA SPEC QL NAA+PROBE: NOT DETECTED
HCO3 SERPL-SCNC: 23 MMOL/L (ref 22–29)
HCOV PNL SPEC NAA+PROBE: NOT DETECTED
HCT VFR BLD AUTO: 38.3 % (ref 40–53)
HGB BLD-MCNC: 12.2 G/DL (ref 13.3–17.7)
HMPV RNA SPEC QL NAA+PROBE: NOT DETECTED
HPIV1 RNA SPEC QL NAA+PROBE: NOT DETECTED
HPIV2 RNA SPEC QL NAA+PROBE: NOT DETECTED
HPIV3 RNA SPEC QL NAA+PROBE: NOT DETECTED
HPIV4 RNA SPEC QL NAA+PROBE: NOT DETECTED
INTERPRETATION ECG - MUSE: NORMAL
M PNEUMO DNA SPEC QL NAA+PROBE: NOT DETECTED
MCH RBC QN AUTO: 31.2 PG (ref 26.5–33)
MCHC RBC AUTO-ENTMCNC: 31.9 G/DL (ref 31.5–36.5)
MCV RBC AUTO: 98 FL (ref 78–100)
P AXIS - MUSE: NORMAL DEGREES
PLATELET # BLD AUTO: 223 10E3/UL (ref 150–450)
POTASSIUM SERPL-SCNC: 3.8 MMOL/L (ref 3.4–5.3)
PR INTERVAL - MUSE: NORMAL MS
QRS DURATION - MUSE: 154 MS
QT - MUSE: 430 MS
QTC - MUSE: 520 MS
R AXIS - MUSE: -59 DEGREES
RBC # BLD AUTO: 3.91 10E6/UL (ref 4.4–5.9)
RSV RNA SPEC QL NAA+PROBE: NOT DETECTED
RSV RNA SPEC QL NAA+PROBE: NOT DETECTED
RV+EV RNA SPEC QL NAA+PROBE: NOT DETECTED
SODIUM SERPL-SCNC: 134 MMOL/L (ref 135–145)
SYSTOLIC BLOOD PRESSURE - MUSE: NORMAL MMHG
T AXIS - MUSE: 106 DEGREES
VENTRICULAR RATE- MUSE: 88 BPM
WBC # BLD AUTO: 11.5 10E3/UL (ref 4–11)

## 2025-02-24 PROCEDURE — 250N000009 HC RX 250: Performed by: INTERNAL MEDICINE

## 2025-02-24 PROCEDURE — 250N000013 HC RX MED GY IP 250 OP 250 PS 637: Performed by: INTERNAL MEDICINE

## 2025-02-24 PROCEDURE — 250N000013 HC RX MED GY IP 250 OP 250 PS 637: Performed by: HOSPITALIST

## 2025-02-24 PROCEDURE — 87581 M.PNEUMON DNA AMP PROBE: CPT | Performed by: HOSPITALIST

## 2025-02-24 PROCEDURE — 250N000011 HC RX IP 250 OP 636: Performed by: INTERNAL MEDICINE

## 2025-02-24 PROCEDURE — 120N000001 HC R&B MED SURG/OB

## 2025-02-24 PROCEDURE — 99223 1ST HOSP IP/OBS HIGH 75: CPT | Performed by: CLINICAL NURSE SPECIALIST

## 2025-02-24 PROCEDURE — 96361 HYDRATE IV INFUSION ADD-ON: CPT

## 2025-02-24 PROCEDURE — 99232 SBSQ HOSP IP/OBS MODERATE 35: CPT | Performed by: HOSPITALIST

## 2025-02-24 PROCEDURE — 36415 COLL VENOUS BLD VENIPUNCTURE: CPT | Performed by: INTERNAL MEDICINE

## 2025-02-24 PROCEDURE — 80048 BASIC METABOLIC PNL TOTAL CA: CPT | Performed by: INTERNAL MEDICINE

## 2025-02-24 PROCEDURE — 82310 ASSAY OF CALCIUM: CPT | Performed by: INTERNAL MEDICINE

## 2025-02-24 PROCEDURE — 250N000011 HC RX IP 250 OP 636: Performed by: HOSPITALIST

## 2025-02-24 PROCEDURE — G0378 HOSPITAL OBSERVATION PER HR: HCPCS

## 2025-02-24 PROCEDURE — 85027 COMPLETE CBC AUTOMATED: CPT | Performed by: INTERNAL MEDICINE

## 2025-02-24 PROCEDURE — 87486 CHLMYD PNEUM DNA AMP PROBE: CPT | Performed by: HOSPITALIST

## 2025-02-24 PROCEDURE — 258N000003 HC RX IP 258 OP 636: Performed by: INTERNAL MEDICINE

## 2025-02-24 RX ORDER — QUETIAPINE FUMARATE 50 MG/1
50 TABLET, FILM COATED ORAL EVERY 6 HOURS PRN
Status: DISCONTINUED | OUTPATIENT
Start: 2025-02-24 | End: 2025-02-25

## 2025-02-24 RX ORDER — HALOPERIDOL 5 MG/ML
5 INJECTION INTRAMUSCULAR ONCE
Status: COMPLETED | OUTPATIENT
Start: 2025-02-24 | End: 2025-02-24

## 2025-02-24 RX ORDER — HALOPERIDOL 5 MG/ML
2 INJECTION INTRAMUSCULAR EVERY 6 HOURS PRN
Status: DISCONTINUED | OUTPATIENT
Start: 2025-02-24 | End: 2025-02-25

## 2025-02-24 RX ORDER — QUETIAPINE FUMARATE 25 MG/1
25 TABLET, FILM COATED ORAL EVERY 6 HOURS PRN
Status: DISCONTINUED | OUTPATIENT
Start: 2025-02-24 | End: 2025-02-24

## 2025-02-24 RX ADMIN — TAMSULOSIN HYDROCHLORIDE 0.4 MG: 0.4 CAPSULE ORAL at 08:46

## 2025-02-24 RX ADMIN — SODIUM CHLORIDE, POTASSIUM CHLORIDE, SODIUM LACTATE AND CALCIUM CHLORIDE: 600; 310; 30; 20 INJECTION, SOLUTION INTRAVENOUS at 00:11

## 2025-02-24 RX ADMIN — QUETIAPINE FUMARATE 25 MG: 25 TABLET ORAL at 12:32

## 2025-02-24 RX ADMIN — PANTOPRAZOLE SODIUM 40 MG: 40 TABLET, DELAYED RELEASE ORAL at 08:46

## 2025-02-24 RX ADMIN — DOXYCYCLINE HYCLATE 100 MG: 100 CAPSULE ORAL at 21:23

## 2025-02-24 RX ADMIN — ATENOLOL 50 MG: 50 TABLET ORAL at 08:46

## 2025-02-24 RX ADMIN — ACETAMINOPHEN 650 MG: 325 TABLET, FILM COATED ORAL at 12:32

## 2025-02-24 RX ADMIN — APIXABAN 5 MG: 5 TABLET, FILM COATED ORAL at 08:45

## 2025-02-24 RX ADMIN — CEFTRIAXONE 2 G: 2 INJECTION, POWDER, FOR SOLUTION INTRAMUSCULAR; INTRAVENOUS at 21:23

## 2025-02-24 RX ADMIN — APIXABAN 5 MG: 5 TABLET, FILM COATED ORAL at 21:23

## 2025-02-24 RX ADMIN — QUETIAPINE FUMARATE 12.5 MG: 25 TABLET ORAL at 02:51

## 2025-02-24 RX ADMIN — DOXYCYCLINE HYCLATE 100 MG: 100 CAPSULE ORAL at 08:45

## 2025-02-24 RX ADMIN — DOXYCYCLINE HYCLATE 100 MG: 100 CAPSULE ORAL at 00:11

## 2025-02-24 RX ADMIN — HALOPERIDOL LACTATE 2 MG: 5 INJECTION, SOLUTION INTRAMUSCULAR at 17:12

## 2025-02-24 RX ADMIN — HALOPERIDOL LACTATE 5 MG: 5 INJECTION, SOLUTION INTRAMUSCULAR at 18:13

## 2025-02-24 RX ADMIN — QUETIAPINE FUMARATE 50 MG: 50 TABLET ORAL at 17:41

## 2025-02-24 RX ADMIN — Medication 3 MG: at 21:23

## 2025-02-24 RX ADMIN — IPRATROPIUM BROMIDE AND ALBUTEROL SULFATE 3 ML: .5; 3 SOLUTION RESPIRATORY (INHALATION) at 17:59

## 2025-02-24 RX ADMIN — Medication 3 MG: at 02:51

## 2025-02-24 ASSESSMENT — ACTIVITIES OF DAILY LIVING (ADL)
ADLS_ACUITY_SCORE: 60
ADLS_ACUITY_SCORE: 66
ADLS_ACUITY_SCORE: 62
ADLS_ACUITY_SCORE: 60
ADLS_ACUITY_SCORE: 61
ADLS_ACUITY_SCORE: 62
ADLS_ACUITY_SCORE: 60
ADLS_ACUITY_SCORE: 60
ADLS_ACUITY_SCORE: 62
ADLS_ACUITY_SCORE: 66
ADLS_ACUITY_SCORE: 60
ADLS_ACUITY_SCORE: 47
ADLS_ACUITY_SCORE: 62
ADLS_ACUITY_SCORE: 66
ADLS_ACUITY_SCORE: 60
ADLS_ACUITY_SCORE: 60
ADLS_ACUITY_SCORE: 61
ADLS_ACUITY_SCORE: 66
ADLS_ACUITY_SCORE: 62
ADLS_ACUITY_SCORE: 66
ADLS_ACUITY_SCORE: 66
ADLS_ACUITY_SCORE: 62
ADLS_ACUITY_SCORE: 60

## 2025-02-24 NOTE — CONSULTS
Care Management Initial Consult    General Information  Assessment completed with: Family,    Type of CM/SW Visit: Initial Assessment    Primary Care Provider verified and updated as needed: Yes   Readmission within the last 30 days: no previous admission in last 30 days      Reason for Consult: discharge planning  Advance Care Planning:            Communication Assessment  Patient's communication style: spoken language (English or Bilingual)             Cognitive  Cognitive/Neuro/Behavioral: .WDL except, orientation  Level of Consciousness: confused  Arousal Level: opens eyes spontaneously  Orientation: disoriented to, place, situation, time  Mood/Behavior: calm, cooperative     Speech: clear, spontaneous, logical    Living Environment:   People in home: alone     Current living Arrangements: independent living facility      Able to return to prior arrangements: other (see comments)       Family/Social Support:  Care provided by:    Provides care for: no one, unable/limited ability to care for self     Support system: Children          Description of Support System: Supportive, Involved    Support Assessment: Adequate family and caregiver support    Current Resources:   Patient receiving home care services:          Community Resources:    Equipment currently used at home:    Supplies currently used at home:      Employment/Financial:  Employment Status:          Financial Concerns:             Does the patient's insurance plan have a 3 day qualifying hospital stay waiver?  No    Lifestyle & Psychosocial Needs:  Social Drivers of Health     Food Insecurity: Not on file   Depression: Not at risk (5/6/2024)    PHQ-2     PHQ-2 Score: 0   Housing Stability: Not on file   Tobacco Use: Low Risk  (5/6/2024)    Patient History     Smoking Tobacco Use: Never     Smokeless Tobacco Use: Never     Passive Exposure: Not on file   Financial Resource Strain: Not on file   Alcohol Use: Not on file   Transportation Needs: Not on  "file   Physical Activity: Not on file   Interpersonal Safety: Not on file   Stress: Not on file   Social Connections: Not on file   Health Literacy: Not on file       Functional Status:  Prior to admission patient needed assistance:   Dependent ADLs:: Ambulation-walker  Dependent IADLs:: Cooking, Cleaning, Laundry, Transportation, Meal Preparation  Assesssment of Functional Status: Not at baseline with ADL Functioning    Mental Health Status:          Chemical Dependency Status:                Values/Beliefs:  Spiritual, Cultural Beliefs, Gnosticism Practices, Values that affect care:                 Discussed  Partnership in Safe Discharge Planning  document with patient/family: No    Additional Information:    SW spoke with pt son Niko over the phone to introduce role and discuss discharge planning. Pt son expressed frustration that the pt is still in the ER and reports frustration of SW checking in to verify baseline information as he has already told other staff members. SW listened empathetically and explained that the pt is boarding in the ED under Observation status, he will be moved once a bed is available, SW offered to call a different family member in which Niko declines and is agreeable to speaking to this writer.    Niko reports that the pt lives at [facility] and receives cleaning and meal services. He states that the pt has been declining over the past few weeks and has been sundowning. He feels that his dad is dying and reports that \"we all know where this is going\". Pt son would be appreciative of a Palliaitve consult and would like to have a meeting in person, he shares that he can coordinate with his siblings and can be identified as the main contact. He shares that the pt is a . He shares that he has been discussing next steps with his siblings and is unsure what that will be. They have discussed moving the pt into  at [facility], into [facility], or a higher level of care. He is " unsure at this time what the plan would be.     Next Steps: follow for team recommendations.     Yesica Monet/SAMEER Clayton, PRISCILA  Inpatient Care Coordination  Emergency Room /Float  [phone]    Yesica Monet, LGSW

## 2025-02-24 NOTE — CONSULTS
Palliative Care Consultation Note  [facility]      Patient: Kraig Genao  Date of Admission:  2/23/2025    Requesting Clinician / Team: Hospitalist Shahab Her DO   Reason for consult: Goals of care       Recommendations & Counseling     GOALS OF CARE:   Life-prolonging with limits  - No CPR- Do NOT Intubate and NO FEEDING TUBE.    ADVANCE CARE PLANNING:  No health care directive on file. Per system policy, Surrogate Decision-makers for Patients With Diminished Decision-making Capacity offers guidance on possible decision-makers. Daughter, Valerio Bolanos and son, Terry Genao (who will be on vacation for a week starting tomorrow)   has been identified as a surrogate decision maker.   There is a POLST form on file, this was reviewed and current.  Code status: No CPR- Do NOT Intubate    MEDICAL MANAGEMENT:   We are not actively managing symptoms at this time.    PSYCHOSOCIAL/SPIRITUAL SUPPORT:  Family - Daughter Valerio and three sons,  Terry, Jose Eduardo and Niko      Palliative Care will continue to follow. Thank you for the consult and allowing us to aid in the care of Kraig Genao.    These recommendations have been discussed with Hospitalist Shahab Her Do.    TRAMAINE Talbert CNS  [facility], Palliative Care  Securely message with the [employer] Web Console (learn more here) or  Text page via [facility] Paging/Directory         Assessment      Kraig Genao is a 89 year old male admitted 2/23/2025 with generalized weakness and confusion, found to have community-acquired pneumonia. His past medical history of moderate dementia (SLUMS score 12 on 4/21/2023), atrial fibrillation (Eliquis) , CKD Stage III, and BPH.     Today, the patient was seen for:  Goals of care    History of Present Illness   Met with the patient's son, Jose Eduardo Genao at bedside. He explained his sister, Valerio and brother, Terry are his father's medical decision makers. He acknowledged Terry would be going out of town on vacation  "tomorrow. We moved to a quiet location and Jose Eduardo phoned his brother, Terry. I introduced our role as an extra layer of support and how we help patients and families dealing with serious, potentially life-limiting illnesses. I explained the composition of the palliative care team.  Palliative care helps patients and families navigate their care while focusing on the whole person; providing emotional, social and spiritual support  Palliative care often assists with symptom management, information sharing about what to expect from the illness, available treatment options and what effect those options may have on the disease course, and provide effective communication and caring support. Terry had just been on the phone with [facility] as they realize their father will need increased services. They note that their father is followed by [facility] at [facility]. Both explained their father had been independent until this event which began yesterday at 1 PM. Terry explained taking Al to Confucianism services on Sunday and his wife helps Al to the elevator while he delong the car. His father didn't think he would make it the last time he took him to Confucianism, which was new for him. They explained their father's goal was to make it to 100 years. Jose Eduardo offered \"I don't think he's thinking about that now. He told me he was done yesterday.\" They agreed that they would like to see if their father makes some improvement during this hospitalization. Terry explained his mother-in-law had been on hospice at [facility], so he has some knowledge of the benefit, but first he wants to see \"where things end up here.\"     Prognosis, Goals, & Planning:   Functional Status just prior to this current hospitalization:  Patient had been el an independent living apartment at [facility] with [facility] home [facility].     Prognosis, Goals, and/or Advance Care Planning:  We discussed general treatment options (full/restorative, selective/conservatives, and comfort " only/hospice). We then discussed how these specifically apply to Al's known wishes.  Based on this discussion, his sons has decided to continue restorative efforts the next few days in the hospital to see if their father will improve.     Code Status was addressed today:   Yes, We discussed potential risks and rationale of attempting cardiac resuscitation, intubation, and mechanical ventilation.  We also discussed probability of survival as well as quality of life implications.  Based on this discussion, patient or surrogate response/decision: No CPR- Do NOT Intubate      Patient's decision making preferences: unable to assess        Patient has decision-making capacity today for complex decisions: Unreliable - patient is currently confused and agitated          Coping, Meaning, & Spirituality:   Mood, coping, and/or meaning in the context of serious illness were addressed today: Yes    Social:   Living situation: resides in assisted living [facility]    Medications:  Reviewed this patient's medication profile and medications from this hospitalization. [place] Board of Pharmacy Data Base Reviewed: Yes:   reviewed - no record of controlled substances prescribed.    ROS:  Comprehensive ROS is reviewed and is negative except as here & per HPI:     Physical Exam   Vital Signs with Ranges  Temp:  [97.5  F (36.4  C)-99.3  F (37.4  C)] 97.5  F (36.4  C)  Pulse:  [66-98] 78  Resp:  [12-20] 18  BP: (140-170)/() 164/78  SpO2:  [94 %-100 %] 94 %  Wt Readings from Last 10 Encounters:   10/03/24 69.4 kg (153 lb)   05/06/24 69.8 kg (153 lb 14.4 oz)   03/10/23 64.4 kg (142 lb)   03/09/23 65.5 kg (144 lb 6.4 oz)   11/02/22 68.9 kg (152 lb)   04/01/20 70.3 kg (155 lb)   09/23/19 73 kg (161 lb)   02/01/19 74.4 kg (164 lb)   02/16/18 70.3 kg (155 lb)   01/30/18 70.3 kg (155 lb)     0 lbs 0 oz    PHYSICAL EXAM:  GEN:  Patient resting comfortably in a cot in ED 19. No apparent distress, but awoke confused and agitated.  HEENT:   Normocephalic/atraumatic, no scleral icterus, no nasal discharge, mouth moist.  CV:  RRR, S1, S2; no murmurs or other irregularities noted.  +3 DP/PT pulses bilatererally; no edema BLE.  RESP:  Clear to auscultation bilaterally without rales/rhonchi/wheezing/retractions.  Symmetric chest rise on inhalation noted.  Normal respiratory effort.  ABD:  Rounded, soft, non-tender/non-distended.  +BS  EXT:  CMS intact x 4.     M/S:   No wincing or grimace with palpation of extremities.    SKIN: Warm and dry to touch, mottling.    NEURO: Deferred.    Data reviewed:  Results for orders placed or performed during the hospital encounter of 02/23/25   CT Head w/o Contrast     Status: None    Narrative    EXAM: CT HEAD W/O CONTRAST  LOCATION: [facility]  DATE: 2/23/2025    INDICATION: Weakness and confusion.  COMPARISON: CT 10/27/2022.  TECHNIQUE: Routine CT Head without IV contrast. Multiplanar reformats. Dose reduction techniques were used.    FINDINGS:  INTRACRANIAL CONTENTS: No intracranial hemorrhage, extraaxial collection, or mass effect.  No CT evidence of acute infarct. Mild presumed chronic small vessel ischemic changes. Moderate generalized volume loss. No hydrocephalus. The sella is unremarkable   for technique. The cerebellar tonsils are appropriately positioned.    VISUALIZED ORBITS/SINUSES/MASTOIDS: Prior bilateral cataract surgery. Visualized portions of the orbits are otherwise unremarkable. No paranasal sinus mucosal disease. No middle ear or mastoid effusion.    BONES/SOFT TISSUES: No scalp hematoma. No skull fracture.      Impression    IMPRESSION:  1.  No evidence of an acute intracranial abnormality.  2.  Mild presumed chronic small vessel ischemic change.  3.  Moderate atrophy.     XR Chest 2 Views     Status: None    Narrative    EXAM: XR CHEST 2 VIEWS  LOCATION: [facility]  DATE: 2/23/2025    INDICATION: altered mental status, leukocytosis   COMPARISON: CTA  chest and radiograph 10/3/2024      Impression    IMPRESSION: Hazy opacity in the left mid and lower lung, suspicious for infection. No pleural effusion or pneumothorax. Stable cardiomegaly. Right shoulder replacement.   Lactic acid whole blood with 1x repeat in 2 hr when >2     Status: Normal   Result Value Ref Range    Lactic Acid, Initial 1.0 0.7 - 2.0 mmol/L   Comprehensive metabolic panel     Status: Abnormal   Result Value Ref Range    Sodium 136 135 - 145 mmol/L    Potassium 4.0 3.4 - 5.3 mmol/L    Carbon Dioxide (CO2) 22 22 - 29 mmol/L    Anion Gap 17 (H) 7 - 15 mmol/L    Urea Nitrogen 28.3 (H) 8.0 - 23.0 mg/dL    Creatinine 1.26 (H) 0.67 - 1.17 mg/dL    GFR Estimate 52 (L) >60 mL/min/1.73m2    Calcium 9.3 8.8 - 10.4 mg/dL    Chloride 97 (L) 98 - 107 mmol/L    Glucose 130 (H) 70 - 99 mg/dL    Alkaline Phosphatase 91 40 - 150 U/L    AST 29 0 - 45 U/L    ALT 14 0 - 70 U/L    Protein Total 7.6 6.4 - 8.3 g/dL    Albumin 4.4 3.5 - 5.2 g/dL    Bilirubin Total 1.7 (H) <=1.2 mg/dL   Magnesium     Status: Normal   Result Value Ref Range    Magnesium 1.8 1.7 - 2.3 mg/dL   Influenza A/B, RSV and SARS-CoV2 PCR (COVID-19) Nasopharyngeal     Status: Normal    Specimen: Nasopharyngeal; Swab   Result Value Ref Range    Influenza A PCR Negative Negative    Influenza B PCR Negative Negative    RSV PCR Negative Negative    SARS CoV2 PCR Negative Negative    Narrative    Testing was performed using the Xpert Xpress CoV2/Flu/RSV Assay on the Cepheid GeneXpert Instrument. This test should be ordered for the detection of SARS-CoV2, influenza, and RSV viruses in individuals with signs and symptoms of respiratory tract infection. This test is for in vitro diagnostic use under the [place] FDA for laboratories certified under CLIA to perform high or moderate complexity testing. This test has been [place] FDA cleared. A negative result does not rule out the presence of PCR inhibitors in the specimen or target RNA in concentration below the limit  of detection for the assay. If only one viral target is positive but coinfection with multiple targets is suspected, the sample should be re-tested with another FDA cleared, approved, or authorized test, if coninfection would change clinical management. This test was validated by the [facility]. These laboratories are certified under the Clinical Laboratory Improvement Amendments of 1988 (CLIA-88) as qualified to perfom high complexity laboratory testing.   UA with Microscopic reflex to Culture     Status: Abnormal    Specimen: Urine, Clean Catch   Result Value Ref Range    Color Urine Yellow Colorless, Straw, Light Yellow, Yellow    Appearance Urine Clear Clear    Glucose Urine Negative Negative mg/dL    Bilirubin Urine Negative Negative    Ketones Urine 20 (A) Negative mg/dL    Specific Gravity Urine 1.017 1.003 - 1.035    Blood Urine Negative Negative    pH Urine 5.5 5.0 - 7.0    Protein Albumin Urine 50 (A) Negative mg/dL    Urobilinogen Urine Normal Normal, 2.0 mg/dL    Nitrite Urine Negative Negative    Leukocyte Esterase Urine Negative Negative    Mucus Urine Present (A) None Seen /LPF    RBC Urine 1 <=2 /HPF    WBC Urine 1 <=5 /HPF    Squamous Epithelials Urine <1 <=1 /HPF    Narrative    Urine Culture not indicated   CBC with platelets and differential     Status: Abnormal   Result Value Ref Range    WBC Count 17.0 (H) 4.0 - 11.0 10e3/uL    RBC Count 4.05 (L) 4.40 - 5.90 10e6/uL    Hemoglobin 12.8 (L) 13.3 - 17.7 g/dL    Hematocrit 39.7 (L) 40.0 - 53.0 %    MCV 98 78 - 100 fL    MCH 31.6 26.5 - 33.0 pg    MCHC 32.2 31.5 - 36.5 g/dL    RDW 12.7 10.0 - 15.0 %    Platelet Count 257 150 - 450 10e3/uL    % Neutrophils 91 %    % Lymphocytes 2 %    % Monocytes 6 %    % Eosinophils 0 %    % Basophils 0 %    % Immature Granulocytes 1 %    NRBCs per 100 WBC 0 <1 /100    Absolute Neutrophils 15.4 (H) 1.6 - 8.3 10e3/uL    Absolute Lymphocytes 0.4 (L) 0.8 - 5.3 10e3/uL    Absolute Monocytes 1.1 0.0 -  1.3 10e3/uL    Absolute Eosinophils 0.0 0.0 - 0.7 10e3/uL    Absolute Basophils 0.0 0.0 - 0.2 10e3/uL    Absolute Immature Granulocytes 0.1 <=0.4 10e3/uL    Absolute NRBCs 0.0 10e3/uL   Troponin T, High Sensitivity     Status: Abnormal   Result Value Ref Range    Troponin T, High Sensitivity 60 (H) <=22 ng/L   Troponin T, High Sensitivity     Status: Abnormal   Result Value Ref Range    Troponin T, High Sensitivity 57 (H) <=22 ng/L   Basic metabolic panel     Status: Abnormal   Result Value Ref Range    Sodium 134 (L) 135 - 145 mmol/L    Potassium 3.8 3.4 - 5.3 mmol/L    Chloride 98 98 - 107 mmol/L    Carbon Dioxide (CO2) 23 22 - 29 mmol/L    Anion Gap 13 7 - 15 mmol/L    Urea Nitrogen 25.5 (H) 8.0 - 23.0 mg/dL    Creatinine 1.07 0.67 - 1.17 mg/dL    GFR Estimate 63 >60 mL/min/1.73m2    Calcium 9.1 8.8 - 10.4 mg/dL    Glucose 103 (H) 70 - 99 mg/dL   CBC with platelets     Status: Abnormal   Result Value Ref Range    WBC Count 11.5 (H) 4.0 - 11.0 10e3/uL    RBC Count 3.91 (L) 4.40 - 5.90 10e6/uL    Hemoglobin 12.2 (L) 13.3 - 17.7 g/dL    Hematocrit 38.3 (L) 40.0 - 53.0 %    MCV 98 78 - 100 fL    MCH 31.2 26.5 - 33.0 pg    MCHC 31.9 31.5 - 36.5 g/dL    RDW 12.8 10.0 - 15.0 %    Platelet Count 223 150 - 450 10e3/uL   EKG 12-lead, tracing only     Status: None   Result Value Ref Range    Systolic Blood Pressure  mmHg    Diastolic Blood Pressure  mmHg    Ventricular Rate 88 BPM    Atrial Rate  BPM    KY Interval  ms    QRS Duration 154 ms     ms    QTc 520 ms    P Axis  degrees    R AXIS -59 degrees    T Axis 106 degrees    Interpretation ECG       Atrial fibrillation  Left axis deviation  Left bundle branch block  Abnormal ECG  When compared with ECG of 03-Oct-2024 19:14,  Left bundle branch block is now Present  Criteria for Inferior infarct are no longer Present  Confirmed by - EMERGENCY ROOM, PHYSICIAN ([id number]),  EDGAR TRUJILLO ([id number]) on 2/24/2025 6:48:41 AM     Blood Culture Arm, Left     Status: Normal  (Preliminary result)    Specimen: Arm, Left; Blood   Result Value Ref Range    Culture No growth after 12 hours    Blood Culture Arm, Right     Status: Normal (Preliminary result)    Specimen: Arm, Right; Blood   Result Value Ref Range    Culture No growth after 12 hours    CBC with platelets differential     Status: Abnormal    Narrative    The following orders were created for panel order CBC with platelets differential.  Procedure                               Abnormality         Status                     ---------                               -----------         ------                     CBC with platelets and d...[[id number]]  Abnormal            Final result                 Please view results for these tests on the individual orders.       MANAGEMENT DISCUSSED with the following over the past 24 hours: Hospitalist Shahab Her Do and bedside nurse SASHA cam.   3:15 PM until 4:50 PM - 95  MINUTES SPENT BY ME on the date of service doing chart review, history, exam, documentation & further activities per the note.

## 2025-02-24 NOTE — PHARMACY-ADMISSION MEDICATION HISTORY
Pharmacist Admission Medication History    Admission medication history is complete. The information provided in this note is only as accurate as the sources available at the time of the update.    Information Source(s): Family member and Facility (TCU/NH/) medication list/MAR via in-person    Pertinent Information: none    Changes made to PTA medication list:  Added: tylenol, biotene, vit b12, flonase, gabapentin, lidoderm, claritin, melatonin, preservision, protonix, tiotropium  Deleted: augmentin, biotin, symbicort, voltaren, robaxin, mvi, fish oil, omeprazole  Changed: atenolol    Allergies reviewed with patient and updates made in EHR: yes    Medication History Completed By: Hubert Tao RPH 2/23/2025 9:45 PM    PTA Med List   Medication Sig Last Dose/Taking    acetaminophen (TYLENOL) 500 MG tablet Take 1,000 mg by mouth 2 times daily. 2/22/2025 Evening    albuterol (PROAIR HFA/PROVENTIL HFA/VENTOLIN HFA) 108 (90 Base) MCG/ACT inhaler Inhale 2 puffs into the lungs every 4 hours as needed for shortness of breath, wheezing or cough Taking As Needed    apixaban ANTICOAGULANT (ELIQUIS) 5 MG tablet Take 1 tablet (5 mg) by mouth 2 times daily 2/22/2025 Evening    artificial saliva (BIOTENE MT) AERS spray Take 1 spray by mouth 4 times daily as needed for dry mouth. Taking As Needed    atenolol (TENORMIN) 50 MG tablet Take 50 mg by mouth daily. 2/22/2025 Morning    Cholecalciferol (VITAMIN D3 PO) Take 1,000 Units by mouth daily 2/22/2025 Evening    cyanocobalamin (VITAMIN B-12) 500 MCG tablet Take 500 mcg by mouth daily. 2/22/2025 Morning    fluticasone (FLONASE) 50 MCG/ACT nasal spray Spray 1 spray into both nostrils 2 times daily. 2/22/2025 Evening    gabapentin (NEURONTIN) 400 MG capsule Take 400 mg by mouth 2 times daily. 2/22/2025 Evening    ketoconazole (NIZORAL) 2 % external shampoo Apply topically three times a week. Past Week    Lidocaine (LIDOCARE) 4 % Patch Place 2 patches onto the skin every 24 hours.  To prevent lidocaine toxicity, patient should be patch free for 12 hrs daily. Taking    loratadine (CLARITIN) 10 MG tablet Take 10 mg by mouth daily as needed for allergies. Taking As Needed    melatonin 3 MG tablet Take 3 mg by mouth every evening. 2/22/2025 Evening    Multiple Vitamins-Minerals (ICAPS AREDS 2 PO) Take 1 capsule by mouth 2 times daily. 2/22/2025 Evening    pantoprazole (PROTONIX) 40 MG EC tablet Take 40 mg by mouth daily. 2/22/2025 Morning    tamsulosin (FLOMAX) 0.4 MG capsule Take 0.4 mg by mouth daily 2/22/2025 Evening    tiotropium (SPIRIVA RESPIMAT) 2.5 MCG/ACT inhaler Inhale 2 puffs into the lungs daily. Taking

## 2025-02-24 NOTE — ED NOTES
Bed: ED01  Expected date:   Expected time:   Means of arrival:   Comments:  A597. 89M. Dysuria,weakness

## 2025-02-24 NOTE — CARE PLAN
[facility]    ED Boarding Nurse Handoff Addendum Report:    Date/time: 2/24/2025, 2:09 PM    Activity Level: in bed    Fall Risk: Yes:  bed/chair alarm on, nonskid shoes/slippers when out of bed, arm band in place, patient and family education, activity supervised, mobility aid in reach, and room door open    Active Infusions: None    Current Meds Due: Review MAR    Current care needs: Continue with plan of care, IV abx    Oxygen requirements (liters/min and/or FiO2): None    Respiratory status: Room air    Vital signs (within last 30 minutes):    Vitals:    02/24/25 0848 02/24/25 0900 02/24/25 1000 02/24/25 1159   BP:    (!) 155/91   BP Location:    Left arm   Pulse: 90 98 66    Resp:    16   Temp:    99.3  F (37.4  C)   TempSrc:    Axillary   SpO2:    94%     BP (!) 155/91 (BP Location: Left arm)   Pulse 66   Temp 99.3  F (37.4  C) (Axillary)   Resp 16   SpO2 94%       Focused assessment within last 30 minutes:    Pt oriented to self only. Sitter at bedside. PRN Seroquel given for agitation/restlessness. Respiratory panel results pending. VSS on RA. SW following for placement. Takes pills whole w/water. BS for 600 this AM, straight cath output 700 mL. A1-2 for transfers.     ED Boarding Nurse name: Cindy Guy RN

## 2025-02-24 NOTE — PLAN OF CARE
Goal Outcome Evaluation:      Plan of Care Reviewed With: child    Overall Patient Progress: no changeOverall Patient Progress: no change    Outcome Evaluation: Sitter at bedside. PRN Seroquel given for agitation/restlessness.    Increased weakness/confusion, sitter at bedside. PNA dx, PO Doxycyline/IV Rocephin given. Respiratory panel pending. VSS on RA. Son at bedside.       Problem: Adult Inpatient Plan of Care  Goal: Plan of Care Review  Description: The Plan of Care Review/Shift note should be completed every shift.  The Outcome Evaluation is a brief statement about your assessment that the patient is improving, declining, or no change.  This information will be displayed automatically on your shift  note.  2/24/2025 1420 by Alyson Guy RN  Outcome: Progressing  Flowsheets (Taken 2/24/2025 1420)  Outcome Evaluation: Sitter at bedside. PRN Seroquel given for agitation/restlessness.  Plan of Care Reviewed With: child  Overall Patient Progress: no change  2/24/2025 1419 by Alyson Guy RN  Outcome: Progressing  Flowsheets (Taken 2/24/2025 1419)  Outcome Evaluation: Sitter at bedside. PRN Seroquel given for agitation/restlessness.  Plan of Care Reviewed With: child  Overall Patient Progress: no change  2/24/2025 1419 by Alyson Guy RN  Outcome: Progressing  Flowsheets (Taken 2/24/2025 1419)  Outcome Evaluation: Sitter at bedside. PRN Seroquel given for agitation/restlessness.  Plan of Care Reviewed With: child  Overall Patient Progress: no change  2/24/2025 1418 by Alyson Guy RN  Outcome: Progressing  Flowsheets (Taken 2/24/2025 1418)  Outcome Evaluation: Sitter at bedside. PRN Seroquel given for agitation/restlessness. PO Doxycycline BID for PNA, IV Rocephin Q24 hrs. SW following.  Plan of Care Reviewed With: child  Overall Patient Progress: no change  Goal: Patient-Specific Goal (Individualized)  Description: You can add care plan individualizations to a care plan. Examples of  "Individualization might be:  \"Parent requests to be called daily at 9am for status\", \"I have a hard time hearing out of my right ear\", or \"Do not touch me to wake me up as it startles  me\".  2/24/2025 1420 by Alyson Guy RN  Outcome: Progressing  2/24/2025 1419 by Alyson Guy RN  Outcome: Progressing  2/24/2025 1419 by Alyson Guy RN  Outcome: Progressing  2/24/2025 1418 by Alyson Guy RN  Outcome: Progressing  Goal: Absence of Hospital-Acquired Illness or Injury  2/24/2025 1420 by Alyson Guy RN  Outcome: Progressing  2/24/2025 1419 by Alyson Guy RN  Outcome: Progressing  2/24/2025 1419 by Alyson Guy RN  Outcome: Progressing  2/24/2025 1418 by Alyson Guy RN  Outcome: Progressing  Intervention: Identify and Manage Fall Risk  Recent Flowsheet Documentation  Taken 2/24/2025 1400 by Alyson Guy RN  Safety Promotion/Fall Prevention: safety round/check completed  Taken 2/24/2025 1126 by Alyson Guy RN  Safety Promotion/Fall Prevention: safety round/check completed  Taken 2/24/2025 0848 by Alyson Guy RN  Safety Promotion/Fall Prevention: safety round/check completed  Intervention: Prevent Infection  Recent Flowsheet Documentation  Taken 2/24/2025 0848 by Alyson Guy RN  Infection Prevention:   single patient room provided   rest/sleep promoted   hand hygiene promoted  Goal: Optimal Comfort and Wellbeing  2/24/2025 1420 by Alyson Guy RN  Outcome: Progressing  2/24/2025 1419 by Alyson Guy RN  Outcome: Progressing  2/24/2025 1419 by Alyson Guy RN  Outcome: Progressing  2/24/2025 1418 by Alyson Guy RN  Outcome: Progressing  Goal: Readiness for Transition of Care  2/24/2025 1420 by Alyson Guy RN  Outcome: Progressing  2/24/2025 1419 by Alyson Guy, RN  Outcome: Progressing  2/24/2025 1419 by Alyson Guy, RN  Outcome: Progressing  2/24/2025 1418 by Alyson Guy, RN  Outcome: Progressing       "

## 2025-02-24 NOTE — ED NOTES
[facility]    ED Boarding Nurse Handoff Addendum Report:    Date/time: 2/24/2025, 1:43 AM    Patient  initiated when pts family left for the evening d/t pt safety reasons. Pt having increased confusion and attempting to exit the bed without assistance setting the bed alarm off multiple times putting the patient at risk for falls and injury. Overnight the  pt did become more restless and at times frustrated complaining about the inability to sleep. Provider paged, medication ordered, meds administered, intervention did not assist patient with sleep.    Neuro: Disorientated to, Time, Place, and Situation. Hard of hearing.  Activity: are 1 Assist with Gait Belt  Telemetry Monitoring: Yes - Afib CVR with BBB. New ST depression, pt denies symptoms, provider paged, no new orders.  Pain: denying pain.  Labs / Tests: Troponin: 60, 57.   GI: Bmx1 incontinent, cares provided.  : bladder scan 583 pre-void, post void 456. Provider paged. Order: attempt x1 to void if unsuccessful straight cath. Output from straight cath: 500 mL  O2: RA  LDA's: Peripheral  Fluids: has Lactated Ringer's running at 100 mL per hour.  Diet: Regular  Consults: PT and Social Work or Care Coordination  Discharge Disposition:  TBD        Vital signs (within last 30 minutes):    Vitals:    02/23/25 2030 02/23/25 2300 02/24/25 0117 02/24/25 0346   BP: (!) 153/94 (!) 147/107 (!) 167/97 (!) 155/94   BP Location:  Left arm Left arm Left arm   Pulse:       Resp:  20 14 12   Temp:   97.7  F (36.5  C)    TempSrc:   Oral    SpO2: 99% 97% 95%        ED Boarding Nurse name: Nicky Contreras RN

## 2025-02-24 NOTE — H&P
"[facility]       Hospitalist History & Physical     Assessment & Plan     ASSESSMENT    89M with history of A-fib on Eliquis, CKD stage III, BPH, and suspected dementia presents with generalized weakness and confusion and found to have community-acquired pneumonia.    PLAN    Community-Acquired Pneumonia  -Ceftriaxone 2 g daily  -Doxycycline 100 mg twice daily  -Follow-up blood cultures    Acute Metabolic Encephalopathy & Dementia  -Has had \"sundowning\" in recent months, worse on last 48 hours  -May be acutely worse in setting of pneumonia    Generalized Weakness & Ambulatory Dysfunction  -Physical therapy consultation  -SW consult, currently in independent living, will need assisted living or memory care    New LBBB & Chronically Elevated Troponin  -No chest pain and EKG does not meet Deysiosserum's criteria  -Troponin chronically elevated to 50's    Other Issues  -Chronic Afib: Home atenolol and Eliquis  -BPH: Home Flomax  -CKD stage III: Creatinine at baseline    DVT Prophy  -SCDs    Disposition  -Medically Ready for Discharge: Anticipated Tomorrow       Milad Guerrero MD    History of Present Illness     Kraig Genao is a 89 year old with history of A-fib on Eliquis, CKD stage III, BPH, and suspected dementia presents with generalized weakness and confusion.  Most of the history is taken from son at bedside given patient is currently confused.  Per son, patient has been exhibiting signs of dementia over the last few months.  Lives in independent living.  Forgetful and has agitation issues at night and often calling family members for help.  Over the last few days, has been much more severe and has been found wandering the halls of his independent living facility.  Also with generalized weakness and ambulatory dysfunction.  Son says that he looks like he is going to fall at any moment but has not had any falls as of yet.  Slight cough but denies shortness of breath, fevers, nausea, vomiting, " abdominal flank pain, headache, or neck stiffness.  No lower extremity edema.  No sick contacts.  In the ED, WBC 17.0.  Troponin 57.  UA negative.  Viral respiratory panel negative.  CXR concerning for pneumonia.  CT head negative.  EKG with new left bundle branch block but does not meet Sgarbossa's criteria.  QTc 520.  Admitted to medicine.    Review of Systems     A Comprehensive greater than 10 system review of systems was carried out.  Pertinent positives and negatives are noted above.  Otherwise negative for contributory information.     Past Medical History     Past Medical History:   Diagnosis Date    Bile duct stone     Chronic atrial fibrillation     Elevated LFTs     Erectile dysfunction     Gastroesophageal reflux disease 05/04/2016    h/o Helicobacter pylori infection     had UGI endoscopy 10/00 & had LES dilitation    Hypertrophy (benign) of prostate     some voiding sx    Macular degeneration (senile) of retina     mainly R eye     Medications     Current Outpatient Medications   Medication Sig Dispense Refill    albuterol (PROAIR HFA/PROVENTIL HFA/VENTOLIN HFA) 108 (90 Base) MCG/ACT inhaler Inhale 2 puffs into the lungs every 4 hours as needed for shortness of breath, wheezing or cough 18 g 0    amoxicillin-clavulanate (AUGMENTIN) 875-125 MG tablet Take 1 tablet by mouth 2 times daily 14 tablet 0    apixaban ANTICOAGULANT (ELIQUIS) 5 MG tablet Take 1 tablet (5 mg) by mouth 2 times daily 60 tablet 12    atenolol (TENORMIN) 50 MG tablet Take 25 mg by mouth 2 times daily      biotin 1000 MCG TABS tablet Take by mouth daily      budesonide-formoterol (SYMBICORT) 160-4.5 MCG/ACT inhaler Inhale 2 puffs into the lungs 2 times daily      Cholecalciferol (VITAMIN D3 PO) Take 1,000 Units by mouth daily      Cholecalciferol 10 MCG /0.025ML LIQD Take 50 mcg by mouth daily      diclofenac (VOLTAREN) 1 % topical gel Apply 2 g topically 3 times daily as needed for moderate pain. 50 g 0    methocarbamol (ROBAXIN) 500  MG tablet Take 1 tablet (500 mg) by mouth 3 times daily as needed for muscle spasms or other (chest wall pain). 21 tablet 0    Multiple Vitamin (ONE-A-DAY MENS PO) Take 1 tablet by mouth daily      Omega-3 Fatty Acids (FISH OIL) 1200 MG CAPS Take by mouth daily      omeprazole (PRILOSEC) 20 MG capsule Take 20 mg by mouth daily      tamsulosin (FLOMAX) 0.4 MG capsule Take 0.4 mg by mouth daily        Past Surgical History     Past Surgical History:   Procedure Laterality Date    ARTHROPLASTY SHOULDER Right 2004    COLONOSCOPY  2001    ENDOSCOPIC RETROGRADE CHOLANGIOPANCREATOGRAM  09/23/2011    Procedure:ENDOSCOPIC RETROGRADE CHOLANGIOPANCREATOGRAM; Endoscopic Retrograde Cholangiopancreatogram (c-arm), baloon dilation, stone retreival and 10fr. Solus stent placement x2 in  bile duct with the spy glass; Surgeon:EDD AUGUST; Location:[facility] OR    ENDOSCOPIC RETROGRADE CHOLANGIOPANCREATOGRAPHY, LITHOTRIPSY PANCREAS, COMBINED  10/27/2011    Procedure:COMBINED ENDOSCOPIC RETROGRADE CHOLANGIOPANCREATOGRAPHY, LITHOTRIPSY PANCREAS; Endoscopic Retrograde Cholangiopancreatogram with spygass scope ,Electrohydraulic Lithotripsy  baloon dilation of bile duct, stone removal (c-arm); Surgeon:EDD AUGUST; Location:[facility] OR    ESOPHAGOSCOPY, GASTROSCOPY, DUODENOSCOPY (EGD), COMBINED N/A 05/09/2016    Procedure: COMBINED ESOPHAGOSCOPY, GASTROSCOPY, DUODENOSCOPY (EGD), BIOPSY SINGLE OR MULTIPLE;  Surgeon: Mendez Mcknight MD;  Location:  GI    ESOPHAGOSCOPY, GASTROSCOPY, DUODENOSCOPY (EGD), COMBINED N/A 02/11/2018    Procedure: COMBINED ESOPHAGOSCOPY, GASTROSCOPY, DUODENOSCOPY (EGD), REMOVE FOREIGN BODY;  COMBINED ESOPHAGOSCOPY, GASTROSCOPY, DUODENOSCOPY (EGD) by raptor graspeing ;  Surgeon: Mendez Mcknight MD;  Location:  GI    ESOPHAGOSCOPY, GASTROSCOPY, DUODENOSCOPY (EGD), COMBINED Left 02/16/2018    Procedure: COMBINED ESOPHAGOSCOPY, GASTROSCOPY, DUODENOSCOPY (EGD), BIOPSY SINGLE OR MULTIPLE;  ESOPHAGOSCOPY, GASTROSCOPY,  DUODENOSCOPY (EGD) (Nicci) with biopsies using cold bx forcep and tts balloon dilation 12-15mm;  Surgeon: Mendez Mcknight MD;  Location: [facility]    ESOPHAGOSCOPY, GASTROSCOPY, DUODENOSCOPY (EGD), COMBINED N/A 08/10/2021    Procedure: ESOPHAGOGASTRODUODENOSCOPY (EGD);  Surgeon: Mendez Mcknight MD;  Location: [facility]    ESOPHAGOSCOPY, GASTROSCOPY, DUODENOSCOPY (EGD), COMBINED N/A 12/23/2023    Procedure: ESOPHAGOGASTRODUODENOSCOPY, WITH FOREIGN BODY REMOVAL by geovanysmary ann raptor grasping forcep;  Surgeon: Mendez Mcknight MD;  Location: [facility] GI    INGUINAL HERNIA REPAIR      LAPAROSCOPIC CHOLECYSTECTOMY  2002    Repeat Shoulder arthroplasty Right 2005     Family History     Family History   Problem Relation Age of Onset    Heart Disease Father          M.I.    Esophageal Cancer No family hx of     Gastrointestinal Disease No family hx of     Stomach Cancer No family hx of      Allergies     Allergies   Allergen Reactions    No Known Allergies      Social History     Social History     Tobacco Use    Smoking status: Never    Smokeless tobacco: Never   Substance Use Topics    Alcohol use: Yes     Alcohol/week: 0.0 standard drinks of alcohol     Comment: 1-2 beer, wine, or mixed drink per night     Physical Exam   Blood pressure (!) 153/94, pulse 77, temperature 97.8  F (36.6  C), temperature source Oral, resp. rate 18, SpO2 99%.    General: Ill appearing, cooperative with exam, in NAD.  HEENT: Atraumatic. No erythema in posterior pharynx.  Lymph: No cervical or inguinal lymphadenopathy.  Cardiac: RRR. No murmurs.  Lungs: CTAB. Nl WOB.  Abd: Non-tender. No rebound or gaurding. Nl bowel sounds.  Ext: No edema. 2+ pulses.  Skin: No rashes, abrasions, or contusions.  Psych: A&Ox3. Nl affect.  Neuro: 5/5 strength. Sensation intact.    Labs & Imaging     Reviewed and Pertinent results discussed in assessment and plan.

## 2025-02-24 NOTE — PROGRESS NOTES
HR jumping into 140's, hospitalist paged and aware. PRN Zyprexa/Haldol given with relief. Pt still agitated after, hospitalist paged. One time dose of haldol given. O2 sats dropped down to 78% on RA. Pt placed on oximask 10L, now on 6 L via oximask sats 93-94%. PRN nebulizer given for wheezing/SOB with relief noted. Pt now sleeping.

## 2025-02-24 NOTE — PROGRESS NOTES
"[facility]    Medicine Progress Note - Hospitalist Service    Date of Admission:  2/23/2025    Assessment & Plan    89M with history of A-fib on Eliquis, CKD stage III, BPH, and suspected dementia presents with generalized weakness and confusion and found to have community-acquired pneumonia.      Possible CAP  -CT not impressive but some borderline respiratory symptoms  -agree with covering with antibiotics but plan for short course  -family interested in palliative eval, consulted  -check viral respiratory panel, initial limited panel negative     Acute Metabolic and infectious encephalopathy   Progressive dementia  -Has had \"sundowning\" in recent months, worse last few days  -still confused and agitated at times  -cont PRN seroquel, attempt to reorient     Generalized Weakness & Ambulatory Dysfunction  -Physical therapy consultation  -SW consult, currently in independent living  -PT to evaluate     New LBBB & Chronically Elevated Troponin  -No chest pain and EKG does not meet Woorbossa's criteria  -Troponin chronically elevated to 50's     Other Issues  -Chronic Afib: Home atenolol and Eliquis  -BPH: Home Flomax  -CKD stage III: Creatinine at baseline          Diet: Regular Diet Adult    DVT Prophylaxis: DOAC  Lieberman Catheter: Not present  Lines: None     Cardiac Monitoring: ACTIVE order. Indication: Acute decompensated heart failure (48 hours)  Code Status: No CPR- Do NOT Intubate        Social Drivers of Health            Disposition Plan     Medically Ready for Discharge: Anticipated in 2-4 Days             Shahab Her DO  Hospitalist Service  [facility]  Securely message with [employer] (more info)  Text page via [employer] Paging/Directory   ______________________________________________________________________    Interval History   Still getting agitated at times, no fever or significant respiratory symptoms. No diarrhea or dysuria    Physical Exam   Vital Signs: " Temp: 99.3  F (37.4  C) Temp src: Axillary BP: (!) 155/91 Pulse: 66   Resp: 16 SpO2: 94 % O2 Device: None (Room air)    Weight: 0 lbs 0 oz    Constitutional: chronically ill appearing, no distress but some occasional agitation  Respiratory: no increased work of breathing, good air exchange, and no crackles or wheezing  Cardiovascular: regular rate and rhythm and no murmur noted  GI: normal bowel sounds, soft, and non-distended  Skin: no bruising or bleeding  Neurologic: drowsy, oriented x1, moving exremities    Medical Decision Making       45 MINUTES SPENT BY ME on the date of service doing chart review, history, exam, documentation & further activities per the note.      Data   ------------------------- PAST 24 HR DATA REVIEWED -----------------------------------------------    I have personally reviewed the following data over the past 24 hrs:    11.5 (H)  \   12.2 (L)   / 223     134 (L) 98 25.5 (H) /  103 (H)   3.8 23 1.07 \     ALT: 14 AST: 29 AP: 91 TBILI: 1.7 (H)   ALB: 4.4 TOT PROTEIN: 7.6 LIPASE: N/A     Trop: 57 (H) BNP: N/A     Procal: N/A CRP: N/A Lactic Acid: 1.0         Imaging results reviewed over the past 24 hrs:   Recent Results (from the past 24 hours)   CT Head w/o Contrast    Narrative    EXAM: CT HEAD W/O CONTRAST  LOCATION: [facility]  DATE: 2/23/2025    INDICATION: Weakness and confusion.  COMPARISON: CT 10/27/2022.  TECHNIQUE: Routine CT Head without IV contrast. Multiplanar reformats. Dose reduction techniques were used.    FINDINGS:  INTRACRANIAL CONTENTS: No intracranial hemorrhage, extraaxial collection, or mass effect.  No CT evidence of acute infarct. Mild presumed chronic small vessel ischemic changes. Moderate generalized volume loss. No hydrocephalus. The sella is unremarkable   for technique. The cerebellar tonsils are appropriately positioned.    VISUALIZED ORBITS/SINUSES/MASTOIDS: Prior bilateral cataract surgery. Visualized portions of the orbits are  otherwise unremarkable. No paranasal sinus mucosal disease. No middle ear or mastoid effusion.    BONES/SOFT TISSUES: No scalp hematoma. No skull fracture.      Impression    IMPRESSION:  1.  No evidence of an acute intracranial abnormality.  2.  Mild presumed chronic small vessel ischemic change.  3.  Moderate atrophy.     XR Chest 2 Views    Narrative    EXAM: XR CHEST 2 VIEWS  LOCATION: [facility]  DATE: 2/23/2025    INDICATION: altered mental status, leukocytosis   COMPARISON: CTA chest and radiograph 10/3/2024      Impression    IMPRESSION: Hazy opacity in the left mid and lower lung, suspicious for infection. No pleural effusion or pneumothorax. Stable cardiomegaly. Right shoulder replacement.

## 2025-02-24 NOTE — ED TRIAGE NOTES
Pt arrives via EMS from his [facility] where he lives independently for increased weakness and confusion that was noticed upon waking and has persisted all day. Is normally a&o x 4. Also complains of dysuria. Per ems, was an assist of 1 pivot transfer which is not his baseline.

## 2025-02-24 NOTE — ED NOTES
[facility]  ED Nurse Handoff Report    ED Chief complaint: Altered Mental Status and Generalized Weakness  . ED Diagnosis:   Final diagnoses:   Confusion   Generalized weakness   LBBB (left bundle branch block)       Allergies:   Allergies   Allergen Reactions    No Known Allergies        Code Status: DNR / DNI    Activity level - Baseline/Home:  independent.  Activity Level - Current:   assist of 1.   Lift room needed: No.   Bariatric: No   Needed: No   Isolation: No.   Infection: Not Applicable.     Respiratory status: Room air    Vital Signs (within 30 minutes):   Vitals:    02/23/25 1858 02/23/25 2030   BP: (!) 140/77 (!) 153/94   Pulse: 77    Resp: 18    Temp: 97.8  F (36.6  C)    TempSrc: Oral    SpO2: 97% 99%       Cardiac Rhythm:  ,      Pain level:    Patient confused: Yes.   Patient Falls Risk: nonskid shoes/slippers when out of bed and patient and family education.   Elimination Status: Has voided     Patient Report - Initial Complaint: confusion, weakness.   Focused Assessment: 89 year old male on Eliquis with a history of chronic atrial fibrillation, GERD, and CKD stage 3 presenting to the ED for weakness and confusion. Per EMS, he comes from his independent living facility for weakness and confusion, which is not baseline. He is typically independent, alert, and oriented. He further endorses dysuria and not eating much today. Denies recent falls, chest pain, abdominal pain, or hematuria.      Per patient's son, he reports increased confusion and memory loss for the past few weeks. Previously, he was able to ambulate with his walker. Today, staff found him wandering without pants and a walker, prompting staff to call his children. They arrived to the facility around 1330, stating that he was abnormally fatigued, and somewhat unresponsive while transferring him between his bed and chair. At lunch, he had soiled himself, then returned to his room at which point he was  phasing in and out of consciousness, prompting 911. He notes that he has not taken his 1000 meds. [facility], with a nurse visiting him on a biweekly basis.      Abnormal Results:   Labs Ordered and Resulted from Time of ED Arrival to Time of ED Departure   COMPREHENSIVE METABOLIC PANEL - Abnormal       Result Value    Sodium 136      Potassium 4.0      Carbon Dioxide (CO2) 22      Anion Gap 17 (*)     Urea Nitrogen 28.3 (*)     Creatinine 1.26 (*)     GFR Estimate 52 (*)     Calcium 9.3      Chloride 97 (*)     Glucose 130 (*)     Alkaline Phosphatase 91      AST 29      ALT 14      Protein Total 7.6      Albumin 4.4      Bilirubin Total 1.7 (*)    ROUTINE UA WITH MICROSCOPIC REFLEX TO CULTURE - Abnormal    Color Urine Yellow      Appearance Urine Clear      Glucose Urine Negative      Bilirubin Urine Negative      Ketones Urine 20 (*)     Specific Gravity Urine 1.017      Blood Urine Negative      pH Urine 5.5      Protein Albumin Urine 50 (*)     Urobilinogen Urine Normal      Nitrite Urine Negative      Leukocyte Esterase Urine Negative      Mucus Urine Present (*)     RBC Urine 1      WBC Urine 1      Squamous Epithelials Urine <1     CBC WITH PLATELETS AND DIFFERENTIAL - Abnormal    WBC Count 17.0 (*)     RBC Count 4.05 (*)     Hemoglobin 12.8 (*)     Hematocrit 39.7 (*)     MCV 98      MCH 31.6      MCHC 32.2      RDW 12.7      Platelet Count 257      % Neutrophils 91      % Lymphocytes 2      % Monocytes 6      % Eosinophils 0      % Basophils 0      % Immature Granulocytes 1      NRBCs per 100 WBC 0      Absolute Neutrophils 15.4 (*)     Absolute Lymphocytes 0.4 (*)     Absolute Monocytes 1.1      Absolute Eosinophils 0.0      Absolute Basophils 0.0      Absolute Immature Granulocytes 0.1      Absolute NRBCs 0.0     TROPONIN T, HIGH SENSITIVITY - Abnormal    Troponin T, High Sensitivity 60 (*)    TROPONIN T, HIGH SENSITIVITY - Abnormal    Troponin T, High Sensitivity 57 (*)    LACTIC ACID WHOLE  BLOOD WITH 1X REPEAT IN 2 HR WHEN >2 - Normal    Lactic Acid, Initial 1.0     MAGNESIUM - Normal    Magnesium 1.8     INFLUENZA A/B, RSV AND SARS-COV2 PCR - Normal    Influenza A PCR Negative      Influenza B PCR Negative      RSV PCR Negative      SARS CoV2 PCR Negative     BLOOD CULTURE   BLOOD CULTURE        CT Head w/o Contrast   Final Result   IMPRESSION:   1.  No evidence of an acute intracranial abnormality.   2.  Mild presumed chronic small vessel ischemic change.   3.  Moderate atrophy.         XR Chest 2 Views    (Results Pending)       Treatments provided: see mar  Family Comments: involved and very supportive  OBS brochure/video discussed/provided to patient:  Yes  ED Medications:   Medications   acetaminophen (TYLENOL) tablet 650 mg (has no administration in time range)     Or   acetaminophen (TYLENOL) Suppository 650 mg (has no administration in time range)   senna-docusate (SENOKOT-S/PERICOLACE) 8.6-50 MG per tablet 1 tablet (has no administration in time range)     Or   senna-docusate (SENOKOT-S/PERICOLACE) 8.6-50 MG per tablet 2 tablet (has no administration in time range)   ondansetron (ZOFRAN ODT) ODT tab 4 mg (has no administration in time range)     Or   ondansetron (ZOFRAN) injection 4 mg (has no administration in time range)   prochlorperazine (COMPAZINE) injection 5 mg (has no administration in time range)     Or   prochlorperazine (COMPAZINE) tablet 5 mg (has no administration in time range)   nicotine (COMMIT) lozenge 2 mg (has no administration in time range)   lactated ringers infusion (has no administration in time range)   sodium chloride 0.9% BOLUS 1,000 mL (0 mLs Intravenous Stopped 2/23/25 2022)       Drips infusing:  No  For the majority of the shift this patient was Green.   Interventions performed were NA.    Sepsis treatment initiated: No    Cares/treatment/interventions/medications to be completed following ED care: see inpatient orders    ED Nurse Name: Valeri Mendoza,  RN  9:17 PM

## 2025-02-24 NOTE — ED PROVIDER NOTES
Emergency Department Note      History of Present Illness     Chief Complaint   Weakness and confusion     HPI history is limited due to patient's condition.   Kraig Genao is a 89 year old male on Eliquis with a history of chronic atrial fibrillation, GERD, and CKD stage 3 presenting to the ED for weakness and confusion. Per EMS, he comes from his independent living facility for weakness and confusion, which is not baseline. He is typically independent, alert, and oriented. He further endorses dysuria and not eating much today. Denies recent falls, chest pain, abdominal pain, or hematuria.     Per patient's son, he reports increased confusion and memory loss for the past few weeks. Previously, he was able to ambulate with his walker. Today, staff found him wandering without pants and a walker, prompting staff to call his children. They arrived to the facility around 1330, stating that he was abnormally fatigued, and somewhat unresponsive while transferring him between his bed and chair. At lunch, he had soiled himself, then returned to his room at which point he was phasing in and out of consciousness, prompting 911. He notes that he has not taken his medications today. [facility] with [facility] him on a biweekly basis.     Independent Historian   EMS as detailed above.    Son as detailed above.     Review of External Notes   None    Past Medical History     Medical History and Problem List   Bile duct stone  Chronic atrial fibrillation  Erectile dysfunction  GERD  Hypertrophy (benign) of prostate  Macular degeneration of retina  CKD stage 3  Moderate COPD    Medications   Albuterol  Augmentin  Eliquis  Tenormin  Biotin  Symbicort  Prilosec  Flomax    Surgical History   Arthroplasty shoulder, right  Colonoscopy  Endoscopic retrograde cholangiopancreatogram  Endoscopic retrograde cholangiopancreatography, lithotripsy pancreas, combined  EGD 5x  Hernia repair  Laparoscopic  cholecystectomy  Repeat shoulder arthroplasty, right     Physical Exam     Patient Vitals for the past 24 hrs:   BP Temp Temp src Pulse Resp SpO2   02/23/25 2030 (!) 153/94 -- -- -- -- 99 %   02/23/25 1858 (!) 140/77 97.8  F (36.6  C) Oral 77 18 97 %     Physical Exam  General: Elderly. Nontoxic.  Resting comfortably  Head:  Scalp, face, and head appear normal, atraumatic   Eyes:  Pupils are equal, round, reactive to light EOMI, no nystagmus     Conjunctivae non-injected and sclerae white  ENT:    The external nose is normal    Pinnae are normal  Neck:  Normal range of motion    There is no rigidity noted    Trachea is in the midline  CV:  Regular rate and rhythm     Normal S1/S2, no S3/S4    3/6 systolic murmur. No rub. Radial pulses 2+ bilaterally.  Resp:  Lungs are clear and equal bilaterally  There is no tachypnea    No increased work of breathing    No rales, wheezing, or rhonchi  GI:  Abdomen is soft, no rigidity or guarding    No distension, or mass    No tenderness or rebound tenderness   MS:  Normal muscular tone. Full painless ROM of all extremities. Extremities non tender to palpation and atraumatic.     Symmetric motor strength    No lower extremity edema  Skin:  No rash or acute skin lesions noted  Neuro:  A&Ox3, GCS 15    CN II - XII intact    Speech clear, fluent, and normal    Strength 5/5 and symmetric in bilateral upper and lower extremities.    No pronator drift. No leg drift. SILT throughout.    No ankle clonus    FTN testing normal. No tremor.     No meningismus   Psych: Normal affect. Appropriate interactions.      Diagnostics     Lab Results   Labs Ordered and Resulted from Time of ED Arrival to Time of ED Departure   COMPREHENSIVE METABOLIC PANEL - Abnormal       Result Value    Sodium 136      Potassium 4.0      Carbon Dioxide (CO2) 22      Anion Gap 17 (*)     Urea Nitrogen 28.3 (*)     Creatinine 1.26 (*)     GFR Estimate 52 (*)     Calcium 9.3      Chloride 97 (*)     Glucose 130 (*)      Alkaline Phosphatase 91      AST 29      ALT 14      Protein Total 7.6      Albumin 4.4      Bilirubin Total 1.7 (*)    ROUTINE UA WITH MICROSCOPIC REFLEX TO CULTURE - Abnormal    Color Urine Yellow      Appearance Urine Clear      Glucose Urine Negative      Bilirubin Urine Negative      Ketones Urine 20 (*)     Specific Gravity Urine 1.017      Blood Urine Negative      pH Urine 5.5      Protein Albumin Urine 50 (*)     Urobilinogen Urine Normal      Nitrite Urine Negative      Leukocyte Esterase Urine Negative      Mucus Urine Present (*)     RBC Urine 1      WBC Urine 1      Squamous Epithelials Urine <1     CBC WITH PLATELETS AND DIFFERENTIAL - Abnormal    WBC Count 17.0 (*)     RBC Count 4.05 (*)     Hemoglobin 12.8 (*)     Hematocrit 39.7 (*)     MCV 98      MCH 31.6      MCHC 32.2      RDW 12.7      Platelet Count 257      % Neutrophils 91      % Lymphocytes 2      % Monocytes 6      % Eosinophils 0      % Basophils 0      % Immature Granulocytes 1      NRBCs per 100 WBC 0      Absolute Neutrophils 15.4 (*)     Absolute Lymphocytes 0.4 (*)     Absolute Monocytes 1.1      Absolute Eosinophils 0.0      Absolute Basophils 0.0      Absolute Immature Granulocytes 0.1      Absolute NRBCs 0.0     TROPONIN T, HIGH SENSITIVITY - Abnormal    Troponin T, High Sensitivity 60 (*)    TROPONIN T, HIGH SENSITIVITY - Abnormal    Troponin T, High Sensitivity 57 (*)    LACTIC ACID WHOLE BLOOD WITH 1X REPEAT IN 2 HR WHEN >2 - Normal    Lactic Acid, Initial 1.0     MAGNESIUM - Normal    Magnesium 1.8     INFLUENZA A/B, RSV AND SARS-COV2 PCR - Normal    Influenza A PCR Negative      Influenza B PCR Negative      RSV PCR Negative      SARS CoV2 PCR Negative     BLOOD CULTURE   BLOOD CULTURE       Imaging   XR Chest 2 Views   Final Result   IMPRESSION: Hazy opacity in the left mid and lower lung, suspicious for infection. No pleural effusion or pneumothorax. Stable cardiomegaly. Right shoulder replacement.      CT Head w/o Contrast    Final Result   IMPRESSION:   1.  No evidence of an acute intracranial abnormality.   2.  Mild presumed chronic small vessel ischemic change.   3.  Moderate atrophy.             EKG   ECG taken at 1926, ECG read at 1934  Atrial fibrillation   Left axis deviation  Left bundle branch block  Abnormal ECG  New LBBB as compared to prior, dated 10/3/24.  Rate 88 bpm. SC interval * ms. QRS duration 154 ms. QT/QTc 430/520 ms. P-R-T axes * -59 106.    Independent Interpretation   See below     ED Course      Medications Administered   Medications   acetaminophen (TYLENOL) tablet 650 mg (has no administration in time range)     Or   acetaminophen (TYLENOL) Suppository 650 mg (has no administration in time range)   senna-docusate (SENOKOT-S/PERICOLACE) 8.6-50 MG per tablet 1 tablet (has no administration in time range)     Or   senna-docusate (SENOKOT-S/PERICOLACE) 8.6-50 MG per tablet 2 tablet (has no administration in time range)   nicotine (COMMIT) lozenge 2 mg (has no administration in time range)   lactated ringers infusion (has no administration in time range)   cefTRIAXone (ROCEPHIN) 2 g vial to attach to  ml bag for ADULTS or NS 50 ml bag for PEDS (has no administration in time range)   doxycycline hyclate (VIBRAMYCIN) capsule 100 mg (has no administration in time range)   apixaban ANTICOAGULANT (ELIQUIS) tablet 5 mg (has no administration in time range)   atenolol (TENORMIN) tablet 50 mg (has no administration in time range)   pantoprazole (PROTONIX) EC tablet 40 mg (has no administration in time range)   tamsulosin (FLOMAX) capsule 0.4 mg (has no administration in time range)   ipratropium - albuterol 0.5 mg/2.5 mg/3 mL (DUONEB) neb solution 3 mL (has no administration in time range)   sodium chloride 0.9% BOLUS 1,000 mL (0 mLs Intravenous Stopped 2/23/25 2022)       Procedures   Procedures     Discussion of Management   Admitting Hospitalist, Dr. Dale    ED Course   ED Course as of 02/23/25 9455    Sun Feb 23, 2025 1858 I obtained history and examined the patient as noted above.     2053 I performed an independent interpretation of the patient's Head CT. No large volume intracranial hemorrhage or midline shift seen.    2112 I consulted with Dr. Dale of the hospitalist service and discussed patient admission. They accepted care of the patient.       Additional Documentation  None    Medical Decision Making / Diagnosis     CMS Diagnoses: None    MIPS       None    MDM   Kraig Genao is a 89 year old male who presents for evaluation of generalized weakness and confusion.  Patient is typically independent and performs all of his own ADLs.  On my evaluation he is nontoxic, hemodynamically stable and afebrile.  No focal neurologic deficits to suggest stroke or other acute CNS process.  CT scan of the head was obtained and negative for any acute findings.  No intracranial hemorrhage, mass lesion, cerebral edema or other findings.  Broad ED workup was largely unrevealing.  EKG shows chronic atrial fibrillation with left bundle branch block.  Patient previously had an incomplete left bundle branch block pattern on old EKG.  He is having no chest pain or shortness of breath.  High-sensitivity troponin is minimally elevated but stable without significant rise.  He denies any chest pain or clinical symptoms to suggest acute coronary syndrome, myopericarditis or other primary cardiac etiology. COVID/Influenza/RSV testing negative.  Urinalysis negative for UTI.  Creatinine is at baseline.  CBC with leukocytosis of 17.  No evidence of any skin or soft tissue infection.  Lactic acid is normal.  Patient is not having any significant cough, shortness of breath or hypoxia.  No clear respiratory symptoms.  However given leukocytosis and acute change in his functional status there is concern for possible underlying infection.  Chest x-ray is ordered and pending at this time.  Given the overall clinical picture  patient will require admission to the hospital for ongoing monitoring evaluation and treatment.  The case was discussed with the hospitalist and the patient was admitted in stable condition.    Disposition   The patient was admitted to the hospital.     Diagnosis     ICD-10-CM    1. Confusion  R41.0       2. Generalized weakness  R53.1       3. LBBB (left bundle branch block)  I44.7              Scribe Disclosure:  Rupert MADDEN, am serving as a scribe at 6:57 PM on 2/23/2025 to document services personally performed by Reynaldo Dickson MD based on my observations and the provider's statements to me.        Reynaldo Dickson MD  02/23/25 7088

## 2025-02-25 ENCOUNTER — APPOINTMENT (OUTPATIENT)
Dept: PHYSICAL THERAPY | Facility: CLINIC | Age: OVER 89
End: 2025-02-25
Attending: INTERNAL MEDICINE
Payer: MEDICARE

## 2025-02-25 PROCEDURE — 97116 GAIT TRAINING THERAPY: CPT | Mod: GP

## 2025-02-25 PROCEDURE — 250N000011 HC RX IP 250 OP 636: Mod: JW

## 2025-02-25 PROCEDURE — 250N000011 HC RX IP 250 OP 636: Performed by: INTERNAL MEDICINE

## 2025-02-25 PROCEDURE — 97161 PT EVAL LOW COMPLEX 20 MIN: CPT | Mod: GP

## 2025-02-25 PROCEDURE — 250N000013 HC RX MED GY IP 250 OP 250 PS 637: Performed by: STUDENT IN AN ORGANIZED HEALTH CARE EDUCATION/TRAINING PROGRAM

## 2025-02-25 PROCEDURE — 99222 1ST HOSP IP/OBS MODERATE 55: CPT | Performed by: STUDENT IN AN ORGANIZED HEALTH CARE EDUCATION/TRAINING PROGRAM

## 2025-02-25 PROCEDURE — 97530 THERAPEUTIC ACTIVITIES: CPT | Mod: GP

## 2025-02-25 PROCEDURE — 250N000013 HC RX MED GY IP 250 OP 250 PS 637: Performed by: CLINICAL NURSE SPECIALIST

## 2025-02-25 PROCEDURE — 250N000011 HC RX IP 250 OP 636: Performed by: HOSPITALIST

## 2025-02-25 PROCEDURE — 99231 SBSQ HOSP IP/OBS SF/LOW 25: CPT | Performed by: CLINICAL NURSE SPECIALIST

## 2025-02-25 PROCEDURE — 250N000013 HC RX MED GY IP 250 OP 250 PS 637

## 2025-02-25 PROCEDURE — 99233 SBSQ HOSP IP/OBS HIGH 50: CPT

## 2025-02-25 PROCEDURE — 250N000013 HC RX MED GY IP 250 OP 250 PS 637: Performed by: INTERNAL MEDICINE

## 2025-02-25 PROCEDURE — 120N000001 HC R&B MED SURG/OB

## 2025-02-25 RX ORDER — OLANZAPINE 10 MG/2ML
2.5 INJECTION, POWDER, FOR SOLUTION INTRAMUSCULAR DAILY PRN
Status: DISCONTINUED | OUTPATIENT
Start: 2025-02-25 | End: 2025-03-19 | Stop reason: HOSPADM

## 2025-02-25 RX ORDER — FINASTERIDE 5 MG/1
5 TABLET, FILM COATED ORAL DAILY
Status: DISCONTINUED | OUTPATIENT
Start: 2025-02-25 | End: 2025-03-19 | Stop reason: HOSPADM

## 2025-02-25 RX ORDER — LIDOCAINE 4 G/G
2 PATCH TOPICAL
Status: DISCONTINUED | OUTPATIENT
Start: 2025-02-25 | End: 2025-03-19 | Stop reason: HOSPADM

## 2025-02-25 RX ORDER — QUETIAPINE FUMARATE 25 MG/1
25 TABLET, FILM COATED ORAL EVERY 6 HOURS PRN
Status: DISCONTINUED | OUTPATIENT
Start: 2025-02-25 | End: 2025-03-19 | Stop reason: HOSPADM

## 2025-02-25 RX ORDER — TAMSULOSIN HYDROCHLORIDE 0.4 MG/1
0.8 CAPSULE ORAL EVERY EVENING
Status: DISCONTINUED | OUTPATIENT
Start: 2025-02-26 | End: 2025-03-19 | Stop reason: HOSPADM

## 2025-02-25 RX ORDER — TAMSULOSIN HYDROCHLORIDE 0.4 MG/1
0.4 CAPSULE ORAL ONCE
Status: COMPLETED | OUTPATIENT
Start: 2025-02-25 | End: 2025-02-25

## 2025-02-25 RX ADMIN — ATENOLOL 50 MG: 50 TABLET ORAL at 08:23

## 2025-02-25 RX ADMIN — DOXYCYCLINE HYCLATE 100 MG: 100 CAPSULE ORAL at 20:45

## 2025-02-25 RX ADMIN — CEFTRIAXONE 2 G: 2 INJECTION, POWDER, FOR SOLUTION INTRAMUSCULAR; INTRAVENOUS at 22:39

## 2025-02-25 RX ADMIN — TAMSULOSIN HYDROCHLORIDE 0.4 MG: 0.4 CAPSULE ORAL at 08:23

## 2025-02-25 RX ADMIN — TAMSULOSIN HYDROCHLORIDE 0.4 MG: 0.4 CAPSULE ORAL at 20:45

## 2025-02-25 RX ADMIN — APIXABAN 5 MG: 5 TABLET, FILM COATED ORAL at 08:23

## 2025-02-25 RX ADMIN — OLANZAPINE 2.5 MG: 10 INJECTION, POWDER, FOR SOLUTION INTRAMUSCULAR at 20:35

## 2025-02-25 RX ADMIN — LIDOCAINE 2 PATCH: 4 PATCH TOPICAL at 20:45

## 2025-02-25 RX ADMIN — PANTOPRAZOLE SODIUM 40 MG: 40 TABLET, DELAYED RELEASE ORAL at 08:24

## 2025-02-25 RX ADMIN — Medication 3 MG: at 22:38

## 2025-02-25 RX ADMIN — FINASTERIDE 5 MG: 5 TABLET, FILM COATED ORAL at 16:57

## 2025-02-25 RX ADMIN — QUETIAPINE FUMARATE 25 MG: 25 TABLET ORAL at 23:37

## 2025-02-25 RX ADMIN — HALOPERIDOL LACTATE 2 MG: 5 INJECTION, SOLUTION INTRAMUSCULAR at 02:04

## 2025-02-25 RX ADMIN — DOXYCYCLINE HYCLATE 100 MG: 100 CAPSULE ORAL at 08:23

## 2025-02-25 ASSESSMENT — ACTIVITIES OF DAILY LIVING (ADL)
ADLS_ACUITY_SCORE: 72
ADLS_ACUITY_SCORE: 75
ADLS_ACUITY_SCORE: 75
ADLS_ACUITY_SCORE: 72
ADLS_ACUITY_SCORE: 80
ADLS_ACUITY_SCORE: 75
ADLS_ACUITY_SCORE: 83
ADLS_ACUITY_SCORE: 75
ADLS_ACUITY_SCORE: 75
ADLS_ACUITY_SCORE: 72
ADLS_ACUITY_SCORE: 83
ADLS_ACUITY_SCORE: 83
ADLS_ACUITY_SCORE: 72
ADLS_ACUITY_SCORE: 75
ADLS_ACUITY_SCORE: 77
ADLS_ACUITY_SCORE: 83
ADLS_ACUITY_SCORE: 66
ADLS_ACUITY_SCORE: 83
ADLS_ACUITY_SCORE: 75
ADLS_ACUITY_SCORE: 83
ADLS_ACUITY_SCORE: 80
ADLS_ACUITY_SCORE: 72
ADLS_ACUITY_SCORE: 83

## 2025-02-25 NOTE — CONSULTS
Care Management Follow Up    Length of Stay (days): 1    Expected Discharge Date: 02/26/2025     Concerns to be Addressed: discharge planning     Patient plan of care discussed at interdisciplinary rounds: Yes    Anticipated Discharge Disposition:                Anticipated Discharge Services:    Anticipated Discharge DME:      Patient/family educated on Medicare website which has current facility and service quality ratings:    Education Provided on the Discharge Plan:    Patient/Family in Agreement with the Plan:      Referrals Placed by CM/SW:    Private pay costs discussed: Not applicable    Discussed  Partnership in Safe Discharge Planning  document with patient/family: No     Handoff Completed: No, handoff not indicated or clinically appropriate    Additional Information:    MEDHAT spoke with pt daughter Valerio over the phone to discuss discharge planning. She explains that they are trying to decide between TCU and having the pt return to [facility] with increased services. She states that she is leaning towards a TCU stay. SW answered questions about TCUs and provided a list. She is going to relay the information to her siblings and will let SW know what they decide.     MEDHAT spoke with Dejah MATIAS at [facility] (P: [phone] F: [phone]) who reports that if they decide to increase services, they can accommodate AX-1 and would want homecare set for pt, their preference is [facility]. She states that she would need to do an on-site assessment prior to his return.     Next Steps: follow up with family for TCU vs return to [facility] with increased services.     Addendum    MEDHAT spoke with Valerio who reports that they have decided upon TCU. She spoke with her brothers and they choose [facility] as top choice then [facility] and [facility]. They will pay the private room fee. Transportation is undetermined at this time, likely [facility], cost discussed.     TCU referrals sent.     Yesica Monet/Forrest, SAMEER, [facility]  Inpatient Care  Coordination  Emergency Room /Float  [phone]    Yesica Monet, LGSW

## 2025-02-25 NOTE — PLAN OF CARE
"Patient is disoriented x3 (patient does not understand why he would be in the hospital and has no idea how he got here). Responds appropriately. Was on O2 this morning, but able to wean him to RA with O2 >90%. Lung sounds clear and patient is on RA. Denies SOB. Active bowel sounds with LBM today. Lieberman placed d/t retention. Urine started out as yellow, but now bloody (provider aware) Eliquis placed on hold and urology consulted.  Generalized bruising on bilateral hands and forearms. Patient is a SBA with walker and gb. Family states he is independent with walker at baseline. Regular diet and ate more at lunch.  Patient continues on Rocephin and Doxycycline for Pneumonia. Medications crushed in pudding or applesauce. Patient comes from [facility] with food help and laundry.    Plan: Urology consult and PT recommends TCU placement.   /76 (BP Location: Left arm)   Pulse 84   Temp 97.3  F (36.3  C) (Oral)   Resp 16   SpO2 96%     Problem: Adult Inpatient Plan of Care  Goal: Plan of Care Review  Description: The Plan of Care Review/Shift note should be completed every shift.  The Outcome Evaluation is a brief statement about your assessment that the patient is improving, declining, or no change.  This information will be displayed automatically on your shift  note.  2/25/2025 1418 by Joana Lam, RN  Outcome: Progressing  Flowsheets (Taken 2/25/2025 1418)  Plan of Care Reviewed With:   patient   child  Overall Patient Progress: improving  2/25/2025 0848 by Joana Lam, RN  Outcome: Progressing  Flowsheets (Taken 2/25/2025 0848)  Plan of Care Reviewed With: patient  Overall Patient Progress: improving  Goal: Patient-Specific Goal (Individualized)  Description: You can add care plan individualizations to a care plan. Examples of Individualization might be:  \"Parent requests to be called daily at 9am for status\", \"I have a hard time hearing out of my right ear\", or \"Do not touch me to wake me up as it " "startles  me\".  2/25/2025 1418 by Joana Lam RN  Outcome: Progressing  2/25/2025 0848 by Joana Lam RN  Outcome: Progressing  Goal: Absence of Hospital-Acquired Illness or Injury  2/25/2025 1418 by Joana Lam RN  Outcome: Progressing  2/25/2025 0848 by Joana Lam RN  Outcome: Progressing  Intervention: Identify and Manage Fall Risk  Recent Flowsheet Documentation  Taken 2/25/2025 0835 by Joana Lam RN  Safety Promotion/Fall Prevention:   activity supervised   nonskid shoes/slippers when out of bed   bedside attendant  Intervention: Prevent Skin Injury  Recent Flowsheet Documentation  Taken 2/25/2025 0835 by Joana Lam RN  Body Position: position maintained  Intervention: Prevent and Manage VTE (Venous Thromboembolism) Risk  Recent Flowsheet Documentation  Taken 2/25/2025 0835 by Joana Lam RN  VTE Prevention/Management: SCDs off (sequential compression devices)  Intervention: Prevent Infection  Recent Flowsheet Documentation  Taken 2/25/2025 0835 by Joana Lam RN  Infection Prevention:   hand hygiene promoted   rest/sleep promoted   single patient room provided  Goal: Optimal Comfort and Wellbeing  2/25/2025 1418 by Joana Lam RN  Outcome: Progressing  2/25/2025 0848 by Joana Lam RN  Outcome: Progressing  Goal: Readiness for Transition of Care  2/25/2025 1418 by Joana Lam RN  Outcome: Progressing  2/25/2025 0848 by Joana Lam RN  Outcome: Progressing   Goal Outcome Evaluation:      Plan of Care Reviewed With: patient, child    Overall Patient Progress: improvingOverall Patient Progress: improving           "

## 2025-02-25 NOTE — PLAN OF CARE
"INPATIENT NOTE:    Patient is alert and oriented to self only. Denies pain, does not appear to be in pain. Patient oxygen was weaned down to 1LPM, sats >90. Tele on, Afib CVR 80s. Peripheral iv saline locked. Regular diet. Rocephin q24 & Doxycycline q12 for pneumonia. Prn iv hadol given for agitation. Bladder scan 576, straight cath 700. SW following for discharge planning. PT consult pending.    Goal Outcome Evaluation:      Plan of Care Reviewed With: patient    Overall Patient Progress: no changeOverall Patient Progress: no change     Problem: Adult Inpatient Plan of Care  Goal: Plan of Care Review  Description: The Plan of Care Review/Shift note should be completed every shift.  The Outcome Evaluation is a brief statement about your assessment that the patient is improving, declining, or no change.  This information will be displayed automatically on your shift  note.  Outcome: Progressing  Flowsheets (Taken 2/25/2025 0452)  Plan of Care Reviewed With: patient  Overall Patient Progress: no change  Goal: Patient-Specific Goal (Individualized)  Description: You can add care plan individualizations to a care plan. Examples of Individualization might be:  \"Parent requests to be called daily at 9am for status\", \"I have a hard time hearing out of my right ear\", or \"Do not touch me to wake me up as it startles  me\".  Outcome: Progressing  Goal: Absence of Hospital-Acquired Illness or Injury  Outcome: Progressing  Intervention: Identify and Manage Fall Risk  Recent Flowsheet Documentation  Taken 2/24/2025 2032 by Rex Suarez RN  Safety Promotion/Fall Prevention:   activity supervised   safety round/check completed   clutter free environment maintained   nonskid shoes/slippers when out of bed   room organization consistent   room near nurse's station  Intervention: Prevent Skin Injury  Recent Flowsheet Documentation  Taken 2/24/2025 2032 by Rex Suarez RN  Body Position: supine, legs elevated  Skin " Protection: adhesive use limited  Intervention: Prevent and Manage VTE (Venous Thromboembolism) Risk  Recent Flowsheet Documentation  Taken 2/24/2025 2032 by Rex Suarez RN  VTE Prevention/Management: SCDs off (sequential compression devices)  Intervention: Prevent Infection  Recent Flowsheet Documentation  Taken 2/24/2025 2032 by Rex Suarez, RN  Infection Prevention:   hand hygiene promoted   rest/sleep promoted   single patient room provided  Goal: Optimal Comfort and Wellbeing  Outcome: Progressing  Goal: Readiness for Transition of Care  Outcome: Progressing

## 2025-02-25 NOTE — CONSULTS
SPIRITUAL HEALTH SERVICES - Consult Note  [facility] Observation Unit    Referral Source: [facility] consult; family requested  support for pt per Palliative Care Nurse Charlotte Lindo.    I was accompanied by  Intern Jg Salazar.  Pt Al's daughter Valerio was on the phone, outside his room.  She confirmed that Al is [employer] and affiliated with [facility].      Al was delighted to see a  and welcomed prayer.  He accepted my offer to contact his [employer] and ask them to add him to their prayer list.  Al reported that he has four children: Valerio and three sons.  He was able to reminisced about [place] in [place] with his parents and three siblings (he loved to ride horses) and about meeting his spouse.      Plan:   Informed pt's daughter how she can request further  support.  This author and other chaplains remain available per family request.   Will see pt 1-2 times per week for spiritual support through prayer and fellowship.    Shahab Keane M.Div., [facility]  Staff     [facility] available 24/7 for emergent requests/referrals, either by paging the on-call  or by entering an ASAP/STAT consult in [facility], which will also page the on-call .

## 2025-02-25 NOTE — PROGRESS NOTES
PALLIATIVE CARE PROGRESS NOTE  [facility]     Patient Name: Kraig Genao  Date of Admission: 2/23/2025   Today the patient was seen for: Goals of care and emotional support     Recommendations & Counseling     GOALS OF CARE:   Life-prolonging with limits - No CPR- Do NOT Intubate and NO FEEDING TUBE.  Case Management is consulted for dismissal plan     ADVANCE CARE PLANNING:  No health care directive on file. Per system policy, Surrogate Decision-makers for Patients With Diminished Decision-making Capacity offers guidance on possible decision-makers. Daughter, Valerio Bolanos and son, Terry Genao (who will be on vacation for a week starting tomorrow)  has been identified as a surrogate decision maker.   There is a POLST form on file, this was reviewed and current.  Code status: No CPR- Do NOT Intubate     MEDICAL MANAGEMENT:   We are not actively managing symptoms at this time.     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     Palliative Care will continue to follow. Thank you for the consult and allowing us to aid in the care of Kraig Genao.    These recommendations have been discussed with Hospitalist Jenny Palacios PA-C and bedside nursing staff.     TRAMAINE Talbert CNS  [facility], Palliative Care  Securely message with the [employer] Web Console (learn more here) or  Text page via [facility] Paging/Directory        Assessment          Kraig Genao is a 89 year old male admitted 2/23/2025 with generalized weakness and confusion, found to have community-acquired pneumonia. His past medical history of moderate dementia (SLUMS score 12 on 4/21/2023), atrial fibrillation (Eliquis), CKD Stage III, and BPH.       Interval History:  "    Multidisciplinary collaboration:  Patient more calm and alert today    Patient/family narrative  Met with Al as he was sitting up in a chair. He knew he was not well yesterday, but was unable to recall the events or why he is here. He also could not recall where he lived in [place] or when or why he moved to [place].     Phoned the patient's daughter, Valerio Genao at [phone]. She acknowledged her father's substantial improvement, yet \"He's never been a medical type person. Me and my mom took care of all of that.\"  She had been at the hospital today when LEONEL Espinal ambulated her father and asked about TCU. I acknowledged the care management/social work team would assist in dismissal planning. Valerio confirmed the contents of her father's POLST which was completed with the support of her brother in 2022.    Review of Systems:     Besides above, ROS was reviewed and is unremarkable        Physical Exam:   Temp:  [97.3  F (36.3  C)-100.9  F (38.3  C)] 97.3  F (36.3  C)  Pulse:  [] 84  Resp:  [16-22] 16  BP: (126-164)/(72-97) 138/76  SpO2:  [78 %-100 %] 96 %  0 lbs 0 oz    Physical Exam  Pleasant elderly, but forgetful male sitting up in a chair. No notable distress on room air.           Data Reviewed:     No results found for this or any previous visit (from the past 24 hours).    MANAGEMENT DISCUSSED with the following over the past 24 hours: Hospitalist Philip Alvarado PA-C.   30 MINUTES SPENT BY ME on the date of service doing chart review, history, exam, documentation & further activities per the note.       "

## 2025-02-25 NOTE — PROGRESS NOTES
ROOM # 221     Living Situation (if not independent, order SW consult):  Facility name:  : [facility] ( daughter)  Activity level at baseline: ind  Activity level on admit: not OOB    Who will be transporting you at discharge: [facility] ( daughter)    Patient registered to observation; given Patient Bill of Rights; given the opportunity to ask questions about observation status and their plan of care.  Patient has been oriented to the observation room, bathroom and call light is in place.    Discussed discharge goals and expectations with patient/family.

## 2025-02-25 NOTE — PLAN OF CARE
PRIMARY DIAGNOSIS: Generalized weakness/increase confusion/Pneumonia     OUTPATIENT/OBSERVATION GOALS TO BE MET BEFORE DISCHARGE  1. Orthostatic performed: No    2. Tolerating PO medications:  pills crushed in pudding    3. Return to near baseline physical activity: No    4. Cleared for discharge by consultants (if involved): No    Discharge Planner Nurse   Safe discharge environment identified: No  Barriers to discharge: Yes       Entered by: Joana Lam RN 02/25/2025 8:50 AM     Please review provider order for any additional goals.   Nurse to notify provider when observation goals have been met and patient is ready for discharge.  Patient is disoriented x3 (patient does not understand why he would be in the hospital and has no idea how he got here). Responds appropriately. Was on O2 this morning, but able to wean him to RA with O2 >90%. Lung sounds clear and patient is on RA. Denies SOB. Active bowel sounds. Lieberman placed d/t retention. Patient bladder scanned for >191 with 250 out instantly. Generalized bruising on bilateral hands and forearms. Patient is a heavy assist of 2 with gb and walker. Family states he is independent with walker at baseline. Regular diet and not eating very much. Patient continues on Rocephin and Doxycycline for Pneumonia. Medications crushed in pudding or applesauce. Patient comes [facility] with food help and laundry.      BP (!) 154/97 (BP Location: Right arm)   Pulse 109   Temp 99.4  F (37.4  C) (Axillary)   Resp 20   SpO2 92%     Problem: Adult Inpatient Plan of Care  Goal: Plan of Care Review  Description: The Plan of Care Review/Shift note should be completed every shift.  The Outcome Evaluation is a brief statement about your assessment that the patient is improving, declining, or no change.  This information will be displayed automatically on your shift  note.  Outcome: Progressing  Flowsheets (Taken 2/25/2025 0848)  Plan of Care Reviewed With: patient  Overall Patient  "Progress: improving  Goal: Patient-Specific Goal (Individualized)  Description: You can add care plan individualizations to a care plan. Examples of Individualization might be:  \"Parent requests to be called daily at 9am for status\", \"I have a hard time hearing out of my right ear\", or \"Do not touch me to wake me up as it startles  me\".  Outcome: Progressing  Goal: Absence of Hospital-Acquired Illness or Injury  Outcome: Progressing  Intervention: Identify and Manage Fall Risk  Recent Flowsheet Documentation  Taken 2/25/2025 0835 by Joana Lam RN  Safety Promotion/Fall Prevention:   activity supervised   nonskid shoes/slippers when out of bed   bedside attendant  Intervention: Prevent Skin Injury  Recent Flowsheet Documentation  Taken 2/25/2025 0835 by Joana Lam RN  Body Position: position maintained  Intervention: Prevent and Manage VTE (Venous Thromboembolism) Risk  Recent Flowsheet Documentation  Taken 2/25/2025 0835 by Joana Lam RN  VTE Prevention/Management: SCDs off (sequential compression devices)  Intervention: Prevent Infection  Recent Flowsheet Documentation  Taken 2/25/2025 0835 by Joana Lam RN  Infection Prevention:   hand hygiene promoted   rest/sleep promoted   single patient room provided  Goal: Optimal Comfort and Wellbeing  Outcome: Progressing  Goal: Readiness for Transition of Care  Outcome: Progressing   Goal Outcome Evaluation:      Plan of Care Reviewed With: patient    Overall Patient Progress: improvingOverall Patient Progress: improving           "

## 2025-02-25 NOTE — PROGRESS NOTES
Urology    Patient seen and examined, formal consult note to follow      No acute urologic intervention  I moved the statlock closer to his penis on his leg to avoid further catheter trauma  Hold eliquis until urine is clear for 24 hours  Leave Lieberman in place at least 2 more days prior to trial of void  Increase tamsulosin to 0.8 mg daily and continue on discharge  Start finasteride 5 mg daily and continue on discharge  Follow up as outpatient in 2-3 months for cystoscopy with UA, PVR, AUA symptom score    Eugene Lau MD   [facility] Urology  [phone] clinic phone

## 2025-02-25 NOTE — CONSULTS
[facility] Urology Consultation    Kraig Genao MRN# [id number]   Age: 89 year old YOB: 1926     Date of Admission:  2/23/2025    Reason for consult: Urinary retention, gross hematuria       Requesting physician: Jenny Palacios PA-C        Level of consult: One-time consult to assist in determining a diagnosis and to recommend an appropriate treatment plan           Assessment and Plan:   Assessment:   89 yo M with dementia, afib on eliquis, admitted with weakness and confusion, pneumonia, upon whom urology is consulted for retention and gross hematuria in setting of Lieberman catheter    Retention likely BPH. Has been on long term tamsulosin 0.4 mg daily. Will increase to 0.8 mg daily and add 5ARI finasteride for max medical therapy. Hopefully we will be able to get the catheter out of this patient asap given his delirium while in the hospital, he will continue to traumatize his prostate if he tugs again    Gross hematuria from prostatic trauma from catheter. I moved statlock closer to penis to help alleviate this      Plan:   No acute urologic intervention  I moved the statlock closer to his penis on his leg to avoid further catheter trauma  Hold eliquis until urine is clear for 24 hours  Leave Lieberman in place at least 2 more days prior to trial of void *addendum* could try to get catheter out earlier if patient continues to have significant agitation and tugging on catheter but ideally would let urine clear up first  Increase tamsulosin to 0.8 mg daily and continue on discharge  Start finasteride 5 mg daily and continue on discharge  Follow up as outpatient in 2-3 months for cystoscopy with UA, PVR, AUA symptom score    Eugene Lau MD   [facility] Urology  [phone] clinic phone               Chief Complaint:   Urinary retention, gross hematuria     History is obtained from the patient and his son (patient extreme hard of hearing)         History of Present Illness:   This patient is a  89 yo M with dementia, afib on eliquis, admitted with weakness and confusion, pneumonia, upon whom urology is consulted for retention and gross hematuria in setting of Pérez catheter    Yesterday the patient was quite altered and confused, agitated. Needed zyprexa/haldol. Noted to be in retention and needed straight cath followed by pérez placement    He is unable to give much of a history but is feeling somewhat more lucid today. No fever or chills. Denies significant problems with urination prior to admission. No gross hematuria           Past Medical History:   I have reviewed this patient's past medical history  Past Medical History:   Diagnosis Date    Bile duct stone     Chronic atrial fibrillation     Elevated LFTs     Erectile dysfunction     Gastroesophageal reflux disease 05/04/2016    h/o Helicobacter pylori infection     had UGI endoscopy 10/00 & had LES dilitation    Hypertrophy (benign) of prostate     some voiding sx    Macular degeneration (senile) of retina     mainly R eye             Past Surgical History:     Past Surgical History:   Procedure Laterality Date    ARTHROPLASTY SHOULDER Right 2004    COLONOSCOPY  2001    ENDOSCOPIC RETROGRADE CHOLANGIOPANCREATOGRAM  09/23/2011    Procedure:ENDOSCOPIC RETROGRADE CHOLANGIOPANCREATOGRAM; Endoscopic Retrograde Cholangiopancreatogram (c-arm), baloon dilation, stone retreival and 10fr. Solus stent placement x2 in  bile duct with the spy glass; Surgeon:EDD AUGUST; Location:[facility] OR    ENDOSCOPIC RETROGRADE CHOLANGIOPANCREATOGRAPHY, LITHOTRIPSY PANCREAS, COMBINED  10/27/2011    Procedure:COMBINED ENDOSCOPIC RETROGRADE CHOLANGIOPANCREATOGRAPHY, LITHOTRIPSY PANCREAS; Endoscopic Retrograde Cholangiopancreatogram with spygass scope ,Electrohydraulic Lithotripsy  baloon dilation of bile duct, stone removal (c-arm); Surgeon:EDD AUGUST; Location:[facility] OR    ESOPHAGOSCOPY, GASTROSCOPY, DUODENOSCOPY (EGD), COMBINED N/A 05/09/2016    Procedure: COMBINED  ESOPHAGOSCOPY, GASTROSCOPY, DUODENOSCOPY (EGD), BIOPSY SINGLE OR MULTIPLE;  Surgeon: Mendez Mcknight MD;  Location: [facility]    ESOPHAGOSCOPY, GASTROSCOPY, DUODENOSCOPY (EGD), COMBINED N/A 02/11/2018    Procedure: COMBINED ESOPHAGOSCOPY, GASTROSCOPY, DUODENOSCOPY (EGD), REMOVE FOREIGN BODY;  COMBINED ESOPHAGOSCOPY, GASTROSCOPY, DUODENOSCOPY (EGD) by raptor graspeing ;  Surgeon: Mendez Mcknight MD;  Location: [facility]    ESOPHAGOSCOPY, GASTROSCOPY, DUODENOSCOPY (EGD), COMBINED Left 02/16/2018    Procedure: COMBINED ESOPHAGOSCOPY, GASTROSCOPY, DUODENOSCOPY (EGD), BIOPSY SINGLE OR MULTIPLE;  ESOPHAGOSCOPY, GASTROSCOPY, DUODENOSCOPY (EGD) (Nicci) with biopsies using cold bx forcep and tts balloon dilation 12-15mm;  Surgeon: Mendez Mcknight MD;  Location: [facility]    ESOPHAGOSCOPY, GASTROSCOPY, DUODENOSCOPY (EGD), COMBINED N/A 08/10/2021    Procedure: ESOPHAGOGASTRODUODENOSCOPY (EGD);  Surgeon: Mendez Mcknight MD;  Location: [facility]    ESOPHAGOSCOPY, GASTROSCOPY, DUODENOSCOPY (EGD), COMBINED N/A 12/23/2023    Procedure: ESOPHAGOGASTRODUODENOSCOPY, WITH FOREIGN BODY REMOVAL by uisng raptor grasping forcep;  Surgeon: Mendez Mcknight MD;  Location:  GI    INGUINAL HERNIA REPAIR      LAPAROSCOPIC CHOLECYSTECTOMY  2002    Repeat Shoulder arthroplasty Right 2005             Social History:     Social History     Tobacco Use    Smoking status: Never    Smokeless tobacco: Never   Substance Use Topics    Alcohol use: Yes     Alcohol/week: 0.0 standard drinks of alcohol     Comment: 1-2 beer, wine, or mixed drink per night             Family History:     Family History   Problem Relation Age of Onset    Heart Disease Father          M.I.    Esophageal Cancer No family hx of     Gastrointestinal Disease No family hx of     Stomach Cancer No family hx of      Family history not discussed          Immunizations:     Immunization History   Administered Date(s) Administered    COVID-19 12+ (Pfizer) 10/26/2023, 07/23/2024,  10/01/2024    COVID-19 Bivalent 12+ (Pfizer) 11/08/2022    COVID-19 Bivalent 18+ (Moderna) 05/25/2023    COVID-19 MONOVALENT 12+ (Pfizer) 01/25/2021, 02/18/2021, 10/06/2021    COVID-19 Monovalent 18+ (Moderna) 05/17/2022    Influenza (High Dose) Trivalent,PF (Fluzone) 09/25/2014, 10/04/2015, 10/28/2016, 09/15/2017, 10/17/2018, 09/26/2019, 12/05/2020, 09/17/2024    Pneumococcal 23 valent 01/01/2009    TDAP (Adacel,Boostrix) 10/27/2022    Tetanus 01/01/2007    Zoster vaccine, live 12/16/2009             Allergies:     Allergies   Allergen Reactions    No Known Allergies              Medications:     Medications Prior to Admission   Medication Sig Dispense Refill Last Dose/Taking    acetaminophen (TYLENOL) 500 MG tablet Take 1,000 mg by mouth 2 times daily.   2/22/2025 Evening    albuterol (PROAIR HFA/PROVENTIL HFA/VENTOLIN HFA) 108 (90 Base) MCG/ACT inhaler Inhale 2 puffs into the lungs every 4 hours as needed for shortness of breath, wheezing or cough 18 g 0 Taking As Needed    apixaban ANTICOAGULANT (ELIQUIS) 5 MG tablet Take 1 tablet (5 mg) by mouth 2 times daily 60 tablet 12 2/22/2025 Evening    artificial saliva (BIOTENE MT) AERS spray Take 1 spray by mouth 4 times daily as needed for dry mouth.   Taking As Needed    atenolol (TENORMIN) 50 MG tablet Take 50 mg by mouth daily.   2/22/2025 Morning    Cholecalciferol (VITAMIN D3 PO) Take 1,000 Units by mouth daily   2/22/2025 Evening    cyanocobalamin (VITAMIN B-12) 500 MCG tablet Take 500 mcg by mouth daily.   2/22/2025 Morning    fluticasone (FLONASE) 50 MCG/ACT nasal spray Spray 1 spray into both nostrils 2 times daily.   2/22/2025 Evening    gabapentin (NEURONTIN) 400 MG capsule Take 400 mg by mouth 2 times daily.   2/22/2025 Evening    ketoconazole (NIZORAL) 2 % external shampoo Apply topically three times a week.   Past Week    Lidocaine (LIDOCARE) 4 % Patch Place 2 patches onto the skin every 24 hours. To prevent lidocaine toxicity, patient should be patch  free for 12 hrs daily.   Taking    loratadine (CLARITIN) 10 MG tablet Take 10 mg by mouth daily as needed for allergies.   Taking As Needed    melatonin 3 MG tablet Take 3 mg by mouth every evening.   2/22/2025 Evening    Multiple Vitamins-Minerals (ICAPS AREDS 2 PO) Take 1 capsule by mouth 2 times daily.   2/22/2025 Evening    pantoprazole (PROTONIX) 40 MG EC tablet Take 40 mg by mouth daily.   2/22/2025 Morning    tamsulosin (FLOMAX) 0.4 MG capsule Take 0.4 mg by mouth daily   2/22/2025 Evening    tiotropium (SPIRIVA RESPIMAT) 2.5 MCG/ACT inhaler Inhale 2 puffs into the lungs daily.   Taking             Review of Systems:   The Review of Systems is negative other than noted in the HPI          Physical Exam:   Vitals were reviewed  Temp: 98.2  F (36.8  C) Temp src: Oral BP: (!) 155/91 Pulse: 91   Resp: 16 SpO2: 94 % O2 Device: None (Room air) Oxygen Delivery: 1 LPM  Awake, alert  NAD  Lieberman in place with grade II hematuria, draining well          Data:   All laboratory and imaging data in the past 24 hours reviewed  Results for orders placed or performed during the hospital encounter of 02/23/25 (from the past 24 hours)   CBC with Platelets & Differential    Narrative    The following orders were created for panel order CBC with Platelets & Differential.  Procedure                               Abnormality         Status                     ---------                               -----------         ------                     CBC with platelets and d...[[id number]]  Abnormal            Final result                 Please view results for these tests on the individual orders.   Basic metabolic panel   Result Value Ref Range    Sodium 133 (L) 135 - 145 mmol/L    Potassium 3.5 3.4 - 5.3 mmol/L    Chloride 98 98 - 107 mmol/L    Carbon Dioxide (CO2) 21 (L) 22 - 29 mmol/L    Anion Gap 14 7 - 15 mmol/L    Urea Nitrogen 23.4 (H) 8.0 - 23.0 mg/dL    Creatinine 0.98 0.67 - 1.17 mg/dL    GFR Estimate 70 >60 mL/min/1.73m2     Calcium 8.7 (L) 8.8 - 10.4 mg/dL    Glucose 120 (H) 70 - 99 mg/dL   CBC with platelets and differential   Result Value Ref Range    WBC Count 13.0 (H) 4.0 - 11.0 10e3/uL    RBC Count 3.93 (L) 4.40 - 5.90 10e6/uL    Hemoglobin 12.4 (L) 13.3 - 17.7 g/dL    Hematocrit 38.0 (L) 40.0 - 53.0 %    MCV 97 78 - 100 fL    MCH 31.6 26.5 - 33.0 pg    MCHC 32.6 31.5 - 36.5 g/dL    RDW 12.4 10.0 - 15.0 %    Platelet Count 213 150 - 450 10e3/uL    % Neutrophils 80 %    % Lymphocytes 9 %    % Monocytes 10 %    % Eosinophils 0 %    % Basophils 0 %    % Immature Granulocytes 1 %    NRBCs per 100 WBC 0 <1 /100    Absolute Neutrophils 10.5 (H) 1.6 - 8.3 10e3/uL    Absolute Lymphocytes 1.1 0.8 - 5.3 10e3/uL    Absolute Monocytes 1.3 0.0 - 1.3 10e3/uL    Absolute Eosinophils 0.0 0.0 - 0.7 10e3/uL    Absolute Basophils 0.0 0.0 - 0.2 10e3/uL    Absolute Immature Granulocytes 0.1 <=0.4 10e3/uL    Absolute NRBCs 0.0 10e3/uL     No pertinent urologic imaging     Attestation:  I have reviewed today's vital signs, notes, medications, labs and imaging.    Eugene Lau MD

## 2025-02-25 NOTE — PROGRESS NOTES
"   02/25/25 1243   Appointment Info   Signing Clinician's Name / Credentials (PT) Teddy Woods DPT   Living Environment   People in Home alone;facility resident   Current Living Arrangements assisted living   Home Accessibility no concerns   Transportation Anticipated family or friend will provide   Living Environment Comments Pt lives in [place]. Daughter present and helping with questions. Per dtr, pt has assistance with cleaning, laundry, medications, and meals. Otherwise IND, using 4WW or SEC.   Self-Care   Usual Activity Tolerance moderate   Current Activity Tolerance fair   Equipment Currently Used at Home cane, straight;walker, rolling   Fall history within last six months no   Activity/Exercise/Self-Care Comment IND with functional mob, uses 4WW mostly for ambulation, SEC for shorter distances and per daughter, will forget to use ADs with ambulation at times.   General Information   Onset of Illness/Injury or Date of Surgery 02/23/25   Referring Physician Deric Dale MD   Pertinent History of Current Problem (include personal factors and/or comorbidities that impact the POC) Pt is 89 yo male who, per chart, \"history of A-fib on Eliquis, CKD stage III, BPH, and suspected dementia presents with generalized weakness and confusion and found to have community-acquired pneumonia.\"   Existing Precautions/Restrictions fall   Cognition   Affect/Mental Status (Cognition) confused   Follows Commands (Cognition) WFL   Pain Assessment   Patient Currently in Pain No   Integumentary/Edema   Integumentary/Edema Comments age related skin changes, distented veins, dry and scabbed over area, quarter size, on the R lateral malleoli.   Posture    Posture Forward head position;Protracted shoulders   Range of Motion (ROM)   Range of Motion ROM is WFL   Strength (Manual Muscle Testing)   Strength (Manual Muscle Testing) Able to perform R SLR;Able to perform L SLR;Deficits observed during functional mobility   Bed " Mobility   Comment, (Bed Mobility) did not assess on this date.   Transfers   Comment, (Transfers) STS with 4WW and Royal, from recliner.   Gait/Stairs (Locomotion)   Comment, (Gait/Stairs) 10' with 4WW and CGA, slow gait speed with forward flexed posture, walker held far anteriorly and forefoot initial contact with most steps.   Balance   Balance Comments mild unsteadiness with standing and ambulation, needing 4WW and AX1 for safety.   Sensory Examination   Sensory Perception Comments light touch intact per screen to B feet   Clinical Impression   Criteria for Skilled Therapeutic Intervention Yes, treatment indicated   PT Diagnosis (PT) impaird functional mobility   Influenced by the following impairments decreased strength and endurance, impaired balance, cog deficits.   Functional limitations due to impairments difficulty with bed mob, transfers, and ambulation   Clinical Presentation (PT Evaluation Complexity) stable   Clinical Presentation Rationale clinical judgment   Clinical Decision Making (Complexity) low complexity   Planned Therapy Interventions (PT) balance training;bed mobility training;gait training;home exercise program;patient/family education;ROM (range of motion);strengthening;transfer training;progressive activity/exercise   Risk & Benefits of therapy have been explained evaluation/treatment results reviewed;care plan/treatment goals reviewed;risks/benefits reviewed;current/potential barriers reviewed;participants voiced agreement with care plan;participants included;patient;daughter   PT Total Evaluation Time   PT Eval, Low Complexity Minutes (74613) 10   Physical Therapy Goals   PT Frequency 5x/week   PT Predicted Duration/Target Date for Goal Attainment 03/04/25   PT Goals Bed Mobility;Transfers;Gait   PT: Bed Mobility Supervision/stand-by assist;Supine to/from sit   PT: Transfers Modified independent;Sit to/from stand;Assistive device   PT: Gait Modified independent;Rolling walker;100 feet    Interventions   Interventions Quick Adds Gait Training;Therapeutic Activity   Therapeutic Activity   Therapeutic Activities: dynamic activities to improve functional performance Minutes (07422) 16   Symptoms Noted During/After Treatment Fatigue   Treatment Detail/Skilled Intervention Pt sitting in recliner upon PT arrival, daughter present during session. Pt [place], needing repeated verbal cues due to hearing and confusion. Pt performed STS x2 with 4WW and Royal, cues on UE placement for, both, sit > stand and stand > sit. Extended time spent coordinating with daughter and educating on d/c recommendations and options for patient. TCF being the first recommendation given pt is below baseline and would need increased services if returning to [facility]. Daughter verbalized understanding. Nurse updated and recommended SW/CC follow up this afternoon with sons present. Pt sitting in recliner, alarm on, all needs within reach.   Gait Training   Gait Training Minutes (25780) 8   Symptoms Noted During/After Treatment (Gait Training) fatigue   Treatment Detail/Skilled Intervention Pt ambulated ~100' with 4WW and CG-Royal, needing assistance with directing walker due to L lateral vearing once in hallway, then progressed toCGA. Cues on walker proximity and for heel to toe pattern. Pt demo'd briefly, then reverts to previous gait mechanics (refer to eval) with inital contact of forefoot and forward flexed posture. Educated pt and daughter and proper fitting of walker and recommending lower walker height to allow pt to stand closer, facilitate upright posture, and improved initial contact. Unsure how to further adjust pt's walker. Will look at tomorrow   PT Discharge Planning   PT Plan assess bed mob, repeat STS, increase gait distance (attempt to adjust walker to lower height if able to facilitate closer stance to walker and upright posture with ambulation).   PT Discharge Recommendation (DC Rec) Transitional Care Facility   PT Rationale  for DC Rec Pt at baseline is IND with functional mob, using 4WW or SEC for ambulation. Lives in [place] and has assistance with meals, meds, cleaning, laundry. Currenlty, pt is below baseline, limited by decreased strength and activity tolerance, impaired balance, cog deficits. Recommending TCF at this time. If pt returned to [facility], he would need Ax1 for all mobility and ADLs.   PT Brief overview of current status Ax1 with 4WW   PT Total Distance Amb During Session (feet) 110   Physical Therapy Time and Intention   Timed Code Treatment Minutes 24   Total Session Time (sum of timed and untimed services) 34

## 2025-02-25 NOTE — PROGRESS NOTES
"[facility]    Medicine Progress Note - Hospitalist Service    Date of Admission:  2/23/2025    Assessment & Plan   Kraig is a 89 Male with history of A-fib on Eliquis, CKD stage III, BPH, and suspected dementia presents with generalized weakness and confusion and found to have community-acquired pneumonia.      Community-acquired pneumonia  - continuing rocephin 2 g + doxycycline   - family interested in palliative eval  - full respiratory panel: negative  - continuous pulse ox      Acute Metabolic and infectious encephalopathy - improving   Progressive dementia  Has had \"sundowning\" in recent months, worse last few days. still confused and agitated at times  - cont PRN seroquel, attempt to reorient  - given dose of haldol on 2/24 with acute change in respiratory status requiring 10 L oxy mask would avoid Haldol if possible  - if need IV/IM would do very low dose haldol with close monitoring or try low dose Zyprexa     Acute urinary retention requiring Lieberman catheter placement  Gross hematuria   BPH  - Lieberman catheter placed due to number of times required to straight cath  - Developed gross hematuria after Lieberman placement and ambulation  - Urology consult due to above   -Continue pta Flomax  - holding Eliquis this evening     Generalized Weakness & Ambulatory Dysfunction  -Physical therapy consultation: recommending TCU  -SW consult, currently in independent living     New LBBB & Chronically Elevated Troponin  - No chest pain and EKG does not meet Sgarbossa's criteria  - Troponin chronically elevated to 50's     Chronic Afib:   Home atenolol and Eliquis    CKD stage III  Creatinine at baseline          Diet: Regular Diet Adult    DVT Prophylaxis: DOAC holding this evening due to hematuria   Lieberman Catheter: Not present  Lines: None     Cardiac Monitoring: ACTIVE order. Indication: Acute decompensated heart failure (48 hours)  Code Status: No CPR- Do NOT Intubate      Clinically Significant " Risk Factors         # Hyponatremia: Lowest Na = 134 mmol/L in last 2 days, will monitor as appropriate  # Hypochloremia: Lowest Cl = 97 mmol/L in last 2 days, will monitor as appropriate                         # COPD: noted on problem list        Social Drivers of Health            Disposition Plan     Medically Ready for Discharge: Anticipated in 2-4 Days      The patient's care was discussed with the Bedside Nurse, Care Coordinator/, Patient, and Patient's Family.    WENDY Palacios PA-C  Hospitalist Service  [facility]  Securely message with [employer] (more info)  Text page via [facility] Paging/Directory   ______________________________________________________________________    Interval History   Family present during evaluation and states patient is much more alert today.  He is sitting up eating lunch.  States he has no concerns.  States his breathing is normal denies any chest pain abdominal pain.  Pérez catheter was placed due to ongoing urinary retention.  There are some gross hematuria after Pérez placement.    Physical Exam   Vital Signs: Temp: 98.2  F (36.8  C) Temp src: Oral BP: (!) 155/91 Pulse: 91   Resp: 16 SpO2: 94 % O2 Device: None (Room air) Oxygen Delivery: 1 LPM  Weight: 0 lbs 0 oz    GENERAL:  Alert, Comfortable, No acute distress. Sitting up in chair eating lunch  PSYCH: pleasant  HEENT:  Normocephalic, No scleral icterus or conjunctival injection  HEART:  Normal S1, S2 with no murmur, IRR  LUNGS:  Normal Respiratory effort. Clear to auscultation bilaterally with no wheezing, rales or ronchi.  ABDOMEN:  Soft, non-tender, non distended. No peritoneal signs.   EXTREMITIES:  No pitting pedal edema, No cyanosis.   : Gross hematuria in pérez catheter   SKIN:  Warm, dry to touch  NEUROLOGIC: Speech clear, alert, no focal deficits.     Medical Decision Making       MANAGEMENT DISCUSSED with the following over the past 24 hours: patient, family, nursing, PT    NOTE(S)/MEDICAL RECORDS REVIEWED over the past 24 hours: labs, imaging, progress notes       Data         Imaging results reviewed over the past 24 hrs:   No results found for this or any previous visit (from the past 24 hours).

## 2025-02-26 ENCOUNTER — APPOINTMENT (OUTPATIENT)
Dept: PHYSICAL THERAPY | Facility: CLINIC | Age: OVER 89
End: 2025-02-26
Payer: MEDICARE

## 2025-02-26 LAB
ANION GAP SERPL CALCULATED.3IONS-SCNC: 14 MMOL/L (ref 7–15)
BASOPHILS # BLD AUTO: 0 10E3/UL (ref 0–0.2)
BASOPHILS NFR BLD AUTO: 0 %
BUN SERPL-MCNC: 23.4 MG/DL (ref 8–23)
CALCIUM SERPL-MCNC: 8.7 MG/DL (ref 8.8–10.4)
CHLORIDE SERPL-SCNC: 98 MMOL/L (ref 98–107)
CREAT SERPL-MCNC: 0.98 MG/DL (ref 0.67–1.17)
EGFRCR SERPLBLD CKD-EPI 2021: 70 ML/MIN/1.73M2
EOSINOPHIL # BLD AUTO: 0 10E3/UL (ref 0–0.7)
EOSINOPHIL NFR BLD AUTO: 0 %
ERYTHROCYTE [DISTWIDTH] IN BLOOD BY AUTOMATED COUNT: 12.4 % (ref 10–15)
GLUCOSE SERPL-MCNC: 120 MG/DL (ref 70–99)
HCO3 SERPL-SCNC: 21 MMOL/L (ref 22–29)
HCT VFR BLD AUTO: 38 % (ref 40–53)
HGB BLD-MCNC: 12.4 G/DL (ref 13.3–17.7)
IMM GRANULOCYTES # BLD: 0.1 10E3/UL
IMM GRANULOCYTES NFR BLD: 1 %
LYMPHOCYTES # BLD AUTO: 1.1 10E3/UL (ref 0.8–5.3)
LYMPHOCYTES NFR BLD AUTO: 9 %
MCH RBC QN AUTO: 31.6 PG (ref 26.5–33)
MCHC RBC AUTO-ENTMCNC: 32.6 G/DL (ref 31.5–36.5)
MCV RBC AUTO: 97 FL (ref 78–100)
MONOCYTES # BLD AUTO: 1.3 10E3/UL (ref 0–1.3)
MONOCYTES NFR BLD AUTO: 10 %
NEUTROPHILS # BLD AUTO: 10.5 10E3/UL (ref 1.6–8.3)
NEUTROPHILS NFR BLD AUTO: 80 %
NRBC # BLD AUTO: 0 10E3/UL
NRBC BLD AUTO-RTO: 0 /100
PLATELET # BLD AUTO: 213 10E3/UL (ref 150–450)
POTASSIUM SERPL-SCNC: 3.5 MMOL/L (ref 3.4–5.3)
RBC # BLD AUTO: 3.93 10E6/UL (ref 4.4–5.9)
SODIUM SERPL-SCNC: 133 MMOL/L (ref 135–145)
WBC # BLD AUTO: 13 10E3/UL (ref 4–11)

## 2025-02-26 PROCEDURE — 250N000011 HC RX IP 250 OP 636: Performed by: INTERNAL MEDICINE

## 2025-02-26 PROCEDURE — 250N000013 HC RX MED GY IP 250 OP 250 PS 637: Performed by: STUDENT IN AN ORGANIZED HEALTH CARE EDUCATION/TRAINING PROGRAM

## 2025-02-26 PROCEDURE — 250N000013 HC RX MED GY IP 250 OP 250 PS 637: Performed by: INTERNAL MEDICINE

## 2025-02-26 PROCEDURE — 250N000013 HC RX MED GY IP 250 OP 250 PS 637

## 2025-02-26 PROCEDURE — 97530 THERAPEUTIC ACTIVITIES: CPT | Mod: GP | Performed by: PHYSICAL THERAPIST

## 2025-02-26 PROCEDURE — 99231 SBSQ HOSP IP/OBS SF/LOW 25: CPT | Performed by: PHYSICIAN ASSISTANT

## 2025-02-26 PROCEDURE — 85014 HEMATOCRIT: CPT

## 2025-02-26 PROCEDURE — 85004 AUTOMATED DIFF WBC COUNT: CPT

## 2025-02-26 PROCEDURE — 82310 ASSAY OF CALCIUM: CPT

## 2025-02-26 PROCEDURE — 250N000011 HC RX IP 250 OP 636

## 2025-02-26 PROCEDURE — 120N000001 HC R&B MED SURG/OB

## 2025-02-26 PROCEDURE — 80048 BASIC METABOLIC PNL TOTAL CA: CPT

## 2025-02-26 PROCEDURE — 99233 SBSQ HOSP IP/OBS HIGH 50: CPT

## 2025-02-26 PROCEDURE — 97116 GAIT TRAINING THERAPY: CPT | Mod: GP | Performed by: PHYSICAL THERAPIST

## 2025-02-26 PROCEDURE — 36415 COLL VENOUS BLD VENIPUNCTURE: CPT

## 2025-02-26 RX ORDER — QUETIAPINE FUMARATE 25 MG/1
25 TABLET, FILM COATED ORAL
Status: DISCONTINUED | OUTPATIENT
Start: 2025-02-26 | End: 2025-02-26

## 2025-02-26 RX ORDER — QUETIAPINE FUMARATE 25 MG/1
25 TABLET, FILM COATED ORAL AT BEDTIME
Status: DISCONTINUED | OUTPATIENT
Start: 2025-02-26 | End: 2025-02-26

## 2025-02-26 RX ORDER — QUETIAPINE FUMARATE 25 MG/1
25 TABLET, FILM COATED ORAL 2 TIMES DAILY
Status: DISCONTINUED | OUTPATIENT
Start: 2025-02-27 | End: 2025-02-27

## 2025-02-26 RX ADMIN — QUETIAPINE FUMARATE 25 MG: 25 TABLET ORAL at 20:13

## 2025-02-26 RX ADMIN — TAMSULOSIN HYDROCHLORIDE 0.8 MG: 0.4 CAPSULE ORAL at 20:13

## 2025-02-26 RX ADMIN — QUETIAPINE FUMARATE 25 MG: 25 TABLET ORAL at 14:22

## 2025-02-26 RX ADMIN — Medication 3 MG: at 21:04

## 2025-02-26 RX ADMIN — DOXYCYCLINE HYCLATE 100 MG: 100 CAPSULE ORAL at 20:13

## 2025-02-26 RX ADMIN — OLANZAPINE 2.5 MG: 10 INJECTION, POWDER, FOR SOLUTION INTRAMUSCULAR at 15:19

## 2025-02-26 RX ADMIN — ATENOLOL 50 MG: 50 TABLET ORAL at 07:24

## 2025-02-26 RX ADMIN — FINASTERIDE 5 MG: 5 TABLET, FILM COATED ORAL at 07:24

## 2025-02-26 RX ADMIN — DOXYCYCLINE HYCLATE 100 MG: 100 CAPSULE ORAL at 07:25

## 2025-02-26 RX ADMIN — PANTOPRAZOLE SODIUM 40 MG: 40 TABLET, DELAYED RELEASE ORAL at 07:24

## 2025-02-26 RX ADMIN — CEFTRIAXONE 2 G: 2 INJECTION, POWDER, FOR SOLUTION INTRAMUSCULAR; INTRAVENOUS at 21:05

## 2025-02-26 ASSESSMENT — ACTIVITIES OF DAILY LIVING (ADL)
ADLS_ACUITY_SCORE: 73
ADLS_ACUITY_SCORE: 71
ADLS_ACUITY_SCORE: 73
ADLS_ACUITY_SCORE: 73
ADLS_ACUITY_SCORE: 71
ADLS_ACUITY_SCORE: 71
ADLS_ACUITY_SCORE: 75
ADLS_ACUITY_SCORE: 71
ADLS_ACUITY_SCORE: 75
ADLS_ACUITY_SCORE: 73
ADLS_ACUITY_SCORE: 73
ADLS_ACUITY_SCORE: 71
ADLS_ACUITY_SCORE: 72
ADLS_ACUITY_SCORE: 75
ADLS_ACUITY_SCORE: 75
ADLS_ACUITY_SCORE: 71
ADLS_ACUITY_SCORE: 73
ADLS_ACUITY_SCORE: 71
ADLS_ACUITY_SCORE: 73
ADLS_ACUITY_SCORE: 75
ADLS_ACUITY_SCORE: 71
ADLS_ACUITY_SCORE: 71
ADLS_ACUITY_SCORE: 73

## 2025-02-26 NOTE — PROGRESS NOTES
Care Management Follow Up    Length of Stay (days): 2    Expected Discharge Date: 02/26/2025     Concerns to be Addressed: discharge planning     Patient plan of care discussed at interdisciplinary rounds: Yes    Anticipated Discharge Disposition: TCU     Anticipated Discharge Services:  none  Anticipated Discharge DME:  none    Patient/family educated on Medicare website which has current facility and service quality ratings:  yes  Education Provided on the Discharge Plan:  yes  Patient/Family in Agreement with the Plan:  yes    Referrals Placed by CM/SW:  TCU  Private pay costs discussed: Not applicable    Discussed  Partnership in Safe Discharge Planning  document with patient/family: No     Handoff Completed: No, handoff not indicated or clinically appropriate    Additional Information:  Patient needed a sitter overnight due to pulling at lines. Patient unable to be evaluated for TCU until off sitter for 24 hrs. [facility] considering for when off sitter. [employer] called and updated patient's daughter, Valerio.     11:21 AM   SW updated son Jose Eduardo at bedside per family request and answered all questions.     3:14 PM   SW updated marie Pope at bedside per family request and answered all questions.     Next Steps: Patient 3-day IP stay on 2/27; follow up with facilities once patient off sitter for 24 hrs.     PRISCILA Quintero   Social Work Care Manager   [facility]   [phone]

## 2025-02-26 NOTE — PLAN OF CARE
"BP (!) 157/93 (BP Location: Right arm)   Pulse 103   Temp 98.4  F (36.9  C) (Oral)   Resp 24   SpO2 (!) 91%     Pt alert and oriented to self only. Denies being in pain. Denies SOB. On RA. O2 above 90s. Afib control 80s. Active bowel sounds with 1 BM occurrence during shift. Has pérez cath. Bloody urine noted, was seen by urology for consult and they moved statlock close to pt's penis to prevent further cath trauma. Eliquis held. Per urology, hold Eliquis till urine is clear for 24 hours and leave pérez in place for at least 2 more days prior to voiding trial. No acute urological intervention per urology. On doxycycline and Rocephin for pneumonia. SL. On reg diet. Ax1 with walker and gait belt. SW following to decide pt's placement with family.    Problem: Adult Inpatient Plan of Care  Goal: Plan of Care Review  Description: The Plan of Care Review/Shift note should be completed every shift.  The Outcome Evaluation is a brief statement about your assessment that the patient is improving, declining, or no change.  This information will be displayed automatically on your shift  note.  Outcome: Progressing  Flowsheets (Taken 2/25/2025 1726)  Outcome Evaluation: Pt alert and oriented to self only. Denies any pain at the moment.  Plan of Care Reviewed With: patient  Overall Patient Progress: improving  Goal: Patient-Specific Goal (Individualized)  Description: You can add care plan individualizations to a care plan. Examples of Individualization might be:  \"Parent requests to be called daily at 9am for status\", \"I have a hard time hearing out of my right ear\", or \"Do not touch me to wake me up as it startles  me\".  Outcome: Progressing  Goal: Absence of Hospital-Acquired Illness or Injury  Outcome: Progressing  Intervention: Prevent and Manage VTE (Venous Thromboembolism) Risk  Recent Flowsheet Documentation  Taken 2/25/2025 1603 by Tiffanie Freire RN  VTE Prevention/Management: SCDs off (sequential compression " devices)  Intervention: Prevent Infection  Recent Flowsheet Documentation  Taken 2/25/2025 1603 by Tiffanie Freire, RN  Infection Prevention:   hand hygiene promoted   single patient room provided  Goal: Optimal Comfort and Wellbeing  Outcome: Progressing  Goal: Readiness for Transition of Care  Outcome: Progressing     Goal Outcome Evaluation:      Plan of Care Reviewed With: patient    Overall Patient Progress: improvingOverall Patient Progress: improving    Outcome Evaluation: Pt alert and oriented to self only. Denies any pain at the moment.

## 2025-02-26 NOTE — PLAN OF CARE
Goal Outcome Evaluation:    Care From: 8499-1629  Admitted Diagnosis: Generalized Weakness, Increased Confusion      Plan of Care Reviewed With: patient    Overall Patient Progress: improvingOverall Patient Progress: improving    Outcome Evaluation: Alert to self only. Disoriented x3. restless at the start of the shift but calmed down after Seroquel given. Sitter at bedside.    Vitals: BP (!) 157/93 (BP Location: Right arm)   Pulse 103   Temp 98.4  F (36.9  C) (Oral)   Resp 24   SpO2 (!) 91%      Neuro: alert to self only. Disoriented x3. Restless at the beginning of the shift constantly reaching Lieberman and tried to pull. IM Zyprexa given in previous shift with minimum effect. Seroquel given on this shift. Pt then able to sleep for a few hours. Sitter remain at bedside.    Cardiac: on tele running A-fib/HR 80's.   Lungs: wdl. Sat stable on RA.  GI: wdl ex for incontinent of bowel  : Lieberman catheter in place. Urine output remain bloody, clear with no clots.   Pain: denies pain  IV: on Rocephin and po Doxycycline for Pneumonia.   Labs/Imaging: Na 134. WBC 11.5. Hgb 12.2  Diet: regular  Activity: assist of x1 with walker and GB  Consult: Urology, Palliative, PT, SW  Plan: continue Lieberman catheter. Monitor urine output. Continue holding Eliquis until urine is clear for 24 hrs per Urology. PT recommended TCU. SW following with placement. Continue sitter since pt is frequently grabbing and trying to pull Lieberman, tele and IV line.    Problem: Adult Inpatient Plan of Care  Goal: Plan of Care Review  Description: The Plan of Care Review/Shift note should be completed every shift.  The Outcome Evaluation is a brief statement about your assessment that the patient is improving, declining, or no change.  This information will be displayed automatically on your shift  note.  Outcome: Progressing  Flowsheets (Taken 2/26/2025 6996)  Outcome Evaluation: Alert to self only. Disoriented x3. restless at the start of the shift but  "calmed down after Seroquel given. Sitter at bedside.  Plan of Care Reviewed With: patient  Overall Patient Progress: improving  Goal: Patient-Specific Goal (Individualized)  Description: You can add care plan individualizations to a care plan. Examples of Individualization might be:  \"Parent requests to be called daily at 9am for status\", \"I have a hard time hearing out of my right ear\", or \"Do not touch me to wake me up as it startles  me\".  Outcome: Progressing  Goal: Absence of Hospital-Acquired Illness or Injury  Outcome: Progressing  Intervention: Prevent and Manage VTE (Venous Thromboembolism) Risk  Recent Flowsheet Documentation  Taken 2/25/2025 2355 by Amy Pate RN  VTE Prevention/Management: SCDs off (sequential compression devices)  Goal: Optimal Comfort and Wellbeing  Outcome: Progressing  Goal: Readiness for Transition of Care  Outcome: Progressing       "

## 2025-02-26 NOTE — PLAN OF CARE
"Patient is disoriented x3 (patient does not understand why he would be in the hospital and has no idea how he got here). Responds appropriately. VS WNL and documented on the FS. Lung sounds clear and patient is on RA. Denies SOB. Active bowel sounds with LBM yesterday. Lieberman placed d/t retention. Urine dark yellow now. Eliquis placed on hold. Generalized bruising on bilateral hands and forearms. Patient is a SBA with walker and gb. Family states he is independent with walker at baseline. Regular diet and tolerating. Patient continues on Rocephin and Doxycycline for Pneumonia. Medications crushed in pudding or applesauce. Tele in place and patient is Afib controlled 70's. Patient comes from [facility] with food help and laundry.  Plan: TCU placement    BP (!) 143/81 (BP Location: Right arm)   Pulse 101   Temp 98.8  F (37.1  C) (Oral)   Resp 24   SpO2 97%     Problem: Adult Inpatient Plan of Care  Goal: Plan of Care Review  Description: The Plan of Care Review/Shift note should be completed every shift.  The Outcome Evaluation is a brief statement about your assessment that the patient is improving, declining, or no change.  This information will be displayed automatically on your shift  note.  Outcome: Progressing  Flowsheets (Taken 2/26/2025 0113)  Plan of Care Reviewed With: patient  Overall Patient Progress: improving  Goal: Patient-Specific Goal (Individualized)  Description: You can add care plan individualizations to a care plan. Examples of Individualization might be:  \"Parent requests to be called daily at 9am for status\", \"I have a hard time hearing out of my right ear\", or \"Do not touch me to wake me up as it startles  me\".  Outcome: Progressing  Goal: Absence of Hospital-Acquired Illness or Injury  Outcome: Progressing  Intervention: Identify and Manage Fall Risk  Recent Flowsheet Documentation  Taken 2/26/2025 5583 by Joana Lam RN  Safety Promotion/Fall Prevention:   activity supervised   patient " and family education   nonskid shoes/slippers when out of bed  Intervention: Prevent Skin Injury  Recent Flowsheet Documentation  Taken 2/26/2025 0714 by Joana Lam RN  Body Position: position maintained  Intervention: Prevent and Manage VTE (Venous Thromboembolism) Risk  Recent Flowsheet Documentation  Taken 2/26/2025 0735 by Joana Lam RN  VTE Prevention/Management: SCDs off (sequential compression devices)  Goal: Optimal Comfort and Wellbeing  Outcome: Progressing  Goal: Readiness for Transition of Care  Outcome: Progressing   Goal Outcome Evaluation:      Plan of Care Reviewed With: patient    Overall Patient Progress: improvingOverall Patient Progress: improving

## 2025-02-26 NOTE — PROGRESS NOTES
"[facility]    Medicine Progress Note - Hospitalist Service    Date of Admission:  2/23/2025    Assessment & Plan   Kraig is a 89 Male with history of A-fib on Eliquis, CKD stage III, BPH, and suspected dementia presents with generalized weakness and confusion and found to have community-acquired pneumonia.    Daily update: Encephalopathy improved.  Planning on TCU placement.  Needs to be sitter free for 24 hours and no IV or IM medications.  Patient received 25 Seroquel and IM Zyprexa this afternoon.  Planning on scheduling Seroquel twice daily starting tomorrow. Decision was made to remove the Lieberman catheter early and straight cath if needed for ongoing urinary retention. Patient was trying to remove the catheter which would cause more trauma if ripped out. Gross hematuria resolved plan to resume Eliquis in the a.m.      Community-acquired pneumonia  - continuing rocephin 2 g + doxycycline (started 2/23/25)  - palliative consulted: life prolonging with limits  - full respiratory panel: negative  - continuous pulse ox      Acute Metabolic and infectious encephalopathy - resolved   Progressive dementia  Has had \"sundowning\" in recent months, worse last few days. still confused and agitated at times  - schedule Seroquel 25 mg BID starting 2/27  - attempt to reorient  - given dose of haldol on 2/24 with acute change in respiratory status requiring 10 L oxy mask would avoid Haldol if possible  - if need IV/IM would try low dose Zyprexa     Acute urinary retention requiring Lieberman catheter placement  Gross hematuria - resolved   BPH  - Lieberman catheter placed due to number of times required to straight cath, Developed gross hematuria after Lieberman placement and ambulation 2/25  - Urology consult due to above   - increased Flomax 0.8mg   - started finasteride   - needs outpatient follow up in 2-3 months with cystoscopy, UA, postvoid  - Lieberman removed 2/26 late morning due to patient trying to manipulate " pérez and would cause more trauma being ripped out  - resume Eliquis in AM 2/27     Generalized Weakness & Ambulatory Dysfunction  -Physical therapy consultation: recommending TCU  - needs to be sitter free  - consult, currently in independent living     New LBBB & Chronically Elevated Troponin  - No chest pain and EKG does not meet Keisha's criteria  - Troponin chronically elevated to 50's     Chronic Afib, controlled   - continue home atenolol and Eliquis  - rate controlled on telemetry in the 80's  - removed tele on 2/26 due to agitation     CKD stage III  Creatinine at baseline          Diet: Regular Diet Adult    DVT Prophylaxis: DOAC holding this evening due to hematuria   Pérez Catheter: Not present  Lines: None     Cardiac Monitoring: None  Code Status: No CPR- Do NOT Intubate      Clinically Significant Risk Factors         # Hyponatremia: Lowest Na = 133 mmol/L in last 2 days, will monitor as appropriate                          # COPD: noted on problem list        Social Drivers of Health            Disposition Plan     Medically Ready for Discharge: Anticipated in 2-4 Days      The patient's care was discussed with the Bedside Nurse, Care Coordinator/, Patient, and Patient's Family.    WENDY Palacios PA-C  Hospitalist Service  [facility]  Securely message with [employer] (more info)  Text page via [employer] Paging/Directory   ______________________________________________________________________    Interval History   Family present during evaluation. He is sitting up in chair. Pérez removed this morning. Urine color peach prior to removal. Denies sob, chest pain, abdominal pain, N/V.     Physical Exam   Vital Signs: Temp: 98.8  F (37.1  C) Temp src: Oral BP: (!) 143/81 Pulse: 101   Resp: 24 SpO2: 97 % O2 Device: None (Room air)    Weight: 0 lbs 0 oz    GENERAL:  Alert, Comfortable, No acute distress. Sitting up in chair eating lunch  PSYCH: pleasant  HEENT:   Normocephalic, No scleral icterus or conjunctival injection  HEART:  Normal S1, S2 with no murmur, IRR  LUNGS:  Normal Respiratory effort. Clear to auscultation bilaterally with no wheezing, rales or ronchi.  ABDOMEN:  Soft, non-tender, non distended. No peritoneal signs.   EXTREMITIES:  No pitting pedal edema, No cyanosis.   : Gross hematuria in pérez catheter   SKIN:  Warm, dry to touch  NEUROLOGIC: Speech clear, alert, no focal deficits.     Medical Decision Making       MANAGEMENT DISCUSSED with the following over the past 24 hours: patient, family, nursing, PT   NOTE(S)/MEDICAL RECORDS REVIEWED over the past 24 hours: labs, imaging, progress notes       Data     I have personally reviewed the following data over the past 24 hrs:    13.0 (H)  \   12.4 (L)   / 213     133 (L) 98 23.4 (H) /  120 (H)   3.5 21 (L) 0.98 \       Imaging results reviewed over the past 24 hrs:   No results found for this or any previous visit (from the past 24 hours).

## 2025-02-26 NOTE — PROGRESS NOTES
[facility] Urology Progress Note          Assessment and Plan:     Assessment:    Urinary retention    Gross hematuria    BPH    Pneumonia    LBBB (left bundle branch block)    Confusion    Generalized weakness        Plan:   -Discontinue Lieberman catheter.  Given that patient needs a sitter to not manipulate Lieberman, will have to discharge without this in place.  -Urine is clearing up.  Is clear peach.  Can consider restarting his Eliquis tonight or tomorrow morning.  -Continue Flomax 0.8 mg daily.  This is an increase from his baseline 0.4 mg.  -Finasteride 5 mg once daily has been added on.  This medication will take 6 to 9 months to see full effect.  -Will arrange for patient to follow-up in clinic in 2 to 3 months with cystoscopy, at urinalysis, postvoid residual and AUA symptom score.  Message has been sent to arrange this.  -Okay to discharge from urology perspective when cleared by medicine.      Beth Mosquera PA-C   [facility]  [phone]               Interval History:     Doing okay.  Denies pain.  Lieberman catheter in place draining clear peach urine.  Denies N/V/F/C.  Patient has needed a sitter with Lieberman.  Cannot go home until no sitter.  Afebrile with mild tachycardia.  Hemoglobin 12.4.  Creatinine 0.98 EGFR of 70.  Denies nausea, vomiting, fevers, chills.              Review of Systems:     The 5 point Review of Systems is negative other than noted in the HPI             Medications:     Current Facility-Administered Medications   Medication Dose Route Frequency Provider Last Rate Last Admin    acetaminophen (TYLENOL) tablet 650 mg  650 mg Oral Q4H PRN Deric Dale MD   650 mg at 02/24/25 1232    Or    acetaminophen (TYLENOL) Suppository 650 mg  650 mg Rectal Q4H PRN Deric Dale MD        [Held by provider] apixaban ANTICOAGULANT (ELIQUIS) tablet 5 mg  5 mg Oral BID Deric Dale MD   5 mg at 02/25/25 0823    atenolol (TENORMIN)  tablet 50 mg  50 mg Oral Daily Deric Dale MD   50 mg at 02/26/25 0724    cefTRIAXone (ROCEPHIN) 2 g vial to attach to  ml bag for ADULTS or NS 50 ml bag for PEDS  2 g Intravenous Q24H Deric Dale MD 0 mL/hr at 02/24/25 0020 2 g at 02/25/25 2239    doxycycline hyclate (VIBRAMYCIN) capsule 100 mg  100 mg Oral Q12H [facility] (08/20) Deric Dale MD   100 mg at 02/26/25 0725    finasteride (PROSCAR) tablet 5 mg  5 mg Oral Daily Eugene Lau MD   5 mg at 02/26/25 0724    ipratropium - albuterol 0.5 mg/2.5 mg/3 mL (DUONEB) neb solution 3 mL  3 mL Nebulization Q4H PRN Deric Dale MD   3 mL at 02/24/25 1759    Lidocaine (LIDOCARE) 4 % Patch 2 patch  2 patch Transdermal Q24h Charlotte Lindo APRN CNS   2 patch at 02/25/25 2045    melatonin tablet 3 mg  3 mg Oral At Bedtime Edmund Arroyo MD   3 mg at 02/25/25 2238    nicotine (COMMIT) lozenge 2 mg  2 mg Buccal Q1H PRN Deric Dale MD        OLANZapine (zyPREXA) injection 2.5 mg  2.5 mg Intramuscular Daily PRN Jenny Palacios PA-C   2.5 mg at 02/25/25 2035    pantoprazole (PROTONIX) EC tablet 40 mg  40 mg Oral Daily Deric Dale MD   40 mg at 02/26/25 0724    QUEtiapine (SEROquel) tablet 25 mg  25 mg Oral Q6H PRN Jenny Palacios PA-C   25 mg at 02/25/25 2337    senna-docusate (SENOKOT-S/PERICOLACE) 8.6-50 MG per tablet 1 tablet  1 tablet Oral BID PRN Deric Dale MD        Or    senna-docusate (SENOKOT-S/PERICOLACE) 8.6-50 MG per tablet 2 tablet  2 tablet Oral BID PRN Deric Dale MD        tamsulosin (FLOMAX) capsule 0.8 mg  0.8 mg Oral QPM Eugene Lau MD                      Physical Exam:   Vitals were reviewed  Patient Vitals for the past 8 hrs:   BP Temp Temp src Pulse Resp SpO2   02/26/25 0714 (!) 143/81 98.8  F (37.1  C) Oral 101 24 97 %     GEN: NAD, up to chair  EYES: EOMI  MOUTH: MMM  NECK: Supple  RESP: Unlabored  "breathing  SKIN: Warm  NEURO: AAO to self only  URO: Lieberman catheter in place draining clear peach urine.           Data:   No results found for: \"NTBNPI\", \"NTBNP\"  Lab Results   Component Value Date    WBC 13.0 (H) 02/26/2025    WBC 11.5 (H) 02/24/2025    WBC 17.0 (H) 02/23/2025    HGB 12.4 (L) 02/26/2025    HGB 12.2 (L) 02/24/2025    HGB 12.8 (L) 02/23/2025    HCT 38.0 (L) 02/26/2025    HCT 38.3 (L) 02/24/2025    HCT 39.7 (L) 02/23/2025    MCV 97 02/26/2025    MCV 98 02/24/2025    MCV 98 02/23/2025     02/26/2025     02/24/2025     02/23/2025     Lab Results   Component Value Date    INR 1.04 02/18/2015    INR 1.12 10/27/2011    INR 1.14 09/23/2011      "

## 2025-02-26 NOTE — PROVIDER NOTIFICATION
Pt agitated and trying to actively get out of bed. Gave prn Zyprexa with relief. Pt alert and responding to questions asked. BP jumping into 170-180 Systolic and 102-109 diastolic. HR jumping between 103-105. Hospitalist updated.

## 2025-02-27 VITALS
BODY MASS INDEX: 19.87 KG/M2 | TEMPERATURE: 98.7 F | RESPIRATION RATE: 16 BRPM | SYSTOLIC BLOOD PRESSURE: 142 MMHG | DIASTOLIC BLOOD PRESSURE: 85 MMHG | HEART RATE: 77 BPM | OXYGEN SATURATION: 93 % | WEIGHT: 131.1 LBS | HEIGHT: 68 IN

## 2025-02-27 LAB
ANION GAP SERPL CALCULATED.3IONS-SCNC: 14 MMOL/L (ref 7–15)
BACTERIA BLD CULT: NORMAL
BACTERIA BLD CULT: NORMAL
BASOPHILS # BLD AUTO: 0 10E3/UL (ref 0–0.2)
BASOPHILS NFR BLD AUTO: 0 %
BUN SERPL-MCNC: 29.2 MG/DL (ref 8–23)
CALCIUM SERPL-MCNC: 8.5 MG/DL (ref 8.8–10.4)
CHLORIDE SERPL-SCNC: 99 MMOL/L (ref 98–107)
CREAT SERPL-MCNC: 1.04 MG/DL (ref 0.67–1.17)
EGFRCR SERPLBLD CKD-EPI 2021: 65 ML/MIN/1.73M2
EOSINOPHIL # BLD AUTO: 0.2 10E3/UL (ref 0–0.7)
EOSINOPHIL NFR BLD AUTO: 2 %
ERYTHROCYTE [DISTWIDTH] IN BLOOD BY AUTOMATED COUNT: 12.3 % (ref 10–15)
GLUCOSE SERPL-MCNC: 113 MG/DL (ref 70–99)
HCO3 SERPL-SCNC: 22 MMOL/L (ref 22–29)
HCT VFR BLD AUTO: 35.6 % (ref 40–53)
HGB BLD-MCNC: 11.6 G/DL (ref 13.3–17.7)
IMM GRANULOCYTES # BLD: 0.1 10E3/UL
IMM GRANULOCYTES NFR BLD: 1 %
LYMPHOCYTES # BLD AUTO: 0.6 10E3/UL (ref 0.8–5.3)
LYMPHOCYTES NFR BLD AUTO: 7 %
MCH RBC QN AUTO: 31.1 PG (ref 26.5–33)
MCHC RBC AUTO-ENTMCNC: 32.6 G/DL (ref 31.5–36.5)
MCV RBC AUTO: 95 FL (ref 78–100)
MONOCYTES # BLD AUTO: 0.9 10E3/UL (ref 0–1.3)
MONOCYTES NFR BLD AUTO: 11 %
NEUTROPHILS # BLD AUTO: 6.7 10E3/UL (ref 1.6–8.3)
NEUTROPHILS NFR BLD AUTO: 79 %
NRBC # BLD AUTO: 0 10E3/UL
NRBC BLD AUTO-RTO: 0 /100
PLATELET # BLD AUTO: 229 10E3/UL (ref 150–450)
POTASSIUM SERPL-SCNC: 3.3 MMOL/L (ref 3.4–5.3)
RBC # BLD AUTO: 3.73 10E6/UL (ref 4.4–5.9)
SODIUM SERPL-SCNC: 135 MMOL/L (ref 135–145)
WBC # BLD AUTO: 8.6 10E3/UL (ref 4–11)

## 2025-02-27 PROCEDURE — 250N000013 HC RX MED GY IP 250 OP 250 PS 637: Performed by: CLINICAL NURSE SPECIALIST

## 2025-02-27 PROCEDURE — 85004 AUTOMATED DIFF WBC COUNT: CPT

## 2025-02-27 PROCEDURE — 99232 SBSQ HOSP IP/OBS MODERATE 35: CPT | Performed by: PHYSICIAN ASSISTANT

## 2025-02-27 PROCEDURE — 250N000013 HC RX MED GY IP 250 OP 250 PS 637: Performed by: INTERNAL MEDICINE

## 2025-02-27 PROCEDURE — 250N000013 HC RX MED GY IP 250 OP 250 PS 637

## 2025-02-27 PROCEDURE — 80048 BASIC METABOLIC PNL TOTAL CA: CPT

## 2025-02-27 PROCEDURE — 250N000011 HC RX IP 250 OP 636: Performed by: PHYSICIAN ASSISTANT

## 2025-02-27 PROCEDURE — 36415 COLL VENOUS BLD VENIPUNCTURE: CPT

## 2025-02-27 PROCEDURE — 82310 ASSAY OF CALCIUM: CPT

## 2025-02-27 PROCEDURE — 250N000013 HC RX MED GY IP 250 OP 250 PS 637: Performed by: PHYSICIAN ASSISTANT

## 2025-02-27 PROCEDURE — 85018 HEMOGLOBIN: CPT

## 2025-02-27 PROCEDURE — 120N000001 HC R&B MED SURG/OB

## 2025-02-27 PROCEDURE — 250N000013 HC RX MED GY IP 250 OP 250 PS 637: Performed by: STUDENT IN AN ORGANIZED HEALTH CARE EDUCATION/TRAINING PROGRAM

## 2025-02-27 RX ORDER — QUETIAPINE FUMARATE 25 MG/1
25 TABLET, FILM COATED ORAL AT BEDTIME
Status: DISCONTINUED | OUTPATIENT
Start: 2025-02-27 | End: 2025-02-27

## 2025-02-27 RX ORDER — DOXYCYCLINE 100 MG/1
100 CAPSULE ORAL EVERY 12 HOURS SCHEDULED
Status: COMPLETED | OUTPATIENT
Start: 2025-02-27 | End: 2025-03-02

## 2025-02-27 RX ORDER — CEFTRIAXONE 2 G/1
2 INJECTION, POWDER, FOR SOLUTION INTRAMUSCULAR; INTRAVENOUS EVERY 24 HOURS
Status: COMPLETED | OUTPATIENT
Start: 2025-02-27 | End: 2025-03-01

## 2025-02-27 RX ORDER — POTASSIUM CHLORIDE 1.5 G/1.58G
40 POWDER, FOR SOLUTION ORAL ONCE
Status: COMPLETED | OUTPATIENT
Start: 2025-02-27 | End: 2025-02-27

## 2025-02-27 RX ADMIN — POTASSIUM CHLORIDE 40 MEQ: 1.5 POWDER, FOR SOLUTION ORAL at 18:38

## 2025-02-27 RX ADMIN — FINASTERIDE 5 MG: 5 TABLET, FILM COATED ORAL at 08:03

## 2025-02-27 RX ADMIN — PANTOPRAZOLE SODIUM 40 MG: 40 TABLET, DELAYED RELEASE ORAL at 08:03

## 2025-02-27 RX ADMIN — APIXABAN 5 MG: 5 TABLET, FILM COATED ORAL at 21:01

## 2025-02-27 RX ADMIN — QUETIAPINE FUMARATE 25 MG: 25 TABLET ORAL at 08:03

## 2025-02-27 RX ADMIN — TAMSULOSIN HYDROCHLORIDE 0.8 MG: 0.4 CAPSULE ORAL at 21:01

## 2025-02-27 RX ADMIN — APIXABAN 5 MG: 5 TABLET, FILM COATED ORAL at 08:03

## 2025-02-27 RX ADMIN — LIDOCAINE 2 PATCH: 4 PATCH TOPICAL at 21:02

## 2025-02-27 RX ADMIN — Medication 3 MG: at 21:01

## 2025-02-27 RX ADMIN — DOXYCYCLINE HYCLATE 100 MG: 100 CAPSULE ORAL at 21:01

## 2025-02-27 RX ADMIN — ATENOLOL 50 MG: 50 TABLET ORAL at 08:03

## 2025-02-27 RX ADMIN — CEFTRIAXONE 2 G: 2 INJECTION, POWDER, FOR SOLUTION INTRAMUSCULAR; INTRAVENOUS at 21:46

## 2025-02-27 RX ADMIN — DOXYCYCLINE HYCLATE 100 MG: 100 CAPSULE ORAL at 08:03

## 2025-02-27 RX ADMIN — QUETIAPINE FUMARATE 12.5 MG: 25 TABLET ORAL at 13:06

## 2025-02-27 RX ADMIN — QUETIAPINE FUMARATE 12.5 MG: 25 TABLET ORAL at 21:01

## 2025-02-27 ASSESSMENT — ACTIVITIES OF DAILY LIVING (ADL)
ADLS_ACUITY_SCORE: 73
ADLS_ACUITY_SCORE: 75
ADLS_ACUITY_SCORE: 75
ADLS_ACUITY_SCORE: 72
ADLS_ACUITY_SCORE: 71
ADLS_ACUITY_SCORE: 71
ADLS_ACUITY_SCORE: 72
ADLS_ACUITY_SCORE: 75
ADLS_ACUITY_SCORE: 75
ADLS_ACUITY_SCORE: 71
ADLS_ACUITY_SCORE: 75
ADLS_ACUITY_SCORE: 72
ADLS_ACUITY_SCORE: 72
ADLS_ACUITY_SCORE: 73
ADLS_ACUITY_SCORE: 71
ADLS_ACUITY_SCORE: 75
ADLS_ACUITY_SCORE: 71
ADLS_ACUITY_SCORE: 72
ADLS_ACUITY_SCORE: 71
ADLS_ACUITY_SCORE: 72
ADLS_ACUITY_SCORE: 72

## 2025-02-27 NOTE — PLAN OF CARE
"Patient is disoriented x3, but alert and cooperative. Restless around 0800, and scheduled Seroquel given.   VS WNL and documented on the FS. Lung sounds clear and patient is on RA. Denies SOB. Active bowel sounds with LBM yesterday. Patient voiding, but has been incontinent. Patient will say he needs to void, but then will just sit on the toilet for a long time. Generalized bruising on bilateral hands and forearms. Patient is a 1 assist with walker and gb when up. Family states he is independent with walker at baseline. Regular diet and tolerating. Patient continues on Rocephin and Doxycycline for Pneumonia. Medications crushed in pudding or applesauce.   Plan: TCU placement and medication adjustments.       /82 (BP Location: Right arm)   Pulse 93   Temp 98.6  F (37  C) (Oral)   Resp 16   SpO2 96%     Problem: Adult Inpatient Plan of Care  Goal: Plan of Care Review  Description: The Plan of Care Review/Shift note should be completed every shift.  The Outcome Evaluation is a brief statement about your assessment that the patient is improving, declining, or no change.  This information will be displayed automatically on your shift  note.  Outcome: Progressing  Flowsheets (Taken 2/27/2025 1044)  Plan of Care Reviewed With: patient  Overall Patient Progress: improving  Goal: Patient-Specific Goal (Individualized)  Description: You can add care plan individualizations to a care plan. Examples of Individualization might be:  \"Parent requests to be called daily at 9am for status\", \"I have a hard time hearing out of my right ear\", or \"Do not touch me to wake me up as it startles  me\".  Outcome: Progressing  Goal: Absence of Hospital-Acquired Illness or Injury  Outcome: Progressing  Intervention: Identify and Manage Fall Risk  Recent Flowsheet Documentation  Taken 2/27/2025 0822 by Joana Lam RN  Safety Promotion/Fall Prevention:   activity supervised   assistive device/personal items within reach  Intervention: " Prevent Infection  Recent Flowsheet Documentation  Taken 2/27/2025 0822 by Joana Lam RN  Infection Prevention: hand hygiene promoted  Goal: Optimal Comfort and Wellbeing  Outcome: Progressing  Goal: Readiness for Transition of Care  Outcome: Progressing     Problem: Fatigue  Goal: Improved Activity Tolerance  Outcome: Progressing  Intervention: Promote Improved Energy  Recent Flowsheet Documentation  Taken 2/27/2025 0822 by Joana Lam RN  Activity Management:   activity adjusted per tolerance   ambulated to bathroom     Problem: Fall Injury Risk  Goal: Absence of Fall and Fall-Related Injury  Outcome: Progressing  Intervention: Identify and Manage Contributors  Recent Flowsheet Documentation  Taken 2/27/2025 0822 by Joana Lam RN  Medication Review/Management: medications reviewed  Intervention: Promote Injury-Free Environment  Recent Flowsheet Documentation  Taken 2/27/2025 0822 by Joana Lam RN  Safety Promotion/Fall Prevention:   activity supervised   assistive device/personal items within reach   Goal Outcome Evaluation:      Plan of Care Reviewed With: patient    Overall Patient Progress: improvingOverall Patient Progress: improving

## 2025-02-27 NOTE — PROGRESS NOTES
UROLOGY CHART NOTE    Patient having some incontinence and sitting on toilet but not voiding.  Was able to have sitter discontinued.  Did not appear to need straight cath again. Eliquis restarted.    -Continue Flomax 0.8 mg daily.  This is an increase from his baseline 0.4 mg.  -Finasteride 5 mg once daily. This medication will take 6 to 9 months to see full effect.  -Will arrange for patient to follow-up in clinic in 2 to 3 months with cystoscopy, at urinalysis, postvoid residual and AUA symptom score.  Message has been sent to arrange this.  -Okay to discharge from urology perspective when cleared by medicine.  Will plan on signing off. Please contact us with any urological concerns.    Beth Mosquera PA-C  [facility]   [phone]

## 2025-02-27 NOTE — PLAN OF CARE
"Goal Outcome Evaluation:  Care from 2993-9702    Inpatient Progress Note:  For complete assessment see flow sheet documentation.    BP (!) 142/71 (BP Location: Right arm)   Pulse 79   Temp 98.8  F (37.1  C) (Oral)   Resp 19   SpO2 92%    Alert only to self.Denies pain.Assist x 1-2 with walker/GB.Voiding adequately.Attempted a few times to get out of bed, confused but not  agitated.  No PRNs given tonight.PT/SW following.Sitter @ bedside.        Plan of Care Reviewed With: patient    Problem: Adult Inpatient Plan of Care  Goal: Plan of Care Review  Description: The Plan of Care Review/Shift note should be completed every shift.  The Outcome Evaluation is a brief statement about your assessment that the patient is improving, declining, or no change.  This information will be displayed automatically on your shift  note.  Outcome: Progressing  Flowsheets (Taken 2/27/2025 0549)  Plan of Care Reviewed With: patient  Overall Patient Progress: no change  Goal: Patient-Specific Goal (Individualized)  Description: You can add care plan individualizations to a care plan. Examples of Individualization might be:  \"Parent requests to be called daily at 9am for status\", \"I have a hard time hearing out of my right ear\", or \"Do not touch me to wake me up as it startles  me\".  Outcome: Progressing  Goal: Absence of Hospital-Acquired Illness or Injury  Outcome: Progressing  Goal: Optimal Comfort and Wellbeing  Outcome: Progressing  Goal: Readiness for Transition of Care  Outcome: Progressing  Flowsheets (Taken 2/27/2025 0549)  Transportation Anticipated: family or friend will provide  Concerns to be Addressed: discharge planning  Intervention: Mutually Develop Transition Plan  Recent Flowsheet Documentation  Taken 2/27/2025 0549 by Mera Person, RN  Transportation Anticipated: family or friend will provide  Concerns to be Addressed: discharge planning     Problem: Fatigue  Goal: Improved Activity Tolerance  Outcome: " Progressing     Problem: Fall Injury Risk  Goal: Absence of Fall and Fall-Related Injury  Outcome: Progressing       Overall Patient Progress: no changeOverall Patient Progress: no change

## 2025-02-27 NOTE — PLAN OF CARE
"Goal Outcome Evaluation:      Plan of Care Reviewed With: patient, child    Overall Patient Progress: no changeOverall Patient Progress: no change     PRIMARY DIAGNOSIS: GENERALIZED WEAKNESS    OUTPATIENT/OBSERVATION GOALS TO BE MET BEFORE DISCHARGE  1. Orthostatic performed: No    2. Tolerating PO medications: No, Meds crushed in apple sauce.    3. Return to near baseline physical activity: No    4. Cleared for discharge by consultants (if involved): No    Discharge Planner Nurse   Safe discharge environment identified: No  Barriers to discharge: Yes       Entered by: Jenna Gunderson RN 02/26/2025 7:46 PM   Vitals are Temp: 98.5  F (36.9  C) Temp src: Oral BP: (!) 142/87 Pulse: 88   Resp: 20 SpO2: 95 %.  Patient is Disorientated to, Time, Place, and Situation. denying pain.  Patient is Saline locked.    Pt is a Regular diet. He is 2 assist with Gait Belt and Walker. Agitated, restless and confused gave prn Zyprexa, ambulated in the hallways this afternoon, had a BM and a urine occurrence today.  Please review provider order for any additional goals.   Nurse to notify provider when observation goals have been met and patient is ready for discharge.    Problem: Adult Inpatient Plan of Care  Goal: Plan of Care Review  Description: The Plan of Care Review/Shift note should be completed every shift.  The Outcome Evaluation is a brief statement about your assessment that the patient is improving, declining, or no change.  This information will be displayed automatically on your shift  note.  Outcome: Progressing  Flowsheets (Taken 2/26/2025 1945)  Plan of Care Reviewed With:   patient   child  Overall Patient Progress: no change  Goal: Patient-Specific Goal (Individualized)  Description: You can add care plan individualizations to a care plan. Examples of Individualization might be:  \"Parent requests to be called daily at 9am for status\", \"I have a hard time hearing out of my right ear\", or \"Do not touch me to wake me " "up as it startles  me\".  Outcome: Progressing  Goal: Absence of Hospital-Acquired Illness or Injury  Outcome: Progressing  Intervention: Identify and Manage Fall Risk  Recent Flowsheet Documentation  Taken 2/26/2025 1657 by Jenna Gunderson RN  Safety Promotion/Fall Prevention:   activity supervised   patient and family education   nonskid shoes/slippers when out of bed  Intervention: Prevent and Manage VTE (Venous Thromboembolism) Risk  Recent Flowsheet Documentation  Taken 2/26/2025 1657 by Jenna Gunderson RN  VTE Prevention/Management: SCDs off (sequential compression devices)  Intervention: Prevent Infection  Recent Flowsheet Documentation  Taken 2/26/2025 1657 by Jenna Gunderson RN  Infection Prevention: hand hygiene promoted  Goal: Optimal Comfort and Wellbeing  Outcome: Progressing  Goal: Readiness for Transition of Care  Outcome: Progressing     Problem: Fatigue  Goal: Improved Activity Tolerance  Outcome: Progressing     Problem: Fall Injury Risk  Goal: Absence of Fall and Fall-Related Injury  Outcome: Progressing  Intervention: Identify and Manage Contributors  Recent Flowsheet Documentation  Taken 2/26/2025 1657 by Jenna Gunderson RN  Medication Review/Management: medications reviewed  Intervention: Promote Injury-Free Environment  Recent Flowsheet Documentation  Taken 2/26/2025 1657 by Jenna Gunderson RN  Safety Promotion/Fall Prevention:   activity supervised   patient and family education   nonskid shoes/slippers when out of bed       "

## 2025-02-27 NOTE — CARE PLAN
Sitter removed this morning at 0700. Patient has been pleasant all day and following direction. Patients Seroquel dose changed to 12.5mg in the morning and afternoon with 25 mg given at night. Family at bedside.

## 2025-02-27 NOTE — PROGRESS NOTES
"[facility]    Medicine Progress Note - Hospitalist Service    Date of Admission:  2/23/2025    Assessment & Plan   Kraig is a 89 Male with history of A-fib on Eliquis, CKD stage III, BPH, and suspected dementia presents with generalized weakness and confusion and found to have community-acquired pneumonia.    Daily update: needed sitter overnight on 2/26 due to increased agitation, sitter again pulled on 2/27. Planning on TCU placement.  Needs to be sitter free for 24 hours and no IV or IM medications. More pleasant today without episodes of increased agitation requiring PRN. Adjusting Seroquel due to increased somnolence afternoon on 2/27, will trial lowest dose and monitor response.       Community-acquired pneumonia  - continuing rocephin 2 g + doxycycline (started 2/23/25)  - palliative consulted: life prolonging with limits  - full respiratory panel: negative  - continuous pulse ox      Acute Metabolic and infectious encephalopathy - resolved   Progressive dementia  Has had \"sundowning\" in recent months, worse last few days. still confused and agitated at times  - increased sedation with 25 mg Seroquel on AM of 2/27 with 12.5 mg around 13:30 due to typically increased agitation at that time, will adjust Seroquel to 12.5 mg TID (AM, around 13:30, and at bedtime, hold for somnolence/sedation)   - attempt to reorient  - given dose of haldol on 2/24 with acute change in respiratory status requiring 10 L oxy mask would avoid Haldol if possible  - if need IV/IM would try low dose Zyprexa     Acute urinary retention requiring Lieberman catheter placement  Gross hematuria - resolved   BPH  - Lieberman catheter placed due to number of times required to straight cath, Developed gross hematuria after Lieberman placement and ambulation 2/25  - Urology consult due to above   - increased Flomax 0.8mg   - started finasteride   - needs outpatient follow up in 2-3 months with cystoscopy, UA, postvoid  - Lieberman " removed 2/26 late morning due to patient trying to manipulate pérez and would cause more trauma being ripped out  - voiding since pérez removed without significant PVR, continue to monitor   - resume Eliquis in AM 2/27     Generalized Weakness & Ambulatory Dysfunction  -Physical therapy consultation: recommending TCU  -needs to be sitter free, sitter pulled morning of 2/27   -SW consult, currently in independent living     New LBBB & Chronically Elevated Troponin  - No chest pain and EKG does not meet Keisha's criteria  - Troponin chronically elevated to 50's     Chronic Afib, controlled   - continue home atenolol and Eliquis  - rate controlled on telemetry in the 80's  - removed tele on 2/26 due to agitation     CKD stage III  Creatinine at baseline          Diet: Regular Diet Adult    DVT Prophylaxis: DOAC  Pérez Catheter: Not present  Lines: None     Cardiac Monitoring: None  Code Status: No CPR- Do NOT Intubate      Clinically Significant Risk Factors        # Hypokalemia: Lowest K = 3.3 mmol/L in last 2 days, will replace as needed  # Hyponatremia: Lowest Na = 133 mmol/L in last 2 days, will monitor as appropriate                          # COPD: noted on problem list        Social Drivers of Health            Disposition Plan     Medically Ready for Discharge: Anticipated in 2-4 Days       The patient's care was discussed with the Bedside Nurse, Care Coordinator/, Patient, and Patient's Family.    April Arias PA-C  Hospitalist Service  [facility]  Securely message with [employer] (more info)  Text page via [employer] Paging/Directory   ______________________________________________________________________    Interval History   Patient is ambulating out of the bathroom upon walking in room.  Appears that he is on his feet and in good spirits.  No current complaints of pain.  He says he has a little cough that is improving.  Denies chest pain or shortness of breath.  He  "states he is in the \"tired room\".     Revisited room later in the day and updated the patient's son Niko in person all questions answered.    Physical Exam   Vital Signs: Temp: 98.6  F (37  C) Temp src: Oral BP: 126/82 Pulse: 93   Resp: 16 SpO2: 96 % O2 Device: None (Room air)    Weight: 0 lbs 0 oz    General Appearance: Ambulating then sitting up in chair, pleasant, interactive, NAD, only oriented to self  Respiratory: CTAB without wheezing or rales  Cardiovascular: RRR without murmur or rub   GI: soft, nontender, nondistended, normoactive bowel sounds   Skin: warm, dry     Medical Decision Making       45 MINUTES SPENT BY ME on the date of service doing chart review, history, exam, documentation & further activities per the note.      Data   ------------------------- PAST 24 HR DATA REVIEWED -----------------------------------------------  "

## 2025-02-28 ENCOUNTER — APPOINTMENT (OUTPATIENT)
Dept: PHYSICAL THERAPY | Facility: CLINIC | Age: OVER 89
End: 2025-02-28
Payer: MEDICARE

## 2025-02-28 LAB
ANION GAP SERPL CALCULATED.3IONS-SCNC: 14 MMOL/L (ref 7–15)
BUN SERPL-MCNC: 39.2 MG/DL (ref 8–23)
CALCIUM SERPL-MCNC: 8.6 MG/DL (ref 8.8–10.4)
CHLORIDE SERPL-SCNC: 104 MMOL/L (ref 98–107)
CREAT SERPL-MCNC: 1.19 MG/DL (ref 0.67–1.17)
EGFRCR SERPLBLD CKD-EPI 2021: 55 ML/MIN/1.73M2
GLUCOSE SERPL-MCNC: 111 MG/DL (ref 70–99)
HCO3 SERPL-SCNC: 21 MMOL/L (ref 22–29)
POTASSIUM SERPL-SCNC: 3.6 MMOL/L (ref 3.4–5.3)
POTASSIUM SERPL-SCNC: 3.7 MMOL/L (ref 3.4–5.3)
SODIUM SERPL-SCNC: 139 MMOL/L (ref 135–145)

## 2025-02-28 PROCEDURE — 97116 GAIT TRAINING THERAPY: CPT | Mod: GP | Performed by: PHYSICAL THERAPIST

## 2025-02-28 PROCEDURE — 99232 SBSQ HOSP IP/OBS MODERATE 35: CPT | Performed by: HOSPITALIST

## 2025-02-28 PROCEDURE — 250N000013 HC RX MED GY IP 250 OP 250 PS 637

## 2025-02-28 PROCEDURE — 250N000013 HC RX MED GY IP 250 OP 250 PS 637: Performed by: INTERNAL MEDICINE

## 2025-02-28 PROCEDURE — 250N000013 HC RX MED GY IP 250 OP 250 PS 637: Performed by: CLINICAL NURSE SPECIALIST

## 2025-02-28 PROCEDURE — 250N000011 HC RX IP 250 OP 636: Performed by: PHYSICIAN ASSISTANT

## 2025-02-28 PROCEDURE — 36415 COLL VENOUS BLD VENIPUNCTURE: CPT | Performed by: PHYSICIAN ASSISTANT

## 2025-02-28 PROCEDURE — 250N000013 HC RX MED GY IP 250 OP 250 PS 637: Performed by: PHYSICIAN ASSISTANT

## 2025-02-28 PROCEDURE — 250N000011 HC RX IP 250 OP 636

## 2025-02-28 PROCEDURE — 97530 THERAPEUTIC ACTIVITIES: CPT | Mod: GP | Performed by: PHYSICAL THERAPIST

## 2025-02-28 PROCEDURE — 250N000013 HC RX MED GY IP 250 OP 250 PS 637: Performed by: STUDENT IN AN ORGANIZED HEALTH CARE EDUCATION/TRAINING PROGRAM

## 2025-02-28 PROCEDURE — 250N000013 HC RX MED GY IP 250 OP 250 PS 637: Performed by: HOSPITALIST

## 2025-02-28 PROCEDURE — 80048 BASIC METABOLIC PNL TOTAL CA: CPT | Performed by: PHYSICIAN ASSISTANT

## 2025-02-28 PROCEDURE — 120N000001 HC R&B MED SURG/OB

## 2025-02-28 PROCEDURE — 84132 ASSAY OF SERUM POTASSIUM: CPT | Performed by: PHYSICIAN ASSISTANT

## 2025-02-28 PROCEDURE — 82310 ASSAY OF CALCIUM: CPT | Performed by: PHYSICIAN ASSISTANT

## 2025-02-28 RX ADMIN — QUETIAPINE FUMARATE 12.5 MG: 25 TABLET ORAL at 07:59

## 2025-02-28 RX ADMIN — PANTOPRAZOLE SODIUM 40 MG: 40 TABLET, DELAYED RELEASE ORAL at 07:59

## 2025-02-28 RX ADMIN — QUETIAPINE FUMARATE 12.5 MG: 25 TABLET ORAL at 21:22

## 2025-02-28 RX ADMIN — DOXYCYCLINE HYCLATE 100 MG: 100 CAPSULE ORAL at 21:42

## 2025-02-28 RX ADMIN — LIDOCAINE 2 PATCH: 4 PATCH TOPICAL at 21:38

## 2025-02-28 RX ADMIN — QUETIAPINE FUMARATE 12.5 MG: 25 TABLET ORAL at 13:24

## 2025-02-28 RX ADMIN — Medication 3 MG: at 21:22

## 2025-02-28 RX ADMIN — FINASTERIDE 5 MG: 5 TABLET, FILM COATED ORAL at 07:59

## 2025-02-28 RX ADMIN — ATENOLOL 50 MG: 50 TABLET ORAL at 07:59

## 2025-02-28 RX ADMIN — OLANZAPINE 2.5 MG: 10 INJECTION, POWDER, FOR SOLUTION INTRAMUSCULAR at 11:57

## 2025-02-28 RX ADMIN — DOXYCYCLINE HYCLATE 100 MG: 100 CAPSULE ORAL at 07:59

## 2025-02-28 RX ADMIN — APIXABAN 5 MG: 5 TABLET, FILM COATED ORAL at 07:59

## 2025-02-28 RX ADMIN — APIXABAN 2.5 MG: 2.5 TABLET, FILM COATED ORAL at 21:22

## 2025-02-28 RX ADMIN — CEFTRIAXONE 2 G: 2 INJECTION, POWDER, FOR SOLUTION INTRAMUSCULAR; INTRAVENOUS at 21:42

## 2025-02-28 ASSESSMENT — ACTIVITIES OF DAILY LIVING (ADL)
ADLS_ACUITY_SCORE: 69
ADLS_ACUITY_SCORE: 68
ADLS_ACUITY_SCORE: 72
ADLS_ACUITY_SCORE: 68
ADLS_ACUITY_SCORE: 72
ADLS_ACUITY_SCORE: 68
ADLS_ACUITY_SCORE: 72
ADLS_ACUITY_SCORE: 68
ADLS_ACUITY_SCORE: 68
ADLS_ACUITY_SCORE: 69
ADLS_ACUITY_SCORE: 72
ADLS_ACUITY_SCORE: 72
ADLS_ACUITY_SCORE: 69
ADLS_ACUITY_SCORE: 72
ADLS_ACUITY_SCORE: 69
ADLS_ACUITY_SCORE: 69
ADLS_ACUITY_SCORE: 68
ADLS_ACUITY_SCORE: 69
ADLS_ACUITY_SCORE: 72
ADLS_ACUITY_SCORE: 69

## 2025-02-28 NOTE — PROGRESS NOTES
Care Management Follow Up    Length of Stay (days): 4    Expected Discharge Date: 02/28/2025     Concerns to be Addressed: discharge planning     Patient plan of care discussed at interdisciplinary rounds: Yes    Anticipated Discharge Disposition: TCU     Anticipated Discharge Services:  none  Anticipated Discharge DME:  none    Patient/family educated on Medicare website which has current facility and service quality ratings:  yes  Education Provided on the Discharge Plan:  yes  Patient/Family in Agreement with the Plan:  yes    Referrals Placed by CM/SW:  TCU  Private pay costs discussed: Not applicable    Discussed  Partnership in Safe Discharge Planning  document with patient/family: No     Handoff Completed: No, handoff not indicated or clinically appropriate    Additional Information:  9:41 AM   MEDHAT sent message to [facility] updating them that patient has not had a sitter in 24 hrs and requested them to re-review.     11:19 AM   MEDHAT called and spoke with Zofia in admissions at [facility] to follow up on referral. Admissions states they have not had a chance to look at it yet, but also know that family wants a private room and she does not have a private room available at this time. She will review him for a shared room when she gets a chance.     MEDHAT called and spoke with patient's daughter to update her and obtain additional options for referrals. Daughter states that she will have to speak with family and give a call back.  SW updated MD.     11:28 AM  SW spoke with daughter, received additional options and sent referrals.     2:17 PM   MEDHAT met with patient's children in obs family lounge to discuss current referrals/declines. Family would prefer [facility] and are agreeable to shared bed. MEDHAT spoke with admissions at [facility]. They will review, but likely can't accept due to him being a potential wander risk.     Next Steps: SW to follow up with family for more  referral options.     PRISCILA Quintero   Social Work Care Manager   [facility]   [phone]

## 2025-02-28 NOTE — PLAN OF CARE
Shift: 4883-0881  VS: VSS, RA  Pain: Denies pain  Neuro: WNL  Cardiac: WNL  Respiratory: Denies SOB   Diet/Appetite:  Reg Diet  /GI: Voiding spontaneously/Small BMx2  LDA's: SL  Skin: Discoloration, moles and freckles scattered throughout the body. scabs scattered (arms, shin)  Activity: Ax1 w/walker GB       Plan: Pt is Alert to self. Denies pain and SOB. VSS, RA. Pt has 2 BM today (both small). Ambulated in room x2. Bed alarm and chair alarm on. PRN Zyprexa x 1 today. Tolerating regular diet. Waiting for placement. SW following.

## 2025-02-28 NOTE — PLAN OF CARE
"A&O to self only. Calm, pleasant, and cooperative. VSS; on room air. Denies all pain. Tolerating regular diet. Incontinence care and repositioning provided. Potassium protocol initiated w/ replacement given. PIV saline locked. Rocephin and doxycycline for pneumonia. Meds crushed w/ applesauce. Bed alarm on for safety. TCU recommended.     /82 (BP Location: Right arm)   Pulse 93   Temp 98.6  F (37  C) (Oral)   Resp 16   Ht 1.727 m (5' 7.99\")   Wt 59.5 kg (131 lb 1.6 oz)   SpO2 96%   BMI 19.94 kg/m      Goal Outcome Evaluation:    Outcome Evaluation: Aler to self. Calm and cooperative. Potassium replaced. Incontinence care provided.    Problem: Adult Inpatient Plan of Care  Goal: Plan of Care Review  Description: The Plan of Care Review/Shift note should be completed every shift.  The Outcome Evaluation is a brief statement about your assessment that the patient is improving, declining, or no change.  This information will be displayed automatically on your shift  note.  2/27/2025 1846 by Aga Raines RN  Outcome: Progressing  2/27/2025 1846 by Aga Raines RN  Outcome: Progressing  Flowsheets (Taken 2/27/2025 1846)  Plan of Care Reviewed With: patient  Overall Patient Progress: improving  2/27/2025 1845 by Aga Raines RN  Outcome: Progressing  Flowsheets (Taken 2/27/2025 1845)  Outcome Evaluation: Aler to self. Calm and cooperative. Potassium replaced. Incontinence care provided.  Goal: Patient-Specific Goal (Individualized)  Description: You can add care plan individualizations to a care plan. Examples of Individualization might be:  \"Parent requests to be called daily at 9am for status\", \"I have a hard time hearing out of my right ear\", or \"Do not touch me to wake me up as it startles  me\".  2/27/2025 1846 by Aga Raines RN  Outcome: Progressing  2/27/2025 1846 by Aga Raines RN  Outcome: Progressing  2/27/2025 1845 by Aga Raines RN  Outcome: Progressing  Goal: " Absence of Hospital-Acquired Illness or Injury  2/27/2025 1846 by Aga Raines RN  Outcome: Progressing  2/27/2025 1846 by Aga Raines RN  Outcome: Progressing  2/27/2025 1845 by Aga Raines RN  Outcome: Progressing  Intervention: Identify and Manage Fall Risk  Recent Flowsheet Documentation  Taken 2/27/2025 1550 by Aga Raines RN  Safety Promotion/Fall Prevention:   activity supervised   clutter free environment maintained   mobility aid in reach   nonskid shoes/slippers when out of bed   patient and family education   room door open   room near nurse's station   safety round/check completed  Intervention: Prevent Skin Injury  Recent Flowsheet Documentation  Taken 2/27/2025 1550 by Aga Raines RN  Skin Protection: adhesive use limited  Intervention: Prevent and Manage VTE (Venous Thromboembolism) Risk  Recent Flowsheet Documentation  Taken 2/27/2025 1550 by Aga Raines RN  VTE Prevention/Management: SCDs off (sequential compression devices)  Intervention: Prevent Infection  Recent Flowsheet Documentation  Taken 2/27/2025 1550 by Aga Raines RN  Infection Prevention:   environmental surveillance performed   hand hygiene promoted   rest/sleep promoted   single patient room provided  Goal: Optimal Comfort and Wellbeing  2/27/2025 1846 by Aga Raines RN  Outcome: Progressing  2/27/2025 1846 by Aga Raines RN  Outcome: Progressing  2/27/2025 1845 by Aga Raines RN  Outcome: Progressing  Goal: Readiness for Transition of Care  2/27/2025 1846 by Aga Raines RN  Outcome: Progressing  2/27/2025 1846 by Aga Raines RN  Outcome: Progressing  2/27/2025 1845 by Aga Raines RN  Outcome: Progressing     Problem: Fatigue  Goal: Improved Activity Tolerance  2/27/2025 1846 by Aga Raines RN  Outcome: Progressing  2/27/2025 1846 by Aga Raines RN  Outcome: Progressing  2/27/2025 1845 by Aga Raines RN  Outcome:  Progressing  Intervention: Promote Improved Energy  Recent Flowsheet Documentation  Taken 2/27/2025 1550 by Aga Rianes, RN  Activity Management: activity adjusted per tolerance     Problem: Fall Injury Risk  Goal: Absence of Fall and Fall-Related Injury  2/27/2025 1846 by Aga Raines, RN  Outcome: Progressing  2/27/2025 1846 by Aga Raines, RN  Outcome: Progressing  2/27/2025 1845 by Aga Raines, RN  Outcome: Progressing  Intervention: Identify and Manage Contributors  Recent Flowsheet Documentation  Taken 2/27/2025 1550 by Aga Raines, RN  Medication Review/Management: medications reviewed  Intervention: Promote Injury-Free Environment  Recent Flowsheet Documentation  Taken 2/27/2025 1550 by Aga Raines, RN  Safety Promotion/Fall Prevention:   activity supervised   clutter free environment maintained   mobility aid in reach   nonskid shoes/slippers when out of bed   patient and family education   room door open   room near nurse's station   safety round/check completed

## 2025-02-28 NOTE — PLAN OF CARE
"  Problem: Adult Inpatient Plan of Care  Goal: Plan of Care Review  Description: The Plan of Care Review/Shift note should be completed every shift.  The Outcome Evaluation is a brief statement about your assessment that the patient is improving, declining, or no change.  This information will be displayed automatically on your shift  note.  Outcome: Progressing  Goal: Patient-Specific Goal (Individualized)  Description: You can add care plan individualizations to a care plan. Examples of Individualization might be:  \"Parent requests to be called daily at 9am for status\", \"I have a hard time hearing out of my right ear\", or \"Do not touch me to wake me up as it startles  me\".  Outcome: Progressing  Goal: Absence of Hospital-Acquired Illness or Injury  Outcome: Progressing  Intervention: Identify and Manage Fall Risk  Recent Flowsheet Documentation  Taken 2/28/2025 0920 by Eddie Guardado RN  Safety Promotion/Fall Prevention:   activity supervised   assistive device/personal items within reach  Intervention: Prevent Skin Injury  Recent Flowsheet Documentation  Taken 2/28/2025 0920 by Eddie Guardado RN  Body Position: position changed independently  Intervention: Prevent and Manage VTE (Venous Thromboembolism) Risk  Recent Flowsheet Documentation  Taken 2/28/2025 0920 by Eddie Guardado RN  VTE Prevention/Management: SCDs off (sequential compression devices)  Intervention: Prevent Infection  Recent Flowsheet Documentation  Taken 2/28/2025 0920 by Eddie Guardado RN  Infection Prevention:   single patient room provided   hand hygiene promoted  Goal: Optimal Comfort and Wellbeing  Outcome: Progressing  Goal: Readiness for Transition of Care  Outcome: Progressing     "

## 2025-02-28 NOTE — PLAN OF CARE
"Goal Outcome Evaluation:  Care from 1326-8594    Inpatient Progress Note:  For complete assessment see flow sheet documentation.    BP (!) 142/86 (BP Location: Right arm)   Pulse 90   Temp 98.7  F (37.1  C) (Oral)   Resp 16   Ht 1.727 m (5' 7.99\")   Wt 59.5 kg (131 lb 1.6 oz)   SpO2 97%   BMI 19.94 kg/m     Alert to self with confusion.VSS,Denies pain.Assist x 1-2 with GB/walker for mobility.PIV saline locked.Voiding appropriately.A couple attempts to get out of bed but redirectable,Continues on Rocephin and Doxy, Meds crushed in pudding. POC ongoing.        Plan of Care Reviewed With: patient    Problem: Adult Inpatient Plan of Care  Goal: Plan of Care Review  Description: The Plan of Care Review/Shift note should be completed every shift.  The Outcome Evaluation is a brief statement about your assessment that the patient is improving, declining, or no change.  This information will be displayed automatically on your shift  note.  Outcome: Progressing  Flowsheets (Taken 2/28/2025 0505)  Plan of Care Reviewed With: patient  Overall Patient Progress: improving  Goal: Patient-Specific Goal (Individualized)  Description: You can add care plan individualizations to a care plan. Examples of Individualization might be:  \"Parent requests to be called daily at 9am for status\", \"I have a hard time hearing out of my right ear\", or \"Do not touch me to wake me up as it startles  me\".  Outcome: Progressing  Goal: Absence of Hospital-Acquired Illness or Injury  Outcome: Progressing  Intervention: Identify and Manage Fall Risk  Recent Flowsheet Documentation  Taken 2/27/2025 2019 by Mera Person, RN  Safety Promotion/Fall Prevention:   activity supervised   patient and family education   nonskid shoes/slippers when out of bed  Intervention: Prevent Skin Injury  Recent Flowsheet Documentation  Taken 2/27/2025 2019 by Mera Person, RN  Body Position: position changed independently  Intervention: Prevent and Manage VTE " The patient is a 88y Female complaining of vomiting. (Venous Thromboembolism) Risk  Recent Flowsheet Documentation  Taken 2/27/2025 2019 by Mera Person RN  VTE Prevention/Management: SCDs off (sequential compression devices)  Goal: Optimal Comfort and Wellbeing  Outcome: Progressing  Goal: Readiness for Transition of Care  Outcome: Progressing  Flowsheets (Taken 2/28/2025 0505)  Anticipated Changes Related to Illness: none  Transportation Anticipated: family or friend will provide  Concerns to be Addressed: discharge planning  Intervention: Mutually Develop Transition Plan  Recent Flowsheet Documentation  Taken 2/28/2025 0505 by eMra Person RN  Anticipated Changes Related to Illness: none  Transportation Anticipated: family or friend will provide  Concerns to be Addressed: discharge planning     Problem: Fatigue  Goal: Improved Activity Tolerance  Outcome: Progressing  Intervention: Promote Improved Energy  Recent Flowsheet Documentation  Taken 2/27/2025 2019 by Mera Person RN  Activity Management: activity adjusted per tolerance     Problem: Fall Injury Risk  Goal: Absence of Fall and Fall-Related Injury  Outcome: Progressing  Intervention: Identify and Manage Contributors  Recent Flowsheet Documentation  Taken 2/27/2025 2019 by Mera Person RN  Medication Review/Management: medications reviewed  Intervention: Promote Injury-Free Environment  Recent Flowsheet Documentation  Taken 2/27/2025 2019 by Mera Person RN  Safety Promotion/Fall Prevention:   activity supervised   patient and family education   nonskid shoes/slippers when out of bed       Overall Patient Progress: improvingOverall Patient Progress: improving

## 2025-02-28 NOTE — PROGRESS NOTES
"[facility]    Medicine Progress Note - Hospitalist Service    Date of Admission:  2/23/2025    Assessment & Plan   Kraig is a 89 Male with history of A-fib on Eliquis, CKD stage III, BPH, and suspected dementia presents with generalized weakness and confusion and found to have community-acquired pneumonia.    Having difficulty with TCU placement due to dementia/need for a sitter    Community-acquired pneumonia    - continuing rocephin 2 g + doxycycline (started 2/23/25), likely needs 1 more day    - palliative consulted: life prolonging with limits    - full respiratory panel: negative    - continuous pulse ox, on room air     Acute Metabolic and infectious encephalopathy - resolved   Progressive dementia    - has had \"sundowning\" in recent months, worse in the hospital    - had increased sedation with 25 mg Seroquel on AM of 2/27 with 12.5 mg around 13:30 due to typically increased agitation at that time. Adjusted Seroquel to 12.5 mg TID (AM, around 13:30, and at bedtime, hold for somnolence/sedation)     - given dose of haldol on 2/24 with acute change in respiratory status requiring 10 L oxy mask would avoid Haldol if possible    - if need IV/IM would try low dose Zyprexa     - did not need a sitter overnight 2/27 to 2/28    Acute urinary retention requiring Pérez catheter placement  Gross hematuria - resolved   BPH    - pérez catheter placed due to number of times required to straight cath, Developed gross hematuria after pérez placement and ambulation 2/25    - Urology consult due to above   - increased Flomax 0.8mg   - started finasteride   - needs outpatient follow up in 2-3 months with cystoscopy, UA, postvoid    - pérez removed 2/26 late morning due to patient trying to manipulate pérez and would cause more trauma being ripped out    - voiding since pérez removed without significant PVR, continue to monitor     - resumed Eliquis in AM 2/27     Generalized Weakness & Ambulatory " Dysfunction    - Physical therapy consultation: recommending TCU    - needs to be sitter free, sitter pulled morning of 2/27, has been sitter-free    - SW consult, currently in independent living, working on TCU     New LBBB & Chronically Elevated Troponin    - no chest pain and EKG     - Troponin chronically elevated to 50's     Chronic Afib, controlled     - continue home atenolol and Eliquis    - rate controlled on telemetry in the 80's    - removed tele on 2/26 due to agitation     CKD stage III    - creatinine at baseline    Son in room. Updated        Diet: Regular Diet Adult    DVT Prophylaxis: DOAC  Lieberman Catheter: Not present  Lines: None     Cardiac Monitoring: None  Code Status: No CPR- Do NOT Intubate      Clinically Significant Risk Factors        # Hypokalemia: Lowest K = 3.3 mmol/L in last 2 days, will replace as needed                           # COPD: noted on problem list        Social Drivers of Health            Disposition Plan     Medically Ready for Discharge: Anticipated in 2-4 Days       The patient's care was discussed with the Bedside Nurse, Care Coordinator/, Patient, and Patient's Family.    Valerio Monet MD  Hospitalist Service  [facility]  Securely message with "[employer] (more info)  Text page via [employer] Paging/Directory   ______________________________________________________________________    Interval History   Patient is in the chair.  His son is in the room.  He just finished a walk in the halls.  His son states he is doing very well today.  Probably the best he has been since in the hospital.  He just ate a good meal.  No other new complaints.    Physical Exam   Vital Signs: Temp: 98  F (36.7  C) Temp src: Oral BP: (!) 173/101 Pulse: 84   Resp: 17 SpO2: 92 % O2 Device: None (Room air)    Weight: 131 lbs 1.6 oz    General Appearance: Ambulating then sitting up in chair, pleasant, interactive, NAD, only oriented to self  Respiratory: CTAB without  wheezing or rales  Cardiovascular: RRR without murmur or rub   GI: soft, nontender, nondistended, normoactive bowel sounds   Skin: warm, dry     Medical Decision Making       45 MINUTES SPENT BY ME on the date of service doing chart review, history, exam, documentation & further activities per the note.      Data   ------------------------- PAST 24 HR DATA REVIEWED -----------------------------------------------

## 2025-03-01 LAB
ANION GAP SERPL CALCULATED.3IONS-SCNC: 9 MMOL/L (ref 7–15)
BACTERIA BLD CULT: NO GROWTH
BACTERIA BLD CULT: NO GROWTH
BASOPHILS # BLD AUTO: 0.1 10E3/UL (ref 0–0.2)
BASOPHILS NFR BLD AUTO: 1 %
BUN SERPL-MCNC: 36.5 MG/DL (ref 8–23)
CALCIUM SERPL-MCNC: 8.8 MG/DL (ref 8.8–10.4)
CHLORIDE SERPL-SCNC: 104 MMOL/L (ref 98–107)
CREAT SERPL-MCNC: 1.17 MG/DL (ref 0.67–1.17)
EGFRCR SERPLBLD CKD-EPI 2021: 56 ML/MIN/1.73M2
EOSINOPHIL # BLD AUTO: 0.3 10E3/UL (ref 0–0.7)
EOSINOPHIL NFR BLD AUTO: 4 %
ERYTHROCYTE [DISTWIDTH] IN BLOOD BY AUTOMATED COUNT: 12.3 % (ref 10–15)
GLUCOSE SERPL-MCNC: 115 MG/DL (ref 70–99)
HCO3 SERPL-SCNC: 25 MMOL/L (ref 22–29)
HCT VFR BLD AUTO: 35.9 % (ref 40–53)
HGB BLD-MCNC: 11.6 G/DL (ref 13.3–17.7)
IMM GRANULOCYTES # BLD: 0.2 10E3/UL
IMM GRANULOCYTES NFR BLD: 3 %
LYMPHOCYTES # BLD AUTO: 0.7 10E3/UL (ref 0.8–5.3)
LYMPHOCYTES NFR BLD AUTO: 9 %
MCH RBC QN AUTO: 31.5 PG (ref 26.5–33)
MCHC RBC AUTO-ENTMCNC: 32.3 G/DL (ref 31.5–36.5)
MCV RBC AUTO: 98 FL (ref 78–100)
MONOCYTES # BLD AUTO: 0.8 10E3/UL (ref 0–1.3)
MONOCYTES NFR BLD AUTO: 10 %
NEUTROPHILS # BLD AUTO: 5.7 10E3/UL (ref 1.6–8.3)
NEUTROPHILS NFR BLD AUTO: 73 %
NRBC # BLD AUTO: 0 10E3/UL
NRBC BLD AUTO-RTO: 0 /100
PLATELET # BLD AUTO: 268 10E3/UL (ref 150–450)
POTASSIUM SERPL-SCNC: 3.6 MMOL/L (ref 3.4–5.3)
RBC # BLD AUTO: 3.68 10E6/UL (ref 4.4–5.9)
SODIUM SERPL-SCNC: 138 MMOL/L (ref 135–145)
WBC # BLD AUTO: 7.9 10E3/UL (ref 4–11)

## 2025-03-01 PROCEDURE — 250N000013 HC RX MED GY IP 250 OP 250 PS 637: Performed by: STUDENT IN AN ORGANIZED HEALTH CARE EDUCATION/TRAINING PROGRAM

## 2025-03-01 PROCEDURE — 250N000013 HC RX MED GY IP 250 OP 250 PS 637: Performed by: PHYSICIAN ASSISTANT

## 2025-03-01 PROCEDURE — 250N000013 HC RX MED GY IP 250 OP 250 PS 637: Performed by: INTERNAL MEDICINE

## 2025-03-01 PROCEDURE — 250N000013 HC RX MED GY IP 250 OP 250 PS 637: Performed by: CLINICAL NURSE SPECIALIST

## 2025-03-01 PROCEDURE — 85004 AUTOMATED DIFF WBC COUNT: CPT | Performed by: HOSPITALIST

## 2025-03-01 PROCEDURE — 99232 SBSQ HOSP IP/OBS MODERATE 35: CPT | Performed by: STUDENT IN AN ORGANIZED HEALTH CARE EDUCATION/TRAINING PROGRAM

## 2025-03-01 PROCEDURE — 80048 BASIC METABOLIC PNL TOTAL CA: CPT | Performed by: HOSPITALIST

## 2025-03-01 PROCEDURE — 250N000013 HC RX MED GY IP 250 OP 250 PS 637: Performed by: HOSPITALIST

## 2025-03-01 PROCEDURE — 250N000011 HC RX IP 250 OP 636: Performed by: PHYSICIAN ASSISTANT

## 2025-03-01 PROCEDURE — 120N000001 HC R&B MED SURG/OB

## 2025-03-01 PROCEDURE — 85018 HEMOGLOBIN: CPT | Performed by: HOSPITALIST

## 2025-03-01 PROCEDURE — 250N000011 HC RX IP 250 OP 636: Mod: JW

## 2025-03-01 PROCEDURE — 36415 COLL VENOUS BLD VENIPUNCTURE: CPT | Performed by: HOSPITALIST

## 2025-03-01 RX ORDER — QUETIAPINE FUMARATE 25 MG/1
25 TABLET, FILM COATED ORAL AT BEDTIME
Status: DISCONTINUED | OUTPATIENT
Start: 2025-03-01 | End: 2025-03-08

## 2025-03-01 RX ADMIN — APIXABAN 2.5 MG: 2.5 TABLET, FILM COATED ORAL at 20:17

## 2025-03-01 RX ADMIN — ATENOLOL 50 MG: 50 TABLET ORAL at 08:16

## 2025-03-01 RX ADMIN — PANTOPRAZOLE SODIUM 40 MG: 40 TABLET, DELAYED RELEASE ORAL at 08:15

## 2025-03-01 RX ADMIN — OLANZAPINE 2.5 MG: 10 INJECTION, POWDER, FOR SOLUTION INTRAMUSCULAR at 17:38

## 2025-03-01 RX ADMIN — ACETAMINOPHEN 650 MG: 325 TABLET, FILM COATED ORAL at 05:55

## 2025-03-01 RX ADMIN — APIXABAN 2.5 MG: 2.5 TABLET, FILM COATED ORAL at 08:16

## 2025-03-01 RX ADMIN — DOXYCYCLINE HYCLATE 100 MG: 100 CAPSULE ORAL at 20:17

## 2025-03-01 RX ADMIN — Medication 3 MG: at 20:17

## 2025-03-01 RX ADMIN — LIDOCAINE 2 PATCH: 4 PATCH TOPICAL at 20:23

## 2025-03-01 RX ADMIN — FINASTERIDE 5 MG: 5 TABLET, FILM COATED ORAL at 08:15

## 2025-03-01 RX ADMIN — CEFTRIAXONE 2 G: 2 INJECTION, POWDER, FOR SOLUTION INTRAMUSCULAR; INTRAVENOUS at 21:46

## 2025-03-01 RX ADMIN — OLANZAPINE 2.5 MG: 10 INJECTION, POWDER, FOR SOLUTION INTRAMUSCULAR at 03:38

## 2025-03-01 RX ADMIN — DOXYCYCLINE HYCLATE 100 MG: 100 CAPSULE ORAL at 08:15

## 2025-03-01 RX ADMIN — QUETIAPINE FUMARATE 25 MG: 25 TABLET ORAL at 20:17

## 2025-03-01 RX ADMIN — OLANZAPINE 2.5 MG: 10 INJECTION, POWDER, FOR SOLUTION INTRAMUSCULAR at 12:16

## 2025-03-01 RX ADMIN — QUETIAPINE FUMARATE 12.5 MG: 25 TABLET ORAL at 08:15

## 2025-03-01 RX ADMIN — QUETIAPINE FUMARATE 12.5 MG: 25 TABLET ORAL at 13:37

## 2025-03-01 ASSESSMENT — ACTIVITIES OF DAILY LIVING (ADL)
ADLS_ACUITY_SCORE: 68
ADLS_ACUITY_SCORE: 73
ADLS_ACUITY_SCORE: 68
ADLS_ACUITY_SCORE: 73
ADLS_ACUITY_SCORE: 68

## 2025-03-01 NOTE — PLAN OF CARE
Shift from 6069-1492     Inpatient Progress Note:  For complete assessment see flow sheet documentation.     Orientation: AO to self only  Pain status: Denies  Activity: Up with A1 with walker, gait belt  Resp: WDL  Cardiac: WDL  GI: WDL  : WDL  LDA: PIV in R upper forearm  Infusions: SL    Diet: Regular  Safety: Sitter at bedside, bed in low position, call light within reach  Consults: SW  Discharge Plan: TBD    Goal Outcome Evaluation:      Plan of Care Reviewed With: patient    Overall Patient Progress: improvingOverall Patient Progress: improving    Outcome Evaluation: AO to self, sitter at bedside

## 2025-03-01 NOTE — PLAN OF CARE
"  Problem: Adult Inpatient Plan of Care  Goal: Plan of Care Review  Description: The Plan of Care Review/Shift note should be completed every shift.  The Outcome Evaluation is a brief statement about your assessment that the patient is improving, declining, or no change.  This information will be displayed automatically on your shift  note.  Outcome: Met  Goal: Patient-Specific Goal (Individualized)  Description: You can add care plan individualizations to a care plan. Examples of Individualization might be:  \"Parent requests to be called daily at 9am for status\", \"I have a hard time hearing out of my right ear\", or \"Do not touch me to wake me up as it startles  me\".  Outcome: Met  Goal: Absence of Hospital-Acquired Illness or Injury  Outcome: Met  Intervention: Prevent and Manage VTE (Venous Thromboembolism) Risk  Recent Flowsheet Documentation  Taken 3/1/2025 0906 by Eddie Guardado RN  VTE Prevention/Management: SCDs off (sequential compression devices)  Goal: Optimal Comfort and Wellbeing  Outcome: Met  Goal: Readiness for Transition of Care  Outcome: Met         "

## 2025-03-01 NOTE — PROGRESS NOTES
"[facility]    Medicine Progress Note - Hospitalist Service    Date of Admission:  2/23/2025    Assessment & Plan   Kraig is a 89 Male with history of A-fib on Eliquis, CKD stage III, BPH, and suspected dementia presents with generalized weakness and confusion and found to have community-acquired pneumonia.    Having difficulty with TCU placement due to dementia/need for a sitter    Community-acquired pneumonia    - continuing rocephin 2 g + doxycycline to complete 7 days    - palliative consulted: life prolonging with limits    - full respiratory panel: negative    - continuous pulse ox, on room air     Acute Metabolic and infectious encephalopathy - resolved   Progressive dementia    - has had \"sundowning\" in recent months, worse in the hospital    - had increased sedation with 25 mg Seroquel on AM of 2/27 with 12.5 mg around 13:30 due to typically increased agitation at that time. Adjusted Seroquel to 12.5 mg TID (AM, around 13:30, and at bedtime, hold for somnolence/sedation)     - Agitated again overnight 2/28 with another episode on around 1pm on 3/1   - Increase bedtime seroquel to 25mg    - given dose of haldol on 2/24 with acute change in respiratory status requiring 10 L oxy mask would avoid Haldol if possible    - if need IV/IM would try low dose Zyprexa     Acute urinary retention requiring Pérez catheter placement  Gross hematuria - resolved   BPH    - pérez catheter placed due to number of times required to straight cath, Developed gross hematuria after pérez placement and ambulation 2/25    - Urology consult due to above   - increased Flomax 0.8mg   - started finasteride   - needs outpatient follow up in 2-3 months with cystoscopy, UA, postvoid    - pérez removed 2/26 late morning due to patient trying to manipulate pérez and would cause more trauma being ripped out    - voiding since pérez removed without significant PVR, continue to monitor     - resumed Eliquis in AM 2/27   "   Generalized Weakness & Ambulatory Dysfunction    - Physical therapy consultation: recommending TCU    - needs to be sitter free, was sitter free for 2 days but required one on evening of 2/28    - SW consult, currently in independent living, working on TCU     New LBBB & Chronically Elevated Troponin    - no chest pain and EKG     - Troponin chronically elevated to 50's     Chronic Afib, controlled     - continue home atenolol and Eliquis    - rate controlled on telemetry in the 80's    - removed tele on 2/26 due to agitation     CKD stage III    - creatinine at baseline            Diet: Regular Diet Adult    DVT Prophylaxis: DOAC  Lieberman Catheter: Not present  Lines: None     Cardiac Monitoring: None  Code Status: No CPR- Do NOT Intubate      Clinically Significant Risk Factors                                  # COPD: noted on problem list        Social Drivers of Health            Disposition Plan     Medically Ready for Discharge: Anticipated in 2-4 Days             Ama Easley DO  Hospitalist Service  [facility]  Securely message with [employer] (more info)  Text page via [employer] Paging/Directory   ______________________________________________________________________    Interval History   Agitated overnight and required IM zyprexa and sitter. Slept in this morning. Had another episode of agitation early afternoon that required prn zyprexa. Much calmer and pleasant after that. Son says he respond well to the seroquel, which was confirmed by nursing. Suspect the agitation today was more due to agitation/lack of sleep overnight so will try to optimize his bedtime regimen rather than increasing frequency to avoid oversedation.     Physical Exam   Vital Signs: Temp: 97.5  F (36.4  C) Temp src: Axillary BP: 136/82 Pulse: 82   Resp: 16 SpO2: 94 % O2 Device: None (Room air)    Weight: 131 lbs 1.6 oz    Constitutional: Awake, alert, no distress, and cooperative  Cardiovascular: Regular rate  and rhythm, normal S1 and S2, no S3 or S4, and no murmur noted  Respiratory: No increased work of breathing, good air exchange, clear to auscultation bilaterally, no crackles or wheezing  Gastrointestinal: Abdomen soft, non-tender, non-distended. BS normal. No masses, organomegaly     Medical Decision Making       45 MINUTES SPENT BY ME on the date of service doing chart review, history, exam, documentation & further activities per the note.      Data     I have personally reviewed the following data over the past 24 hrs:    7.9  \   11.6 (L)   / 268     138 104 36.5 (H) /  115 (H)   3.6 25 1.17 \

## 2025-03-01 NOTE — PROGRESS NOTES
Care Management Follow Up    Length of Stay (days): 5    Expected Discharge Date: 03/04/2025     Concerns to be Addressed: discharge planning     Patient plan of care discussed at interdisciplinary rounds: Yes    Additional Information:  CM following for discharge planning. No accepting TCU at this time. Had sitter overnight, ongoing medication management. Discussed in care rounds that pt would not be appropriate for TCU until 24 hours sitter free. If unable to place in TCU, may need to consider return to [facility] with increased assistance.     Sent additional referrals to TCU's that have [facility] unit.     Next Steps: Follow-up on pending TCU referrals once sitter free.     Adela Acosta RN BSN   Inpatient Care Coordination  [facility]   Phone [phone]

## 2025-03-01 NOTE — PLAN OF CARE
PRIMARY DIAGNOSIS: GENERALIZED WEAKNESS, CONFUSION    OUTPATIENT/OBSERVATION GOALS TO BE MET BEFORE DISCHARGE  1. Orthostatic performed: N/A    2. Tolerating PO medications: Yes    3. Return to near baseline physical activity:  Assist of 1 with RW/GB    4. Cleared for discharge by consultants (if involved): No    Discharge Planner Nurse   Safe discharge environment identified: No  Barriers to discharge: Yes       Entered by: Kari Stevenson RN 03/01/2025 6:01 AM   Patient alert and oriented to self only. Confused and intermittently restless- sitter in room. Vitally stable on room air. Right peripheral IV leaking this evening- taken out and new one placed on left forearm. Complains of tooth ache- PRN Tylenol given x1. Antibiotics given per orders. Medications given crushed with pudding. Patient became very agitated overnight- refused oral Seroquel- IM Zyprexa given with improvement. Patient tolerating regular diet. Up assist of 1 with rolling walker and gait belt. Voiding within limits. Denies shortness of breath/trouble breathing. No nausea/vomiting reported. Awaiting safe discharge plan.     Please review provider order for any additional goals.   Nurse to notify provider when observation goals have been met and patient is ready for discharge.      Goal Outcome Evaluation:      Plan of Care Reviewed With: patient    Overall Patient Progress: no changeOverall Patient Progress: no change    Outcome Evaluation: Denies pain. Intermittently restless- sitter in room      Problem: Adult Inpatient Plan of Care  Goal: Plan of Care Review  Description: The Plan of Care Review/Shift note should be completed every shift.  The Outcome Evaluation is a brief statement about your assessment that the patient is improving, declining, or no change.  This information will be displayed automatically on your shift  note.  Outcome: Progressing  Flowsheets (Taken 3/1/2025 0116)  Outcome Evaluation: Denies pain. Intermittently restless-  "sitter in room  Plan of Care Reviewed With: patient  Overall Patient Progress: no change  Goal: Patient-Specific Goal (Individualized)  Description: You can add care plan individualizations to a care plan. Examples of Individualization might be:  \"Parent requests to be called daily at 9am for status\", \"I have a hard time hearing out of my right ear\", or \"Do not touch me to wake me up as it startles  me\".  Outcome: Progressing  Goal: Absence of Hospital-Acquired Illness or Injury  Outcome: Progressing  Intervention: Identify and Manage Fall Risk  Recent Flowsheet Documentation  Taken 2/28/2025 2000 by Kari Stevenson RN  Safety Promotion/Fall Prevention:   activity supervised   assistive device/personal items within reach  Intervention: Prevent Skin Injury  Recent Flowsheet Documentation  Taken 2/28/2025 2000 by Kari Stevenson RN  Body Position: position changed independently  Intervention: Prevent and Manage VTE (Venous Thromboembolism) Risk  Recent Flowsheet Documentation  Taken 2/28/2025 2000 by Kari Stevenson RN  VTE Prevention/Management: SCDs off (sequential compression devices)  Intervention: Prevent Infection  Recent Flowsheet Documentation  Taken 2/28/2025 2000 by Kari Stevenson, RN  Infection Prevention:   single patient room provided   hand hygiene promoted  Goal: Optimal Comfort and Wellbeing  Outcome: Progressing  Goal: Readiness for Transition of Care  Outcome: Progressing     Problem: Fatigue  Goal: Improved Activity Tolerance  Outcome: Progressing  Intervention: Promote Improved Energy  Recent Flowsheet Documentation  Taken 2/28/2025 2000 by Kari Stevenson, RN  Activity Management: activity adjusted per tolerance     Problem: Fall Injury Risk  Goal: Absence of Fall and Fall-Related Injury  Outcome: Progressing  Intervention: Identify and Manage Contributors  Recent Flowsheet Documentation  Taken 2/28/2025 2000 by Kari Stevenson, RN  Medication Review/Management: medications reviewed  Intervention: Promote " Injury-Free Environment  Recent Flowsheet Documentation  Taken 2/28/2025 2000 by Kari Stevenson, RN  Safety Promotion/Fall Prevention:   activity supervised   assistive device/personal items within reach

## 2025-03-02 PROCEDURE — 250N000013 HC RX MED GY IP 250 OP 250 PS 637: Performed by: PHYSICIAN ASSISTANT

## 2025-03-02 PROCEDURE — 250N000013 HC RX MED GY IP 250 OP 250 PS 637: Performed by: STUDENT IN AN ORGANIZED HEALTH CARE EDUCATION/TRAINING PROGRAM

## 2025-03-02 PROCEDURE — 250N000013 HC RX MED GY IP 250 OP 250 PS 637: Performed by: INTERNAL MEDICINE

## 2025-03-02 PROCEDURE — 250N000013 HC RX MED GY IP 250 OP 250 PS 637: Performed by: CLINICAL NURSE SPECIALIST

## 2025-03-02 PROCEDURE — 99232 SBSQ HOSP IP/OBS MODERATE 35: CPT | Performed by: STUDENT IN AN ORGANIZED HEALTH CARE EDUCATION/TRAINING PROGRAM

## 2025-03-02 PROCEDURE — 93010 ELECTROCARDIOGRAM REPORT: CPT | Performed by: INTERNAL MEDICINE

## 2025-03-02 PROCEDURE — 250N000013 HC RX MED GY IP 250 OP 250 PS 637: Performed by: HOSPITALIST

## 2025-03-02 PROCEDURE — 120N000001 HC R&B MED SURG/OB

## 2025-03-02 RX ORDER — ZIPRASIDONE MESYLATE 20 MG/ML
20 INJECTION, POWDER, LYOPHILIZED, FOR SOLUTION INTRAMUSCULAR EVERY 4 HOURS PRN
Status: DISCONTINUED | OUTPATIENT
Start: 2025-03-02 | End: 2025-03-03

## 2025-03-02 RX ORDER — TRAZODONE HYDROCHLORIDE 50 MG/1
50 TABLET ORAL AT BEDTIME
Status: DISCONTINUED | OUTPATIENT
Start: 2025-03-02 | End: 2025-03-19 | Stop reason: HOSPADM

## 2025-03-02 RX ADMIN — QUETIAPINE FUMARATE 25 MG: 25 TABLET ORAL at 20:59

## 2025-03-02 RX ADMIN — APIXABAN 2.5 MG: 2.5 TABLET, FILM COATED ORAL at 20:59

## 2025-03-02 RX ADMIN — LIDOCAINE 2 PATCH: 4 PATCH TOPICAL at 21:00

## 2025-03-02 RX ADMIN — TRAZODONE HYDROCHLORIDE 50 MG: 50 TABLET ORAL at 21:00

## 2025-03-02 RX ADMIN — QUETIAPINE FUMARATE 12.5 MG: 25 TABLET ORAL at 07:54

## 2025-03-02 RX ADMIN — PANTOPRAZOLE SODIUM 40 MG: 40 TABLET, DELAYED RELEASE ORAL at 07:54

## 2025-03-02 RX ADMIN — Medication 3 MG: at 21:00

## 2025-03-02 RX ADMIN — DOXYCYCLINE HYCLATE 100 MG: 100 CAPSULE ORAL at 07:54

## 2025-03-02 RX ADMIN — ATENOLOL 50 MG: 50 TABLET ORAL at 07:54

## 2025-03-02 RX ADMIN — FINASTERIDE 5 MG: 5 TABLET, FILM COATED ORAL at 07:54

## 2025-03-02 RX ADMIN — QUETIAPINE FUMARATE 12.5 MG: 25 TABLET ORAL at 14:15

## 2025-03-02 RX ADMIN — APIXABAN 2.5 MG: 2.5 TABLET, FILM COATED ORAL at 07:54

## 2025-03-02 ASSESSMENT — ACTIVITIES OF DAILY LIVING (ADL)
ADLS_ACUITY_SCORE: 73
ADLS_ACUITY_SCORE: 73
ADLS_ACUITY_SCORE: 68
ADLS_ACUITY_SCORE: 73
ADLS_ACUITY_SCORE: 73
ADLS_ACUITY_SCORE: 68
ADLS_ACUITY_SCORE: 73
ADLS_ACUITY_SCORE: 68
ADLS_ACUITY_SCORE: 73
ADLS_ACUITY_SCORE: 68
ADLS_ACUITY_SCORE: 68
ADLS_ACUITY_SCORE: 72
ADLS_ACUITY_SCORE: 68
ADLS_ACUITY_SCORE: 72

## 2025-03-02 NOTE — PLAN OF CARE
PRIMARY DIAGNOSIS: GENERALIZED WEAKNESS, CONFUSION     OUTPATIENT/OBSERVATION GOALS TO BE MET BEFORE DISCHARGE  1. Orthostatic performed: N/A    2. Tolerating PO medications: Yes    3. Return to near baseline physical activity:  Assist of 1 with walker and gait belt     4. Cleared for discharge by consultants (if involved): No    Discharge Planner Nurse   Safe discharge environment identified: No  Barriers to discharge: Yes       Entered by: Kari Stevenson RN 03/02/2025 6:02 AM   Patient alert and oriented to self-only. Intermittently restless- sitter in room. Patient vitally stable on room air. Medications taken crushed with pudding. Tolerating regular diet. Up assist of 1 with walker and gait belt. Voiding within limits. Patient had one bowel movement early this morning. Antibiotics given per orders. Peripheral IV saline locked. Lidocaine patches to back to aid with mild pain. Awaiting safe discharge plan.     Please review provider order for any additional goals.   Nurse to notify provider when observation goals have been met and patient is ready for discharge.      Goal Outcome Evaluation:      Plan of Care Reviewed With: patient    Overall Patient Progress: no changeOverall Patient Progress: no change    Outcome Evaluation: Denies pain. Intermittently restless- sitter in room.      Problem: Adult Inpatient Plan of Care  Goal: Plan of Care Review  Description: The Plan of Care Review/Shift note should be completed every shift.  The Outcome Evaluation is a brief statement about your assessment that the patient is improving, declining, or no change.  This information will be displayed automatically on your shift  note.  Outcome: Progressing  Flowsheets (Taken 3/2/2025 0032)  Outcome Evaluation: Denies pain. Intermittently restless- sitter in room.  Plan of Care Reviewed With: patient  Overall Patient Progress: no change  Goal: Patient-Specific Goal (Individualized)  Description: You can add care plan  "individualizations to a care plan. Examples of Individualization might be:  \"Parent requests to be called daily at 9am for status\", \"I have a hard time hearing out of my right ear\", or \"Do not touch me to wake me up as it startles  me\".  Outcome: Progressing  Goal: Absence of Hospital-Acquired Illness or Injury  Outcome: Progressing  Intervention: Identify and Manage Fall Risk  Recent Flowsheet Documentation  Taken 3/1/2025 2000 by Kari Stevenson RN  Safety Promotion/Fall Prevention:   activity supervised   clutter free environment maintained   mobility aid in reach   nonskid shoes/slippers when out of bed   patient and family education   safety round/check completed  Intervention: Prevent Skin Injury  Recent Flowsheet Documentation  Taken 3/1/2025 2000 by Kari Stevenson RN  Body Position: position changed independently  Intervention: Prevent and Manage VTE (Venous Thromboembolism) Risk  Recent Flowsheet Documentation  Taken 3/1/2025 2000 by Kari Stevenson RN  VTE Prevention/Management: SCDs off (sequential compression devices)  Intervention: Prevent Infection  Recent Flowsheet Documentation  Taken 3/1/2025 2000 by Kari Stevenson RN  Infection Prevention:   single patient room provided   hand hygiene promoted  Goal: Optimal Comfort and Wellbeing  Outcome: Progressing  Goal: Readiness for Transition of Care  Outcome: Progressing     Problem: Fall Injury Risk  Goal: Absence of Fall and Fall-Related Injury  Outcome: Progressing  Intervention: Identify and Manage Contributors  Recent Flowsheet Documentation  Taken 3/1/2025 2000 by Kari Stevenson RN  Medication Review/Management: medications reviewed  Intervention: Promote Injury-Free Environment  Recent Flowsheet Documentation  Taken 3/1/2025 2000 by Kari Stevenson RN  Safety Promotion/Fall Prevention:   activity supervised   clutter free environment maintained   mobility aid in reach   nonskid shoes/slippers when out of bed   patient and family education   safety round/check " completed

## 2025-03-02 NOTE — PLAN OF CARE
Shift: 9801-9066  VS: VSS, RA  Pain: Denies pain  Neuro: WNL  Cardiac: Hx of Afib & Aflutter  Respiratory: Denies SOB   Diet/Appetite:  Reg Diet  /GI: Incontinent of B&B  LDA's: PIV SL  Skin: Scattered bruising on BUE, on back and legs.  Activity: Ax1 w/GB and Walker       Plan: Pt is Alert to self. Denies pain or SOB. VSS, RA. Aggitated and was given PRN Zyprexa x 2 this shift. Pt tried to get up out of bed and out of chair several times and was not redirectable. Son came for a few hours and sat with Pt. SW following for placement. Will continue to monitor.

## 2025-03-02 NOTE — PROGRESS NOTES
"[facility]    Medicine Progress Note - Hospitalist Service    Date of Admission:  2/23/2025    Assessment & Plan   Kraig is a 89 Male with history of A-fib on Eliquis, CKD stage III, BPH, and suspected dementia presents with generalized weakness and confusion and found to have community-acquired pneumonia.    Having difficulty with TCU placement due to dementia/need for a sitter    Community-acquired pneumonia    - Completed course of rocephin 2 g + doxycycline to complete 7 days    - palliative consulted: life prolonging with limits    - full respiratory panel: negative     Acute Metabolic and infectious encephalopathy - resolved   Progressive dementia  Has had \"sundowning\" in recent months, worse in the hospital. Had dose of haldol resulting in acute change in respiratory status requiring 10L oxy mask. Had increased sedation with 25 mg Seroquel so adjusted to Seroquel to 12.5 mg TID.  Had a good response for a short time with TID seroquel. Then developed agitation overnight so increased bedtime dose to 25. Continues to be intermittently agitated, but suspect it is related to lack of sleep as patient had poor sleep for last 2 nights. Will add trazodone to promote sleep and add an additional PRN as he is not always responding to zyprexa.   - EKG to check QTc  - Seroquel 12.5mg BID + 25mg at bedtime  - Trazodone 50mg at bedtime   - PRN IV/IM zyprexa and IM ziprasidone  - avoid Haldol if possible    Acute urinary retention requiring Pérez catheter placement  Gross hematuria - resolved   BPH    - pérez catheter placed due to number of times required to straight cath, Developed gross hematuria after pérez placement and ambulation 2/25    - Urology consult due to above   - increased Flomax 0.8mg   - started finasteride   - needs outpatient follow up in 2-3 months with cystoscopy, UA, postvoid    - pérez removed 2/26 late morning due to patient trying to manipulate pérez and would cause more trauma " being ripped out    - voiding since pérez removed without significant PVR, continue to monitor     - resumed Eliquis in AM 2/27     Generalized Weakness & Ambulatory Dysfunction    - Physical therapy consultation: recommending TCU    - needs to be sitter free, was sitter free for 2 days but required one on evening of 2/28    - SW consult, currently in independent living, working on TCU     New LBBB & Chronically Elevated Troponin    - no chest pain and EKG     - Troponin chronically elevated to 50's     Chronic Afib, controlled     - continue home atenolol and Eliquis    - rate controlled on telemetry in the 80's    - removed tele on 2/26 due to agitation     CKD stage III    - creatinine at baseline            Diet: Regular Diet Adult    DVT Prophylaxis: DOAC  Pérez Catheter: Not present  Lines: None     Cardiac Monitoring: None  Code Status: No CPR- Do NOT Intubate      Clinically Significant Risk Factors                                  # COPD: noted on problem list        Social Drivers of Health            Disposition Plan     Medically Ready for Discharge: Anticipated in 2-4 Days             Ama Easley DO  Hospitalist Service  [facility]  Securely message with [employer] (more info)  Text page via [employer] Paging/Directory   ______________________________________________________________________    Interval History   Agitated and restless all night. Agitated and throwing things at nurses this morning. Did not respond quickly to zyprexa like he previously did. Later in the morning when I evaluated patient, he was calm and reading the newspaper.    Physical Exam   Vital Signs: Temp: 98  F (36.7  C) Temp src: Axillary BP: (!) 148/97 Pulse: 84   Resp: 18 SpO2: 97 % O2 Device: None (Room air)    Weight: 131 lbs 1.6 oz    Constitutional: Awake, alert, no distress, and cooperative  Cardiovascular: Regular rate and rhythm, normal S1 and S2, no S3 or S4, and no murmur noted  Respiratory: No  increased work of breathing, good air exchange, clear to auscultation bilaterally, no crackles or wheezing  Gastrointestinal: Abdomen soft, non-tender, non-distended. BS normal. No masses, organomegaly     Medical Decision Making       45 MINUTES SPENT BY ME on the date of service doing chart review, history, exam, documentation & further activities per the note.

## 2025-03-03 ENCOUNTER — APPOINTMENT (OUTPATIENT)
Dept: PHYSICAL THERAPY | Facility: CLINIC | Age: OVER 89
End: 2025-03-03
Payer: MEDICARE

## 2025-03-03 LAB
ATRIAL RATE - MUSE: NORMAL BPM
DIASTOLIC BLOOD PRESSURE - MUSE: NORMAL MMHG
INTERPRETATION ECG - MUSE: NORMAL
P AXIS - MUSE: NORMAL DEGREES
PR INTERVAL - MUSE: NORMAL MS
QRS DURATION - MUSE: 154 MS
QT - MUSE: 442 MS
QTC - MUSE: 516 MS
R AXIS - MUSE: -61 DEGREES
SYSTOLIC BLOOD PRESSURE - MUSE: NORMAL MMHG
T AXIS - MUSE: 97 DEGREES
VENTRICULAR RATE- MUSE: 82 BPM

## 2025-03-03 PROCEDURE — 250N000013 HC RX MED GY IP 250 OP 250 PS 637: Performed by: STUDENT IN AN ORGANIZED HEALTH CARE EDUCATION/TRAINING PROGRAM

## 2025-03-03 PROCEDURE — 120N000001 HC R&B MED SURG/OB

## 2025-03-03 PROCEDURE — 250N000013 HC RX MED GY IP 250 OP 250 PS 637

## 2025-03-03 PROCEDURE — 250N000013 HC RX MED GY IP 250 OP 250 PS 637: Performed by: INTERNAL MEDICINE

## 2025-03-03 PROCEDURE — 250N000013 HC RX MED GY IP 250 OP 250 PS 637: Performed by: PHYSICIAN ASSISTANT

## 2025-03-03 PROCEDURE — 250N000013 HC RX MED GY IP 250 OP 250 PS 637: Performed by: HOSPITALIST

## 2025-03-03 PROCEDURE — 97116 GAIT TRAINING THERAPY: CPT | Mod: GP | Performed by: PHYSICAL THERAPIST

## 2025-03-03 PROCEDURE — 99222 1ST HOSP IP/OBS MODERATE 55: CPT

## 2025-03-03 PROCEDURE — 99232 SBSQ HOSP IP/OBS MODERATE 35: CPT | Performed by: INTERNAL MEDICINE

## 2025-03-03 PROCEDURE — 97530 THERAPEUTIC ACTIVITIES: CPT | Mod: GP | Performed by: PHYSICAL THERAPIST

## 2025-03-03 RX ORDER — MEMANTINE HYDROCHLORIDE 5 MG/1
5 TABLET ORAL 2 TIMES DAILY
Status: DISCONTINUED | OUTPATIENT
Start: 2025-03-10 | End: 2025-03-19 | Stop reason: HOSPADM

## 2025-03-03 RX ORDER — MEMANTINE HYDROCHLORIDE 5 MG/1
5 TABLET ORAL DAILY
Status: COMPLETED | OUTPATIENT
Start: 2025-03-03 | End: 2025-03-09

## 2025-03-03 RX ADMIN — APIXABAN 2.5 MG: 2.5 TABLET, FILM COATED ORAL at 21:31

## 2025-03-03 RX ADMIN — ATENOLOL 50 MG: 50 TABLET ORAL at 08:39

## 2025-03-03 RX ADMIN — TAMSULOSIN HYDROCHLORIDE 0.8 MG: 0.4 CAPSULE ORAL at 21:31

## 2025-03-03 RX ADMIN — FINASTERIDE 5 MG: 5 TABLET, FILM COATED ORAL at 08:39

## 2025-03-03 RX ADMIN — MEMANTINE 5 MG: 5 TABLET ORAL at 13:11

## 2025-03-03 RX ADMIN — APIXABAN 2.5 MG: 2.5 TABLET, FILM COATED ORAL at 08:39

## 2025-03-03 RX ADMIN — Medication 3 MG: at 21:31

## 2025-03-03 RX ADMIN — QUETIAPINE FUMARATE 25 MG: 25 TABLET ORAL at 21:31

## 2025-03-03 RX ADMIN — PANTOPRAZOLE SODIUM 40 MG: 40 TABLET, DELAYED RELEASE ORAL at 08:39

## 2025-03-03 RX ADMIN — TRAZODONE HYDROCHLORIDE 50 MG: 50 TABLET ORAL at 21:31

## 2025-03-03 RX ADMIN — QUETIAPINE FUMARATE 12.5 MG: 25 TABLET ORAL at 08:38

## 2025-03-03 RX ADMIN — QUETIAPINE FUMARATE 12.5 MG: 25 TABLET ORAL at 13:11

## 2025-03-03 ASSESSMENT — ACTIVITIES OF DAILY LIVING (ADL)
ADLS_ACUITY_SCORE: 80
ADLS_ACUITY_SCORE: 75
ADLS_ACUITY_SCORE: 72
ADLS_ACUITY_SCORE: 72
ADLS_ACUITY_SCORE: 76
ADLS_ACUITY_SCORE: 72
ADLS_ACUITY_SCORE: 80
ADLS_ACUITY_SCORE: 76
ADLS_ACUITY_SCORE: 80
ADLS_ACUITY_SCORE: 80
ADLS_ACUITY_SCORE: 76
ADLS_ACUITY_SCORE: 80
ADLS_ACUITY_SCORE: 75
ADLS_ACUITY_SCORE: 72
ADLS_ACUITY_SCORE: 80
ADLS_ACUITY_SCORE: 80
ADLS_ACUITY_SCORE: 72
ADLS_ACUITY_SCORE: 76
ADLS_ACUITY_SCORE: 72
ADLS_ACUITY_SCORE: 76
ADLS_ACUITY_SCORE: 80
ADLS_ACUITY_SCORE: 80
ADLS_ACUITY_SCORE: 76

## 2025-03-03 NOTE — PLAN OF CARE
"Patient is alert and oriented to self. VS documented on the FS and patient is on RA. Active bowel sounds. Patient denies any pain, urgency, and frequency when voiding. Pt is tolerating regular diet. I& O. IV SL. Patient denies any pain. Patient is X 1 assist with walker and gait belt.  Psych consult completed.  Meds crushed with pudding. Pt appeared restless this afternoon. Pt was curious about when he's going to go \"home\". Writer explained the plan of cares and reason for his admission. Ice cream offered. Sitter remains present.     Plan: SW following to facilitate discharge.     Goal Outcome Evaluation:      Plan of Care Reviewed With: patient    Overall Patient Progress: improvingOverall Patient Progress: improving      Problem: Adult Inpatient Plan of Care  Goal: Plan of Care Review  Description: The Plan of Care Review/Shift note should be completed every shift.  The Outcome Evaluation is a brief statement about your assessment that the patient is improving, declining, or no change.  This information will be displayed automatically on your shift  note.  Recent Flowsheet Documentation  Taken 3/3/2025 1347 by Clarissa Ma RN  Plan of Care Reviewed With: patient  Overall Patient Progress: improving  Goal: Absence of Hospital-Acquired Illness or Injury  Intervention: Identify and Manage Fall Risk  Recent Flowsheet Documentation  Taken 3/3/2025 0800 by Clarissa Ma RN  Safety Promotion/Fall Prevention:   activity supervised   nonskid shoes/slippers when out of bed   supervised activity   safety round/check completed   room organization consistent   room near nurse's station  Intervention: Prevent Skin Injury  Recent Flowsheet Documentation  Taken 3/3/2025 0800 by Clarissa Ma RN  Body Position: position changed independently  Intervention: Prevent and Manage VTE (Venous Thromboembolism) Risk  Recent Flowsheet Documentation  Taken 3/3/2025 0800 by Clarissa Ma RN  VTE Prevention/Management: SCDs " off (sequential compression devices)  Intervention: Prevent Infection  Recent Flowsheet Documentation  Taken 3/3/2025 0800 by Clarissa Ma, RN  Infection Prevention:   single patient room provided   hand hygiene promoted

## 2025-03-03 NOTE — CONSULTS
"      Initial Psychiatric Consult   Consult date: March 3, 2025         Reason for Consult, requesting source:    Dementia, agitation    Requesting source:     Labs and imaging reviewed. Consultation with sitter and attending provider, Dr. Portillo.         HPI:   Kraig Genao is a 89 year old male admitted 2/23/2025 with generalized weakness and confusion, found to have community-acquired pneumonia. Past medical history notable for moderate dementia (SLUMS score 12 on 4/21/2023), atrial fibrillation (Eliquis) , CKD Stage III, and BPH. While in the hospital, patient has had increased episodes of agitation and \"sundowning\" which have complicated TCU placement. Patient is on Seroquel 12.5mg twice daily and 25mg at bedtime; higher dosing has lead to somnolence and sedation. Haldol lead to significant changes in respiratory status. Record review indicates Trazodone was added on 3/2/25 to promote sleep as it was though increasing intermittent agitation could be further associated with poor sleep. Psychiatry consulted to assist with agitation and dementia recommendations.    Patient received first dose of Trazodone 50mg on 3/2/2025 and was noted to have slept throughout the night.     \"Al\" was resting in bed when I went to meet with him. He did awaken when the sitter called his name. He tells me he was dreaming. He says she is thirsty. He asks for Rootbeer however was receptive to water offered by the sitter. He asks \"what's the weather?\" He cannot tell me his date of birth or where he is. He says he slept last night. He says he feels \"good.\" He tells me he ate breakfast, per the sitter he has not eaten breakfast today. He then tells me he wants to go to a movie and drink Rootbeer.     Additional record review limited as [facility]. Note reviewed from [facility] encounter noting family concerns for worsening sundowning behavior in which he is confused and " agitated. At this time if was recommended to consider snf, memory care, and or increasing facility provided services to better support Kraig.    Attempted to call daughter, Valerio, listed as emergency contact unable to reach.         Past Psychiatric History:   Record review is limited. SLUMS noted to be 12 on 4/21/2023 with associated dx of moderate dementia. Record review indicates [facility]; recent notes concerning for worsening dementia with behavioral disturbances.     Record review did not indicate any previous  mental health admissions.          Substance Use and History:     Does not appear to be contributing to current presentation.        Past Medical History:   PAST MEDICAL HISTORY:   Past Medical History:   Diagnosis Date    Bile duct stone     Chronic atrial fibrillation     Elevated LFTs     Erectile dysfunction     Gastroesophageal reflux disease 05/04/2016    h/o Helicobacter pylori infection     had UGI endoscopy 10/00 & had LES dilitation    Hypertrophy (benign) of prostate     some voiding sx    Macular degeneration (senile) of retina     mainly R eye       PAST SURGICAL HISTORY:   Past Surgical History:   Procedure Laterality Date    ARTHROPLASTY SHOULDER Right 2004    COLONOSCOPY  2001    ENDOSCOPIC RETROGRADE CHOLANGIOPANCREATOGRAM  09/23/2011    Procedure:ENDOSCOPIC RETROGRADE CHOLANGIOPANCREATOGRAM; Endoscopic Retrograde Cholangiopancreatogram (c-arm), baloon dilation, stone retreival and 10fr. Solus stent placement x2 in  bile duct with the spy glass; Surgeon:EDD AUGUST; Location:[facility]    ENDOSCOPIC RETROGRADE CHOLANGIOPANCREATOGRAPHY, LITHOTRIPSY PANCREAS, COMBINED  10/27/2011    Procedure:COMBINED ENDOSCOPIC RETROGRADE CHOLANGIOPANCREATOGRAPHY, LITHOTRIPSY PANCREAS; Endoscopic Retrograde Cholangiopancreatogram with spygass scope ,Electrohydraulic Lithotripsy  baloon dilation of bile duct, stone removal (c-arm); Surgeon:EDD AUGUST; Location: OR     ESOPHAGOSCOPY, GASTROSCOPY, DUODENOSCOPY (EGD), COMBINED N/A 05/09/2016    Procedure: COMBINED ESOPHAGOSCOPY, GASTROSCOPY, DUODENOSCOPY (EGD), BIOPSY SINGLE OR MULTIPLE;  Surgeon: Mendez Mcknight MD;  Location: [facility]    ESOPHAGOSCOPY, GASTROSCOPY, DUODENOSCOPY (EGD), COMBINED N/A 02/11/2018    Procedure: COMBINED ESOPHAGOSCOPY, GASTROSCOPY, DUODENOSCOPY (EGD), REMOVE FOREIGN BODY;  COMBINED ESOPHAGOSCOPY, GASTROSCOPY, DUODENOSCOPY (EGD) by raptor graspeing ;  Surgeon: Mendez Mcknight MD;  Location: [facility]    ESOPHAGOSCOPY, GASTROSCOPY, DUODENOSCOPY (EGD), COMBINED Left 02/16/2018    Procedure: COMBINED ESOPHAGOSCOPY, GASTROSCOPY, DUODENOSCOPY (EGD), BIOPSY SINGLE OR MULTIPLE;  ESOPHAGOSCOPY, GASTROSCOPY, DUODENOSCOPY (EGD) (Nicci) with biopsies using cold bx forcep and tts balloon dilation 12-15mm;  Surgeon: Mendez Mckinght MD;  Location: [facility]    ESOPHAGOSCOPY, GASTROSCOPY, DUODENOSCOPY (EGD), COMBINED N/A 08/10/2021    Procedure: ESOPHAGOGASTRODUODENOSCOPY (EGD);  Surgeon: Mendez Mcknight MD;  Location: [facility]    ESOPHAGOSCOPY, GASTROSCOPY, DUODENOSCOPY (EGD), COMBINED N/A 12/23/2023    Procedure: ESOPHAGOGASTRODUODENOSCOPY, WITH FOREIGN BODY REMOVAL by uisng raptor grasping forcep;  Surgeon: Mendez Mcknight MD;  Location:  GI    INGUINAL HERNIA REPAIR      LAPAROSCOPIC CHOLECYSTECTOMY  2002    Repeat Shoulder arthroplasty Right 2005             Family History:   FAMILY HISTORY:   Family History   Problem Relation Age of Onset    Heart Disease Father          M.I.    Esophageal Cancer No family hx of     Gastrointestinal Disease No family hx of     Stomach Cancer No family hx of            Social History:   SOCIAL HISTORY:   Social History     Tobacco Use    Smoking status: Never    Smokeless tobacco: Never   Substance Use Topics    Alcohol use: Yes     Alcohol/week: 0.0 standard drinks of alcohol     Comment: 1-2 beer, wine, or mixed drink per night     Record review indicates patient has a  daughter and three adult sons. Patient has been residing in independent living apartment at [facility]. Children take patient to [facility] on Sundays.          Physical ROS:   The 10 point Review of Systems is negative other than noted in the HPI or here.           Medications:     Current Facility-Administered Medications   Medication Dose Route Frequency Provider Last Rate Last Admin    apixaban ANTICOAGULANT (ELIQUIS) tablet 2.5 mg  2.5 mg Oral BID Valerio Monet MD   2.5 mg at 03/03/25 0839    atenolol (TENORMIN) tablet 50 mg  50 mg Oral Daily Deric Dale MD   50 mg at 03/03/25 0839    finasteride (PROSCAR) tablet 5 mg  5 mg Oral Daily Eugene Lau MD   5 mg at 03/03/25 0839    Lidocaine (LIDOCARE) 4 % Patch 2 patch  2 patch Transdermal Q24h Charlotte Lindo APRN CNS   2 patch at 03/02/25 2100    melatonin tablet 3 mg  3 mg Oral At Bedtime Edmund Arroyo MD   3 mg at 03/02/25 2100    pantoprazole (PROTONIX) EC tablet 40 mg  40 mg Oral Daily Deric Dale MD   40 mg at 03/03/25 0839    QUEtiapine (SEROquel) half-tab 12.5 mg  12.5 mg Oral BID April Arias PA-C   12.5 mg at 03/03/25 0838    QUEtiapine (SEROquel) tablet 25 mg  25 mg Oral At Bedtime Ama Easley DO   25 mg at 03/02/25 2059    tamsulosin (FLOMAX) capsule 0.8 mg  0.8 mg Oral QPM Eugene Lau MD   0.8 mg at 02/27/25 2101    traZODone (DESYREL) tablet 50 mg  50 mg Oral At Bedtime Ama Easley DO   50 mg at 03/02/25 2100              Allergies:     Allergies   Allergen Reactions    No Known Allergies           Labs:     Recent Results (from the past 48 hours)   Basic metabolic panel    Collection Time: 03/01/25  9:51 AM   Result Value Ref Range    Sodium 138 135 - 145 mmol/L    Potassium 3.6 3.4 - 5.3 mmol/L    Chloride 104 98 - 107 mmol/L    Carbon Dioxide (CO2) 25 22 - 29 mmol/L    Anion Gap 9 7 - 15 mmol/L    Urea Nitrogen 36.5 (H) 8.0 - 23.0 mg/dL    Creatinine  "1.17 0.67 - 1.17 mg/dL    GFR Estimate 56 (L) >60 mL/min/1.73m2    Calcium 8.8 8.8 - 10.4 mg/dL    Glucose 115 (H) 70 - 99 mg/dL   CBC with platelets and differential    Collection Time: 03/01/25  9:51 AM   Result Value Ref Range    WBC Count 7.9 4.0 - 11.0 10e3/uL    RBC Count 3.68 (L) 4.40 - 5.90 10e6/uL    Hemoglobin 11.6 (L) 13.3 - 17.7 g/dL    Hematocrit 35.9 (L) 40.0 - 53.0 %    MCV 98 78 - 100 fL    MCH 31.5 26.5 - 33.0 pg    MCHC 32.3 31.5 - 36.5 g/dL    RDW 12.3 10.0 - 15.0 %    Platelet Count 268 150 - 450 10e3/uL    % Neutrophils 73 %    % Lymphocytes 9 %    % Monocytes 10 %    % Eosinophils 4 %    % Basophils 1 %    % Immature Granulocytes 3 %    NRBCs per 100 WBC 0 <1 /100    Absolute Neutrophils 5.7 1.6 - 8.3 10e3/uL    Absolute Lymphocytes 0.7 (L) 0.8 - 5.3 10e3/uL    Absolute Monocytes 0.8 0.0 - 1.3 10e3/uL    Absolute Eosinophils 0.3 0.0 - 0.7 10e3/uL    Absolute Basophils 0.1 0.0 - 0.2 10e3/uL    Absolute Immature Granulocytes 0.2 <=0.4 10e3/uL    Absolute NRBCs 0.0 10e3/uL   EKG 12-lead, tracing only    Collection Time: 03/02/25  8:18 PM   Result Value Ref Range    Systolic Blood Pressure  mmHg    Diastolic Blood Pressure  mmHg    Ventricular Rate 82 BPM    Atrial Rate  BPM    MO Interval  ms    QRS Duration 154 ms     ms    QTc 516 ms    P Axis  degrees    R AXIS -61 degrees    T Axis 97 degrees    Interpretation ECG       Atrial fibrillation  Left axis deviation  Left bundle branch block  Abnormal ECG            Physical and Psychiatric Examination:     BP (!) 146/98 (BP Location: Left arm)   Pulse 87   Temp 98.2  F (36.8  C) (Axillary)   Resp 16   Ht 1.727 m (5' 7.99\")   Wt 59.5 kg (131 lb 1.6 oz)   SpO2 92%   BMI 19.94 kg/m    Weight is 131 lbs 1.6 oz  Body mass index is 19.94 kg/m .    Physical Exam:  I have reviewed the physical exam as documented by by the medical team and agree with findings and assessment and have no additional findings to add at this time.    Mental Status " "Exam:    Appearance: awake, alert, dressed in hospital scrubs, and appeared as age stated  Attitude:  cooperative  Eye Contact:  poor   Mood:   \"good\"  Affect:  mood congruent and intensity is blunted  Speech:  clear, coherent and normal prosody  Psychomotor Behavior:  no evidence of tardive dyskinesia, dystonia, or tics  Thought Process:  disorganized  Associations:  no loose associations  Thought Content:  no evidence of suicidal ideation or homicidal ideation and no evidence of psychotic thought  Insight:  limited  Judgement:  limited  Oriented to:   person   Attention Span and Concentration:  limited  Recent and Remote Memory:  limited               DSM-5 Diagnosis:   Major neurocognitive disorder, dementia with behavioral disturbance           Assessment:   Kraig Genao is a 89 year old male admitted 2/23/2025 with generalized weakness and confusion, found to have community-acquired pneumonia. Past medical history notable for moderate dementia (SLUMS score 12 on 4/21/2023), atrial fibrillation (Eliquis) , CKD Stage III, and BPH. While in the hospital, patient has had increased episodes of agitation and \"sundowning\" which have complicated TCU placement. Patient is on Seroquel 12.5mg twice daily and 25mg at bedtime; higher dosing has lead to somnolence and sedation. Haldol lead to significant changes in respiratory status. Record review indicates Trazodone was added on 3/2/25 to promote sleep as it was though increasing intermittent agitation could be further associated with poor sleep. Psychiatry consulted to assist with agitation and dementia recommendations.    Today, patient appeared calm and was resting when assessed by psychiatry. He remains disorganized and confused. He is oriented to person only. He continues to ask for Rootbeer. Record review indicates recent concerns with worsening behavioral disturbances associated with dementia at current living facility. Patient does appear to have slept well with " initiation of Trazodone. Given dementia with behavioral disturbances, will start Namenda further targeting long-term cognitive impairments and behaviors associated with dementia dx. Patient will need to be placed in higher level of care facility which social work is helping to coordinate.           Summary of Recommendations:   Given dementia with behavioral disturbances, will start Namenda from 5 mg daily x 7 days, then increase to 5mg twice daily. Can further increase by 5mg weekly to achieve target maximum dose 20mg daily for cognition impairment and behaviors.  Continue Trazodone 50mg at bedtime for sleep  Continue Seroquel 12.5mg twice daily and 25mg at bedtime  EKG reviewed, QTC noted to be 516 which has decreased compared to prior EKG on 2/23/25, recommendation to continue to monitor noting antipsychotic use can lead to QTC prolongation. Ideally will try to limited antipsychotic polypharmacy.   Agree with medical, patient would benefit from placement with additional cares- TCU vs memory care or longterm. Appreciate social works role with placement assistance.   Please can follow as needed.       TRAMAINE Flannery CNP    Consult/Liaison Psychiatry   [facility]    Please call the [facility] CL line ([phone]) with questions and to determine consult service coverage.

## 2025-03-03 NOTE — PLAN OF CARE
Shift: [id number]  VS: VSS except hypertensive, RA  Pain: Denies pain  Neuro: WNL  Cardiac: hx of Afib/Aflutter  Respiratory: WNL, RA   Diet/Appetite:  Reg Diet, poor appetite  /GI: Incontinent of Bowel and bladder  LDA's: PIV SL  Skin: Scattered bruising and moles all over body, scabs on shins and ankles  Activity: Ax1 w/GB and Walker       Plan: Pt is Alert to self. Denies pain and SOB. Pt is confused and can get agitated and fidgety. Sitter at bedside. Pt takes pills crushed with pudding. Will continue to monitor.

## 2025-03-03 NOTE — PLAN OF CARE
PRIMARY DIAGNOSIS: GENERALIZED WEAKNESS, CONFUSION    OUTPATIENT/OBSERVATION GOALS TO BE MET BEFORE DISCHARGE  1. Orthostatic performed: N/A    2. Tolerating PO medications: Yes    3. Return to near baseline physical activity:  Assist of 1 with walker and gait belt    4. Cleared for discharge by consultants (if involved): No    Discharge Planner Nurse   Safe discharge environment identified: No  Barriers to discharge: Yes       Entered by: Kari Stevenson RN 03/03/2025 6:20 AM   Patient alert and oriented to self-only and confused. Intermittently restless/agitated- sitter continued in room. Patient vitally stable on room air. Denies pain. Started on Trazadone this evening- patient able to sleep most of the night. Up assist of 1 with rolling walker and gait belt. Voiding within limits with intermittent incontinence overnight- cares provided. Lidocaine patches placed on back. Denies shortness of breath/trouble breathing. Peripheral IV saline locked. Medications taken crushed in pudding. Patient hard of hearing. Pending safe discharge plans and psych consult.     Please review provider order for any additional goals.   Nurse to notify provider when observation goals have been met and patient is ready for discharge.      Goal Outcome Evaluation:      Plan of Care Reviewed With: patient    Overall Patient Progress: no changeOverall Patient Progress: no change    Outcome Evaluation: Denies pain. Sitter continued in room.      Problem: Adult Inpatient Plan of Care  Goal: Plan of Care Review  Description: The Plan of Care Review/Shift note should be completed every shift.  The Outcome Evaluation is a brief statement about your assessment that the patient is improving, declining, or no change.  This information will be displayed automatically on your shift  note.  Outcome: Progressing  Flowsheets (Taken 3/3/2025 0032)  Outcome Evaluation: Denies pain. Sitter continued in room.  Plan of Care Reviewed With: patient  Overall  "Patient Progress: no change  Goal: Patient-Specific Goal (Individualized)  Description: You can add care plan individualizations to a care plan. Examples of Individualization might be:  \"Parent requests to be called daily at 9am for status\", \"I have a hard time hearing out of my right ear\", or \"Do not touch me to wake me up as it startles  me\".  Outcome: Progressing  Goal: Absence of Hospital-Acquired Illness or Injury  Outcome: Progressing  Intervention: Identify and Manage Fall Risk  Recent Flowsheet Documentation  Taken 3/2/2025 2100 by Kari Stevenson RN  Safety Promotion/Fall Prevention:   activity supervised   nonskid shoes/slippers when out of bed   supervised activity   safety round/check completed   room organization consistent   room near nurse's station  Intervention: Prevent Skin Injury  Recent Flowsheet Documentation  Taken 3/2/2025 2100 by Kari Stevenson RN  Body Position: position changed independently  Intervention: Prevent and Manage VTE (Venous Thromboembolism) Risk  Recent Flowsheet Documentation  Taken 3/2/2025 2100 by Kari Stevenson RN  VTE Prevention/Management: SCDs off (sequential compression devices)  Intervention: Prevent Infection  Recent Flowsheet Documentation  Taken 3/2/2025 2100 by Kari Stevenson RN  Infection Prevention:   single patient room provided   hand hygiene promoted  Goal: Optimal Comfort and Wellbeing  Outcome: Progressing  Goal: Readiness for Transition of Care  Outcome: Progressing     Problem: Fall Injury Risk  Goal: Absence of Fall and Fall-Related Injury  Outcome: Progressing  Intervention: Identify and Manage Contributors  Recent Flowsheet Documentation  Taken 3/2/2025 2100 by Kari Stevenson RN  Medication Review/Management: medications reviewed  Intervention: Promote Injury-Free Environment  Recent Flowsheet Documentation  Taken 3/2/2025 2100 by Kari Stevenson RN  Safety Promotion/Fall Prevention:   activity supervised   nonskid shoes/slippers when out of bed   supervised " activity   safety round/check completed   room organization consistent   room near nurse's station

## 2025-03-03 NOTE — CONSULTS
"CLINICAL NUTRITION SERVICES - ASSESSMENT NOTE    Malnutrition Status:    % Intake: < 75% for > 7 days (moderate)  % Weight Loss: > 10% in 6 months (severe)   Subcutaneous Fat Loss: Not able to obtain today  Muscle Loss: Not able to obtain today  Fluid Accumulation/Edema: None documented    Malnutrition Diagnosis: Moderate malnutrition in the context of chronic illness --> but not able to confirm with family or obtain NFPE, admit wt is taken via standing scale and PO intakes are variable based on flowsheets  Malnutrition Present on Admission: Unable to assess     Registered Dietitian Interventions:  - Diet as ordered by provider.  - Adding [employer] supplement offerings w/ meals BID (lunch and dinner) to start.       REASON FOR ASSESSMENT  Length of stay (LOS)     PMH of: A fib, CKD stage 3, BPH, suspected dementia.    Admit 2/2: CAP, encephalopathy, weakness.    SUBJECTIVE INFORMATION  Information obtained from chart, oriented to self.    NUTRITION HISTORY  - Documented by LSW to reside in [facility] and receive meal services PTA.  - Allergies: NKFA.    CURRENT NUTRITION ORDERS AND INTAKE/TOLERANCE  Diet: Regular    25-75% intakes per flowsheet review during admit.  Is consistently receiving meals.    CONSULTS  - Palliative following.  Notes read \"no feeding tube\".    - Care Coordinator notes read TCU planned at discharge but has been limited by need for sitter.  - Psych consulted 3/02.      LABS  Nutrition-relevant labs: Reviewed.     MEDICATIONS  Nutrition-relevant medications: Reviewed.     SYSTEM FINDINGS    - Skin: No current documentation of PI.   - GI symptoms: Stooling patterns noted w/ recorded BMs daily.    ANTHROPOMETRICS  Height: 172.7 cm (5' 7.992\")  Most Recent Weight: 59.5 kg (131 lb 1.6 oz)  Body mass index is 19.94 kg/m .:   Normal BMI  Wt Readings from Last 10 Encounters:   02/27/25 59.5 kg (131 lb 1.6 oz)   10/03/24 69.4 kg (153 lb)   05/06/24 69.8 kg (153 lb 14.4 oz)   03/10/23 64.4 kg (142 lb) " "  03/09/23 65.5 kg (144 lb 6.4 oz)   11/02/22 68.9 kg (152 lb)   04/01/20 70.3 kg (155 lb)   09/23/19 73 kg (161 lb)   02/01/19 74.4 kg (164 lb)   02/16/18 70.3 kg (155 lb)     - No edema documented.  - Admit wt taken via standing scale.  - Indicates possible 14% wt loss since 10/2024 (~5 months).      ASSESSED NUTRITION NEEDS  Dosing Weight: 60 kg, based on standing scale/admit wt  Estimated Energy Needs: 25-30+ kcal/kg  Justification: Minimum maintenance  Estimated Protein Needs: >/-1.2 grams of pro/kg  Justification: Preservation of lean body mass, advanced age  Estimated Fluid Needs: per provider    NUTRITION DIAGNOSIS  Inadequate oral intake related to suspect mentation as evidenced by meeting <75% of nutrition needs acutely.    INTERVENTIONS  Medical food supplement therapy     Goals  PO intakes of at least 50-75% of meals or supplements TID while admitted.     Monitoring/Evaluation  Progress toward goals will be monitored and evaluated per policy.      Maribell Orozco RDN, , LD  Clinical Dietitian  [facility] Message Group: \"Dietitian [Gabi]\"  Office: [phone]  Pagers:  3rd floor/ICU: [phone]  All other floors: [phone]  Weekend/holiday: [phone]    "

## 2025-03-03 NOTE — PROGRESS NOTES
"[facility]    Medicine Progress Note - Hospitalist Service    Date of Admission:  2/23/2025    Assessment & Plan   Kraig is a 89 Male with history of A-fib on Eliquis, CKD stage III, BPH, and suspected dementia presents with generalized weakness and confusion and found to have community-acquired pneumonia.    Having difficulty with TCU placement due to dementia/need for a sitter    Community-acquired pneumonia    - Completed course of rocephin 2 g + doxycycline to complete 7 days    - palliative consulted: life prolonging with limits. No CPR- Do NOT Intubate and NO FEEDING TUBE.     - full respiratory panel: negative     Acute Metabolic and infectious encephalopathy - resolved   Progressive dementia  Has had \"sundowning\" in recent months, worse in the hospital. Had dose of haldol resulting in acute change in respiratory status requiring 10L oxy mask. Had increased sedation with 25 mg Seroquel so adjusted to Seroquel to 12.5 mg TID.  Had a good response for a short time with TID seroquel. Then developed agitation overnight so increased bedtime dose to 25. Continues to be intermittently agitated, but suspect it is related to lack of sleep as patient had poor sleep for last 2 nights. Will add trazodone to promote sleep and add an additional PRN as he is not always responding to zyprexa.   - Seroquel 12.5mg BID + 25mg at bedtime  - Trazodone 50mg at bedtime   - PRN IV/IM zyprexa and IM ziprasidone  - avoid Haldol if possible  Patient slept better on 3/2/2025 overnight.  Psychiatry consulted.  Due to his history of dementia start Namenda 5 mg daily for 1 week and then increase to 5 mg p.o. twice daily    Acute urinary retention requiring Pérez catheter placement  Gross hematuria - resolved   BPH    - pérez catheter placed due to number of times required to straight cath, Developed gross hematuria after pérez placement and ambulation 2/25    - Urology consult due to above   - increased Flomax 0.8mg "   - started finasteride   - needs outpatient follow up in 2-3 months with cystoscopy, UA, postvoid    - pérez removed 2/26 late morning due to patient trying to manipulate pérez and would cause more trauma being ripped out    - voiding since pérez removed without significant PVR, continue to monitor     - resumed Eliquis in AM 2/27     Generalized Weakness & Ambulatory Dysfunction    - Physical therapy consultation: recommending TCU    - needs to be sitter free, was sitter free for 2 days but required one on evening of 2/28    - SW consult, currently in independent living, working on TCU     New LBBB & Chronically Elevated Troponin    - no chest pain and EKG     - Troponin chronically elevated to 50's     Chronic Afib, controlled     - continue home atenolol and Eliquis    - rate controlled on telemetry in the 80's    - removed tele on 2/26 due to agitation     CKD stage III    - creatinine at baseline            Diet: Regular Diet Adult    DVT Prophylaxis: DOAC  Pérez Catheter: Not present  Lines: None     Cardiac Monitoring: None  Code Status: No CPR- Do NOT Intubate      Clinically Significant Risk Factors                                  # COPD: noted on problem list        Social Drivers of Health            Disposition Plan     Medically Ready for Discharge: Anticipated in 2-4 Days       Needs to discharge to TCU.  Needs to be off of bedside sitter for 24 hours prior to discharge      Karin Portillo MD  Hospitalist Service  [facility]  Securely message with [employer] (more info)  Text page via [employer] Paging/Directory   ______________________________________________________________________    Interval History   Chart reviewed.  Discussed with bedside RN  Patient slept better with the trazodone started.  Sitting comfortably in chair.  Denies any complaints currently.  Drinking water thinks it is root beer.  Psychiatry started him on Namenda to help with dementia.  No other acute  issues        Physical Exam   Vital Signs: Temp: 98.2  F (36.8  C) Temp src: Axillary BP: (!) 146/98 Pulse: 87   Resp: 16 SpO2: 92 % O2 Device: None (Room air)    Weight: 131 lbs 1.6 oz    Constitutional: Awake, alert, no distress, and cooperative  Cardiovascular: Regular rate and rhythm, normal S1 and S2, no S3 or S4, and no murmur noted  Respiratory: No increased work of breathing, good air exchange, clear to auscultation bilaterally, no crackles or wheezing  Gastrointestinal: Abdomen soft, non-tender, non-distended. BS normal. No masses, organomegaly     Medical Decision Making       45 MINUTES SPENT BY ME on the date of service doing chart review, history, exam, documentation & further activities per the note.

## 2025-03-04 LAB — POTASSIUM SERPL-SCNC: 4.1 MMOL/L (ref 3.4–5.3)

## 2025-03-04 PROCEDURE — 99207 PR NO CHARGE LOS: CPT

## 2025-03-04 PROCEDURE — 250N000013 HC RX MED GY IP 250 OP 250 PS 637: Performed by: PHYSICIAN ASSISTANT

## 2025-03-04 PROCEDURE — 250N000013 HC RX MED GY IP 250 OP 250 PS 637: Performed by: INTERNAL MEDICINE

## 2025-03-04 PROCEDURE — 250N000011 HC RX IP 250 OP 636: Mod: JW

## 2025-03-04 PROCEDURE — 99232 SBSQ HOSP IP/OBS MODERATE 35: CPT | Performed by: HOSPITALIST

## 2025-03-04 PROCEDURE — 250N000013 HC RX MED GY IP 250 OP 250 PS 637: Performed by: STUDENT IN AN ORGANIZED HEALTH CARE EDUCATION/TRAINING PROGRAM

## 2025-03-04 PROCEDURE — 250N000013 HC RX MED GY IP 250 OP 250 PS 637

## 2025-03-04 PROCEDURE — 250N000013 HC RX MED GY IP 250 OP 250 PS 637: Performed by: HOSPITALIST

## 2025-03-04 PROCEDURE — 120N000001 HC R&B MED SURG/OB

## 2025-03-04 PROCEDURE — 36415 COLL VENOUS BLD VENIPUNCTURE: CPT | Performed by: INTERNAL MEDICINE

## 2025-03-04 PROCEDURE — 84132 ASSAY OF SERUM POTASSIUM: CPT | Performed by: INTERNAL MEDICINE

## 2025-03-04 RX ADMIN — APIXABAN 2.5 MG: 2.5 TABLET, FILM COATED ORAL at 21:44

## 2025-03-04 RX ADMIN — OLANZAPINE 2.5 MG: 10 INJECTION, POWDER, FOR SOLUTION INTRAMUSCULAR at 18:43

## 2025-03-04 RX ADMIN — FINASTERIDE 5 MG: 5 TABLET, FILM COATED ORAL at 09:45

## 2025-03-04 RX ADMIN — APIXABAN 2.5 MG: 2.5 TABLET, FILM COATED ORAL at 09:45

## 2025-03-04 RX ADMIN — QUETIAPINE FUMARATE 25 MG: 25 TABLET ORAL at 21:44

## 2025-03-04 RX ADMIN — ATENOLOL 50 MG: 50 TABLET ORAL at 09:45

## 2025-03-04 RX ADMIN — PANTOPRAZOLE SODIUM 40 MG: 40 TABLET, DELAYED RELEASE ORAL at 09:45

## 2025-03-04 RX ADMIN — TRAZODONE HYDROCHLORIDE 50 MG: 50 TABLET ORAL at 21:44

## 2025-03-04 RX ADMIN — Medication 3 MG: at 21:44

## 2025-03-04 RX ADMIN — TAMSULOSIN HYDROCHLORIDE 0.8 MG: 0.4 CAPSULE ORAL at 21:43

## 2025-03-04 RX ADMIN — QUETIAPINE FUMARATE 12.5 MG: 25 TABLET ORAL at 16:12

## 2025-03-04 RX ADMIN — MEMANTINE 5 MG: 5 TABLET ORAL at 09:45

## 2025-03-04 RX ADMIN — QUETIAPINE FUMARATE 12.5 MG: 25 TABLET ORAL at 09:45

## 2025-03-04 ASSESSMENT — ACTIVITIES OF DAILY LIVING (ADL)
ADLS_ACUITY_SCORE: 75

## 2025-03-04 NOTE — PROGRESS NOTES
"[facility]    Medicine Progress Note - Hospitalist Service    Date of Admission:  2/23/2025    Assessment & Plan   Kraig Genao is a 89 Male with history of A-fib on Eliquis, CKD stage III, BPH, and suspected dementia presents with generalized weakness and confusion and found to have community-acquired pneumonia.    Having difficulty with TCU placement due to dementia/need for a sitter    Community-acquired pneumonia    - Completed course of rocephin 2 g + doxycycline to complete 7 days    - palliative consulted: life prolonging with limits. No CPR- Do NOT Intubate and NO FEEDING TUBE.     - full respiratory panel: negative     Acute Metabolic and infectious encephalopathy - resolved   Progressive dementia    - has had \"sundowning\" in recent months, worse in the hospital. Had dose of haldol resulting in acute change in respiratory status requiring 10L oxy mask. Had increased sedation with 25 mg Seroquel so adjusted to Seroquel to 12.5 mg TID.    - had a good response for a short time with TID seroquel. Then developed agitation overnight so increased bedtime dose to 25. Continues to be intermittently agitated, but suspect it is related to lack of sleep as patient had poor sleep for last 2 nights.     - Seroquel 12.5mg BID + 25mg at bedtime    - Trazodone 50mg at bedtime     - PRN IV/IM zyprexa and IM ziprasidone    - avoid Haldol if possible (had change in respiratory status)    - Psych has also added Namenda (to be titrated)    Acute urinary retention requiring Pérez catheter placement  Gross hematuria - resolved   BPH    - pérez catheter placed due to number of times required to straight cath, Developed gross hematuria after pérez placement and ambulation 2/25    - Urology consult due to above   - increased Flomax 0.8mg   - started finasteride   - needs outpatient follow up in 2-3 months with cystoscopy, UA, postvoid    - pérez removed 2/26 late morning due to patient trying to manipulate " pérez and would cause more trauma being ripped out    - voiding since pérez removed without significant PVR, continue to monitor     - resumed Eliquis in AM 2/27     Generalized Weakness & Ambulatory Dysfunction    - Physical therapy consultation: recommending TCU    - needs to be sitter free, was sitter free for 2 days but required one on evening of 2/28    - SW consult, currently in independent living, working on TCU     New LBBB & Chronically Elevated Troponin    - no chest pain and EKG     - Troponin chronically elevated to 50's     Chronic Afib, controlled     - continue home atenolol and Eliquis    - rate controlled on telemetry in the 80's    - removed tele on 2/26 due to agitation     CKD stage III    - creatinine at baseline    No labs needed in am  Daughter in room. Updated          Diet: Regular Diet Adult  Snacks/Supplements Adult: Magic Cup; With Meals    DVT Prophylaxis: DOAC  Pérez Catheter: Not present  Lines: None     Cardiac Monitoring: None  Code Status: No CPR- Do NOT Intubate      Clinically Significant Risk Factors                               # Moderate Malnutrition: based on nutrition assessment and treatment provided per dietitian's recommendations.    # COPD: noted on problem list        Social Drivers of Health            Disposition Plan     Medically Ready for Discharge: Anticipated in 2-4 Days       Needs to discharge to TCU.  Needs to be off of bedside sitter for 24 hours prior to discharge      Valerio Monet MD  Hospitalist Service  [facility]  Securely message with [employer] (more info)  Text page via [employer] Paging/Directory   ______________________________________________________________________    Interval History   Chart reviewed.  Discussed with bedside RN  Patient in chair. Eating lunch. Daughter in room. No complaints or new issues.    Physical Exam   Vital Signs: Temp: 98.7  F (37.1  C) Temp src: Axillary BP: 135/85 Pulse: 86   Resp: 18 SpO2: 96 % O2  Device: None (Room air)    Weight: 131 lbs 1.6 oz    Constitutional: Awake, alert, no distress, and cooperative    Medical Decision Making       45 MINUTES SPENT BY ME on the date of service doing chart review, history, exam, documentation & further activities per the note.

## 2025-03-04 NOTE — CONSULTS
"      Psychiatry Consultation; Follow up              Reason for Consult, requesting source:    Patient assessed yesterday for initial psychiatric consult, see note for additional details. Writer went to see patient this morning. He was sitting in recliner. Patient appeared agitated and was yelling \"leave me alone\" and \"you're a fat pig.\" Patient stated \"go away you fat pig.\" Writer left the room to prevent any further escalation. Per record review, patient was observed to sleep overnight. Of note, patient had not yet taken his morning medications when writer went to follow-up.     Recommendations to continue with current medication regimen. Patient is pending placement, appreciate social work's assistance with this. Please reconsult psychiatry as needed.           TRAMAINE Flannery [facility]  Consult/Liaison Psychiatry   [facility]    Please call the [facility] CL line ([phone]) with questions and to determine consult service coverage.   \"Much or all of the text in this note was generated through the use of Dragon Dictate voice to text software. Errors in spelling or words which appear to be out of contact are unintentional, may be present due having escaped editing\"          "

## 2025-03-04 NOTE — CARE PLAN
"Pt refused to take his morning meds. Staff explained the importance of taking his scheduled meds and staff attempted to intervene as well. When asked why he doesn't want to take his meds he states that \"the medication makes him go   why don't you guys take them and then see what happens\". Pt is able to hold a conversation, makes jokes and is not agitated. Pt was heard addressing staff saying \"you pigs leave me alone\" . Staff excused themselves from the room. Pt remained seated on the recliner comfortably watching TV and reading a magazine.  No sitter is in the room. Later, daughter intervened but to no success. However, around 1 pm provider was able to ask pt to take his meds and he did it without any resistance. Pt is now in a better mood, smiling, appreciative of cares, talking to staff and daughter and he would like a shower. Continue POC.   "

## 2025-03-04 NOTE — PLAN OF CARE
Goal Outcome Evaluation:      Plan of Care Reviewed With: patient    Overall Patient Progress: improvingOverall Patient Progress: improving    Outcome Evaluation: Patient is A &O to self. Pleasant and redirectable. Slept well overnight. Ax1 and walker. Regular diet. Denies pain. PIV SL to left forearm with no-no in place. Denies dizziness, SOB, and nausea. Incontinent at times. TCU placement pending. SW following.      Problem: Adult Inpatient Plan of Care  Goal: Plan of Care Review  Description: The Plan of Care Review/Shift note should be completed every shift.  The Outcome Evaluation is a brief statement about your assessment that the patient is improving, declining, or no change.  This information will be displayed automatically on your shift  note.  Recent Flowsheet Documentation  Taken 3/4/2025 0655 by Jaswinder Stone, SASHA  Outcome Evaluation: Patient is A &O to self. Pleasant and redirectable. Slept well overnight. Ax1 and walker. Regular diet. Denies pain. PIV SL to left forearm with no-no in place. Denies dizziness, SOB, and nausea. Incontinent at times. TCU placement pending. SW following.  Plan of Care Reviewed With: patient  Overall Patient Progress: improving  Goal: Absence of Hospital-Acquired Illness or Injury  Intervention: Identify and Manage Fall Risk  Recent Flowsheet Documentation  Taken 3/3/2025 2151 by Jaswinder Stone, RN  Safety Promotion/Fall Prevention:   treat underlying cause   treat reversible contributory factors   safety round/check completed   lighting adjusted   increase visualization of patient   increased rounding and observation   clutter free environment maintained   room door open   room near nurse's station   room organization consistent   supervised activity  Intervention: Prevent Skin Injury  Recent Flowsheet Documentation  Taken 3/3/2025 2151 by Jaswinder Stone, RN  Body Position: position changed independently  Intervention: Prevent and Manage VTE (Venous Thromboembolism) Risk  Recent  Flowsheet Documentation  Taken 3/3/2025 2151 by Jaswinder Stone RN  VTE Prevention/Management: SCDs off (sequential compression devices)  Intervention: Prevent Infection  Recent Flowsheet Documentation  Taken 3/3/2025 2151 by Jaswinder Stone RN  Infection Prevention:   cohorting utilized   rest/sleep promoted   single patient room provided     Problem: Fall Injury Risk  Goal: Absence of Fall and Fall-Related Injury  Intervention: Identify and Manage Contributors  Recent Flowsheet Documentation  Taken 3/3/2025 2151 by Jaswinder Stone RN  Medication Review/Management: medications reviewed  Intervention: Promote Injury-Free Environment  Recent Flowsheet Documentation  Taken 3/3/2025 2151 by Jaswinder Stone RN  Safety Promotion/Fall Prevention:   treat underlying cause   treat reversible contributory factors   safety round/check completed   lighting adjusted   increase visualization of patient   increased rounding and observation   clutter free environment maintained   room door open   room near nurse's station   room organization consistent   supervised activity     Problem: Adult Inpatient Plan of Care  Goal: Plan of Care Review  Description: The Plan of Care Review/Shift note should be completed every shift.  The Outcome Evaluation is a brief statement about your assessment that the patient is improving, declining, or no change.  This information will be displayed automatically on your shift  note.  Outcome: Progressing  Flowsheets (Taken 3/4/2025 0676)  Outcome Evaluation: Patient is A &O to self. Pleasant and redirectable. Slept well overnight. Ax1 and walker. Regular diet. Denies pain. PIV SL to left forearm with no-no in place. Denies dizziness, SOB, and nausea. Incontinent at times. TCU placement pending. SW following.  Plan of Care Reviewed With: patient  Overall Patient Progress: improving  Goal: Absence of Hospital-Acquired Illness or Injury  Intervention: Identify and Manage Fall Risk  Recent Flowsheet Documentation  Taken  3/3/2025 2151 by Jaswinder Stone RN  Safety Promotion/Fall Prevention:   treat underlying cause   treat reversible contributory factors   safety round/check completed   lighting adjusted   increase visualization of patient   increased rounding and observation   clutter free environment maintained   room door open   room near nurse's station   room organization consistent   supervised activity  Intervention: Prevent Skin Injury  Recent Flowsheet Documentation  Taken 3/3/2025 2151 by Jaswinder Stone RN  Body Position: position changed independently  Intervention: Prevent and Manage VTE (Venous Thromboembolism) Risk  Recent Flowsheet Documentation  Taken 3/3/2025 2151 by Jaswinder Stone RN  VTE Prevention/Management: SCDs off (sequential compression devices)  Intervention: Prevent Infection  Recent Flowsheet Documentation  Taken 3/3/2025 2151 by Jaswinder Stone RN  Infection Prevention:   cohorting utilized   rest/sleep promoted   single patient room provided     Problem: Fall Injury Risk  Goal: Absence of Fall and Fall-Related Injury  Intervention: Identify and Manage Contributors  Recent Flowsheet Documentation  Taken 3/3/2025 2151 by Jaswinder Stone RN  Medication Review/Management: medications reviewed  Intervention: Promote Injury-Free Environment  Recent Flowsheet Documentation  Taken 3/3/2025 2151 by Jaswinder Stone RN  Safety Promotion/Fall Prevention:   treat underlying cause   treat reversible contributory factors   safety round/check completed   lighting adjusted   increase visualization of patient   increased rounding and observation   clutter free environment maintained   room door open   room near nurse's station   room organization consistent   supervised activity

## 2025-03-04 NOTE — PROGRESS NOTES
"Care Management Follow Up    Length of Stay (days): 8    Expected Discharge Date: 03/04/2025     Concerns to be Addressed: discharge planning     Patient plan of care discussed at interdisciplinary rounds: Yes    Anticipated Discharge Disposition:  TCU    Anticipated Discharge Services:  NA  Anticipated Discharge DME:  NA    Patient/family educated on Medicare website which has current facility and service quality ratings:  yes  Education Provided on the Discharge Plan:  yes  Patient/Family in Agreement with the Plan:  yes    Referrals Placed by CM/SW:  TCU  Private pay costs discussed: Not applicable    Discussed  Partnership in Safe Discharge Planning  document with patient/family: No     Handoff Completed: No, handoff not indicated or clinically appropriate    Additional Information:  11:47 AM   MEDHAT sent follow ups to pending facilities letting them know that patient had been off a sitter for 24 hours. MEDHAT spoke with Valerio on the phone to follow up on conversation with FLORENCIO Correa at [facility] to discuss plan. Valerio states that she would \"neither confirm nor deny\" their plans with [facility]. Valerio then asked if it was normal for facilities to reach out. MEDHAT confirmed that it was normal for facilities to request updates and also reach out when they feel they can no longer meet a patients needs. MEDHAT discussed that MEDHAT just wanted to make sure that SW and family were on same plan as far as the discharge plan and needs and that patient was not going to be returning to [facility]. Valerio confirmed SW and family were on the same page. She is going to speak to her siblings about the questions [facility] had.     3:06 PM   MEDHAT left  for [facility].     Next Steps:  Follow up with facilities/family    PRISCILA Quintero   Social Work Care Manager   [facility]   [phone]       "

## 2025-03-05 ENCOUNTER — APPOINTMENT (OUTPATIENT)
Dept: PHYSICAL THERAPY | Facility: CLINIC | Age: OVER 89
DRG: 193 | End: 2025-03-05
Payer: MEDICARE

## 2025-03-05 LAB — POTASSIUM SERPL-SCNC: 4 MMOL/L (ref 3.4–5.3)

## 2025-03-05 PROCEDURE — 250N000013 HC RX MED GY IP 250 OP 250 PS 637: Performed by: INTERNAL MEDICINE

## 2025-03-05 PROCEDURE — 97530 THERAPEUTIC ACTIVITIES: CPT | Mod: GP | Performed by: PHYSICAL THERAPIST

## 2025-03-05 PROCEDURE — 120N000001 HC R&B MED SURG/OB

## 2025-03-05 PROCEDURE — 250N000013 HC RX MED GY IP 250 OP 250 PS 637: Performed by: PHYSICIAN ASSISTANT

## 2025-03-05 PROCEDURE — 99232 SBSQ HOSP IP/OBS MODERATE 35: CPT | Performed by: HOSPITALIST

## 2025-03-05 PROCEDURE — 250N000013 HC RX MED GY IP 250 OP 250 PS 637: Performed by: STUDENT IN AN ORGANIZED HEALTH CARE EDUCATION/TRAINING PROGRAM

## 2025-03-05 PROCEDURE — 250N000013 HC RX MED GY IP 250 OP 250 PS 637: Performed by: HOSPITALIST

## 2025-03-05 PROCEDURE — 250N000013 HC RX MED GY IP 250 OP 250 PS 637

## 2025-03-05 PROCEDURE — 97116 GAIT TRAINING THERAPY: CPT | Mod: GP | Performed by: PHYSICAL THERAPIST

## 2025-03-05 PROCEDURE — 84132 ASSAY OF SERUM POTASSIUM: CPT | Performed by: INTERNAL MEDICINE

## 2025-03-05 PROCEDURE — 36415 COLL VENOUS BLD VENIPUNCTURE: CPT | Performed by: INTERNAL MEDICINE

## 2025-03-05 PROCEDURE — 250N000011 HC RX IP 250 OP 636

## 2025-03-05 RX ADMIN — APIXABAN 2.5 MG: 2.5 TABLET, FILM COATED ORAL at 22:13

## 2025-03-05 RX ADMIN — ATENOLOL 50 MG: 50 TABLET ORAL at 10:07

## 2025-03-05 RX ADMIN — OLANZAPINE 2.5 MG: 10 INJECTION, POWDER, FOR SOLUTION INTRAMUSCULAR at 15:23

## 2025-03-05 RX ADMIN — APIXABAN 2.5 MG: 2.5 TABLET, FILM COATED ORAL at 10:06

## 2025-03-05 RX ADMIN — FINASTERIDE 5 MG: 5 TABLET, FILM COATED ORAL at 10:06

## 2025-03-05 RX ADMIN — QUETIAPINE FUMARATE 12.5 MG: 25 TABLET ORAL at 13:51

## 2025-03-05 RX ADMIN — TRAZODONE HYDROCHLORIDE 50 MG: 50 TABLET ORAL at 22:13

## 2025-03-05 RX ADMIN — QUETIAPINE FUMARATE 12.5 MG: 25 TABLET ORAL at 10:06

## 2025-03-05 RX ADMIN — TAMSULOSIN HYDROCHLORIDE 0.8 MG: 0.4 CAPSULE ORAL at 22:13

## 2025-03-05 RX ADMIN — PANTOPRAZOLE SODIUM 40 MG: 40 TABLET, DELAYED RELEASE ORAL at 10:06

## 2025-03-05 RX ADMIN — Medication 3 MG: at 22:13

## 2025-03-05 RX ADMIN — QUETIAPINE FUMARATE 25 MG: 25 TABLET ORAL at 22:13

## 2025-03-05 RX ADMIN — MEMANTINE 5 MG: 5 TABLET ORAL at 10:07

## 2025-03-05 ASSESSMENT — ACTIVITIES OF DAILY LIVING (ADL)
ADLS_ACUITY_SCORE: 75
ADLS_ACUITY_SCORE: 80
ADLS_ACUITY_SCORE: 75
ADLS_ACUITY_SCORE: 80
ADLS_ACUITY_SCORE: 75
ADLS_ACUITY_SCORE: 80
ADLS_ACUITY_SCORE: 75
ADLS_ACUITY_SCORE: 80
ADLS_ACUITY_SCORE: 75
ADLS_ACUITY_SCORE: 80
ADLS_ACUITY_SCORE: 75
ADLS_ACUITY_SCORE: 80
ADLS_ACUITY_SCORE: 75

## 2025-03-05 NOTE — CARE PLAN
"Pt seen restless in bed with numerous attempts to exit the bed. Sitter present in the room and writer reoriented pt to stay in bed but pt still refusing and states that \" I want to leave and walk\".\" Let me get out\" Pt had ripped out his brief and was naked in bed. Attempted to reorient pt to no success. Attempted to give PRN PO Seroquel bt pt refused. Gave pt a few minutes to settle/ calm down. Later went to check on pt but he was now sleeping and sitter remains present in the room.     UPDATE: pt woke up around 1815 became restless, agitated and uncooperative. Attempted to jump out of the bed. Administered X 1 IM PRN Zyprexa. Will continue to monitor. Sitter remains present.   "

## 2025-03-05 NOTE — PLAN OF CARE
Patient is alert and oriented to self. VS documented on the FS and patient is on RA. Active bowel sounds. Patient denies any pain, urgency, and frequency when voiding. Pt is tolerating regular diet- see orders regarding meat. I& O. Dinner tray ordered in room but pt did not eat. IV SL. Patient denies any pain. Patient is X 1 assist with walker and gait belt. Meds crushed with pudding. Pt appeared restless this evening. PRN Zyprexa administered- see note.     Plan: SW following to facilitate discharge.     Goal Outcome Evaluation:      Plan of Care Reviewed With: patient    Overall Patient Progress: improvingOverall Patient Progress: improving      Problem: Adult Inpatient Plan of Care  Goal: Plan of Care Review  Description: The Plan of Care Review/Shift note should be completed every shift.  The Outcome Evaluation is a brief statement about your assessment that the patient is improving, declining, or no change.  This information will be displayed automatically on your shift  note.  Recent Flowsheet Documentation  Taken 3/4/2025 1940 by Clarissa Ma RN  Plan of Care Reviewed With: patient  Overall Patient Progress: improving  Goal: Absence of Hospital-Acquired Illness or Injury  Intervention: Identify and Manage Fall Risk  Recent Flowsheet Documentation  Taken 3/4/2025 1545 by Clarissa Ma RN  Safety Promotion/Fall Prevention:   treat underlying cause   treat reversible contributory factors   safety round/check completed   lighting adjusted   increase visualization of patient   increased rounding and observation   clutter free environment maintained   room door open   room near nurse's station   room organization consistent   supervised activity  Taken 3/4/2025 1000 by Clarissa Ma RN  Safety Promotion/Fall Prevention:   treat underlying cause   treat reversible contributory factors   safety round/check completed   lighting adjusted   increase visualization of patient   increased rounding and  observation   clutter free environment maintained   room door open   room near nurse's station   room organization consistent   supervised activity  Intervention: Prevent Skin Injury  Recent Flowsheet Documentation  Taken 3/4/2025 1545 by Clarissa Ma RN  Body Position: position changed independently  Taken 3/4/2025 1000 by Clarissa Ma RN  Body Position: position changed independently  Intervention: Prevent and Manage VTE (Venous Thromboembolism) Risk  Recent Flowsheet Documentation  Taken 3/4/2025 1545 by Clarissa Ma RN  VTE Prevention/Management: SCDs off (sequential compression devices)  Taken 3/4/2025 1000 by Clarissa Ma RN  VTE Prevention/Management: SCDs off (sequential compression devices)  Intervention: Prevent Infection  Recent Flowsheet Documentation  Taken 3/4/2025 1545 by Clarissa Ma RN  Infection Prevention:   cohorting utilized   rest/sleep promoted   single patient room provided  Taken 3/4/2025 1000 by Clarissa Ma RN  Infection Prevention:   cohorting utilized   rest/sleep promoted   single patient room provided

## 2025-03-05 NOTE — PROGRESS NOTES
"    Medicine Progress Note - Hospitalist Service    Date of Admission:  2/23/2025    Assessment & Plan   Kraig Genao is a 89 Male with history of A-fib on Eliquis, CKD stage III, BPH, and suspected dementia presents with generalized weakness and confusion and found to have community-acquired pneumonia.    Having difficulty with TCU placement due to dementia/need for a sitter  SW to speak with the family about memory care placement    Community-acquired pneumonia    - Completed course of rocephin 2 g + doxycycline to complete 7 days    - palliative consulted: life prolonging with limits. No CPR- Do NOT Intubate and NO FEEDING TUBE.     - full respiratory panel: negative     Acute Metabolic and infectious encephalopathy - resolved   Progressive dementia    - has had \"sundowning\" in recent months, worse in the hospital. Had dose of haldol resulting in acute change in respiratory status requiring 10L oxy mask. Had increased sedation with 25 mg Seroquel so adjusted to Seroquel to 12.5 mg TID.    - had a good response for a short time with TID seroquel. Then developed agitation overnight so increased bedtime dose to 25. Continues to be intermittently agitated, but suspect it is related to lack of sleep as patient had poor sleep for last 2 nights.     - Seroquel 12.5mg BID + 25mg at bedtime    - Trazodone 50mg at bedtime     - PRN IV/IM zyprexa and IM ziprasidone    - avoid Haldol if possible (had change in respiratory status)    - Psych has also added Namenda (to be titrated)    - needed PRN zyprexa and a sitter yesterday evening    Acute urinary retention requiring Pérez catheter placement  Gross hematuria - resolved   BPH    - pérez catheter placed due to number of times required to straight cath, Developed gross hematuria after pérez placement and ambulation 2/25    - Urology consult due to above   - increased Flomax 0.8mg   - started finasteride   - needs outpatient follow up in 2-3 " months with cystoscopy, UA, postvoid    - pérez removed 2/26 late morning due to patient trying to manipulate pérez and would cause more trauma being ripped out    - voiding since pérez removed without significant PVR, continue to monitor     - resumed Eliquis in AM 2/27     Generalized Weakness & Ambulatory Dysfunction    - Physical therapy consultation: recommending TCU    - needs to be sitter free, was sitter free for 2 days but required one on evening of 2/28    - SW consult, currently in independent living, working on TCU     New LBBB & Chronically Elevated Troponin    - no chest pain and EKG     - Troponin chronically elevated to 50's     Chronic Afib, controlled     - continue home atenolol and Eliquis    - rate controlled on telemetry in the 80's    - removed tele on 2/26 due to agitation     CKD stage III    - creatinine at baseline    No labs needed in am  Called daughter and updated her        Diet: Snacks/Supplements Adult: Magic Cup; With Meals  Regular Diet Adult    DVT Prophylaxis: DOAC  Pérez Catheter: Not present  Lines: None     Cardiac Monitoring: None  Code Status: No CPR- Do NOT Intubate      Clinically Significant Risk Factors                               # Moderate Malnutrition: based on nutrition assessment and treatment provided per dietitian's recommendations.    # COPD: noted on problem list        Social Drivers of Health            Disposition Plan     Medically Ready for Discharge: Anticipated in 2-4 Days     Needs to discharge to TCU.  Needs to be off of bedside sitter for 24 hours prior to discharge    Valerio Monet MD  Hospitalist Service  [facility]  Securely message with [facility] (more info)  Text page via [facility] Paging/Directory   ______________________________________________________________________    Interval History     Patient is in the chair.  He is comfortable.  He denies chest pain or shortness of breath.  No nausea.  He is eating and drinking well.   He would like a shower.  Physical Exam   Vital Signs: Temp: 98.1  F (36.7  C) Temp src: Axillary BP: (!) 151/90 Pulse: 84   Resp: 18 SpO2: 99 % O2 Device: None (Room air)    Weight: 131 lbs 1.6 oz    Constitutional: Awake, alert, no distress, and cooperative    Medical Decision Making       45 MINUTES SPENT BY ME on the date of service doing chart review, history, exam, documentation & further activities per the note.

## 2025-03-05 NOTE — PLAN OF CARE
"Patient is alert and oriented to self. VS documented on the FS and patient is on RA.Denies any pain, SOB, chest pain, nausea. Active bowel sounds LBM today. Patient denies any pain, urgency, and frequency when voiding. Pt is tolerating regular diet- see orders regarding meat. I& O. Pt ate half of his breakfast and lunch. IV SL. Patient denies any pain. Patient is X 1 assist with walker and gait belt. Meds crushed with pudding. Family visited. No behaviors noted this shift. No sitter in the room. Pt received shower, spent the day sitting on the chair.     UPDATE: At 1445 pt was pacing in the room, set off his chair alarm multiple times and very restless. Staff attempted to reorient him. Pt was aggressive and heard calling staff names- \"you prick\"  \"You are not a wizard after all\" \"leave me alone\". IM Zyprexa administered. Pt was safely put back in bed with help of staff. Updated incoming staff and provider is aware    Plan: SW following to facilitate discharge. Continue POC    Goal Outcome Evaluation:      Plan of Care Reviewed With: patient    Overall Patient Progress: improvingOverall Patient Progress: improving      Problem: Adult Inpatient Plan of Care  Goal: Plan of Care Review  Description: The Plan of Care Review/Shift note should be completed every shift.  The Outcome Evaluation is a brief statement about your assessment that the patient is improving, declining, or no change.  This information will be displayed automatically on your shift  note.  Outcome: Progressing  Goal: Patient-Specific Goal (Individualized)  Description: You can add care plan individualizations to a care plan. Examples of Individualization might be:  \"Parent requests to be called daily at 9am for status\", \"I have a hard time hearing out of my right ear\", or \"Do not touch me to wake me up as it startles  me\".  Outcome: Progressing  Goal: Absence of Hospital-Acquired Illness or Injury  Outcome: Progressing  Goal: Optimal Comfort and " Wellbeing  Outcome: Progressing  Goal: Readiness for Transition of Care  Outcome: Progressing

## 2025-03-05 NOTE — PLAN OF CARE
"Goal Outcome Evaluation:  Care from [id number]    Inpatient Progress Note:  For complete assessment see flow sheet documentation.    BP (!) 151/90 (BP Location: Right arm)   Pulse 84   Temp 98.1  F (36.7  C) (Axillary)   Resp 18   Ht 1.727 m (5' 7.99\")   Wt 59.5 kg (131 lb 1.6 oz)   SpO2 99%   BMI 19.94 kg/m     Alert to self. Denies pain. Assist x 1 with walker/GB.Has slept on and off overnight, No behaviors, only attempts to get out of bed to use the bathroom. Meds crushed in pudding. SW following for placement.      Plan of Care Reviewed With: patient    Problem: Adult Inpatient Plan of Care  Goal: Plan of Care Review  Description: The Plan of Care Review/Shift note should be completed every shift.  The Outcome Evaluation is a brief statement about your assessment that the patient is improving, declining, or no change.  This information will be displayed automatically on your shift  note.  Outcome: Progressing  Flowsheets (Taken 3/5/2025 0702)  Plan of Care Reviewed With: patient  Overall Patient Progress: improving  Goal: Patient-Specific Goal (Individualized)  Description: You can add care plan individualizations to a care plan. Examples of Individualization might be:  \"Parent requests to be called daily at 9am for status\", \"I have a hard time hearing out of my right ear\", or \"Do not touch me to wake me up as it startles  me\".  Outcome: Progressing  Goal: Absence of Hospital-Acquired Illness or Injury  Outcome: Progressing  Intervention: Identify and Manage Fall Risk  Recent Flowsheet Documentation  Taken 3/4/2025 1956 by Mera Person, RN  Safety Promotion/Fall Prevention:   treat underlying cause   treat reversible contributory factors   safety round/check completed   lighting adjusted   increase visualization of patient   increased rounding and observation   clutter free environment maintained   room door open   room near nurse's station   room organization consistent   supervised " activity  Intervention: Prevent Skin Injury  Recent Flowsheet Documentation  Taken 3/4/2025 1956 by Mera Person RN  Body Position: position changed independently  Intervention: Prevent and Manage VTE (Venous Thromboembolism) Risk  Recent Flowsheet Documentation  Taken 3/4/2025 1956 by Mera Person RN  VTE Prevention/Management: SCDs off (sequential compression devices)  Intervention: Prevent Infection  Recent Flowsheet Documentation  Taken 3/4/2025 1956 by Mera Person RN  Infection Prevention:   cohorting utilized   rest/sleep promoted   single patient room provided  Goal: Optimal Comfort and Wellbeing  Outcome: Progressing  Goal: Readiness for Transition of Care  Outcome: Progressing  Flowsheets (Taken 3/5/2025 0702)  Anticipated Changes Related to Illness: none  Transportation Anticipated: family or friend will provide  Concerns to be Addressed: discharge planning  Intervention: Mutually Develop Transition Plan  Recent Flowsheet Documentation  Taken 3/5/2025 0702 by Mera Person RN  Anticipated Changes Related to Illness: none  Transportation Anticipated: family or friend will provide  Concerns to be Addressed: discharge planning     Problem: Fall Injury Risk  Goal: Absence of Fall and Fall-Related Injury  Outcome: Progressing  Intervention: Identify and Manage Contributors  Recent Flowsheet Documentation  Taken 3/4/2025 1956 by Mera Person RN  Medication Review/Management: medications reviewed  Intervention: Promote Injury-Free Environment  Recent Flowsheet Documentation  Taken 3/4/2025 1956 by Mera Person RN  Safety Promotion/Fall Prevention:   treat underlying cause   treat reversible contributory factors   safety round/check completed   lighting adjusted   increase visualization of patient   increased rounding and observation   clutter free environment maintained   room door open   room near nurse's station   room organization consistent   supervised activity     Problem: Dementia  Signs/Symptoms  Goal: Improved Behavioral Control (Dementia Signs/Symptoms)  Outcome: Progressing  Goal: Improved Mood Symptoms (Dementia Signs/Symptoms)  Outcome: Progressing  Goal: Optimized Cognitive Function (Dementia Signs/Symptoms)  Outcome: Progressing  Goal: Optimized Oral Intake (Dementia Signs/Symptoms)  Outcome: Progressing  Goal: Improved Sleep (Dementia Signs/Symptoms)  Outcome: Progressing  Goal: Enhanced Social or Functional Skills and Ability (Dementia Signs/Symptoms)  Outcome: Progressing       Overall Patient Progress: improvingOverall Patient Progress: improving

## 2025-03-06 VITALS
HEIGHT: 68 IN | BODY MASS INDEX: 19.87 KG/M2 | OXYGEN SATURATION: 99 % | WEIGHT: 131.1 LBS | DIASTOLIC BLOOD PRESSURE: 82 MMHG | RESPIRATION RATE: 18 BRPM | HEART RATE: 83 BPM | TEMPERATURE: 98.5 F | SYSTOLIC BLOOD PRESSURE: 133 MMHG

## 2025-03-06 LAB — POTASSIUM SERPL-SCNC: 4 MMOL/L (ref 3.4–5.3)

## 2025-03-06 PROCEDURE — 36415 COLL VENOUS BLD VENIPUNCTURE: CPT | Performed by: HOSPITALIST

## 2025-03-06 PROCEDURE — 84132 ASSAY OF SERUM POTASSIUM: CPT | Performed by: HOSPITALIST

## 2025-03-06 PROCEDURE — 120N000001 HC R&B MED SURG/OB

## 2025-03-06 PROCEDURE — 99232 SBSQ HOSP IP/OBS MODERATE 35: CPT | Performed by: HOSPITALIST

## 2025-03-06 PROCEDURE — 250N000013 HC RX MED GY IP 250 OP 250 PS 637

## 2025-03-06 PROCEDURE — 250N000013 HC RX MED GY IP 250 OP 250 PS 637: Performed by: INTERNAL MEDICINE

## 2025-03-06 PROCEDURE — 250N000013 HC RX MED GY IP 250 OP 250 PS 637: Performed by: STUDENT IN AN ORGANIZED HEALTH CARE EDUCATION/TRAINING PROGRAM

## 2025-03-06 PROCEDURE — 250N000013 HC RX MED GY IP 250 OP 250 PS 637: Performed by: PHYSICIAN ASSISTANT

## 2025-03-06 PROCEDURE — 250N000013 HC RX MED GY IP 250 OP 250 PS 637: Performed by: HOSPITALIST

## 2025-03-06 RX ADMIN — QUETIAPINE FUMARATE 25 MG: 25 TABLET ORAL at 18:35

## 2025-03-06 RX ADMIN — APIXABAN 2.5 MG: 2.5 TABLET, FILM COATED ORAL at 09:45

## 2025-03-06 RX ADMIN — QUETIAPINE FUMARATE 12.5 MG: 25 TABLET ORAL at 09:46

## 2025-03-06 RX ADMIN — FINASTERIDE 5 MG: 5 TABLET, FILM COATED ORAL at 09:48

## 2025-03-06 RX ADMIN — MEMANTINE 5 MG: 5 TABLET ORAL at 09:46

## 2025-03-06 RX ADMIN — QUETIAPINE FUMARATE 12.5 MG: 25 TABLET ORAL at 14:38

## 2025-03-06 RX ADMIN — PANTOPRAZOLE SODIUM 40 MG: 40 TABLET, DELAYED RELEASE ORAL at 09:46

## 2025-03-06 RX ADMIN — ATENOLOL 50 MG: 50 TABLET ORAL at 09:45

## 2025-03-06 ASSESSMENT — ACTIVITIES OF DAILY LIVING (ADL)
ADLS_ACUITY_SCORE: 81
ADLS_ACUITY_SCORE: 81
ADLS_ACUITY_SCORE: 76
ADLS_ACUITY_SCORE: 81
ADLS_ACUITY_SCORE: 76
ADLS_ACUITY_SCORE: 81
ADLS_ACUITY_SCORE: 81
ADLS_ACUITY_SCORE: 76
ADLS_ACUITY_SCORE: 81
ADLS_ACUITY_SCORE: 76
ADLS_ACUITY_SCORE: 76
ADLS_ACUITY_SCORE: 81
ADLS_ACUITY_SCORE: 81
ADLS_ACUITY_SCORE: 76
ADLS_ACUITY_SCORE: 81
ADLS_ACUITY_SCORE: 81
ADLS_ACUITY_SCORE: 76
ADLS_ACUITY_SCORE: 81

## 2025-03-06 NOTE — PLAN OF CARE
"  Orientation: x1, oriented to self HX dementia, [place]  Neuro: wnl  Pain status: denies  Activity: Assist x1 walker and gait belt   Peripheral edema: wnl  Resp: wnl  Cardiac: wnl  GI: incont  :  incont  Skin: wnl. Scattered bruising and dry skin  LDA: PIV  Infusions: SL   Pertinent Labs: RN manage K 4.0 no replacement   Diet: Regular, supplement Magic Cup  Consults: PT, SW  Discharge Plan: PT recommend TCU, when no sitter for 24 hours. Possible bed Tue  3/11        Goal Outcome Evaluation:      Plan of Care Reviewed With: patient    Overall Patient Progress: improvingOverall Patient Progress: improving    Outcome Evaluation: Denies pain, needing redirection with confuion, sitter maintaned.      Problem: Adult Inpatient Plan of Care  Goal: Plan of Care Review  Description: The Plan of Care Review/Shift note should be completed every shift.  The Outcome Evaluation is a brief statement about your assessment that the patient is improving, declining, or no change.  This information will be displayed automatically on your shift  note.  3/6/2025 1128 by Ro Zavala RN  Outcome: Progressing  Flowsheets (Taken 3/6/2025 1128)  Outcome Evaluation: Denies pain, needing redirection with confuion, sitter maintaned.  Plan of Care Reviewed With: patient  Overall Patient Progress: improving  3/6/2025 1127 by Ro Zavala RN  Outcome: Progressing  Goal: Patient-Specific Goal (Individualized)  Description: You can add care plan individualizations to a care plan. Examples of Individualization might be:  \"Parent requests to be called daily at 9am for status\", \"I have a hard time hearing out of my right ear\", or \"Do not touch me to wake me up as it startles  me\".  3/6/2025 1128 by Ro Zavala RN  Outcome: Progressing  3/6/2025 1127 by Ro Zavala RN  Outcome: Progressing  Goal: Absence of Hospital-Acquired Illness or Injury  3/6/2025 1128 by Ro Zavala RN  Outcome: Progressing  3/6/2025 1127 by Ro Zavala RN  Outcome: " Progressing  Intervention: Identify and Manage Fall Risk  Recent Flowsheet Documentation  Taken 3/6/2025 0945 by Ro Zavala RN  Safety Promotion/Fall Prevention:   activity supervised   assistive device/personal items within reach   bedside attendant   clutter free environment maintained   increased rounding and observation   increase visualization of patient   lighting adjusted   mobility aid in reach   nonskid shoes/slippers when out of bed   room door open   room near nurse's station   room organization consistent   safety round/check completed   supervised activity  Intervention: Prevent Skin Injury  Recent Flowsheet Documentation  Taken 3/6/2025 0945 by Ro Zavala RN  Body Position: position changed independently  Intervention: Prevent Infection  Recent Flowsheet Documentation  Taken 3/6/2025 0945 by Ro Zavala RN  Infection Prevention:   cohorting utilized   environmental surveillance performed   equipment surfaces disinfected   hand hygiene promoted  Goal: Optimal Comfort and Wellbeing  3/6/2025 1128 by Ro Zvaala RN  Outcome: Progressing  3/6/2025 1127 by Ro Zavala RN  Outcome: Progressing  Goal: Readiness for Transition of Care  3/6/2025 1128 by Ro Zavala RN  Outcome: Progressing  3/6/2025 1127 by Ro Zavala RN  Outcome: Progressing     Problem: Fall Injury Risk  Goal: Absence of Fall and Fall-Related Injury  3/6/2025 1128 by Ro Zavala RN  Outcome: Progressing  3/6/2025 1127 by Ro Zavala RN  Outcome: Progressing  Intervention: Identify and Manage Contributors  Recent Flowsheet Documentation  Taken 3/6/2025 0945 by Ro Zavala RN  Medication Review/Management: medications reviewed  Intervention: Promote Injury-Free Environment  Recent Flowsheet Documentation  Taken 3/6/2025 0945 by Ro Zavala RN  Safety Promotion/Fall Prevention:   activity supervised   assistive device/personal items within reach   bedside attendant   clutter free environment maintained   increased rounding and observation    increase visualization of patient   lighting adjusted   mobility aid in reach   nonskid shoes/slippers when out of bed   room door open   room near nurse's station   room organization consistent   safety round/check completed   supervised activity     Problem: Dementia Signs/Symptoms  Goal: Improved Behavioral Control (Dementia Signs/Symptoms)  3/6/2025 1128 by Ro Zavala RN  Outcome: Progressing  3/6/2025 1127 by Ro Zavala RN  Outcome: Progressing  Goal: Improved Mood Symptoms (Dementia Signs/Symptoms)  3/6/2025 1128 by Ro Zavala RN  Outcome: Progressing  3/6/2025 1127 by Ro Zavala RN  Outcome: Progressing  Goal: Optimized Cognitive Function (Dementia Signs/Symptoms)  3/6/2025 1128 by Ro Zavala RN  Outcome: Progressing  3/6/2025 1127 by Ro Zavala RN  Outcome: Progressing  Goal: Optimized Oral Intake (Dementia Signs/Symptoms)  3/6/2025 1128 by Ro Zavala RN  Outcome: Progressing  3/6/2025 1127 by Ro Zavala RN  Outcome: Progressing  Goal: Improved Sleep (Dementia Signs/Symptoms)  3/6/2025 1128 by Ro Zavala RN  Outcome: Progressing  3/6/2025 1127 by Ro Zavala RN  Outcome: Progressing  Goal: Enhanced Social or Functional Skills and Ability (Dementia Signs/Symptoms)  3/6/2025 1128 by Ro Zavala RN  Outcome: Progressing  3/6/2025 1127 by Ro Zavala RN  Outcome: Progressing     Problem: Delirium  Goal: Optimal Coping  3/6/2025 1128 by Ro Zavala RN  Outcome: Progressing  3/6/2025 1127 by Ro Zavala RN  Outcome: Progressing  Goal: Improved Behavioral Control  3/6/2025 1128 by Ro Zavala RN  Outcome: Progressing  3/6/2025 1127 by Ro Zavala RN  Outcome: Progressing  Intervention: Minimize Safety Risk  Recent Flowsheet Documentation  Taken 3/6/2025 0931 by Ro Zavala RN  Enhanced Safety Measures:   review medications for side effects with activity   room near unit station  Goal: Improved Attention and Thought Clarity  3/6/2025 1128 by Ro Zavala RN  Outcome: Progressing  3/6/2025 1127 by Ro Zavala,  RN  Outcome: Progressing  Goal: Improved Sleep  3/6/2025 1128 by Ro Zavala RN  Outcome: Progressing  3/6/2025 1127 by Ro Zavala RN  Outcome: Progressing

## 2025-03-06 NOTE — PLAN OF CARE
"Temp: 97.2  F (36.2  C) Temp src: Axillary BP: (!) 152/93 Pulse: 73   Resp: 18 SpO2: 100 % O2 Device: None (Room air)       Oriented to self only. Up Ax1 with walker. Can be incontinent of both urine and stool. Denies pain. Fair appetite for dinner. IM zyprexa given by day shift nurse at beginning of shift, combative and agitated. Plan- potassium protocol- recheck in am, po/IM zyprexa prn, scheduled seroquel 12.5mg bid and 25mg at bedtime, monitor pvr, trazodone at bedtime, PT- recommending TCU however, needs to be sitter free fro 24 hours.      Problem: Adult Inpatient Plan of Care  Goal: Absence of Hospital-Acquired Illness or Injury  Intervention: Identify and Manage Fall Risk  Recent Flowsheet Documentation  Taken 3/5/2025 1910 by Chrystal Aparicio RN  Safety Promotion/Fall Prevention:   safety round/check completed   nonskid shoes/slippers when out of bed  Intervention: Prevent Skin Injury  Recent Flowsheet Documentation  Taken 3/5/2025 1910 by Chrystal Aparicio RN  Body Position: position changed independently     Problem: Adult Inpatient Plan of Care  Goal: Plan of Care Review  Description: The Plan of Care Review/Shift note should be completed every shift.  The Outcome Evaluation is a brief statement about your assessment that the patient is improving, declining, or no change.  This information will be displayed automatically on your shift  note.  Outcome: Progressing  Goal: Patient-Specific Goal (Individualized)  Description: You can add care plan individualizations to a care plan. Examples of Individualization might be:  \"Parent requests to be called daily at 9am for status\", \"I have a hard time hearing out of my right ear\", or \"Do not touch me to wake me up as it startles  me\".  Outcome: Progressing  Goal: Absence of Hospital-Acquired Illness or Injury  Outcome: Progressing  Intervention: Identify and Manage Fall Risk  Recent Flowsheet Documentation  Taken 3/5/2025 1910 by Chrystal Aparicio RN  Safety Promotion/Fall " Prevention:   safety round/check completed   nonskid shoes/slippers when out of bed  Intervention: Prevent Skin Injury  Recent Flowsheet Documentation  Taken 3/5/2025 1910 by Chrystal Aparicio RN  Body Position: position changed independently  Goal: Optimal Comfort and Wellbeing  Outcome: Progressing  Goal: Readiness for Transition of Care  Outcome: Progressing     Problem: Fatigue  Goal: Improved Activity Tolerance  Intervention: Promote Improved Energy  Recent Flowsheet Documentation  Taken 3/5/2025 1910 by Chrystal Aparicio RN  Activity Management: activity adjusted per tolerance     Problem: Fall Injury Risk  Goal: Absence of Fall and Fall-Related Injury  Intervention: Identify and Manage Contributors  Recent Flowsheet Documentation  Taken 3/5/2025 1910 by Chrystal Aparicio RN  Medication Review/Management: medications reviewed  Intervention: Promote Injury-Free Environment  Recent Flowsheet Documentation  Taken 3/5/2025 1910 by Chrystal Aparicio RN  Safety Promotion/Fall Prevention:   safety round/check completed   nonskid shoes/slippers when out of bed     Problem: Fall Injury Risk  Goal: Absence of Fall and Fall-Related Injury  Outcome: Progressing  Intervention: Identify and Manage Contributors  Recent Flowsheet Documentation  Taken 3/5/2025 1910 by Chrystal Aparicio RN  Medication Review/Management: medications reviewed  Intervention: Promote Injury-Free Environment  Recent Flowsheet Documentation  Taken 3/5/2025 1910 by Chrystal Aparicio RN  Safety Promotion/Fall Prevention:   safety round/check completed   nonskid shoes/slippers when out of bed     Problem: Dementia Signs/Symptoms  Goal: Improved Behavioral Control (Dementia Signs/Symptoms)  Outcome: Progressing  Goal: Improved Mood Symptoms (Dementia Signs/Symptoms)  Outcome: Progressing  Goal: Optimized Cognitive Function (Dementia Signs/Symptoms)  Outcome: Progressing  Goal: Optimized Oral Intake (Dementia Signs/Symptoms)  Outcome: Progressing  Goal: Improved Sleep (Dementia  Signs/Symptoms)  Outcome: Progressing  Goal: Enhanced Social or Functional Skills and Ability (Dementia Signs/Symptoms)  Outcome: Progressing

## 2025-03-06 NOTE — PLAN OF CARE
PRIMARY DIAGNOSIS: GENERALIZED WEAKNESS, CONFUSION    OUTPATIENT/OBSERVATION GOALS TO BE MET BEFORE DISCHARGE  1. Orthostatic performed: N/A    2. Tolerating PO medications: Yes    3. Return to near baseline physical activity:  Assist of 1 with rolling walker and gait belt      Discharge Planner Nurse   Safe discharge environment identified: No  Barriers to discharge: Yes       Entered by: Kari Stevenson RN 03/06/2025 6:16 AM   Patient alert and oriented to self-only. Confused and intermittently restless. Sitter continued in room. Vitally stable on room air. Patient able to sleep well throughout the night. Respirations even and unlabored. Denies pain. Up assist of 1 with rolling walker and gait belt. Voiding within limits. Tolerating diet. Patient had one bowel movement this shift. Peripheral IV saline locked. [place]. Medications taken crushed in pudding. Pending safe discharge plans.     Please review provider order for any additional goals.   Nurse to notify provider when observation goals have been met and patient is ready for discharge.      Goal Outcome Evaluation:      Plan of Care Reviewed With: patient    Overall Patient Progress: improvingOverall Patient Progress: improving    Outcome Evaluation: denies pain. slept well through the night. sitter continues.      Problem: Adult Inpatient Plan of Care  Goal: Plan of Care Review  Description: The Plan of Care Review/Shift note should be completed every shift.  The Outcome Evaluation is a brief statement about your assessment that the patient is improving, declining, or no change.  This information will be displayed automatically on your shift  note.  Outcome: Progressing  Flowsheets (Taken 3/6/2025 0615)  Outcome Evaluation: denies pain. slept well through the night. sitter continues.  Plan of Care Reviewed With: patient  Overall Patient Progress: improving  Goal: Patient-Specific Goal (Individualized)  Description: You can add care plan individualizations to a care  "plan. Examples of Individualization might be:  \"Parent requests to be called daily at 9am for status\", \"I have a hard time hearing out of my right ear\", or \"Do not touch me to wake me up as it startles  me\".  Outcome: Progressing  Goal: Absence of Hospital-Acquired Illness or Injury  Outcome: Progressing  Intervention: Identify and Manage Fall Risk  Recent Flowsheet Documentation  Taken 3/5/2025 2330 by Kari Stevenson RN  Safety Promotion/Fall Prevention:   activity supervised   bedside attendant   mobility aid in reach   nonskid shoes/slippers when out of bed   safety round/check completed   supervised activity  Intervention: Prevent Skin Injury  Recent Flowsheet Documentation  Taken 3/5/2025 2330 by Kari Stevenson RN  Body Position: position changed independently  Intervention: Prevent and Manage VTE (Venous Thromboembolism) Risk  Recent Flowsheet Documentation  Taken 3/5/2025 2330 by Kari Stevenson RN  VTE Prevention/Management: SCDs off (sequential compression devices)  Intervention: Prevent Infection  Recent Flowsheet Documentation  Taken 3/5/2025 2330 by Kari Stevenson RN  Infection Prevention:   cohorting utilized   rest/sleep promoted   single patient room provided  Goal: Optimal Comfort and Wellbeing  Outcome: Progressing  Goal: Readiness for Transition of Care  Outcome: Progressing     Problem: Fall Injury Risk  Goal: Absence of Fall and Fall-Related Injury  Outcome: Progressing  Intervention: Identify and Manage Contributors  Recent Flowsheet Documentation  Taken 3/5/2025 2330 by Kari Stevenson RN  Medication Review/Management: medications reviewed  Intervention: Promote Injury-Free Environment  Recent Flowsheet Documentation  Taken 3/5/2025 2330 by Kari Stevenson RN  Safety Promotion/Fall Prevention:   activity supervised   bedside attendant   mobility aid in reach   nonskid shoes/slippers when out of bed   safety round/check completed   supervised activity     Problem: Dementia Signs/Symptoms  Goal: Improved " Behavioral Control (Dementia Signs/Symptoms)  Outcome: Progressing  Goal: Improved Mood Symptoms (Dementia Signs/Symptoms)  Outcome: Progressing  Goal: Optimized Cognitive Function (Dementia Signs/Symptoms)  Outcome: Progressing  Goal: Optimized Oral Intake (Dementia Signs/Symptoms)  Outcome: Progressing  Goal: Improved Sleep (Dementia Signs/Symptoms)  Outcome: Progressing  Goal: Enhanced Social or Functional Skills and Ability (Dementia Signs/Symptoms)  Outcome: Progressing

## 2025-03-06 NOTE — PROGRESS NOTES
"[facility]    Medicine Progress Note - Hospitalist Service    Date of Admission:  2/23/2025    Assessment & Plan   Kraig Genao is a 89 Male with history of A-fib on Eliquis, CKD stage III, BPH, and suspected dementia presents with generalized weakness and confusion and found to have community-acquired pneumonia.    Having difficulty with TCU placement due to dementia/need for a sitter  SW to speak with the family about memory care placement    Community-acquired pneumonia    - Completed course of rocephin 2 g + doxycycline to complete 7 days    - palliative consulted: life prolonging with limits. No CPR- Do NOT Intubate and NO FEEDING TUBE.     - full respiratory panel: negative     Acute Metabolic and infectious encephalopathy - resolved   Progressive dementia    - has had \"sundowning\" in recent months, worse in the hospital. Had dose of haldol resulting in acute change in respiratory status requiring 10L oxy mask. Had increased sedation with 25 mg Seroquel so adjusted to Seroquel to 12.5 mg TID.    - had a good response for a short time with TID seroquel. Then developed agitation overnight so increased bedtime dose to 25. Continues to be intermittently agitated, but suspect it is related to lack of sleep as patient had poor sleep for last 2 nights.     - Seroquel 12.5mg BID + 25mg at bedtime    - Trazodone 50mg at bedtime     - PRN IV/IM zyprexa and IM ziprasidone    - avoid Haldol if possible (had change in respiratory status)    - Psych has also added Namenda (to be titrated)    - needed PRN zyprexa and a sitter yesterday evening    Acute urinary retention requiring Pérez catheter placement  Gross hematuria - resolved   BPH    - pérez catheter placed due to number of times required to straight cath, Developed gross hematuria after pérez placement and ambulation 2/25    - Urology consult due to above   - increased Flomax 0.8mg   - started finasteride   - needs outpatient follow up in 2-3 " months with cystoscopy, UA, postvoid    - pérez removed 2/26 late morning due to patient trying to manipulate pérez and would cause more trauma being ripped out    - voiding since pérez removed without significant PVR, continue to monitor     - resumed Eliquis in AM 2/27     Generalized Weakness & Ambulatory Dysfunction    - Physical therapy consultation: recommending TCU    - needs to be sitter free, was sitter free for 2 days but required one on evening of 2/28    - SW consult, currently in independent living, working on memory care     New LBBB & Chronically Elevated Troponin    - no chest pain and EKG     - Troponin chronically elevated to 50's     Chronic Afib, controlled     - continue home atenolol and Eliquis    - rate controlled on telemetry in the 80's    - removed tele on 2/26 due to agitation     CKD stage III    - creatinine at baseline    No labs needed in am  Called daughter and updated her        Diet: Snacks/Supplements Adult: Magic Cup; With Meals  Regular Diet Adult    DVT Prophylaxis: DOAC  Pérez Catheter: Not present  Lines: None     Cardiac Monitoring: None  Code Status: No CPR- Do NOT Intubate      Clinically Significant Risk Factors                               # Moderate Malnutrition: based on nutrition assessment and treatment provided per dietitian's recommendations.    # COPD: noted on problem list        Social Drivers of Health            Disposition Plan     Medically Ready for Discharge: Anticipated in 2-4 Days     Needs to discharge to TCU.  Needs to be off of bedside sitter for 24 hours prior to discharge    Valerio Monet MD  Hospitalist Service  [facility]  Securely message with [employer] (more info)  Text page via [employer] Paging/Directory   ______________________________________________________________________    Interval History     Patient in chair. Daughter in room. No new complaints. Tells me a story of when he played baseball in high school with his  brother Aleks.    Physical Exam   Vital Signs: Temp: (!) 96.7  F (35.9  C) Temp src: Axillary BP: (!) 139/109 Pulse: 74   Resp: 18 SpO2: 99 % O2 Device: None (Room air)    Weight: 131 lbs 1.6 oz    Constitutional: Awake, alert, no distress, and cooperative    Medical Decision Making       45 MINUTES SPENT BY ME on the date of service doing chart review, history, exam, documentation & further activities per the note.

## 2025-03-06 NOTE — PROGRESS NOTES
Care Management Follow Up    Length of Stay (days): 10    Expected Discharge Date: 03/04/2025     Concerns to be Addressed: discharge planning     Patient plan of care discussed at interdisciplinary rounds: Yes    Anticipated Discharge Disposition:  memory care      Anticipated Discharge Services:  PT/OT  Anticipated Discharge DME:  none    Patient/family educated on Medicare website which has current facility and service quality ratings: yes  Education Provided on the Discharge Plan:  yes  Patient/Family in Agreement with the Plan:  yes    Referrals Placed by CM/SW:  Memory care  Private pay costs discussed: Not applicable    Discussed  Partnership in Safe Discharge Planning  document with patient/family: No     Handoff Completed: No, handoff not indicated or clinically appropriate    Additional Information:  12:29 PM   SW spoke with patients daughter re: memory care. SW sent referrals to memory care facilities. SW called 3 additional facilities to get their fax numbers to send referrals.   Referrals sent to:   - [facility]   - [facility]   - [facility] fax: [phone]; phone: [phone]    Waiting on return calls from:   - [facility]aki[facility] Faye Alleman     1:03 PM   SW faxed referral to [facility] at [phone] and provided contact information for family to schedule a tour.     Next Steps: SW to follow up on referrals.     PRISCILA Quintero   Social Work Care Manager   [facility]   [phone]

## 2025-03-07 ENCOUNTER — APPOINTMENT (OUTPATIENT)
Dept: PHYSICAL THERAPY | Facility: CLINIC | Age: OVER 89
DRG: 193 | End: 2025-03-07
Payer: MEDICARE

## 2025-03-07 LAB — POTASSIUM SERPL-SCNC: 3.8 MMOL/L (ref 3.4–5.3)

## 2025-03-07 PROCEDURE — 97530 THERAPEUTIC ACTIVITIES: CPT | Mod: GP

## 2025-03-07 PROCEDURE — 250N000013 HC RX MED GY IP 250 OP 250 PS 637: Performed by: STUDENT IN AN ORGANIZED HEALTH CARE EDUCATION/TRAINING PROGRAM

## 2025-03-07 PROCEDURE — 82565 ASSAY OF CREATININE: CPT | Performed by: INTERNAL MEDICINE

## 2025-03-07 PROCEDURE — 250N000013 HC RX MED GY IP 250 OP 250 PS 637: Performed by: PHYSICIAN ASSISTANT

## 2025-03-07 PROCEDURE — 84132 ASSAY OF SERUM POTASSIUM: CPT | Performed by: HOSPITALIST

## 2025-03-07 PROCEDURE — 97116 GAIT TRAINING THERAPY: CPT | Mod: GP

## 2025-03-07 PROCEDURE — 99232 SBSQ HOSP IP/OBS MODERATE 35: CPT | Performed by: HOSPITALIST

## 2025-03-07 PROCEDURE — 250N000013 HC RX MED GY IP 250 OP 250 PS 637: Performed by: HOSPITALIST

## 2025-03-07 PROCEDURE — 250N000013 HC RX MED GY IP 250 OP 250 PS 637: Performed by: INTERNAL MEDICINE

## 2025-03-07 PROCEDURE — 36415 COLL VENOUS BLD VENIPUNCTURE: CPT | Performed by: HOSPITALIST

## 2025-03-07 PROCEDURE — 250N000013 HC RX MED GY IP 250 OP 250 PS 637

## 2025-03-07 PROCEDURE — 250N000011 HC RX IP 250 OP 636

## 2025-03-07 PROCEDURE — 120N000001 HC R&B MED SURG/OB

## 2025-03-07 RX ADMIN — QUETIAPINE FUMARATE 12.5 MG: 25 TABLET ORAL at 14:25

## 2025-03-07 RX ADMIN — APIXABAN 2.5 MG: 2.5 TABLET, FILM COATED ORAL at 19:33

## 2025-03-07 RX ADMIN — MEMANTINE 5 MG: 5 TABLET ORAL at 08:18

## 2025-03-07 RX ADMIN — Medication 3 MG: at 21:55

## 2025-03-07 RX ADMIN — PANTOPRAZOLE SODIUM 40 MG: 40 TABLET, DELAYED RELEASE ORAL at 08:18

## 2025-03-07 RX ADMIN — OLANZAPINE 2.5 MG: 10 INJECTION, POWDER, FOR SOLUTION INTRAMUSCULAR at 18:53

## 2025-03-07 RX ADMIN — FINASTERIDE 5 MG: 5 TABLET, FILM COATED ORAL at 08:18

## 2025-03-07 RX ADMIN — ATENOLOL 50 MG: 50 TABLET ORAL at 08:18

## 2025-03-07 RX ADMIN — QUETIAPINE FUMARATE 25 MG: 25 TABLET ORAL at 21:54

## 2025-03-07 RX ADMIN — APIXABAN 2.5 MG: 2.5 TABLET, FILM COATED ORAL at 08:18

## 2025-03-07 RX ADMIN — TRAZODONE HYDROCHLORIDE 50 MG: 50 TABLET ORAL at 21:54

## 2025-03-07 RX ADMIN — QUETIAPINE FUMARATE 12.5 MG: 25 TABLET ORAL at 08:18

## 2025-03-07 ASSESSMENT — ACTIVITIES OF DAILY LIVING (ADL)
ADLS_ACUITY_SCORE: 73
ADLS_ACUITY_SCORE: 76
ADLS_ACUITY_SCORE: 73
ADLS_ACUITY_SCORE: 76
ADLS_ACUITY_SCORE: 73
ADLS_ACUITY_SCORE: 81
ADLS_ACUITY_SCORE: 73
ADLS_ACUITY_SCORE: 81
ADLS_ACUITY_SCORE: 73

## 2025-03-07 NOTE — PROGRESS NOTES
"[facility]    Medicine Progress Note - Hospitalist Service    Date of Admission:  2/23/2025    Assessment & Plan   Kriag Genao is a 89 Male with history of A-fib on Eliquis, CKD stage III, BPH, and suspected dementia presents with generalized weakness and confusion and found to have community-acquired pneumonia.    Having difficulty with TCU placement due to dementia/need for a sitter  SW to speak with the family about memory care placement  Pending placement in memory care    Community-acquired pneumonia, resolved    - Completed course of rocephin 2 g + doxycycline to complete 7 days    - palliative consulted: life prolonging with limits. No CPR- Do NOT Intubate and NO FEEDING TUBE.     - full respiratory panel: negative     Acute Metabolic and infectious encephalopathy, resolved   Progressive dementia    - has had \"sundowning\" in recent months, worse in the hospital. Had dose of haldol resulting in acute change in respiratory status requiring 10L oxy mask. Had increased sedation with 25 mg Seroquel so adjusted to Seroquel to 12.5 mg TID.    - had a good response for a short time with TID seroquel. Then developed agitation overnight so increased bedtime dose to 25. Continues to be intermittently agitated, but suspect it is related to lack of sleep as patient had poor sleep for last 2 nights.     - Seroquel 12.5mg BID + 25mg at bedtime    - Trazodone 50mg at bedtime     - PRN IV/IM zyprexa and IM ziprasidone    - avoid Haldol if possible (had change in respiratory status)    - Psych has also added Namenda (to be titrated)    - still needing a sitter due to getting out of bed    Acute urinary retention requiring Pérez catheter placement  Gross hematuria - resolved   BPH    - pérez catheter placed due to number of times required to straight cath, Developed gross hematuria after pérez placement and ambulation 2/25    - Urology consult due to above   - increased Flomax 0.8mg   - started " finasteride   - needs outpatient follow up in 2-3 months with cystoscopy, UA, postvoid    - pérez removed 2/26 late morning due to patient trying to manipulate pérez and would cause more trauma being ripped out    - voiding since pérez removed without significant PVR, continue to monitor     - resumed Eliquis in AM 2/27     Generalized Weakness & Ambulatory Dysfunction    - Physical therapy consultation: recommending TCU    - needs to be sitter free, was sitter free for 2 days but required one on evening of 2/28    -  consult, currently in independent living, working on memory care     New LBBB & Chronically Elevated Troponin    - no chest pain and EKG     - Troponin chronically elevated to 50's     Chronic Afib, controlled     - continue home atenolol and Eliquis    - rate controlled on telemetry in the 80's    - removed tele on 2/26 due to agitation     CKD stage III    - creatinine at baseline    No labs needed in am  Son in room. Answered questions        Diet: Snacks/Supplements Adult: Magic Cup; With Meals  Regular Diet Adult    DVT Prophylaxis: DOAC  Pérez Catheter: Not present  Lines: None     Cardiac Monitoring: None  Code Status: No CPR- Do NOT Intubate      Clinically Significant Risk Factors                               # Moderate Malnutrition: based on nutrition assessment and treatment provided per dietitian's recommendations.    # COPD: noted on problem list        Social Drivers of Health            Disposition Plan     Medically Ready for Discharge: Anticipated in 2-4 Days     Needs to discharge to TCU.  Needs to be off of bedside sitter for 24 hours prior to discharge    Valerio Monet MD  Hospitalist Service  [facility]  Securely message with [employer] (more info)  Text page via [employer] Paging/Directory   ______________________________________________________________________    Interval History     Patient in chair. Son in room. No new complaints. Eating and drinking.      Physical Exam   Vital Signs: Temp: 97.6  F (36.4  C) Temp src: Oral BP: (!) 143/84 Pulse: 68   Resp: 18 SpO2: 95 % O2 Device: None (Room air)    Weight: 131 lbs 1.6 oz    Constitutional: Awake, alert, no distress, and cooperative    Medical Decision Making       45 MINUTES SPENT BY ME on the date of service doing chart review, history, exam, documentation & further activities per the note.

## 2025-03-07 NOTE — PLAN OF CARE
"PRIMARY DIAGNOSIS: GENERALIZED WEAKNESS, CONFUSION     OUTPATIENT/OBSERVATION GOALS TO BE MET BEFORE DISCHARGE  1. Orthostatic performed: N/A    2. Tolerating PO medications: Yes    3. Return to near baseline physical activity:  Assist of 1 with rolling walker and gait belt       Discharge Planner Nurse   Safe discharge environment identified: No  Barriers to discharge: Yes       Entered by: Kari Stevenson RN 03/07/2025 6:04 AM   Patient alert and oriented to self-only. Confused and intermittently restless. Sitter continued in room. Patient agitated and became aggressive with staff this evening- refused vitals and all bedtime medications. Denies pain. Up assist of 1 with rolling walker and gait belt to bathroom. Voiding within limits. Tolerating diet. Peripheral IV saline locked. [place]. Pending safe discharge plans.     Please review provider order for any additional goals.   Nurse to notify provider when observation goals have been met and patient is ready for discharge.      Goal Outcome Evaluation:      Plan of Care Reviewed With: patient    Overall Patient Progress: no changeOverall Patient Progress: no change    Outcome Evaluation: Denies pain. Sitter continued for restlessness      Problem: Adult Inpatient Plan of Care  Goal: Plan of Care Review  Description: The Plan of Care Review/Shift note should be completed every shift.  The Outcome Evaluation is a brief statement about your assessment that the patient is improving, declining, or no change.  This information will be displayed automatically on your shift  note.  Outcome: Progressing  Flowsheets (Taken 3/7/2025 0059)  Outcome Evaluation: Denies pain. Sitter continued for restlessness  Plan of Care Reviewed With: patient  Overall Patient Progress: no change  Goal: Patient-Specific Goal (Individualized)  Description: You can add care plan individualizations to a care plan. Examples of Individualization might be:  \"Parent requests to be called daily at 9am for " "status\", \"I have a hard time hearing out of my right ear\", or \"Do not touch me to wake me up as it startles  me\".  Outcome: Progressing  Goal: Absence of Hospital-Acquired Illness or Injury  Outcome: Progressing  Intervention: Identify and Manage Fall Risk  Recent Flowsheet Documentation  Taken 3/6/2025 2100 by Kari Stevenson RN  Safety Promotion/Fall Prevention:   activity supervised   clutter free environment maintained   nonskid shoes/slippers when out of bed   safety round/check completed   supervised activity   mobility aid in reach  Intervention: Prevent Skin Injury  Recent Flowsheet Documentation  Taken 3/6/2025 2100 by Kari Stevenson RN  Body Position: position changed independently  Intervention: Prevent Infection  Recent Flowsheet Documentation  Taken 3/6/2025 2100 by Kari Stevenson RN  Infection Prevention:   cohorting utilized   environmental surveillance performed   equipment surfaces disinfected   hand hygiene promoted  Goal: Optimal Comfort and Wellbeing  Outcome: Progressing  Goal: Readiness for Transition of Care  Outcome: Progressing     Problem: Fall Injury Risk  Goal: Absence of Fall and Fall-Related Injury  Outcome: Progressing  Intervention: Identify and Manage Contributors  Recent Flowsheet Documentation  Taken 3/6/2025 2100 by Kari Stevenson RN  Medication Review/Management: medications reviewed  Intervention: Promote Injury-Free Environment  Recent Flowsheet Documentation  Taken 3/6/2025 2100 by Kari Stevenson RN  Safety Promotion/Fall Prevention:   activity supervised   clutter free environment maintained   nonskid shoes/slippers when out of bed   safety round/check completed   supervised activity   mobility aid in reach     Problem: Dementia Signs/Symptoms  Goal: Improved Behavioral Control (Dementia Signs/Symptoms)  Outcome: Progressing  Goal: Improved Mood Symptoms (Dementia Signs/Symptoms)  Outcome: Progressing  Goal: Optimized Cognitive Function (Dementia Signs/Symptoms)  Outcome: " Progressing  Goal: Optimized Oral Intake (Dementia Signs/Symptoms)  Outcome: Progressing  Goal: Improved Sleep (Dementia Signs/Symptoms)  Outcome: Progressing  Goal: Enhanced Social or Functional Skills and Ability (Dementia Signs/Symptoms)  Outcome: Progressing     Problem: Delirium  Goal: Optimal Coping  Outcome: Progressing  Goal: Improved Behavioral Control  Outcome: Progressing  Intervention: Minimize Safety Risk  Recent Flowsheet Documentation  Taken 3/6/2025 2100 by Kari Stevenson RN  Enhanced Safety Measures:   review medications for side effects with activity   room near unit station  Goal: Improved Attention and Thought Clarity  Outcome: Progressing  Goal: Improved Sleep  Outcome: Progressing

## 2025-03-07 NOTE — PLAN OF CARE
Goal Outcome Evaluation:      Plan of Care Reviewed With: patient, child    Overall Patient Progress: improvingOverall Patient Progress: improving    Outcome Evaluation: Alert throughout shift. Denies pain. Tolerting PO- appetite fair. Oriented to self. Restless throuhgout shift- ambulating halls frequently with sitter.  SW following for safe dispo planning.      Problem: Adult Inpatient Plan of Care  Goal: Plan of Care Review  Description: The Plan of Care Review/Shift note should be completed every shift.  The Outcome Evaluation is a brief statement about your assessment that the patient is improving, declining, or no change.  This information will be displayed automatically on your shift  note.  Recent Flowsheet Documentation  Taken 3/7/2025 1430 by Elayne Vo RN  Outcome Evaluation: Alert throughout shift. Denies pain. Tolerting PO- appetite fair. Oriented to self. Restless throuhgout shift- ambulating halls frequently with sitter.  SW following for safe dispo planning.  Plan of Care Reviewed With:   patient   child  Overall Patient Progress: improving  Goal: Absence of Hospital-Acquired Illness or Injury  Intervention: Identify and Manage Fall Risk  Recent Flowsheet Documentation  Taken 3/7/2025 0851 by Elayne Vo RN  Safety Promotion/Fall Prevention:   activity supervised   clutter free environment maintained   nonskid shoes/slippers when out of bed   safety round/check completed  Intervention: Prevent Skin Injury  Recent Flowsheet Documentation  Taken 3/7/2025 0851 by Elayne Vo RN  Body Position: position changed independently  Intervention: Prevent Infection  Recent Flowsheet Documentation  Taken 3/7/2025 0851 by Elayne Vo RN  Infection Prevention:   single patient room provided   rest/sleep promoted

## 2025-03-07 NOTE — PROGRESS NOTES
Care Management Follow Up    Length of Stay (days): 11    Expected Discharge Date: 03/11/2025     Concerns to be Addressed: discharge planning     Patient plan of care discussed at interdisciplinary rounds: Yes    Additional Information:  Patient not accepted at [facility].     Next Steps:     PRISCILA Quintero   Social Work Care Manager   [facility]   [phone]

## 2025-03-08 LAB
CREAT SERPL-MCNC: 1.46 MG/DL (ref 0.67–1.17)
EGFRCR SERPLBLD CKD-EPI 2021: 43 ML/MIN/1.73M2

## 2025-03-08 PROCEDURE — 250N000013 HC RX MED GY IP 250 OP 250 PS 637: Performed by: INTERNAL MEDICINE

## 2025-03-08 PROCEDURE — 250N000013 HC RX MED GY IP 250 OP 250 PS 637: Performed by: STUDENT IN AN ORGANIZED HEALTH CARE EDUCATION/TRAINING PROGRAM

## 2025-03-08 PROCEDURE — 99232 SBSQ HOSP IP/OBS MODERATE 35: CPT | Performed by: INTERNAL MEDICINE

## 2025-03-08 PROCEDURE — 250N000013 HC RX MED GY IP 250 OP 250 PS 637: Performed by: PHYSICIAN ASSISTANT

## 2025-03-08 PROCEDURE — 120N000001 HC R&B MED SURG/OB

## 2025-03-08 PROCEDURE — 250N000013 HC RX MED GY IP 250 OP 250 PS 637

## 2025-03-08 PROCEDURE — 250N000013 HC RX MED GY IP 250 OP 250 PS 637: Performed by: HOSPITALIST

## 2025-03-08 RX ORDER — QUETIAPINE FUMARATE 25 MG/1
25 TABLET, FILM COATED ORAL AT BEDTIME
Status: DISCONTINUED | OUTPATIENT
Start: 2025-03-08 | End: 2025-03-10

## 2025-03-08 RX ADMIN — FINASTERIDE 5 MG: 5 TABLET, FILM COATED ORAL at 10:36

## 2025-03-08 RX ADMIN — PANTOPRAZOLE SODIUM 40 MG: 40 TABLET, DELAYED RELEASE ORAL at 10:36

## 2025-03-08 RX ADMIN — TRAZODONE HYDROCHLORIDE 50 MG: 50 TABLET ORAL at 22:42

## 2025-03-08 RX ADMIN — ATENOLOL 50 MG: 50 TABLET ORAL at 10:36

## 2025-03-08 RX ADMIN — TAMSULOSIN HYDROCHLORIDE 0.8 MG: 0.4 CAPSULE ORAL at 19:31

## 2025-03-08 RX ADMIN — APIXABAN 2.5 MG: 2.5 TABLET, FILM COATED ORAL at 19:31

## 2025-03-08 RX ADMIN — QUETIAPINE FUMARATE 12.5 MG: 25 TABLET ORAL at 10:36

## 2025-03-08 RX ADMIN — MEMANTINE 5 MG: 5 TABLET ORAL at 10:36

## 2025-03-08 RX ADMIN — QUETIAPINE FUMARATE 12.5 MG: 25 TABLET ORAL at 16:15

## 2025-03-08 RX ADMIN — APIXABAN 2.5 MG: 2.5 TABLET, FILM COATED ORAL at 10:36

## 2025-03-08 RX ADMIN — Medication 3 MG: at 22:42

## 2025-03-08 RX ADMIN — QUETIAPINE FUMARATE 25 MG: 25 TABLET ORAL at 19:31

## 2025-03-08 ASSESSMENT — ACTIVITIES OF DAILY LIVING (ADL)
ADLS_ACUITY_SCORE: 73
ADLS_ACUITY_SCORE: 73
ADLS_ACUITY_SCORE: 76
ADLS_ACUITY_SCORE: 73
ADLS_ACUITY_SCORE: 76
ADLS_ACUITY_SCORE: 76
ADLS_ACUITY_SCORE: 73
ADLS_ACUITY_SCORE: 76
ADLS_ACUITY_SCORE: 73
ADLS_ACUITY_SCORE: 76
ADLS_ACUITY_SCORE: 73
ADLS_ACUITY_SCORE: 76
ADLS_ACUITY_SCORE: 73
ADLS_ACUITY_SCORE: 73
ADLS_ACUITY_SCORE: 76
ADLS_ACUITY_SCORE: 73
ADLS_ACUITY_SCORE: 76

## 2025-03-08 NOTE — PROGRESS NOTES
"[facility]    Medicine Progress Note - Hospitalist Service    Date of Admission:  2/23/2025    Assessment & Plan   Kraig Genao is a 89 Male with history of A-fib on Eliquis, CKD stage III, BPH, and suspected dementia presents with generalized weakness and confusion and found to have community-acquired pneumonia.    Having difficulty with TCU placement due to dementia/need for a sitter.  SW to speak with the family about memory care placement.   Pending placement in memory care. Continuing sitter at this time until plan clear.     Community-acquired pneumonia, resolved    - Completed course of rocephin 2 g + doxycycline to complete 7 days    - palliative consulted: life prolonging with limits. No CPR- Do NOT Intubate and NO FEEDING TUBE.     - full respiratory panel: negative     Acute Metabolic and infectious encephalopathy, resolved   Progressive dementia    - has had \"sundowning\" in recent months, worse in the hospital. Had dose of haldol resulting in acute change in respiratory status requiring 10L oxy mask. Had increased sedation with 25 mg Seroquel so adjusted to Seroquel to 12.5 mg TID.    - had a good response for a short time with TID seroquel. Then developed agitation overnight so increased bedtime dose to 25. Continues to be intermittently agitated, but suspect it is related to lack of sleep as patient had poor sleep for last 2 nights.     - Seroquel 12.5mg BID + 25mg at bedtime. Given frequent need for PRN medications in early evening, adjusted timing of scheduled Seroquel to 10 AM, 3 PM, and 8 PM. Will follow and may make further adjustments as needed.     - Trazodone 50mg at bedtime     - PRN IV/IM zyprexa and IM ziprasidone    - avoid Haldol if possible (had change in respiratory status)    - Psych has also added Namenda (to be titrated)    - still needing a sitter due to getting out of bed    Acute urinary retention requiring Lieberman catheter placement  Gross hematuria - " resolved   BPH    - pérez catheter placed due to number of times required to straight cath, Developed gross hematuria after pérez placement and ambulation 2/25    - Urology consult due to above   - increased Flomax 0.8mg   - started finasteride   - needs outpatient follow up in 2-3 months with cystoscopy, UA, postvoid    - pérez removed 2/26 late morning due to patient trying to manipulate pérez and would cause more trauma being ripped out    - voiding since pérez removed without significant PVR, continue to monitor     - resumed Eliquis in AM 2/27     Generalized Weakness & Ambulatory Dysfunction    - Physical therapy consultation: recommending TCU    - needs to be sitter free, was sitter free for 2 days but required one on evening of 2/28    - SW consult, currently in independent living, working on memory care. Will keep sitter for now until plan for discharge clear.      New LBBB & Chronically Elevated Troponin    - no chest pain and EKG     - Troponin chronically elevated to 50's     Chronic Afib, controlled     - continue home atenolol and Eliquis    - rate controlled on telemetry in the 80's    - removed tele on 2/26 due to agitation     CKD stage III    - creatinine at baseline    No labs needed in am.  Nursing spoke with son at length. Made some adjustments to timing of medications.         Diet: Snacks/Supplements Adult: Magic Cup; With Meals  Regular Diet Adult    DVT Prophylaxis: DOAC  Pérez Catheter: Not present  Lines: None     Cardiac Monitoring: None  Code Status: No CPR- Do NOT Intubate      Clinically Significant Risk Factors                               # Moderate Malnutrition: based on nutrition assessment and treatment provided per dietitian's recommendations.    # COPD: noted on problem list        Social Drivers of Health            Disposition Plan     Medically Ready for Discharge: Anticipated in 2-4 Days   Needs to discharge to TCU vs memory care. Pending placement from . Continuing  sitter at this time until better plan for discharge. Continuing to adjust medications.     Madeline Burnette MD  Hospitalist Service  [facility]    ______________________________________________________________________    Interval History     Patient in chair. Son in room. No new complaints. Eating and drinking.     Physical Exam   Vital Signs: Temp: 97.6  F (36.4  C) Temp src: Axillary BP: 128/74 Pulse: 73   Resp: 18 SpO2: 95 % O2 Device: None (Room air)    Weight: 131 lbs 1.6 oz    Constitutional: Older gentleman agitated at time of exam. Hard of hearing. Dementia at baseline.   Remainder of exam deferred given agitation and noting that patient is medically stable, but awaiting placement.     Medical Decision Making       30 MINUTES SPENT BY ME on the date of service doing chart review, history, exam, documentation & further activities per the note.

## 2025-03-08 NOTE — PROGRESS NOTES
Pt became agitated around 6:30 stating he wants to go home and call the police. Attempts to redirect patient failed, called his daughter Valerio to help calm him down and that failed he ended up throwing his phone. Given IM zyprexa for agitation.,

## 2025-03-08 NOTE — PROGRESS NOTES
Received report from RN that the patient had aggressive behavior this evening and finally fall sleep.Observed patient sleeping peacefully sitter at bedside, safety check completed, informed sitter to call primary RN  when pt  is awake. Will provide cares as needed. Patient slept well.

## 2025-03-08 NOTE — PLAN OF CARE
"Goal Outcome Evaluation:     PRIMARY DIAGNOSIS: GENERALIZED WEAKNESS    OUTPATIENT/OBSERVATION GOALS TO BE MET BEFORE DISCHARGE  1. Orthostatic performed: No    2. Tolerating PO medications: No, Meds crushed in apple sauce.    3. Return to near baseline physical activity: No    4. Cleared for discharge by consultants (if involved): No    Discharge Planner Nurse   Safe discharge environment identified: No  Barriers to discharge: Yes       Entered by: Jenna Gunderson RN 03/07/2025 11:20 PM   Vitals are Temp: 97.7  F (36.5  C) Temp src: Axillary BP: (!) 145/88 Pulse: 110   Resp: 20 SpO2: 95 %.  Patient is Disorientated to, Time, Place, and Situation. denying pain.  Patient is Saline locked.    Pt is a Regular diet. He is 2 assist with Gait Belt and Walker. Agitated, restless and confused gave prn IM Zyprexa, ambulated in the hallways this afternoon, had a BM and a urine occurrence today.  Please review provider order for any additional goals.   Nurse to notify provider when observation goals have been met and patient is ready for discharge.    Problem: Adult Inpatient Plan of Care  Goal: Plan of Care Review  Description: The Plan of Care Review/Shift note should be completed every shift.  The Outcome Evaluation is a brief statement about your assessment that the patient is improving, declining, or no change.  This information will be displayed automatically on your shift  note.  Outcome: Progressing  Flowsheets (Taken 2/26/2025 1945)  Plan of Care Reviewed With:   patient   child  Overall Patient Progress: no change  Goal: Patient-Specific Goal (Individualized)  Description: You can add care plan individualizations to a care plan. Examples of Individualization might be:  \"Parent requests to be called daily at 9am for status\", \"I have a hard time hearing out of my right ear\", or \"Do not touch me to wake me up as it startles  me\".  Outcome: Progressing  Goal: Absence of Hospital-Acquired Illness or Injury  Outcome: " Progressing  Intervention: Identify and Manage Fall Risk  Recent Flowsheet Documentation  Taken 2/26/2025 1657 by Jenna Gunderson RN  Safety Promotion/Fall Prevention:   activity supervised   patient and family education   nonskid shoes/slippers when out of bed  Intervention: Prevent and Manage VTE (Venous Thromboembolism) Risk  Recent Flowsheet Documentation  Taken 2/26/2025 1657 by Jenna Gunderson RN  VTE Prevention/Management: SCDs off (sequential compression devices)  Intervention: Prevent Infection  Recent Flowsheet Documentation  Taken 2/26/2025 1657 by Jenna Gunderson RN  Infection Prevention: hand hygiene promoted  Goal: Optimal Comfort and Wellbeing  Outcome: Progressing  Goal: Readiness for Transition of Care  Outcome: Progressing     Problem: Fatigue  Goal: Improved Activity Tolerance  Outcome: Progressing     Problem: Fall Injury Risk  Goal: Absence of Fall and Fall-Related Injury  Outcome: Progressing  Intervention: Identify and Manage Contributors  Recent Flowsheet Documentation  Taken 2/26/2025 1657 by Jenna Gunderson RN  Medication Review/Management: medications reviewed  Intervention: Promote Injury-Free Environment  Recent Flowsheet Documentation  Taken 2/26/2025 1657 by Jenna Gunderson RN  Safety Promotion/Fall Prevention:   activity supervised   patient and family education   nonskid shoes/slippers when out of bed

## 2025-03-09 PROCEDURE — 99232 SBSQ HOSP IP/OBS MODERATE 35: CPT | Performed by: INTERNAL MEDICINE

## 2025-03-09 PROCEDURE — 250N000013 HC RX MED GY IP 250 OP 250 PS 637: Performed by: STUDENT IN AN ORGANIZED HEALTH CARE EDUCATION/TRAINING PROGRAM

## 2025-03-09 PROCEDURE — 250N000013 HC RX MED GY IP 250 OP 250 PS 637: Performed by: INTERNAL MEDICINE

## 2025-03-09 PROCEDURE — 250N000013 HC RX MED GY IP 250 OP 250 PS 637

## 2025-03-09 PROCEDURE — 120N000001 HC R&B MED SURG/OB

## 2025-03-09 PROCEDURE — 250N000011 HC RX IP 250 OP 636

## 2025-03-09 PROCEDURE — 250N000013 HC RX MED GY IP 250 OP 250 PS 637: Performed by: HOSPITALIST

## 2025-03-09 RX ADMIN — APIXABAN 2.5 MG: 2.5 TABLET, FILM COATED ORAL at 20:29

## 2025-03-09 RX ADMIN — Medication 3 MG: at 21:01

## 2025-03-09 RX ADMIN — PANTOPRAZOLE SODIUM 40 MG: 40 TABLET, DELAYED RELEASE ORAL at 11:07

## 2025-03-09 RX ADMIN — OLANZAPINE 2.5 MG: 10 INJECTION, POWDER, FOR SOLUTION INTRAMUSCULAR at 20:09

## 2025-03-09 RX ADMIN — QUETIAPINE FUMARATE 12.5 MG: 25 TABLET ORAL at 11:06

## 2025-03-09 RX ADMIN — TRAZODONE HYDROCHLORIDE 50 MG: 50 TABLET ORAL at 21:01

## 2025-03-09 RX ADMIN — TAMSULOSIN HYDROCHLORIDE 0.8 MG: 0.4 CAPSULE ORAL at 20:17

## 2025-03-09 RX ADMIN — ATENOLOL 50 MG: 50 TABLET ORAL at 11:07

## 2025-03-09 RX ADMIN — QUETIAPINE FUMARATE 25 MG: 25 TABLET ORAL at 20:17

## 2025-03-09 RX ADMIN — MEMANTINE 5 MG: 5 TABLET ORAL at 11:17

## 2025-03-09 RX ADMIN — APIXABAN 2.5 MG: 2.5 TABLET, FILM COATED ORAL at 11:08

## 2025-03-09 RX ADMIN — QUETIAPINE FUMARATE 12.5 MG: 25 TABLET ORAL at 16:13

## 2025-03-09 RX ADMIN — FINASTERIDE 5 MG: 5 TABLET, FILM COATED ORAL at 11:06

## 2025-03-09 ASSESSMENT — ACTIVITIES OF DAILY LIVING (ADL)
ADLS_ACUITY_SCORE: 73

## 2025-03-09 NOTE — PLAN OF CARE
"[id number]    Inpatient Progress Note:    BP (!) 150/87 (BP Location: Right arm)   Pulse 78   Temp 97.4  F (36.3  C) (Oral)   Resp 18   Ht 1.727 m (5' 7.99\")   Wt 59.5 kg (131 lb 1.6 oz)   SpO2 96%   BMI 19.94 kg/m       Outcome Evaluation: alert to self, calm, awake for toileting, ambulated to bathroom sba, void normally, safe D/C plannng /SW,PT .  Problem: Adult Inpatient Plan of Care  Goal: Plan of Care Review  Description:   Recent Flowsheet Documentation  Taken 3/9/2025 0035 by Nigel Schaefer RN  Outcome Evaluation: alert to self, calm, awake for toileting, ambulated to bathroom sba, void normally, safe D/C plannng /SW,PT .  Plan of Care Reviewed With: patient  Overall Patient Progress: improving  Taken 3/9/2025 0032 by Nigel Schaefer RN  Plan of Care Reviewed With: patient  Overall Patient Progress: improving  Goal: Absence of Hospital-Acquired Illness or Injury  Intervention: Identify and Manage Fall Risk  Recent Flowsheet Documentation  Taken 3/8/2025 2350 by Nigel Schaefer RN  Safety Promotion/Fall Prevention:   activity supervised   clutter free environment maintained   nonskid shoes/slippers when out of bed   safety round/check completed  Intervention: Prevent Infection  Recent Flowsheet Documentation  Taken 3/8/2025 2350 by Nigel Schaefer RN  Infection Prevention:   single patient room provided   rest/sleep promoted   Goal Outcome Evaluation:      Plan of Care Reviewed With: patient    Overall Patient Progress: improvingOverall Patient Progress: improving           "

## 2025-03-09 NOTE — PLAN OF CARE
"Goal Outcome Evaluation:     PRIMARY DIAGNOSIS: GENERALIZED WEAKNESS    OUTPATIENT/OBSERVATION GOALS TO BE MET BEFORE DISCHARGE  1. Orthostatic performed: No    2. Tolerating PO medications: No, Meds crushed in apple sauce.    3. Return to near baseline physical activity: No    4. Cleared for discharge by consultants (if involved): No    Discharge Planner Nurse   Safe discharge environment identified: No  Barriers to discharge: Yes       Entered by: Jenna Gunderson RN 03/08/2025 10:48 PM   Vitals are Temp: 97.7  F (36.5  C) Temp src: Oral BP: (!) 165/99 Pulse: 84   Resp: 16 SpO2: 98 %.  Patient is Disorientated to, Time, Place, and Situation. denying pain.  Patient is Saline locked.    Pt is a Regular diet. He is 2 assist with Gait Belt and Walker. Calm and cooperative this shift, ambulated in the hallways this afternoon,   Please review provider order for any additional goals.   Nurse to notify provider when observation goals have been met and patient is ready for discharge.    Problem: Adult Inpatient Plan of Care  Goal: Plan of Care Review  Description: The Plan of Care Review/Shift note should be completed every shift.  The Outcome Evaluation is a brief statement about your assessment that the patient is improving, declining, or no change.  This information will be displayed automatically on your shift  note.  Outcome: Progressing  Flowsheets (Taken 2/26/2025 1945)  Plan of Care Reviewed With:   patient   child  Overall Patient Progress: no change  Goal: Patient-Specific Goal (Individualized)  Description: You can add care plan individualizations to a care plan. Examples of Individualization might be:  \"Parent requests to be called daily at 9am for status\", \"I have a hard time hearing out of my right ear\", or \"Do not touch me to wake me up as it startles  me\".  Outcome: Progressing  Goal: Absence of Hospital-Acquired Illness or Injury  Outcome: Progressing  Intervention: Identify and Manage Fall Risk  Recent " Flowsheet Documentation  Taken 2/26/2025 1657 by Jenna Gunderson RN  Safety Promotion/Fall Prevention:   activity supervised   patient and family education   nonskid shoes/slippers when out of bed  Intervention: Prevent and Manage VTE (Venous Thromboembolism) Risk  Recent Flowsheet Documentation  Taken 2/26/2025 1657 by Jenna Gunderson RN  VTE Prevention/Management: SCDs off (sequential compression devices)  Intervention: Prevent Infection  Recent Flowsheet Documentation  Taken 2/26/2025 1657 by Jenna Gunderson RN  Infection Prevention: hand hygiene promoted  Goal: Optimal Comfort and Wellbeing  Outcome: Progressing  Goal: Readiness for Transition of Care  Outcome: Progressing     Problem: Fatigue  Goal: Improved Activity Tolerance  Outcome: Progressing     Problem: Fall Injury Risk  Goal: Absence of Fall and Fall-Related Injury  Outcome: Progressing  Intervention: Identify and Manage Contributors  Recent Flowsheet Documentation  Taken 2/26/2025 1657 by Jenna Gunderson RN  Medication Review/Management: medications reviewed  Intervention: Promote Injury-Free Environment  Recent Flowsheet Documentation  Taken 2/26/2025 1657 by Jenna Gunderson RN  Safety Promotion/Fall Prevention:   activity supervised   patient and family education   nonskid shoes/slippers when out of bed

## 2025-03-09 NOTE — PROGRESS NOTES
"[facility]    Medicine Progress Note - Hospitalist Service    Date of Admission:  2/23/2025    Assessment & Plan   Kraig Genao is a 89 Male with history of A-fib on Eliquis, CKD stage III, BPH, and suspected dementia presents with generalized weakness and confusion and found to have community-acquired pneumonia.    Having difficulty with TCU placement due to dementia/need for a sitter.  SW to speak with the family about memory care placement.   Pending placement in memory care. Continuing sitter at this time until plan clear.     Community-acquired pneumonia, resolved    - Completed course of rocephin 2 g + doxycycline to complete 7 days    - palliative consulted: life prolonging with limits. No CPR- Do NOT Intubate and NO FEEDING TUBE.     - full respiratory panel: negative     Acute Metabolic and infectious encephalopathy, resolved   Progressive dementia    - has had \"sundowning\" in recent months, worse in the hospital. Had dose of haldol resulting in acute change in respiratory status requiring 10L oxy mask. Had increased sedation with 25 mg Seroquel so adjusted to Seroquel to 12.5 mg TID.    - had a good response for a short time with TID seroquel. Then developed agitation overnight so increased bedtime dose to 25. Continues to be intermittently agitated, but suspect it is related to lack of sleep as patient had poor sleep for last 2 nights.     - Seroquel 12.5mg BID + 25mg at bedtime. Given frequent need for PRN medications in early evening, adjusted timing of scheduled Seroquel to 10 AM, 3 PM, and 8 PM. Will follow and may make further adjustments as needed.     - Trazodone 50mg at bedtime     - PRN IV/IM zyprexa and IM ziprasidone. Last PRN needed 3/7 around 7PM.     - avoid Haldol if possible (had change in respiratory status)    - Psych has also added Namenda (to be titrated)    - still needing a sitter due to getting out of bed    Acute urinary retention requiring Lieberman catheter " placement  Gross hematuria - resolved   BPH    - pérez catheter placed due to number of times required to straight cath, Developed gross hematuria after pérez placement and ambulation 2/25    - Urology consult due to above   - increased Flomax 0.8mg   - started finasteride   - needs outpatient follow up in 2-3 months with cystoscopy, UA, postvoid    - pérez removed 2/26 late morning due to patient trying to manipulate pérez and would cause more trauma being ripped out    - voiding since pérez removed without significant PVR, continue to monitor     - resumed Eliquis in AM 2/27     Generalized Weakness & Ambulatory Dysfunction    - Physical therapy consultation: recommending TCU    - needs to be sitter free, was sitter free for 2 days but required one on evening of 2/28    -  consult, currently in independent living, working on memory care. Will keep sitter for now until plan for discharge clear.      New LBBB & Chronically Elevated Troponin    - no chest pain and EKG     - Troponin chronically elevated to 50's     Chronic Afib, controlled     - continue home atenolol and Eliquis    - rate controlled on telemetry in the 80's    - removed tele on 2/26 due to agitation     CKD stage III    - creatinine at baseline    No labs needed in am.        Diet: Snacks/Supplements Adult: Magic Cup; With Meals  Regular Diet Adult    DVT Prophylaxis: DOAC  Pérez Catheter: Not present  Lines: None     Cardiac Monitoring: None  Code Status: No CPR- Do NOT Intubate      Clinically Significant Risk Factors                               # Moderate Malnutrition: based on nutrition assessment and treatment provided per dietitian's recommendations.    # COPD: noted on problem list        Social Drivers of Health            Disposition Plan     Medically Ready for Discharge: Anticipated in 2-4 Days   Needs to discharge to TCU vs memory care. Pending placement from . Continuing sitter at this time until better plan for discharge.  Continuing to adjust medications.     Madeline Burnette MD  Hospitalist Service  [facility]    ______________________________________________________________________    Interval History   Pleasant older gentleman seen sitting in room watching TV, drinking coffee.  Sitter at bedside.  Later seen ambulating in hallway with walker.  Patient did not require any as needed medications overnight.  Pleasant, conversant.    Physical Exam   Vital Signs: Temp: 97.6  F (36.4  C) Temp src: Oral BP: 115/85 Pulse: 86   Resp: 16 SpO2: 95 % O2 Device: None (Room air)    Weight: 131 lbs 1.6 oz    Constitutional: Pleasant older gentleman seen sitting in room watching TV, drinking coffee.  Sitter at bedside.  Later seen ambulating in hallway with walker.  Pleasant.  Answering questions appropriately.  No acute distress.    HEENT: NCAT. EOMI. Moist oral mucosa.  Hard of hearing at baseline, but wearing hearing aids.  Respiratory: Clear to auscultation bilaterally. No crackles or wheezes.  Cardiovascular: Regular rate and rhythm   Musculoskeletal: No gross deformities.   Neurologic: Dementia at baseline.  Normal gait with walker.      Medical Decision Making       30 MINUTES SPENT BY ME on the date of service doing chart review, history, exam, documentation & further activities per the note.

## 2025-03-10 PROCEDURE — 250N000013 HC RX MED GY IP 250 OP 250 PS 637

## 2025-03-10 PROCEDURE — 250N000013 HC RX MED GY IP 250 OP 250 PS 637: Performed by: HOSPITALIST

## 2025-03-10 PROCEDURE — 250N000013 HC RX MED GY IP 250 OP 250 PS 637: Performed by: STUDENT IN AN ORGANIZED HEALTH CARE EDUCATION/TRAINING PROGRAM

## 2025-03-10 PROCEDURE — 250N000013 HC RX MED GY IP 250 OP 250 PS 637: Performed by: INTERNAL MEDICINE

## 2025-03-10 PROCEDURE — 99232 SBSQ HOSP IP/OBS MODERATE 35: CPT | Performed by: INTERNAL MEDICINE

## 2025-03-10 PROCEDURE — 120N000001 HC R&B MED SURG/OB

## 2025-03-10 RX ORDER — MULTIPLE VITAMINS W/ MINERALS TAB 9MG-400MCG
1 TAB ORAL DAILY
Status: DISCONTINUED | OUTPATIENT
Start: 2025-03-10 | End: 2025-03-19 | Stop reason: HOSPADM

## 2025-03-10 RX ORDER — QUETIAPINE FUMARATE 25 MG/1
25 TABLET, FILM COATED ORAL AT BEDTIME
Status: DISCONTINUED | OUTPATIENT
Start: 2025-03-10 | End: 2025-03-19 | Stop reason: HOSPADM

## 2025-03-10 RX ADMIN — QUETIAPINE FUMARATE 25 MG: 25 TABLET ORAL at 18:17

## 2025-03-10 RX ADMIN — MEMANTINE 5 MG: 5 TABLET ORAL at 19:27

## 2025-03-10 RX ADMIN — QUETIAPINE FUMARATE 12.5 MG: 25 TABLET ORAL at 15:42

## 2025-03-10 RX ADMIN — MEMANTINE 5 MG: 5 TABLET ORAL at 11:02

## 2025-03-10 RX ADMIN — Medication 1 TABLET: at 10:59

## 2025-03-10 RX ADMIN — PANTOPRAZOLE SODIUM 40 MG: 40 TABLET, DELAYED RELEASE ORAL at 10:59

## 2025-03-10 RX ADMIN — ATENOLOL 50 MG: 50 TABLET ORAL at 10:59

## 2025-03-10 RX ADMIN — APIXABAN 2.5 MG: 2.5 TABLET, FILM COATED ORAL at 10:59

## 2025-03-10 RX ADMIN — APIXABAN 2.5 MG: 2.5 TABLET, FILM COATED ORAL at 19:27

## 2025-03-10 RX ADMIN — QUETIAPINE FUMARATE 12.5 MG: 25 TABLET ORAL at 10:59

## 2025-03-10 RX ADMIN — TAMSULOSIN HYDROCHLORIDE 0.8 MG: 0.4 CAPSULE ORAL at 19:27

## 2025-03-10 RX ADMIN — FINASTERIDE 5 MG: 5 TABLET, FILM COATED ORAL at 10:59

## 2025-03-10 ASSESSMENT — ACTIVITIES OF DAILY LIVING (ADL)
ADLS_ACUITY_SCORE: 73
ADLS_ACUITY_SCORE: 78
ADLS_ACUITY_SCORE: 73
ADLS_ACUITY_SCORE: 78
ADLS_ACUITY_SCORE: 73

## 2025-03-10 NOTE — PLAN OF CARE
"[id number]    Inpatient Progress Note:    /79 (BP Location: Right arm)   Pulse 87   Temp 97.8  F (36.6  C) (Oral)   Resp 16   Ht 1.727 m (5' 7.99\")   Wt 59.5 kg (131 lb 1.6 oz)   SpO2 98%   BMI 19.94 kg/m       Goal Outcome Evaluation:      Plan of Care Reviewed With: patient    Overall Patient Progress: no changeOverall Patient Progress: no change    Outcome Evaluation: A/OX1, Agitated and very agressive,  trying to hit the staff w/walker, staff helped to calm down the patient, but more agressive behavior, swearing to hit the staff, IM zyperxa is given, patient came down gradually. Sleep/resting. Patient was awake to bathroom multiple times Sitter at the bedside for safety.    Problem: Adult Inpatient Plan of Care  Goal: Plan of Care Review  Description:   Recent Flowsheet Documentation  Taken 3/9/2025 2046 by Nigel Schaefer RN  Outcome Evaluation: A/OX1, Agitated and very agressive,  trying to hit the staff w/walker, staff helped to calm down the patient, but more agressive behavior, swearing to hit the staff, IM zyperxa is given, patient came down gradually. Sitter at the bedside for safety.  Plan of Care Reviewed With: patient  Overall Patient Progress: no change  Goal: Absence of Hospital-Acquired Illness or Injury  Intervention: Identify and Manage Fall Risk  Recent Flowsheet Documentation  Taken 3/9/2025 2041 by Nigel Schaefer RN  Safety Promotion/Fall Prevention:   activity supervised   clutter free environment maintained   nonskid shoes/slippers when out of bed   safety round/check completed  Intervention: Prevent Skin Injury  Recent Flowsheet Documentation  Taken 3/9/2025 2041 by Nigel Schaefer, RN  Body Position: position changed independently  Intervention: Prevent Infection  Recent Flowsheet Documentation  Taken 3/9/2025 2041 by Nigel Schaefer RN  Infection Prevention:   single patient room provided   rest/sleep promoted     "

## 2025-03-10 NOTE — PROGRESS NOTES
CLINICAL NUTRITION SERVICES - REASSESSMENT NOTE     RECOMMENDATIONS FOR MDs/PROVIDERS TO ORDER:  None    Malnutrition Status:    evere malnutrition in the context of chronic illness    Registered Dietitian Interventions:  Added MVI/Min  Change supplements: remove Magic Cup, add Ensure Clear, Gelatein Plus  Continue with minced/ground meat modification to regular diet.     Future/Additional Recommendations:  Monitor oral intake, acceptance of supps, renal fxn.     SUBJECTIVE INFORMATION  Assessed patient in room.    CURRENT NUTRITION ORDERS  Diet: Orders Placed This Encounter      Regular Diet Adult  All meat minced or ground.  Nutrition Support: Palliative note indicating no tube feedings for this patient.    CURRENT INTAKE/TOLERANCE  Per RN, pt reports pt intake has been fair to good in the morning , other meals 25-50%.  Intake often worse with dinner meal as pt experiencing sundowning.   Pt refusing Magic Cup.  Does like juices.  Dentures giving him difficulty, RN reports that it seems that he does pretty well with most foods, except meats unless ground.  Pt reports the minced meat are OK.      Update:  checked back in with patient in afternoon.  Daughter had helped him with his dentures.  Pt does not want any change to the diet order for texture purposes.       NEW FINDINGS  Weight: no new weight.    Skin/wounds: no PI or skin concerns  GI symptoms: stooling appropriate.  Nutrition-relevant labs:  Creatinine 1.46 (increased), GFR 43 (decreased)  Nutrition-relevant medications: Reviewed    MALNUTRITION  % Intake: </=75% for >/= 1 month (severe)  % Weight Loss:  No new weight.  With weight from 2/27 - wt loss >10% in 6 mo  Subcutaneous Fat Loss: Orbital: Mild, Triceps: Moderate, and Fat overlying the ribs: Moderate  Muscle Loss: Clavicles (pectoralis and deltoids): Severe, Shoulders (deltoids): Severe, Interosseous muscles: Moderate, Scapula (latissimus dorsi, trapezious, deltoids): Moderate, Thigh (quadriceps):  Moderate, and Calf (gastrocnemius): Moderate  Fluid Accumulation/Edema: None noted  Malnutrition Diagnosis: Severe malnutrition in the context of chronic illness  Malnutrition Present on Admission: Yes    EVALUATION OF THE PROGRESS TOWARD GOALS   Previous Goals  PO intakes of at least 50-75% of meals or supplements TID while admitted.   Evaluation: Not progressing    Previous Nutrition Diagnosis  Inadequate oral intake related to suspect mentation as evidenced by meeting <75% of nutrition needs acutely.   Evaluation:  unable to compare previous intake to past week due to lack of data available.    NUTRITION DIAGNOSIS  Inadequate oral intake related to report of lack of appetite, altered mentation especially in evening as evidenced by verbal report of intake from nursing, pt report of dislikes, likes.     INTERVENTIONS  Manage composition of oral intake  Medical food supplement therapy  Vitamin and mineral supplement therapy    Goals  Patient to consume % of nutritionally adequate meal trays TID, or the equivalent with supplements/snacks.     Monitoring/Evaluation      Progress toward goals will be monitored and evaluated per policy.

## 2025-03-10 NOTE — PROGRESS NOTES
"[facility]    Medicine Progress Note - Hospitalist Service    Date of Admission:  2/23/2025    Assessment & Plan   Kraig Genao is a 89 Male with history of A-fib on Eliquis, CKD stage III, BPH, and suspected dementia presents with generalized weakness and confusion and found to have community-acquired pneumonia.    Having difficulty with TCU placement due to dementia/need for a sitter.  SW to speak with the family about memory care placement.   Pending placement in memory care. Continuing sitter at this time until plan clear.   Encouraging PO fluids and recheck BMP 3/11.    Community-acquired pneumonia, resolved    - Completed course of rocephin 2 g + doxycycline to complete 7 days    - palliative consulted: life prolonging with limits. No CPR- Do NOT Intubate and NO FEEDING TUBE.     - full respiratory panel: negative     Acute Metabolic and infectious encephalopathy, resolved   Progressive dementia    - has had \"sundowning\" in recent months, worse in the hospital. Had dose of haldol resulting in acute change in respiratory status requiring 10L oxy mask. Had increased sedation with 25 mg Seroquel so adjusted to Seroquel to 12.5 mg TID.    - had a good response for a short time with TID seroquel. Then developed agitation overnight so increased bedtime dose to 25. Continues to be intermittently agitated, but suspect it is related to lack of sleep as patient had poor sleep for last 2 nights.     - Trazodone 50mg at bedtime     - PRN IV/IM zyprexa and IM ziprasidone. Last PRN needed 3/9 around 8PM.     - Seroquel 12.5mg BID + 25mg at bedtime. Given frequent need for PRN medications in early evening, adjusted timing of scheduled Seroquel to 10 AM, 3 PM, and 6:30 PM. Will follow and may make further adjustments as needed.     - avoid Haldol if possible (had change in respiratory status)    - Psych has also added Namenda (to be titrated)    - still needing a sitter due to getting out of " bed    Increase in creatinine superimposed on CKD stage III    - creatinine at baseline during stay. Cr was 1.17 on 3/1. Cr 1.46 on 3/7 (ordered per pharmacy). Encouraging PO fluids and have discussed with nursing. Will repeat BMP 3/11 and can consider IV fluids / further evaluation as needed.     Acute urinary retention requiring Pérez catheter placement  Gross hematuria - resolved   BPH    - pérez catheter placed due to number of times required to straight cath, Developed gross hematuria after pérez placement and ambulation 2/25    - Urology consult due to above   - increased Flomax 0.8mg   - started finasteride   - needs outpatient follow up in 2-3 months with cystoscopy, UA, postvoid    - pérez removed 2/26 late morning due to patient trying to manipulate pérez and would cause more trauma being ripped out    - voiding since pérez removed without significant PVR, continue to monitor     - resumed Eliquis in AM 2/27     Generalized Weakness & Ambulatory Dysfunction    - Physical therapy consultation: recommending TCU    - needs to be sitter free, was sitter free for 2 days but required one on evening of 2/28    - SW consult, currently in independent living, working on memory care. Will keep sitter for now until plan for discharge clear.      New LBBB & Chronically Elevated Troponin    - no chest pain and EKG     - Troponin chronically elevated to 50's     Chronic Afib, controlled     - continue home atenolol and Eliquis    - rate controlled on telemetry in the 80's    - removed tele on 2/26 due to agitation         Diet: Snacks/Supplements Adult: Magic Cup; With Meals  Regular Diet Adult    DVT Prophylaxis: DOAC  Pérez Catheter: Not present  Lines: None     Cardiac Monitoring: None  Code Status: No CPR- Do NOT Intubate      Clinically Significant Risk Factors                               # Moderate Malnutrition: based on nutrition assessment and treatment provided per dietitian's recommendations.    # COPD: noted  on problem list        Social Drivers of Health            Disposition Plan     Medically Ready for Discharge: Anticipated in 2-4 Days   Needs to discharge to TCU vs memory care. Pending placement from . Continuing sitter at this time until better plan for discharge. Continuing to adjust medications.     Madeline Burnette MD  Hospitalist Service  [facility]    ______________________________________________________________________    Interval History   Pleasant older gentleman seen waking up from bed. No new complaints from patient.  He did require PRN IM Zyprexa last night around 8 PM for agitation. Adjusting timing of evening scheduled Seroquel.       Physical Exam   Vital Signs: Temp: 97.8  F (36.6  C) Temp src: Oral BP: 125/79 Pulse: 87   Resp: 16 SpO2: 98 % O2 Device: None (Room air)    Weight: 131 lbs 1.6 oz    Constitutional: Pleasant older gentleman seen sitting up at side of bed. States that he is just waking up.  Sitter at bedside.  Pleasant.  Answering questions appropriately.  No acute distress.    HEENT: NCAT. EOMI. Dry oral mucosa.  Hard of hearing at baseline. As he is just waking up, hearing aids not yet in place.   Respiratory: Clear to auscultation bilaterally. No crackles or wheezes.  Cardiovascular: Regular rate and rhythm   Musculoskeletal: No gross deformities.   Neurologic: Dementia at baseline. Did not see ambulating today.       Medical Decision Making       30 MINUTES SPENT BY ME on the date of service doing chart review, history, exam, documentation & further activities per the note.

## 2025-03-10 NOTE — PROGRESS NOTES
Care Management Follow Up    Length of Stay (days): 14    Expected Discharge Date: 03/14/2025     Concerns to be Addressed: discharge planning     Patient plan of care discussed at interdisciplinary rounds: Yes    Anticipated Discharge Disposition:  Memory Care     Anticipated Discharge Services:  home care   Anticipated Discharge DME:  none    Patient/family educated on Medicare website which has current facility and service quality ratings:  yes  Education Provided on the Discharge Plan:  yes  Patient/Family in Agreement with the Plan:  yes    Referrals Placed by CM/SW:  [facility]  Private pay costs discussed: Not applicable    Discussed  Partnership in Safe Discharge Planning  document with patient/family: No     Handoff Completed: No, handoff not indicated or clinically appropriate    Additional Information:  3:48 PM   SW spoke with patient's daughter, Valerio, to follow up on pending referrals. Valerio states that someone is coming from [facility] tomorrow at 11am to assess patient. She states the room is not available yet, if they are able to accept him. SW confirmed time of assessment with Karlee at [facility] and that room is not ready. They are waiting for another family to remove belongings before they can turn it and have patient move in. Hoping this is completed by the end of the week.     Next Steps: SW to follow up with facility/family.     PRISCILA Quintero   Social Work Care Manager   [facility]   [phone]

## 2025-03-10 NOTE — PLAN OF CARE
Goal Outcome Evaluation:      Plan of Care Reviewed With: patient, child    Overall Patient Progress: improvingOverall Patient Progress: improving    Outcome Evaluation: Alert to self- calm most of day and pleasant- became aggitated and slightly more aggressive around 1700 tonight- accusing staff of lying and swatting when staff trying to help steady pt. Calmed down after staff left pt on his chair in the room and sitter stayed in hallway- also had security stop up and chat with patient which didnt hurt or help. SW following for safe dispo planning. Continue current POC.      Problem: Adult Inpatient Plan of Care  Goal: Plan of Care Review  Description: The Plan of Care Review/Shift note should be completed every shift.  The Outcome Evaluation is a brief statement about your assessment that the patient is improving, declining, or no change.  This information will be displayed automatically on your shift  note.  Recent Flowsheet Documentation  Taken 3/9/2025 1841 by Elayne Vo RN  Outcome Evaluation: Alert to self- calm most of day and pleasant- became aggitated and slightly more aggressive around 1700 tonight- accusing staff of lying and swatting when staff trying to help steady pt. Calmed down after staff left pt on his chair in the room and sitter stayed in hallway- also had security stop up and chat with patient which didnt hurt or help. SW following for safe dispo planning. Continue current POC.  Plan of Care Reviewed With:   patient   child  Overall Patient Progress: improving  Goal: Absence of Hospital-Acquired Illness or Injury  Intervention: Identify and Manage Fall Risk  Recent Flowsheet Documentation  Taken 3/9/2025 1006 by Elayne Vo, RN  Safety Promotion/Fall Prevention:   activity supervised   clutter free environment maintained   nonskid shoes/slippers when out of bed   safety round/check completed  Intervention: Prevent Skin Injury  Recent Flowsheet Documentation  Taken 3/9/2025 1129 by  Elayne Vo, RN  Body Position: position changed independently  Taken 3/9/2025 1006 by Elayne Vo RN  Body Position: position changed independently  Skin Protection: adhesive use limited  Intervention: Prevent Infection  Recent Flowsheet Documentation  Taken 3/9/2025 1006 by Elayne Vo RN  Infection Prevention:   single patient room provided   rest/sleep promoted

## 2025-03-10 NOTE — PLAN OF CARE
Goal Outcome Evaluation:      Plan of Care Reviewed With: patient, child    Overall Patient Progress: improvingOverall Patient Progress: improving    Outcome Evaluation: Alert throughout shift. Oriented to self only. Sitter at bedside. tolerating PO- ate 100% of breakfast tray. Pleasant and cooperative. Ambulating in room and occassionaly in halls. Daughter visiting during shift. IV SL. Encouraging fluids d/t rising Cr. Pt likes chocolate and root beer- chocolates and root beer in room for evenings when pt becomes more aggitated.  SW following for safe dispo planning.      Problem: Adult Inpatient Plan of Care  Goal: Plan of Care Review  Description: The Plan of Care Review/Shift note should be completed every shift.  The Outcome Evaluation is a brief statement about your assessment that the patient is improving, declining, or no change.  This information will be displayed automatically on your shift  note.  Recent Flowsheet Documentation  Taken 3/10/2025 1433 by Elayne Vo RN  Outcome Evaluation: Alert throughout shift. Oriented to self only. Sitter at bedside. tolerating PO- ate 100% of breakfast tray. Pleasant and cooperative. Ambulating in room and occassionaly in halls. Daughter visiting during shift. IV SL. Encouraging fluids d/t rising Cr. Pt likes chocolate and root beer- chocolates and root beer in room for evenings when pt becomes more aggitated.  SW following for safe dispo planning.  Plan of Care Reviewed With:   patient   child  Overall Patient Progress: improving

## 2025-03-11 LAB
ANION GAP SERPL CALCULATED.3IONS-SCNC: 12 MMOL/L (ref 7–15)
BUN SERPL-MCNC: 37.7 MG/DL (ref 8–23)
CALCIUM SERPL-MCNC: 9 MG/DL (ref 8.8–10.4)
CHLORIDE SERPL-SCNC: 101 MMOL/L (ref 98–107)
CREAT SERPL-MCNC: 1.34 MG/DL (ref 0.67–1.17)
EGFRCR SERPLBLD CKD-EPI 2021: 48 ML/MIN/1.73M2
GLUCOSE SERPL-MCNC: 99 MG/DL (ref 70–99)
HCO3 SERPL-SCNC: 25 MMOL/L (ref 22–29)
POTASSIUM SERPL-SCNC: 4.3 MMOL/L (ref 3.4–5.3)
SODIUM SERPL-SCNC: 138 MMOL/L (ref 135–145)

## 2025-03-11 PROCEDURE — 120N000001 HC R&B MED SURG/OB

## 2025-03-11 PROCEDURE — 250N000013 HC RX MED GY IP 250 OP 250 PS 637: Performed by: INTERNAL MEDICINE

## 2025-03-11 PROCEDURE — 250N000013 HC RX MED GY IP 250 OP 250 PS 637: Performed by: STUDENT IN AN ORGANIZED HEALTH CARE EDUCATION/TRAINING PROGRAM

## 2025-03-11 PROCEDURE — 250N000013 HC RX MED GY IP 250 OP 250 PS 637

## 2025-03-11 PROCEDURE — 80048 BASIC METABOLIC PNL TOTAL CA: CPT | Performed by: INTERNAL MEDICINE

## 2025-03-11 PROCEDURE — 99232 SBSQ HOSP IP/OBS MODERATE 35: CPT | Performed by: HOSPITALIST

## 2025-03-11 PROCEDURE — 36415 COLL VENOUS BLD VENIPUNCTURE: CPT | Performed by: INTERNAL MEDICINE

## 2025-03-11 PROCEDURE — 82310 ASSAY OF CALCIUM: CPT | Performed by: INTERNAL MEDICINE

## 2025-03-11 PROCEDURE — 250N000013 HC RX MED GY IP 250 OP 250 PS 637: Performed by: HOSPITALIST

## 2025-03-11 PROCEDURE — 82565 ASSAY OF CREATININE: CPT | Performed by: INTERNAL MEDICINE

## 2025-03-11 RX ADMIN — QUETIAPINE FUMARATE 12.5 MG: 25 TABLET ORAL at 16:12

## 2025-03-11 RX ADMIN — TRAZODONE HYDROCHLORIDE 50 MG: 50 TABLET ORAL at 21:13

## 2025-03-11 RX ADMIN — APIXABAN 2.5 MG: 2.5 TABLET, FILM COATED ORAL at 21:13

## 2025-03-11 RX ADMIN — QUETIAPINE FUMARATE 25 MG: 25 TABLET ORAL at 18:01

## 2025-03-11 RX ADMIN — MEMANTINE 5 MG: 5 TABLET ORAL at 08:12

## 2025-03-11 RX ADMIN — TAMSULOSIN HYDROCHLORIDE 0.8 MG: 0.4 CAPSULE ORAL at 21:13

## 2025-03-11 RX ADMIN — ATENOLOL 50 MG: 50 TABLET ORAL at 08:12

## 2025-03-11 RX ADMIN — Medication 1 TABLET: at 08:12

## 2025-03-11 RX ADMIN — APIXABAN 2.5 MG: 2.5 TABLET, FILM COATED ORAL at 08:12

## 2025-03-11 RX ADMIN — FINASTERIDE 5 MG: 5 TABLET, FILM COATED ORAL at 08:12

## 2025-03-11 RX ADMIN — Medication 3 MG: at 21:13

## 2025-03-11 RX ADMIN — MEMANTINE 5 MG: 5 TABLET ORAL at 21:13

## 2025-03-11 RX ADMIN — PANTOPRAZOLE SODIUM 40 MG: 40 TABLET, DELAYED RELEASE ORAL at 08:12

## 2025-03-11 RX ADMIN — QUETIAPINE FUMARATE 12.5 MG: 25 TABLET ORAL at 10:48

## 2025-03-11 ASSESSMENT — ACTIVITIES OF DAILY LIVING (ADL)
ADLS_ACUITY_SCORE: 78
ADLS_ACUITY_SCORE: 76
ADLS_ACUITY_SCORE: 78
ADLS_ACUITY_SCORE: 76
ADLS_ACUITY_SCORE: 78

## 2025-03-11 NOTE — PLAN OF CARE
Goal Outcome Evaluation:      Plan of Care Reviewed With: patient    Overall Patient Progress: improvingOverall Patient Progress: improving    Outcome Evaluation: Alert more calm, unpredictable behavior. thus far does not required any prn this shift. VSS, RA , up in chair watching tv, Patient agreed to go to bed.Slept /rest well. got up for bathroom and back to sleep sitter at bedside for safety. SW following.    Problem: Adult Inpatient Plan of Care  Goal: Plan of Care Review  Description  Recent Flowsheet Documentation  Taken 3/10/2025 1929 by Nigel Schaefer RN  Outcome Evaluation: Alert more calm, VSS, RA , up in chair watching tv, will aproach in clam manner.  Plan of Care Reviewed With: patient  Overall Patient Progress: improving     activity supervised   clutter free environment maintained   nonskid shoes/slippers when out of bed   safety round/check completed  Intervention: Prevent Skin Injury  Recent Flowsheet Documentation  Taken 3/9/2025 2041 by Nigel Schaefer RN  Body Position: position changed independently  Intervention: Prevent Infection  Recent Flowsheet Documentation  Taken 3/9/2025 2041 by Nigel Schaefer, SASHA  Infection Prevention:   single patient room provided   rest/sleep promoted

## 2025-03-11 NOTE — CONSULTS
SPIRITUAL HEALTH SERVICES - Consult Note   RH OBS 2     Referral Source: [facility] consult to assess pt's emotional/spiritual resources and needs per his length of stay.     Pt. Al visited for a follow-up after previous visit on 2/25/25.  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  Al named his brothers and sisters, and his children during our conversation.  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  Al gave voice to having trouble with his memory.  Al desired to pray for world peace, which was offered.  I offered to contact Al's Yarsani, [facility] in [place], but he declined.  During the previous visit he was placed on their prayer list.     Plan: Informed pt how he can request further  support.  This author and other chaplains remain available per pt/family request.     SHAUNA Vann   Intern    [facility]S available 24/7 for emergent requests/referrals, either by paging the on-call  or by entering an ASAP/STAT consult in [facility], which will also page the on-call .

## 2025-03-11 NOTE — PLAN OF CARE
Patient is confused with a sitter in the room. Was evaluated today for memory care placement and was accepted, planned discharge on Monday. More tearful today, but no behaviors.

## 2025-03-11 NOTE — PLAN OF CARE
Goal Outcome Evaluation:      Plan of Care Reviewed With: patient    Overall Patient Progress: improvingOverall Patient Progress: improving    Outcome Evaluation: patient has been calm and cooperative with staff. Tolerating meals. Family in the room, had meeting with a memory care facility.    Ambulating in the room w/walker and sitter. Resting w/o any complains.

## 2025-03-11 NOTE — PROGRESS NOTES
"[facility]    Medicine Progress Note - Hospitalist Service    Date of Admission:  2/23/2025    Assessment & Plan   Kraig Genao is a 89 Male with history of A-fib on Eliquis, CKD stage III, BPH, and suspected dementia presents with generalized weakness and confusion and found to have community-acquired pneumonia.    Having difficulty with TCU placement due to dementia/need for a sitter.  SW to speak with the family about memory care placement.   Pending placement in memory care. Continuing sitter at this time until plan clear.   Accepted at [facility] for Monday    Acute renal failure    - encouraged PO    - re-check in am    Community-acquired pneumonia, resolved    - Completed course of rocephin 2 g + doxycycline to complete 7 days    - palliative consulted: life prolonging with limits. No CPR- Do NOT Intubate and NO FEEDING TUBE.     - full respiratory panel: negative     Acute Metabolic and infectious encephalopathy, resolved   Progressive dementia    - has had \"sundowning\" in recent months, worse in the hospital. Had dose of haldol resulting in acute change in respiratory status requiring 10L oxy mask. Had increased sedation with 25 mg Seroquel so adjusted to Seroquel to 12.5 mg TID.    - had a good response for a short time with TID seroquel. Then developed agitation overnight so increased bedtime dose to 25. Continues to be intermittently agitated, but suspect it is related to lack of sleep as patient had poor sleep for last 2 nights.     - Trazodone 50mg at bedtime     - PRN IV/IM zyprexa and IM ziprasidone. Last PRN needed 3/9 around 8PM.     - Seroquel 12.5mg BID + 25mg at bedtime. Given frequent need for PRN medications in early evening, adjusted timing of scheduled Seroquel to 10 AM, 3 PM, and 6:30 PM. Will follow and may make further adjustments as needed.     - avoid Haldol if possible (had change in respiratory status)    - Psych has also added Namenda (to be titrated)    - still " needing a sitter due to getting out of bed    Increase in creatinine superimposed on CKD stage III    - creatinine at baseline during stay. Cr was 1.17 on 3/1. Cr 1.46 on 3/7 (ordered per pharmacy). Encouraging PO fluids and have discussed with nursing. Will repeat BMP 3/11 and can consider IV fluids / further evaluation as needed.     Acute urinary retention requiring Pérez catheter placement  Gross hematuria - resolved   BPH    - pérez catheter placed due to number of times required to straight cath, Developed gross hematuria after pérez placement and ambulation 2/25    - Urology consult due to above   - increased Flomax 0.8mg   - started finasteride   - needs outpatient follow up in 2-3 months with cystoscopy, UA, postvoid    - pérez removed 2/26 late morning due to patient trying to manipulate pérez and would cause more trauma being ripped out    - voiding since pérez removed without significant PVR, continue to monitor     - resumed Eliquis in AM 2/27     Generalized Weakness & Ambulatory Dysfunction    - Physical therapy consultation: recommending TCU    - needs to be sitter free, was sitter free for 2 days but required one on evening of 2/28    - SW consult, currently in independent living, working on memory care. Will keep sitter for now until plan for discharge clear.      New LBBB & Chronically Elevated Troponin    - no chest pain and EKG     - Troponin chronically elevated to 50's     Chronic Afib, controlled     - continue home atenolol and Eliquis    - rate controlled on telemetry in the 80's    - removed tele on 2/26 due to agitation     Son in room and updated        Diet: Snacks/Supplements Adult: Magic Cup; With Meals  Regular Diet Adult  Snacks/Supplements Adult: Ensure Clear; Between Meals    DVT Prophylaxis: DOAC  Pérez Catheter: Not present  Lines: None     Cardiac Monitoring: None  Code Status: No CPR- Do NOT Intubate      Clinically Significant Risk Factors                               #  Moderate Malnutrition: based on nutrition assessment and treatment provided per dietitian's recommendations.    # COPD: noted on problem list        Social Drivers of Health            Disposition Plan     Medically Ready for Discharge: Anticipated in 2-4 Days   Valerio Monet MD  Hospitalist Service  [facility]    ______________________________________________________________________    Interval History   Re-assumed care. Patient in chair. Son in room. No new complaints.     Physical Exam   Vital Signs: Temp: 97.7  F (36.5  C) Temp src: Oral BP: 133/84 Pulse: 66   Resp: 16 SpO2: 100 % O2 Device: None (Room air)    Weight: 131 lbs 1.6 oz    Constitutional: in chair. comfortable  HEENT: NCAT. EOMI. Dry oral mucosa.  Hard of hearing at baseline. As he is just waking up, hearing aids not yet in place.   Medical Decision Making       30 MINUTES SPENT BY ME on the date of service doing chart review, history, exam, documentation & further activities per the note.

## 2025-03-11 NOTE — PROGRESS NOTES
Care Management Follow Up    Length of Stay (days): 15    Expected Discharge Date: 03/17/2025     Concerns to be Addressed: discharge planning     Patient plan of care discussed at interdisciplinary rounds: Yes    Anticipated Discharge Disposition:  memory care      Additional Information:  Pt assessed by Yunier.   SW left  for admissions.   Per provider, patient will be able to admit to  on 3/17.     Next Steps: [facility] following for discharge coordination.     PRISCILA Quintero   Social Work Care Manager   [facility]   [phone]

## 2025-03-12 LAB
ANION GAP SERPL CALCULATED.3IONS-SCNC: 10 MMOL/L (ref 7–15)
BUN SERPL-MCNC: 40.2 MG/DL (ref 8–23)
CALCIUM SERPL-MCNC: 8.8 MG/DL (ref 8.8–10.4)
CHLORIDE SERPL-SCNC: 104 MMOL/L (ref 98–107)
CREAT SERPL-MCNC: 1.19 MG/DL (ref 0.67–1.17)
EGFRCR SERPLBLD CKD-EPI 2021: 55 ML/MIN/1.73M2
GLUCOSE SERPL-MCNC: 105 MG/DL (ref 70–99)
HCO3 SERPL-SCNC: 24 MMOL/L (ref 22–29)
POTASSIUM SERPL-SCNC: 3.6 MMOL/L (ref 3.4–5.3)
SODIUM SERPL-SCNC: 138 MMOL/L (ref 135–145)

## 2025-03-12 PROCEDURE — 80048 BASIC METABOLIC PNL TOTAL CA: CPT | Performed by: HOSPITALIST

## 2025-03-12 PROCEDURE — 250N000013 HC RX MED GY IP 250 OP 250 PS 637: Performed by: INTERNAL MEDICINE

## 2025-03-12 PROCEDURE — 250N000013 HC RX MED GY IP 250 OP 250 PS 637: Performed by: STUDENT IN AN ORGANIZED HEALTH CARE EDUCATION/TRAINING PROGRAM

## 2025-03-12 PROCEDURE — 36415 COLL VENOUS BLD VENIPUNCTURE: CPT | Performed by: HOSPITALIST

## 2025-03-12 PROCEDURE — 250N000013 HC RX MED GY IP 250 OP 250 PS 637: Performed by: HOSPITALIST

## 2025-03-12 PROCEDURE — 120N000001 HC R&B MED SURG/OB

## 2025-03-12 PROCEDURE — 250N000013 HC RX MED GY IP 250 OP 250 PS 637

## 2025-03-12 PROCEDURE — 99232 SBSQ HOSP IP/OBS MODERATE 35: CPT | Performed by: HOSPITALIST

## 2025-03-12 PROCEDURE — 82310 ASSAY OF CALCIUM: CPT | Performed by: HOSPITALIST

## 2025-03-12 RX ADMIN — TRAZODONE HYDROCHLORIDE 50 MG: 50 TABLET ORAL at 21:16

## 2025-03-12 RX ADMIN — MEMANTINE 5 MG: 5 TABLET ORAL at 20:23

## 2025-03-12 RX ADMIN — APIXABAN 2.5 MG: 2.5 TABLET, FILM COATED ORAL at 09:50

## 2025-03-12 RX ADMIN — Medication 1 TABLET: at 09:50

## 2025-03-12 RX ADMIN — MEMANTINE 5 MG: 5 TABLET ORAL at 09:50

## 2025-03-12 RX ADMIN — PANTOPRAZOLE SODIUM 40 MG: 40 TABLET, DELAYED RELEASE ORAL at 09:50

## 2025-03-12 RX ADMIN — QUETIAPINE FUMARATE 12.5 MG: 25 TABLET ORAL at 15:21

## 2025-03-12 RX ADMIN — QUETIAPINE FUMARATE 25 MG: 25 TABLET ORAL at 18:08

## 2025-03-12 RX ADMIN — QUETIAPINE FUMARATE 12.5 MG: 25 TABLET ORAL at 09:50

## 2025-03-12 RX ADMIN — TAMSULOSIN HYDROCHLORIDE 0.8 MG: 0.4 CAPSULE ORAL at 20:23

## 2025-03-12 RX ADMIN — ATENOLOL 50 MG: 50 TABLET ORAL at 10:20

## 2025-03-12 RX ADMIN — Medication 3 MG: at 21:14

## 2025-03-12 RX ADMIN — FINASTERIDE 5 MG: 5 TABLET, FILM COATED ORAL at 09:50

## 2025-03-12 RX ADMIN — APIXABAN 2.5 MG: 2.5 TABLET, FILM COATED ORAL at 20:22

## 2025-03-12 ASSESSMENT — ACTIVITIES OF DAILY LIVING (ADL)
ADLS_ACUITY_SCORE: 75
ADLS_ACUITY_SCORE: 73
ADLS_ACUITY_SCORE: 75
ADLS_ACUITY_SCORE: 73
ADLS_ACUITY_SCORE: 75
ADLS_ACUITY_SCORE: 73
ADLS_ACUITY_SCORE: 75
ADLS_ACUITY_SCORE: 75
ADLS_ACUITY_SCORE: 73
ADLS_ACUITY_SCORE: 73
ADLS_ACUITY_SCORE: 75
ADLS_ACUITY_SCORE: 73
ADLS_ACUITY_SCORE: 75
ADLS_ACUITY_SCORE: 75

## 2025-03-12 NOTE — PLAN OF CARE
Goal Outcome Evaluation:      Plan of Care Reviewed With: patient    Overall Patient Progress: improvingOverall Patient Progress: improving    Outcome Evaluation: A & O all day. Calm and cooperative. Family came and walked him this morning for about an hour

## 2025-03-12 NOTE — PLAN OF CARE
A&O to self. VSS. Sitter at bedside. Ax1 w/ walker. Regular diet. SW following. Pt to discharge to [facility] on 3/17.     Goal Outcome Evaluation:      Plan of Care Reviewed With: patient    Overall Patient Progress: improvingOverall Patient Progress: improving    Outcome Evaluation: A&O to self. Calm and cooperative

## 2025-03-12 NOTE — PROGRESS NOTES
Care Management Follow Up    Length of Stay (days): 16    Expected Discharge Date: 03/17/2025     Concerns to be Addressed: discharge planning     Patient plan of care discussed at interdisciplinary rounds: Yes    Anticipated Discharge Disposition:  [facility]      Anticipated Discharge Services:  home care   Anticipated Discharge DME:  none    Patient/family educated on Medicare website which has current facility and service quality ratings:  yes  Education Provided on the Discharge Plan:  yes  Patient/Family in Agreement with the Plan:  yes    Referrals Placed by CM/SW:  TCU/Home care/Memory care  Private pay costs discussed: Not applicable    Discussed  Partnership in Safe Discharge Planning  document with patient/family: No     Handoff Completed: No, handoff not indicated or clinically appropriate    Additional Information:  11:22 AM   SW spoke with ana lilia Toussaint at [facility]. Norbert states that the room they are putting patient in likely will not be ready until Tuesday, maybe Wednesday, based on when the room will be available/pt's family to move belongings in. SW to touch base with Norbert on Monday 3/17 to discuss.     Next Steps: Patient to discharge 3/18 or 3/19, not 3/17     PRISCILA Quintero   Social Work Care Manager   [facility]   [phone]

## 2025-03-12 NOTE — PROGRESS NOTES
"[facility]    Medicine Progress Note - Hospitalist Service    Date of Admission:  2/23/2025    Assessment & Plan   Kraig Genao is a 89 Male with history of A-fib on Eliquis, CKD stage III, BPH, and suspected dementia presents with generalized weakness and confusion and found to have community-acquired pneumonia.    Having difficulty with TCU placement due to dementia/need for a sitter.  SW to speak with the family about memory care placement.   Pending placement in memory care. Continuing sitter at this time until plan clear.   Accepted at [facility] for Monday    Acute renal failure    - encouraged PO    - re-check in am    - doing well    Community-acquired pneumonia, resolved    - Completed course of rocephin 2 g + doxycycline to complete 7 days    - palliative consulted: life prolonging with limits. No CPR- Do NOT Intubate and NO FEEDING TUBE.     - full respiratory panel: negative     Acute Metabolic and infectious encephalopathy, resolved   Progressive dementia    - has had \"sundowning\" in recent months, worse in the hospital. Had dose of haldol resulting in acute change in respiratory status requiring 10L oxy mask. Had increased sedation with 25 mg Seroquel so adjusted to Seroquel to 12.5 mg TID.    - had a good response for a short time with TID seroquel. Then developed agitation overnight so increased bedtime dose to 25. Continues to be intermittently agitated, but suspect it is related to lack of sleep as patient had poor sleep for last 2 nights.     - Trazodone 50mg at bedtime     - PRN IV/IM zyprexa and IM ziprasidone. Last PRN needed 3/9 around 8PM.     - Seroquel 12.5mg BID + 25mg at bedtime. Given frequent need for PRN medications in early evening, adjusted timing of scheduled Seroquel to 10 AM, 3 PM, and 6:30 PM. Will follow and may make further adjustments as needed.     - avoid Haldol if possible (had change in respiratory status)    - Psych has also added Namenda (to be " titrated)    - still needing a sitter due to getting out of bed    Increase in creatinine superimposed on CKD stage III    - creatinine at baseline during stay. Cr was 1.17 on 3/1. Cr 1.46 on 3/7 (ordered per pharmacy). Encouraging PO fluids and have discussed with nursing. Will repeat BMP 3/11 and can consider IV fluids / further evaluation as needed.     Acute urinary retention requiring Pérez catheter placement  Gross hematuria - resolved   BPH    - pérez catheter placed due to number of times required to straight cath, Developed gross hematuria after éprez placement and ambulation 2/25    - Urology consult due to above   - increased Flomax 0.8mg   - started finasteride   - needs outpatient follow up in 2-3 months with cystoscopy, UA, postvoid    - pérez removed 2/26 late morning due to patient trying to manipulate pérez and would cause more trauma being ripped out    - voiding since pérez removed without significant PVR, continue to monitor     - resumed Eliquis in AM 2/27     Generalized Weakness & Ambulatory Dysfunction    - Physical therapy consultation: recommending TCU    - needs to be sitter free, was sitter free for 2 days but required one on evening of 2/28    - SW consult, currently in independent living, working on memory care. Will keep sitter for now until plan for discharge clear.      New LBBB & Chronically Elevated Troponin    - no chest pain and EKG     - Troponin chronically elevated to 50's     Chronic Afib, controlled     - continue home atenolol and Eliquis    - rate controlled on telemetry in the 80's    - removed tele on 2/26 due to agitation     Family has been kept updated  No new issues    No labs needed in am      Diet: Snacks/Supplements Adult: Magic Cup; With Meals  Regular Diet Adult  Snacks/Supplements Adult: Ensure Clear; Between Meals    DVT Prophylaxis: DOAC  Pérez Catheter: Not present  Lines: None     Cardiac Monitoring: None  Code Status: No CPR- Do NOT Intubate       Clinically Significant Risk Factors                               # Moderate Malnutrition: based on nutrition assessment and treatment provided per dietitian's recommendations.    # COPD: noted on problem list        Social Drivers of Health            Disposition Plan     Medically Ready for Discharge: Anticipated in 2-4 Days   Valerio Monet MD  Hospitalist Service  [facility]    ______________________________________________________________________    Interval History   Patient seen multiple times today. Walking in the halls with his son and daughter.  Now asleep in room. No new issues    Physical Exam   Vital Signs: Temp: 98.1  F (36.7  C) Temp src: Oral BP: 118/82 Pulse: 75   Resp: 16 SpO2: 100 % O2 Device: None (Room air)    Weight: 131 lbs 1.6 oz    Constitutional: walking in halls  HEENT: NCAT. EOMI. Dry oral mucosa.  Hard of hearing at baseline. As he is just waking up, hearing aids not yet in place.   Medical Decision Making       30 MINUTES SPENT BY ME on the date of service doing chart review, history, exam, documentation & further activities per the note.

## 2025-03-12 NOTE — PLAN OF CARE
"Temp: 97.7  F (36.5  C) Temp src: Oral BP: 133/82 Pulse: 66   Resp: 16 SpO2: 98 % O2 Device: None (Room air)       Oriented to self. Up Ax1 with walker. Mildly anxious this evening and wanting to go home, but redirectable. Denies pain. Up in chair for dinner, offered to ambulate in hallway, pt declined. Plan- [facility]  accepted 3/17- SW following for discharge planning, encourage po intake.     Problem: Adult Inpatient Plan of Care  Goal: Absence of Hospital-Acquired Illness or Injury  Intervention: Identify and Manage Fall Risk  Recent Flowsheet Documentation  Taken 3/11/2025 1809 by Chrystal Aparicio, RN  Safety Promotion/Fall Prevention:   safety round/check completed   nonskid shoes/slippers when out of bed  Intervention: Prevent Skin Injury  Recent Flowsheet Documentation  Taken 3/11/2025 1809 by Chrystal Aparicio RN  Body Position: weight shifting     Problem: Adult Inpatient Plan of Care  Goal: Plan of Care Review  Description: The Plan of Care Review/Shift note should be completed every shift.  The Outcome Evaluation is a brief statement about your assessment that the patient is improving, declining, or no change.  This information will be displayed automatically on your shift  note.  Outcome: Progressing  Goal: Patient-Specific Goal (Individualized)  Description: You can add care plan individualizations to a care plan. Examples of Individualization might be:  \"Parent requests to be called daily at 9am for status\", \"I have a hard time hearing out of my right ear\", or \"Do not touch me to wake me up as it startles  me\".  Outcome: Progressing  Goal: Absence of Hospital-Acquired Illness or Injury  Outcome: Progressing  Intervention: Identify and Manage Fall Risk  Recent Flowsheet Documentation  Taken 3/11/2025 1809 by Chrystal Aparicio RN  Safety Promotion/Fall Prevention:   safety round/check completed   nonskid shoes/slippers when out of bed  Intervention: Prevent Skin Injury  Recent Flowsheet Documentation  Taken 3/11/2025 " 1809 by Chrystal Aparicio RN  Body Position: weight shifting  Goal: Optimal Comfort and Wellbeing  Outcome: Progressing  Goal: Readiness for Transition of Care  Outcome: Progressing     Problem: Fatigue  Goal: Improved Activity Tolerance  Intervention: Promote Improved Energy  Recent Flowsheet Documentation  Taken 3/11/2025 1809 by Chrystal Aparicio RN  Activity Management: activity adjusted per tolerance     Problem: Fatigue  Goal: Improved Activity Tolerance  Outcome: Progressing  Intervention: Promote Improved Energy  Recent Flowsheet Documentation  Taken 3/11/2025 1809 by Chrystal Aparicio RN  Activity Management: activity adjusted per tolerance     Problem: Fall Injury Risk  Goal: Absence of Fall and Fall-Related Injury  Intervention: Identify and Manage Contributors  Recent Flowsheet Documentation  Taken 3/11/2025 1809 by Chrystal Aparicio RN  Medication Review/Management: medications reviewed  Intervention: Promote Injury-Free Environment  Recent Flowsheet Documentation  Taken 3/11/2025 1809 by Chrystal Aparicio RN  Safety Promotion/Fall Prevention:   safety round/check completed   nonskid shoes/slippers when out of bed     Problem: Fall Injury Risk  Goal: Absence of Fall and Fall-Related Injury  Outcome: Progressing  Intervention: Identify and Manage Contributors  Recent Flowsheet Documentation  Taken 3/11/2025 1809 by Chrystal Aparicio RN  Medication Review/Management: medications reviewed  Intervention: Promote Injury-Free Environment  Recent Flowsheet Documentation  Taken 3/11/2025 1809 by Chrystal Aparicio RN  Safety Promotion/Fall Prevention:   safety round/check completed   nonskid shoes/slippers when out of bed     Problem: Dementia Signs/Symptoms  Goal: Improved Behavioral Control (Dementia Signs/Symptoms)  Outcome: Progressing  Goal: Improved Mood Symptoms (Dementia Signs/Symptoms)  Outcome: Progressing  Goal: Optimized Cognitive Function (Dementia Signs/Symptoms)  Outcome: Progressing  Goal: Optimized Oral Intake (Dementia  Signs/Symptoms)  Outcome: Progressing  Goal: Improved Sleep (Dementia Signs/Symptoms)  Outcome: Progressing  Goal: Enhanced Social or Functional Skills and Ability (Dementia Signs/Symptoms)  Outcome: Progressing     Problem: Delirium  Goal: Optimal Coping  Outcome: Progressing  Goal: Improved Behavioral Control  Outcome: Progressing  Goal: Improved Attention and Thought Clarity  Outcome: Progressing  Goal: Improved Sleep  Outcome: Progressing

## 2025-03-13 PROCEDURE — 250N000013 HC RX MED GY IP 250 OP 250 PS 637: Performed by: INTERNAL MEDICINE

## 2025-03-13 PROCEDURE — 250N000013 HC RX MED GY IP 250 OP 250 PS 637

## 2025-03-13 PROCEDURE — 250N000013 HC RX MED GY IP 250 OP 250 PS 637: Performed by: STUDENT IN AN ORGANIZED HEALTH CARE EDUCATION/TRAINING PROGRAM

## 2025-03-13 PROCEDURE — 250N000013 HC RX MED GY IP 250 OP 250 PS 637: Performed by: HOSPITALIST

## 2025-03-13 PROCEDURE — 120N000001 HC R&B MED SURG/OB

## 2025-03-13 PROCEDURE — 99232 SBSQ HOSP IP/OBS MODERATE 35: CPT | Performed by: HOSPITALIST

## 2025-03-13 RX ADMIN — Medication 3 MG: at 21:24

## 2025-03-13 RX ADMIN — Medication 1 TABLET: at 09:21

## 2025-03-13 RX ADMIN — ATENOLOL 50 MG: 50 TABLET ORAL at 09:21

## 2025-03-13 RX ADMIN — QUETIAPINE FUMARATE 12.5 MG: 25 TABLET ORAL at 09:26

## 2025-03-13 RX ADMIN — QUETIAPINE FUMARATE 12.5 MG: 25 TABLET ORAL at 15:21

## 2025-03-13 RX ADMIN — TRAZODONE HYDROCHLORIDE 50 MG: 50 TABLET ORAL at 21:24

## 2025-03-13 RX ADMIN — TAMSULOSIN HYDROCHLORIDE 0.8 MG: 0.4 CAPSULE ORAL at 20:00

## 2025-03-13 RX ADMIN — MEMANTINE 5 MG: 5 TABLET ORAL at 20:00

## 2025-03-13 RX ADMIN — PANTOPRAZOLE SODIUM 40 MG: 40 TABLET, DELAYED RELEASE ORAL at 09:21

## 2025-03-13 RX ADMIN — APIXABAN 2.5 MG: 2.5 TABLET, FILM COATED ORAL at 09:21

## 2025-03-13 RX ADMIN — MEMANTINE 5 MG: 5 TABLET ORAL at 09:21

## 2025-03-13 RX ADMIN — FINASTERIDE 5 MG: 5 TABLET, FILM COATED ORAL at 09:21

## 2025-03-13 RX ADMIN — QUETIAPINE FUMARATE 25 MG: 25 TABLET ORAL at 18:33

## 2025-03-13 RX ADMIN — APIXABAN 2.5 MG: 2.5 TABLET, FILM COATED ORAL at 20:00

## 2025-03-13 ASSESSMENT — ACTIVITIES OF DAILY LIVING (ADL)
ADLS_ACUITY_SCORE: 70
ADLS_ACUITY_SCORE: 75
ADLS_ACUITY_SCORE: 70
ADLS_ACUITY_SCORE: 75
ADLS_ACUITY_SCORE: 70
ADLS_ACUITY_SCORE: 70
ADLS_ACUITY_SCORE: 75
ADLS_ACUITY_SCORE: 75
ADLS_ACUITY_SCORE: 70
ADLS_ACUITY_SCORE: 75
ADLS_ACUITY_SCORE: 70
ADLS_ACUITY_SCORE: 75

## 2025-03-13 NOTE — PLAN OF CARE
"1900-0700    Inpatient Progress Note:    /84 (BP Location: Right arm)   Pulse 76   Temp 97.6  F (36.4  C) (Oral)   Resp 19   Ht 1.727 m (5' 7.99\")   Wt 59.5 kg (131 lb 1.6 oz)   SpO2 97%   BMI 19.94 kg/m       Goal Outcome Evaluation:  Patient is Alert to and Self. Calm sleep /rest the night. SBA with Gait Belt and Walker. Pt is a Regular diet. denying pain. Patient is Saline locked. Sitter at bedside.    Outcome Evaluation: A/O to self, calm, in bed, VSS.RA.    Problem: Adult Inpatient Plan of Care  Goal: Plan of Care Review  Description:   Recent Flowsheet Documentation  Taken 3/12/2025 2135 by Nigel Schaefer RN  Outcome Evaluation: A/O to self, calm, in bed, VSS.RA.  Goal: Absence of Hospital-Acquired Illness or Injury  Intervention: Identify and Manage Fall Risk  Recent Flowsheet Documentation  Taken 3/12/2025 1939 by Nigel Schaefer RN  Safety Promotion/Fall Prevention:   activity supervised   mobility aid in reach   nonskid shoes/slippers when out of bed  Intervention: Prevent Skin Injury  Recent Flowsheet Documentation  Taken 3/12/2025 1939 by Nigel Schaefer RN  Body Position: position changed independently  Intervention: Prevent and Manage VTE (Venous Thromboembolism) Risk  Recent Flowsheet Documentation  Taken 3/12/2025 1939 by Nigel Schaefer RN  VTE Prevention/Management: SCDs off (sequential compression devices)  Intervention: Prevent Infection  Recent Flowsheet Documentation  Taken 3/12/2025 1939 by Nigel Schaefer RN  Infection Prevention:   single patient room provided   rest/sleep promoted  Goal: Optimal Comfort and Wellbeing  Intervention: Provide Person-Centered Care  Recent Flowsheet Documentation  Taken 3/12/2025 1939 by Nigel Schaefer RN  Trust Relationship/Rapport: care explained     "

## 2025-03-13 NOTE — PROGRESS NOTES
"[facility]    Medicine Progress Note - Hospitalist Service    Date of Admission:  2/23/2025    Assessment & Plan   Kraig Genao is a 89 Male with history of A-fib on Eliquis, CKD stage III, BPH, and suspected dementia presents with generalized weakness and confusion and found to have community-acquired pneumonia.    Having difficulty with TCU placement due to dementia/need for a sitter.  SW to speak with the family about memory care placement.   Pending placement in memory care. Continuing sitter at this time until plan clear.   Accepted at [facility] for Monday  Medically stable for discharge    Acute renal failure    - encouraged PO    - re-check in am    - doing well    Community-acquired pneumonia, resolved    - Completed course of rocephin 2 g + doxycycline to complete 7 days    - palliative consulted: life prolonging with limits. No CPR- Do NOT Intubate and NO FEEDING TUBE.     - full respiratory panel: negative     Acute Metabolic and infectious encephalopathy, resolved   Progressive dementia    - has had \"sundowning\" in recent months, worse in the hospital. Had dose of haldol resulting in acute change in respiratory status requiring 10L oxy mask. Had increased sedation with 25 mg Seroquel so adjusted to Seroquel to 12.5 mg TID.    - had a good response for a short time with TID seroquel. Then developed agitation overnight so increased bedtime dose to 25. Continues to be intermittently agitated, but suspect it is related to lack of sleep as patient had poor sleep for last 2 nights.     - Trazodone 50mg at bedtime     - PRN IV/IM zyprexa and IM ziprasidone. Last PRN needed 3/9 around 8PM.     - Seroquel 12.5mg BID + 25mg at bedtime. Given frequent need for PRN medications in early evening, adjusted timing of scheduled Seroquel to 10 AM, 3 PM, and 6:30 PM. Will follow and may make further adjustments as needed.     - avoid Haldol if possible (had change in respiratory status)    - Psych has " also added Namenda (to be titrated)    - still needing a sitter due to getting out of bed    Increase in creatinine superimposed on CKD stage III    - creatinine at baseline during stay. Cr was 1.17 on 3/1. Cr 1.46 on 3/7 (ordered per pharmacy). Encouraging PO fluids and have discussed with nursing. Will repeat BMP 3/11 and can consider IV fluids / further evaluation as needed.     Acute urinary retention requiring Pérez catheter placement  Gross hematuria - resolved   BPH    - pérez catheter placed due to number of times required to straight cath, Developed gross hematuria after pérez placement and ambulation 2/25    - Urology consult due to above   - increased Flomax 0.8mg   - started finasteride   - needs outpatient follow up in 2-3 months with cystoscopy, UA, postvoid    - pérez removed 2/26 late morning due to patient trying to manipulate pérez and would cause more trauma being ripped out    - voiding since pérez removed without significant PVR, continue to monitor     - resumed Eliquis in AM 2/27     Generalized Weakness & Ambulatory Dysfunction    - Physical therapy consultation: recommending TCU    - needs to be sitter free, was sitter free for 2 days but required one on evening of 2/28    - SW consult, currently in independent living, working on memory care. Will keep sitter for now until plan for discharge clear.      New LBBB & Chronically Elevated Troponin    - no chest pain and EKG     - Troponin chronically elevated to 50's     Chronic Afib, controlled     - continue home atenolol and Eliquis    - rate controlled on telemetry in the 80's    - removed tele on 2/26 due to agitation     Family has been kept updated  No new issues    No labs needed in am      Diet: Snacks/Supplements Adult: Magic Cup; With Meals  Regular Diet Adult  Snacks/Supplements Adult: Ensure Clear; Between Meals    DVT Prophylaxis: DOAC  Pérez Catheter: Not present  Lines: None     Cardiac Monitoring: None  Code Status: No CPR- Do  NOT Intubate      Clinically Significant Risk Factors                               # Moderate Malnutrition: based on nutrition assessment and treatment provided per dietitian's recommendations.    # COPD: noted on problem list        Social Drivers of Health            Disposition Plan     Medically Ready for Discharge: Anticipated in 2-4 Days   Valerio Monet MD  Hospitalist Service  [facility]    ______________________________________________________________________    Interval History   Patient seen multiple times today. Walking in the halls     Physical Exam   Vital Signs: Temp: 97.7  F (36.5  C) Temp src: Oral BP: 124/80 Pulse: 72   Resp: 18 SpO2: 98 % O2 Device: None (Room air)    Weight: 131 lbs 1.6 oz    Constitutional: walking in halls  HEENT: NCAT. EOMI. Dry oral mucosa.  Hard of hearing at baseline. As he is just waking up, hearing aids not yet in place.   Medical Decision Making       30 MINUTES SPENT BY ME on the date of service doing chart review, history, exam, documentation & further activities per the note.

## 2025-03-13 NOTE — PLAN OF CARE
"Goal Outcome Evaluation:      Plan of Care Reviewed With: patient    Overall Patient Progress: improvingOverall Patient Progress: improving    Outcome Evaluation: Alert, oriented to self and intermittently to place. VSS on RA. Denies pain. Ambulated in the halls with asst of 1 and a walker. Plan to discharge early next week when room is ready at [facility].        Problem: Adult Inpatient Plan of Care  Goal: Plan of Care Review  Description: The Plan of Care Review/Shift note should be completed every shift.  The Outcome Evaluation is a brief statement about your assessment that the patient is improving, declining, or no change.  This information will be displayed automatically on your shift  note.  3/13/2025 1742 by Mila Sidhu RN  Outcome: Progressing  Flowsheets (Taken 3/13/2025 1742)  Outcome Evaluation: Alert, oriented to self and intermittently to place. VSS on RA. Denies pain. Ambulated in the halls with asst of 1 and a walker. Plan to discharge early next week when room is ready at [facility].  Plan of Care Reviewed With: patient  Overall Patient Progress: improving  3/13/2025 1741 by Mila Sidhu RN  Outcome: Progressing  Flowsheets (Taken 3/13/2025 1739)  Outcome Evaluation: Alert, oriented to self and place.  Plan of Care Reviewed With: patient  Overall Patient Progress: improving  Goal: Patient-Specific Goal (Individualized)  Description: You can add care plan individualizations to a care plan. Examples of Individualization might be:  \"Parent requests to be called daily at 9am for status\", \"I have a hard time hearing out of my right ear\", or \"Do not touch me to wake me up as it startles  me\".  3/13/2025 1742 by Mila Sidhu, RN  Outcome: Progressing  3/13/2025 1741 by Mila Sidhu RN  Outcome: Progressing  Goal: Absence of Hospital-Acquired Illness or Injury  3/13/2025 1742 by Mila Sidhu RN  Outcome: Progressing  3/13/2025 1741 by Mila Sidhu, " RN  Outcome: Progressing  Intervention: Identify and Manage Fall Risk  Recent Flowsheet Documentation  Taken 3/13/2025 0930 by Mila Sidhu RN  Safety Promotion/Fall Prevention:   activity supervised   mobility aid in reach   nonskid shoes/slippers when out of bed  Intervention: Prevent Infection  Recent Flowsheet Documentation  Taken 3/13/2025 0930 by Mila Sidhu RN  Infection Prevention:   single patient room provided   rest/sleep promoted  Goal: Optimal Comfort and Wellbeing  3/13/2025 1742 by Mila Sidhu RN  Outcome: Progressing  3/13/2025 1741 by Mila Sidhu RN  Outcome: Progressing  Intervention: Provide Person-Centered Care  Recent Flowsheet Documentation  Taken 3/13/2025 0930 by Mila Sidhu RN  Trust Relationship/Rapport: care explained  Goal: Readiness for Transition of Care  3/13/2025 1742 by Mila Sidhu RN  Outcome: Progressing  3/13/2025 1741 by Mila Sidhu RN  Outcome: Progressing     Problem: Fall Injury Risk  Goal: Absence of Fall and Fall-Related Injury  3/13/2025 1742 by Mila Sidhu RN  Outcome: Progressing  3/13/2025 1741 by Mila Sidhu RN  Outcome: Progressing  Intervention: Identify and Manage Contributors  Recent Flowsheet Documentation  Taken 3/13/2025 0930 by Mila Sidhu RN  Medication Review/Management: medications reviewed  Intervention: Promote Injury-Free Environment  Recent Flowsheet Documentation  Taken 3/13/2025 0930 by Mila Sidhu RN  Safety Promotion/Fall Prevention:   activity supervised   mobility aid in reach   nonskid shoes/slippers when out of bed     Problem: Dementia Signs/Symptoms  Goal: Improved Behavioral Control (Dementia Signs/Symptoms)  3/13/2025 1742 by Mila Sidhu RN  Outcome: Progressing  3/13/2025 1741 by Mila Sidhu RN  Outcome: Progressing  Intervention: Manage Behavior  Recent Flowsheet Documentation  Taken 3/13/2025 0930 by Mila Sidhu RN  Environmental Support: calm  environment promoted  Goal: Improved Mood Symptoms (Dementia Signs/Symptoms)  3/13/2025 1742 by Mila Sidhu RN  Outcome: Progressing  3/13/2025 1741 by Mila Sidhu RN  Outcome: Progressing  Intervention: Optimize Emotion and Mood  Recent Flowsheet Documentation  Taken 3/13/2025 0930 by Mila Sidhu RN  Supportive Measures: active listening utilized  Goal: Optimized Cognitive Function (Dementia Signs/Symptoms)  3/13/2025 1742 by Mila Sidhu RN  Outcome: Progressing  3/13/2025 1741 by Mila Sidhu RN  Outcome: Progressing  Goal: Optimized Oral Intake (Dementia Signs/Symptoms)  3/13/2025 1742 by Mila Sidhu RN  Outcome: Progressing  3/13/2025 1741 by Mila Sidhu RN  Outcome: Progressing  Intervention: Promote and Optimize Oral Intake  Recent Flowsheet Documentation  Taken 3/13/2025 0930 by Mila Sidhu RN  Bowel Function Promotion: toileting schedule established  Goal: Improved Sleep (Dementia Signs/Symptoms)  3/13/2025 1742 by Mila Sidhu RN  Outcome: Progressing  3/13/2025 1741 by Mila Sidhu RN  Outcome: Progressing  Goal: Enhanced Social or Functional Skills and Ability (Dementia Signs/Symptoms)  3/13/2025 1742 by Mila Sidhu RN  Outcome: Progressing  3/13/2025 1741 by Mila Sidhu RN  Outcome: Progressing  Intervention: Promote Social and Functional Ability  Recent Flowsheet Documentation  Taken 3/13/2025 0930 by Mila Sidhu RN  Trust Relationship/Rapport: care explained     Problem: Delirium  Goal: Optimal Coping  3/13/2025 1742 by Mila Sidhu RN  Outcome: Progressing  3/13/2025 1741 by Mila Sidhu RN  Outcome: Progressing  Intervention: Optimize Psychosocial Adjustment to Delirium  Recent Flowsheet Documentation  Taken 3/13/2025 0930 by Mila Sidhu RN  Supportive Measures: active listening utilized  Goal: Improved Behavioral Control  3/13/2025 1742 by Mila Sidhu RN  Outcome: Progressing  3/13/2025  1741 by Mila Sidhu RN  Outcome: Progressing  Intervention: Prevent and Manage Agitation  Recent Flowsheet Documentation  Taken 3/13/2025 0930 by Mila Sidhu RN  Environment Familiarity/Consistency: daily routine followed  Intervention: Minimize Safety Risk  Recent Flowsheet Documentation  Taken 3/13/2025 0930 by Mila Sidhu RN  Enhanced Safety Measures: room near unit station  Trust Relationship/Rapport: care explained  Goal: Improved Attention and Thought Clarity  3/13/2025 1742 by Mila Sidhu RN  Outcome: Progressing  3/13/2025 1741 by Mila Sidhu RN  Outcome: Progressing  Intervention: Maximize Cognitive Function  Recent Flowsheet Documentation  Taken 3/13/2025 0930 by Mila Sidhu RN  Reorientation Measures:   clock in view   reorientation provided  Goal: Improved Sleep  3/13/2025 1742 by Mila Sidhu RN  Outcome: Progressing  3/13/2025 1741 by Mila Sidhu RN  Outcome: Progressing     Problem: Fatigue  Goal: Improved Activity Tolerance  3/13/2025 1742 by Mila Sidhu RN  Outcome: Progressing  3/13/2025 1741 by Mila Sidhu RN  Outcome: Progressing  Intervention: Promote Improved Energy  Recent Flowsheet Documentation  Taken 3/13/2025 0930 by Mila Sidhu RN  Environmental Support: calm environment promoted

## 2025-03-14 ENCOUNTER — APPOINTMENT (OUTPATIENT)
Dept: PHYSICAL THERAPY | Facility: CLINIC | Age: OVER 89
End: 2025-03-14
Payer: MEDICARE

## 2025-03-14 PROCEDURE — 250N000013 HC RX MED GY IP 250 OP 250 PS 637: Performed by: INTERNAL MEDICINE

## 2025-03-14 PROCEDURE — 250N000013 HC RX MED GY IP 250 OP 250 PS 637: Performed by: HOSPITALIST

## 2025-03-14 PROCEDURE — 250N000013 HC RX MED GY IP 250 OP 250 PS 637: Performed by: CLINICAL NURSE SPECIALIST

## 2025-03-14 PROCEDURE — 250N000013 HC RX MED GY IP 250 OP 250 PS 637: Performed by: STUDENT IN AN ORGANIZED HEALTH CARE EDUCATION/TRAINING PROGRAM

## 2025-03-14 PROCEDURE — 99232 SBSQ HOSP IP/OBS MODERATE 35: CPT | Performed by: HOSPITALIST

## 2025-03-14 PROCEDURE — 250N000013 HC RX MED GY IP 250 OP 250 PS 637

## 2025-03-14 PROCEDURE — 120N000001 HC R&B MED SURG/OB

## 2025-03-14 PROCEDURE — 97530 THERAPEUTIC ACTIVITIES: CPT | Mod: GP

## 2025-03-14 RX ADMIN — PANTOPRAZOLE SODIUM 40 MG: 40 TABLET, DELAYED RELEASE ORAL at 09:20

## 2025-03-14 RX ADMIN — QUETIAPINE FUMARATE 12.5 MG: 25 TABLET ORAL at 15:47

## 2025-03-14 RX ADMIN — MEMANTINE 5 MG: 5 TABLET ORAL at 20:22

## 2025-03-14 RX ADMIN — ATENOLOL 50 MG: 50 TABLET ORAL at 09:20

## 2025-03-14 RX ADMIN — TAMSULOSIN HYDROCHLORIDE 0.8 MG: 0.4 CAPSULE ORAL at 20:22

## 2025-03-14 RX ADMIN — Medication 3 MG: at 21:24

## 2025-03-14 RX ADMIN — APIXABAN 2.5 MG: 2.5 TABLET, FILM COATED ORAL at 20:22

## 2025-03-14 RX ADMIN — QUETIAPINE FUMARATE 12.5 MG: 25 TABLET ORAL at 09:20

## 2025-03-14 RX ADMIN — QUETIAPINE FUMARATE 25 MG: 25 TABLET ORAL at 18:49

## 2025-03-14 RX ADMIN — TRAZODONE HYDROCHLORIDE 50 MG: 50 TABLET ORAL at 21:24

## 2025-03-14 RX ADMIN — LIDOCAINE 2 PATCH: 4 PATCH TOPICAL at 20:23

## 2025-03-14 RX ADMIN — APIXABAN 2.5 MG: 2.5 TABLET, FILM COATED ORAL at 09:20

## 2025-03-14 RX ADMIN — Medication 1 TABLET: at 09:20

## 2025-03-14 RX ADMIN — MEMANTINE 5 MG: 5 TABLET ORAL at 09:20

## 2025-03-14 RX ADMIN — FINASTERIDE 5 MG: 5 TABLET, FILM COATED ORAL at 09:20

## 2025-03-14 ASSESSMENT — ACTIVITIES OF DAILY LIVING (ADL)
ADLS_ACUITY_SCORE: 70
ADLS_ACUITY_SCORE: 73
ADLS_ACUITY_SCORE: 78
ADLS_ACUITY_SCORE: 70
ADLS_ACUITY_SCORE: 70
ADLS_ACUITY_SCORE: 78
ADLS_ACUITY_SCORE: 70
ADLS_ACUITY_SCORE: 78
ADLS_ACUITY_SCORE: 70
ADLS_ACUITY_SCORE: 78
ADLS_ACUITY_SCORE: 78
ADLS_ACUITY_SCORE: 70
ADLS_ACUITY_SCORE: 78
ADLS_ACUITY_SCORE: 70
ADLS_ACUITY_SCORE: 70

## 2025-03-14 NOTE — PLAN OF CARE
Physical Therapy Discharge Summary    Reason for therapy discharge:    All goals and outcomes met, no further needs identified.    Progress towards therapy goal(s). See goals on Care Plan in [facility] electronic health record for goal details.  Goals met    Therapy recommendation(s):    Pt needing SBA and 4WW for mobility. No further expectations for progress, limited by cognition and impaired safety awareness.

## 2025-03-14 NOTE — PLAN OF CARE
"  Orientation: x1, oriented to self, HX dementia  Neuro: wnl  Pain status: shows no signs of pain   Activity: SBA, walker gait belt  Peripheral edema: wnl  Resp: wnl  Cardiac: wnl  GI: incont  :  icont  Skin: scattered brusing, dry skin  LDA: PIV  Infusions: SL   Diet: Regular  Consults: SW  Discharge Plan: Discharge  [facility] Wednesday 3/19 family to transport      Problem: Adult Inpatient Plan of Care  Goal: Plan of Care Review  Description: The Plan of Care Review/Shift note should be completed every shift.  The Outcome Evaluation is a brief statement about your assessment that the patient is improving, declining, or no change.  This information will be displayed automatically on your shift  note.  Outcome: Progressing  Goal: Patient-Specific Goal (Individualized)  Description: You can add care plan individualizations to a care plan. Examples of Individualization might be:  \"Parent requests to be called daily at 9am for status\", \"I have a hard time hearing out of my right ear\", or \"Do not touch me to wake me up as it startles  me\".  Outcome: Progressing  Goal: Absence of Hospital-Acquired Illness or Injury  Outcome: Progressing  Intervention: Identify and Manage Fall Risk  Recent Flowsheet Documentation  Taken 3/14/2025 0920 by Ro Zavala RN  Safety Promotion/Fall Prevention:   activity supervised   mobility aid in reach   nonskid shoes/slippers when out of bed  Intervention: Prevent Skin Injury  Recent Flowsheet Documentation  Taken 3/14/2025 0920 by Ro Zavala RN  Body Position: position changed independently  Intervention: Prevent Infection  Recent Flowsheet Documentation  Taken 3/14/2025 0920 by Ro Zavala RN  Infection Prevention:   single patient room provided   rest/sleep promoted  Goal: Optimal Comfort and Wellbeing  Outcome: Progressing  Goal: Readiness for Transition of Care  Outcome: Progressing     Problem: Fall Injury Risk  Goal: Absence of Fall and Fall-Related Injury  Outcome: " Progressing  Intervention: Identify and Manage Contributors  Recent Flowsheet Documentation  Taken 3/14/2025 0920 by Ro Zavala RN  Medication Review/Management: medications reviewed  Intervention: Promote Injury-Free Environment  Recent Flowsheet Documentation  Taken 3/14/2025 0920 by Ro Zavala RN  Safety Promotion/Fall Prevention:   activity supervised   mobility aid in reach   nonskid shoes/slippers when out of bed     Problem: Dementia Signs/Symptoms  Goal: Improved Behavioral Control (Dementia Signs/Symptoms)  Outcome: Progressing  Goal: Improved Mood Symptoms (Dementia Signs/Symptoms)  Outcome: Progressing  Goal: Optimized Cognitive Function (Dementia Signs/Symptoms)  Outcome: Progressing  Goal: Optimized Oral Intake (Dementia Signs/Symptoms)  Outcome: Progressing  Goal: Improved Sleep (Dementia Signs/Symptoms)  Outcome: Progressing  Goal: Enhanced Social or Functional Skills and Ability (Dementia Signs/Symptoms)  Outcome: Progressing     Problem: Delirium  Goal: Optimal Coping  Outcome: Progressing  Goal: Improved Behavioral Control  Outcome: Progressing  Intervention: Minimize Safety Risk  Recent Flowsheet Documentation  Taken 3/14/2025 0920 by Ro Zavala RN  Enhanced Safety Measures: room near unit station  Goal: Improved Attention and Thought Clarity  Outcome: Progressing  Goal: Improved Sleep  Outcome: Progressing     Problem: Fatigue  Goal: Improved Activity Tolerance  Outcome: Progressing  Intervention: Promote Improved Energy  Recent Flowsheet Documentation  Taken 3/14/2025 0920 by Ro Zavala RN  Activity Management: activity adjusted per tolerance

## 2025-03-14 NOTE — PLAN OF CARE
"[id number]    Inpatient Progress Note:    /80 (BP Location: Right arm)   Pulse 72   Temp 97.7  F (36.5  C) (Oral)   Resp 18   Ht 1.727 m (5' 7.99\")   Wt 59.5 kg (131 lb 1.6 oz)   SpO2 98%   BMI 19.94 kg/m       Goal Outcome Evaluation:      Plan of Care Reviewed With: patient    Overall Patient Progress: improvingOverall Patient Progress: improving    Outcome Evaluation: A/O to self , calm and coopertive, but sleepy, took 8pm medictions and went back to sleep, VSS, RA , PIV SL. Patient was awake at 2200 angry, get up walked without walker, slapped and pushed away the staff when provided walker, patient was redirected in calm manner and back to sleep. At 0000 patient awake got up ambulated to bath room voided adequately, Independent personal care with minimum care. Skin intact. PIV SL. Plan is to discharge memory care unit, SW following.  Problem: Adult Inpatient Plan of Care  Goal: Plan of Care Review  Description: The Plan of Care Review/Shift note should be completed every shift.  The Outcome Evaluation is a brief statement about your assessment that the patient is improving, declining, or no change.  This information will be displayed automatically on your shift  note.  Recent Flowsheet Documentation  Taken 3/13/2025 2008 by Nigel Schaefer RN  Outcome Evaluation: A/O to self , calm and coopertive, but sleepy, took 8pm medictions and went back to sleep, VSS, RA , PIV SL.  Plan of Care Reviewed With: patient  Overall Patient Progress: improving  Goal: Absence of Hospital-Acquired Illness or Injury  Intervention: Identify and Manage Fall Risk  Recent Flowsheet Documentation  Taken 3/13/2025 2007 by Nigel Schaefer, RN  Safety Promotion/Fall Prevention:   activity supervised   mobility aid in reach   nonskid shoes/slippers when out of bed  Intervention: Prevent Infection  Recent Flowsheet Documentation  Taken 3/13/2025 2007 by Nigel Schaefer, RN  Infection Prevention:   single patient room provided   " rest/sleep promoted  Goal: Optimal Comfort and Wellbeing  Intervention: Provide Person-Centered Care  Recent Flowsheet Documentation  Taken 3/13/2025 2007 by Nigel Schaefer, RN  Trust Relationship/Rapport: care explained

## 2025-03-14 NOTE — PROGRESS NOTES
"[facility]    Medicine Progress Note - Hospitalist Service    Date of Admission:  2/23/2025    Assessment & Plan   Kraig Genao is a 89 Male with history of A-fib on Eliquis, CKD stage III, BPH, and suspected dementia presents with generalized weakness and confusion and found to have community-acquired pneumonia.    Having difficulty with TCU placement due to dementia/need for a sitter.  SW to speak with the family about memory care placement.   Pending placement in memory care. Continuing sitter at this time until plan clear.   Accepted at [facility] for Monday- now wednesday  Medically stable for discharge    Acute renal failure    - encouraged PO    - re-check in am    - doing well    Community-acquired pneumonia, resolved    - Completed course of rocephin 2 g + doxycycline to complete 7 days    - palliative consulted: life prolonging with limits. No CPR- Do NOT Intubate and NO FEEDING TUBE.     - full respiratory panel: negative     Acute Metabolic and infectious encephalopathy, resolved   Progressive dementia    - has had \"sundowning\" in recent months, worse in the hospital. Had dose of haldol resulting in acute change in respiratory status requiring 10L oxy mask. Had increased sedation with 25 mg Seroquel so adjusted to Seroquel to 12.5 mg TID.    - had a good response for a short time with TID seroquel. Then developed agitation overnight so increased bedtime dose to 25. Continues to be intermittently agitated, but suspect it is related to lack of sleep as patient had poor sleep for last 2 nights.     - Trazodone 50mg at bedtime     - PRN IV/IM zyprexa and IM ziprasidone. Last PRN needed 3/9 around 8PM.     - Seroquel 12.5mg BID + 25mg at bedtime. Given frequent need for PRN medications in early evening, adjusted timing of scheduled Seroquel to 10 AM, 3 PM, and 6:30 PM. Will follow and may make further adjustments as needed.     - avoid Haldol if possible (had change in respiratory status)   "  - Psych has also added Namenda (to be titrated)    - still needing a sitter due to getting out of bed    Increase in creatinine superimposed on CKD stage III    - creatinine at baseline during stay. Cr was 1.17 on 3/1. Cr 1.46 on 3/7 (ordered per pharmacy). Encouraging PO fluids and have discussed with nursing. Will repeat BMP 3/11 and can consider IV fluids / further evaluation as needed.     Acute urinary retention requiring Pérez catheter placement  Gross hematuria - resolved   BPH    - pérez catheter placed due to number of times required to straight cath, Developed gross hematuria after pérez placement and ambulation 2/25    - Urology consult due to above   - increased Flomax 0.8mg   - started finasteride   - needs outpatient follow up in 2-3 months with cystoscopy, UA, postvoid    - pérez removed 2/26 late morning due to patient trying to manipulate pérez and would cause more trauma being ripped out    - voiding since pérez removed without significant PVR, continue to monitor     - resumed Eliquis in AM 2/27     Generalized Weakness & Ambulatory Dysfunction    - Physical therapy consultation: recommending TCU    - needs to be sitter free, was sitter free for 2 days but required one on evening of 2/28    - SW consult, currently in independent living, working on memory care. Will keep sitter for now until plan for discharge clear.      New LBBB & Chronically Elevated Troponin    - no chest pain and EKG     - Troponin chronically elevated to 50's     Chronic Afib, controlled     - continue home atenolol and Eliquis    - rate controlled on telemetry in the 80's    - removed tele on 2/26 due to agitation     Family has been kept updated, met with daughter today  PRN labs  No new issues    No labs needed in am      Diet: Snacks/Supplements Adult: Magic Cup; With Meals  Regular Diet Adult  Snacks/Supplements Adult: Ensure Clear; Between Meals    DVT Prophylaxis: DOAC  Pérez Catheter: Not present  Lines: None      Cardiac Monitoring: None  Code Status: No CPR- Do NOT Intubate      Clinically Significant Risk Factors                               # Moderate Malnutrition: based on nutrition assessment and treatment provided per dietitian's recommendations.    # COPD: noted on problem list        Social Drivers of Health            Disposition Plan     Medically Ready for Discharge: Anticipated in 2-4 Days   Valerio Monet MD  Hospitalist Service  [facility]    ______________________________________________________________________    Interval History   Patient sleeping when I entered room. Daughter in room  Patient seen later ambulating in the halls    Physical Exam   Vital Signs: Temp: 97.5  F (36.4  C) Temp src: Oral BP: (!) 142/73 Pulse: 72   Resp: 18 SpO2: 95 % O2 Device: None (Room air)    Weight: 131 lbs 1.6 oz    Constitutional: walking in halls  HEENT: NCAT. EOMI. Dry oral mucosa.  Hard of hearing at baseline. As he is just waking up, hearing aids not yet in place.   Medical Decision Making       30 MINUTES SPENT BY ME on the date of service doing chart review, history, exam, documentation & further activities per the note.

## 2025-03-14 NOTE — PROGRESS NOTES
Care Management Follow Up    Length of Stay (days): 18    Expected Discharge Date: 03/19/2025     Concerns to be Addressed: discharge planning     Patient plan of care discussed at interdisciplinary rounds: Yes    Anticipated Discharge Disposition:  [facility]     Anticipated Discharge Services:  home care PT/OT - referral sent 3/14  Anticipated Discharge DME:  none    Patient/family educated on Medicare website which has current facility and service quality ratings:  yes  Education Provided on the Discharge Plan:  yes  Patient/Family in Agreement with the Plan:  yes    Referrals Placed by CM/SW:  post acute care  Private pay costs discussed: Not applicable    Discussed  Partnership in Safe Discharge Planning  document with patient/family: No     Handoff Completed: No, handoff not indicated or clinically appropriate    Additional Information:  SW spoke with Valerio, pt's daughter, to discuss discharge plan. Patient to discharge on Wednesday, 3/19. Family to transport.     Next Steps: Follow up with what time for transport, follow up on home care     PRISCILA Quintero   Social Work Care Manager   [facility]   [phone]

## 2025-03-15 PROCEDURE — 99232 SBSQ HOSP IP/OBS MODERATE 35: CPT | Performed by: NURSE PRACTITIONER

## 2025-03-15 PROCEDURE — 250N000013 HC RX MED GY IP 250 OP 250 PS 637

## 2025-03-15 PROCEDURE — 250N000013 HC RX MED GY IP 250 OP 250 PS 637: Performed by: STUDENT IN AN ORGANIZED HEALTH CARE EDUCATION/TRAINING PROGRAM

## 2025-03-15 PROCEDURE — 250N000013 HC RX MED GY IP 250 OP 250 PS 637: Performed by: INTERNAL MEDICINE

## 2025-03-15 PROCEDURE — 250N000013 HC RX MED GY IP 250 OP 250 PS 637: Performed by: CLINICAL NURSE SPECIALIST

## 2025-03-15 PROCEDURE — 250N000013 HC RX MED GY IP 250 OP 250 PS 637: Performed by: HOSPITALIST

## 2025-03-15 PROCEDURE — 120N000001 HC R&B MED SURG/OB

## 2025-03-15 RX ADMIN — MEMANTINE 5 MG: 5 TABLET ORAL at 20:58

## 2025-03-15 RX ADMIN — FINASTERIDE 5 MG: 5 TABLET, FILM COATED ORAL at 09:58

## 2025-03-15 RX ADMIN — TAMSULOSIN HYDROCHLORIDE 0.8 MG: 0.4 CAPSULE ORAL at 20:58

## 2025-03-15 RX ADMIN — MEMANTINE 5 MG: 5 TABLET ORAL at 09:58

## 2025-03-15 RX ADMIN — TRAZODONE HYDROCHLORIDE 50 MG: 50 TABLET ORAL at 21:04

## 2025-03-15 RX ADMIN — Medication 3 MG: at 21:04

## 2025-03-15 RX ADMIN — Medication 1 TABLET: at 09:58

## 2025-03-15 RX ADMIN — QUETIAPINE FUMARATE 12.5 MG: 25 TABLET ORAL at 09:57

## 2025-03-15 RX ADMIN — QUETIAPINE FUMARATE 12.5 MG: 25 TABLET ORAL at 16:36

## 2025-03-15 RX ADMIN — ATENOLOL 50 MG: 50 TABLET ORAL at 09:58

## 2025-03-15 RX ADMIN — APIXABAN 2.5 MG: 2.5 TABLET, FILM COATED ORAL at 20:58

## 2025-03-15 RX ADMIN — APIXABAN 2.5 MG: 2.5 TABLET, FILM COATED ORAL at 09:58

## 2025-03-15 RX ADMIN — LIDOCAINE 2 PATCH: 4 PATCH TOPICAL at 21:01

## 2025-03-15 RX ADMIN — PANTOPRAZOLE SODIUM 40 MG: 40 TABLET, DELAYED RELEASE ORAL at 09:58

## 2025-03-15 ASSESSMENT — ACTIVITIES OF DAILY LIVING (ADL)
ADLS_ACUITY_SCORE: 75

## 2025-03-15 NOTE — PLAN OF CARE
"Orientation: x1, hx dementia  Neuro: wnl  Pain status: shows no signs of pain   Activity: SBA, walker and gait belt  Peripheral edema: wnl  Resp: wnl  Cardiac: wnl  GI: incont  :  incont  Skin: SL  LDA: PIV  Infusions:SL    Diet: Regular  Consults: SW  Discharge Plan: Discharge [facility]HEL[facility] wed 3/19 family to transport.          Goal Outcome Evaluation:      Plan of Care Reviewed With: patient    Overall Patient Progress: improvingOverall Patient Progress: improving           Problem: Adult Inpatient Plan of Care  Goal: Plan of Care Review  Description: The Plan of Care Review/Shift note should be completed every shift.  The Outcome Evaluation is a brief statement about your assessment that the patient is improving, declining, or no change.  This information will be displayed automatically on your shift  note.  Outcome: Progressing  Flowsheets (Taken 3/15/2025 1420)  Plan of Care Reviewed With: patient  Overall Patient Progress: improving  Goal: Patient-Specific Goal (Individualized)  Description: You can add care plan individualizations to a care plan. Examples of Individualization might be:  \"Parent requests to be called daily at 9am for status\", \"I have a hard time hearing out of my right ear\", or \"Do not touch me to wake me up as it startles  me\".  Outcome: Progressing  Goal: Absence of Hospital-Acquired Illness or Injury  Outcome: Progressing  Intervention: Identify and Manage Fall Risk  Recent Flowsheet Documentation  Taken 3/15/2025 1000 by Ro Zavala RN  Safety Promotion/Fall Prevention:   activity supervised   mobility aid in reach   nonskid shoes/slippers when out of bed  Intervention: Prevent Skin Injury  Recent Flowsheet Documentation  Taken 3/15/2025 1000 by Ro Zavala RN  Body Position: position changed independently  Intervention: Prevent Infection  Recent Flowsheet Documentation  Taken 3/15/2025 1000 by Ro Zavala RN  Infection Prevention:   cohorting utilized   hand hygiene promoted   rest/sleep " promoted   single patient room provided  Goal: Optimal Comfort and Wellbeing  Outcome: Progressing  Goal: Readiness for Transition of Care  Outcome: Progressing     Problem: Fall Injury Risk  Goal: Absence of Fall and Fall-Related Injury  Outcome: Progressing  Intervention: Identify and Manage Contributors  Recent Flowsheet Documentation  Taken 3/15/2025 1000 by Ro Zavala RN  Medication Review/Management: medications reviewed  Intervention: Promote Injury-Free Environment  Recent Flowsheet Documentation  Taken 3/15/2025 1000 by Ro Zavala RN  Safety Promotion/Fall Prevention:   activity supervised   mobility aid in reach   nonskid shoes/slippers when out of bed     Problem: Dementia Signs/Symptoms  Goal: Improved Behavioral Control (Dementia Signs/Symptoms)  Outcome: Progressing  Goal: Improved Mood Symptoms (Dementia Signs/Symptoms)  Outcome: Progressing  Goal: Optimized Cognitive Function (Dementia Signs/Symptoms)  Outcome: Progressing  Goal: Optimized Oral Intake (Dementia Signs/Symptoms)  Outcome: Progressing  Goal: Improved Sleep (Dementia Signs/Symptoms)  Outcome: Progressing  Goal: Enhanced Social or Functional Skills and Ability (Dementia Signs/Symptoms)  Outcome: Progressing     Problem: Delirium  Goal: Optimal Coping  Outcome: Progressing  Goal: Improved Behavioral Control  Outcome: Progressing  Intervention: Minimize Safety Risk  Recent Flowsheet Documentation  Taken 3/15/2025 1000 by Ro Zavala RN  Enhanced Safety Measures: room near unit station  Goal: Improved Attention and Thought Clarity  Outcome: Progressing  Goal: Improved Sleep  Outcome: Progressing     Problem: Fatigue  Goal: Improved Activity Tolerance  Outcome: Progressing  Intervention: Promote Improved Energy  Recent Flowsheet Documentation  Taken 3/15/2025 1000 by Ro Zavala RN  Activity Management: activity adjusted per tolerance

## 2025-03-15 NOTE — PLAN OF CARE
"INPATIENT NOTE: [id number] - [id number]     PRIMARY PROBLEM: Weakness & Confusion     Vital Signs: Stable, at times pt refused VS        Orientation: A/O x 1, self only  Neuro: Intact  Pain status: Denies  Resp: Lung sounds CTA, denies any SOB  Cardiac: WDL  GI: Bowel sounds active. Last BM 3/14  : Continent  Skin: Scattered bruising  LDA: Peripheral IV SL  Pertinent Labs/Imaging: None  Diet: Regular  Activity: Assist x 1 with walker, sitter at bedside  Alarms/Safety: All alarms on and audible   Consults: PT & SW   Discharge Plan: Discharge to [facility], family to transport   Discharge Date: 3/19     Will continue to monitor and provide cares.     Amanda Ambrose RN Problem: Adult Inpatient Plan of Care  Goal: Plan of Care Review  Description: The Plan of Care Review/Shift note should be completed every shift.  The Outcome Evaluation is a brief statement about your assessment that the patient is improving, declining, or no change.  This information will be displayed automatically on your shift  note.  Outcome: Progressing  Goal: Patient-Specific Goal (Individualized)  Description: You can add care plan individualizations to a care plan. Examples of Individualization might be:  \"Parent requests to be called daily at 9am for status\", \"I have a hard time hearing out of my right ear\", or \"Do not touch me to wake me up as it startles  me\".  Outcome: Progressing  Goal: Absence of Hospital-Acquired Illness or Injury  Outcome: Progressing  Intervention: Identify and Manage Fall Risk  Recent Flowsheet Documentation  Taken 3/14/2025 1734 by Amanda Ambrose, RN  Safety Promotion/Fall Prevention:   activity supervised   mobility aid in reach   nonskid shoes/slippers when out of bed  Intervention: Prevent Skin Injury  Recent Flowsheet Documentation  Taken 3/14/2025 1734 by Amanda Ambrose, RN  Body Position: position changed independently  Intervention: Prevent and Manage VTE (Venous Thromboembolism) Risk  Recent Flowsheet " Documentation  Taken 3/14/2025 1734 by Amanda Ambrose RN  VTE Prevention/Management: SCDs off (sequential compression devices)  Intervention: Prevent Infection  Recent Flowsheet Documentation  Taken 3/14/2025 1734 by Amanda Ambrose RN  Infection Prevention:   cohorting utilized   hand hygiene promoted   rest/sleep promoted   single patient room provided  Goal: Optimal Comfort and Wellbeing  Outcome: Progressing  Intervention: Provide Person-Centered Care  Recent Flowsheet Documentation  Taken 3/14/2025 1734 by Amanda Ambrose RN  Trust Relationship/Rapport: care explained  Goal: Readiness for Transition of Care  Outcome: Progressing     Problem: Fall Injury Risk  Goal: Absence of Fall and Fall-Related Injury  Outcome: Progressing  Intervention: Identify and Manage Contributors  Recent Flowsheet Documentation  Taken 3/14/2025 1734 by Amanda Ambrose RN  Medication Review/Management: medications reviewed  Intervention: Promote Injury-Free Environment  Recent Flowsheet Documentation  Taken 3/14/2025 1734 by Amanda Ambrose RN  Safety Promotion/Fall Prevention:   activity supervised   mobility aid in reach   nonskid shoes/slippers when out of bed     Problem: Dementia Signs/Symptoms  Goal: Improved Behavioral Control (Dementia Signs/Symptoms)  Outcome: Progressing  Intervention: Manage Behavior  Recent Flowsheet Documentation  Taken 3/14/2025 1734 by Amanda Ambrose RN  Environmental Support: calm environment promoted  Goal: Improved Mood Symptoms (Dementia Signs/Symptoms)  Outcome: Progressing  Intervention: Optimize Emotion and Mood  Recent Flowsheet Documentation  Taken 3/14/2025 1734 by Amanda Ambrose RN  Supportive Measures: active listening utilized  Goal: Optimized Cognitive Function (Dementia Signs/Symptoms)  Outcome: Progressing  Goal: Optimized Oral Intake (Dementia Signs/Symptoms)  Outcome: Progressing  Goal: Improved Sleep (Dementia Signs/Symptoms)  Outcome: Progressing  Goal: Enhanced  Social or Functional Skills and Ability (Dementia Signs/Symptoms)  Outcome: Progressing  Intervention: Promote Social and Functional Ability  Recent Flowsheet Documentation  Taken 3/14/2025 1734 by Amanda Ambrose RN  Trust Relationship/Rapport: care explained     Problem: Delirium  Goal: Optimal Coping  Outcome: Progressing  Intervention: Optimize Psychosocial Adjustment to Delirium  Recent Flowsheet Documentation  Taken 3/14/2025 1734 by Amanda Ambrose RN  Supportive Measures: active listening utilized  Goal: Improved Behavioral Control  Outcome: Progressing  Intervention: Prevent and Manage Agitation  Recent Flowsheet Documentation  Taken 3/14/2025 1734 by Amanda Ambrose RN  Environment Familiarity/Consistency: daily routine followed  Intervention: Minimize Safety Risk  Recent Flowsheet Documentation  Taken 3/14/2025 1734 by Amanda Ambrose RN  Enhanced Safety Measures: room near unit station  Trust Relationship/Rapport: care explained  Goal: Improved Attention and Thought Clarity  Outcome: Progressing  Intervention: Maximize Cognitive Function  Recent Flowsheet Documentation  Taken 3/14/2025 1734 by Amanda Ambrose RN  Sensory Stimulation Regulation:   quiet environment promoted   auditory stimulation provided  Reorientation Measures:   clock in view   reorientation provided  Goal: Improved Sleep  Outcome: Progressing     Problem: Fatigue  Goal: Improved Activity Tolerance  Outcome: Progressing  Intervention: Promote Improved Energy  Recent Flowsheet Documentation  Taken 3/14/2025 1734 by Amanda Ambrose RN  Activity Management: activity adjusted per tolerance  Environmental Support: calm environment promoted

## 2025-03-15 NOTE — PROGRESS NOTES
"[facility]    Medicine Progress Note - Hospitalist Service    Date of Admission:  2/23/2025    Assessment & Plan   Kraig Genao is a 89 Male with history of A-fib on Eliquis, CKD stage III, BPH, and suspected dementia presents with generalized weakness and confusion and found to have community-acquired pneumonia.    Having difficulty with TCU placement due to dementia/need for a sitter.  SW to speak with the family about memory care placement.   Pending placement in memory care. Continuing sitter at this time until plan clear.   Accepted at [facility] for Monday- now Wednesday    Medically stable for discharge  3/15/2025- no Sitter    Acute renal failure    - encouraged PO    - re-check in am, Cr 1.46-> 1.34-> 1.19 with baseline 0.9    - doing well    Community-acquired pneumonia, resolved    - Completed course of rocephin 2 g + doxycycline to complete 7 days    - palliative consulted: life prolonging with limits. No CPR- Do NOT Intubate and NO FEEDING TUBE.     - full respiratory panel: negative     Acute Metabolic and infectious encephalopathy, resolved   Progressive dementia    - has had \"sundowning\" in recent months, worse in the hospital. Had dose of haldol resulting in acute change in respiratory status requiring 10L oxy mask. Had increased sedation with 25 mg Seroquel so adjusted to Seroquel to 12.5 mg TID.    - had a good response for a short time with TID seroquel. Then developed agitation overnight so increased bedtime dose to 25. Continues to be intermittently agitated, but suspect it is related to lack of sleep as patient had poor sleep for last 2 nights.     - Trazodone 50mg at bedtime     - PRN IV/IM zyprexa and IM ziprasidone. Last PRN needed 3/9 around 8PM.     - Seroquel 12.5mg BID + 25mg at bedtime. Given frequent need for PRN medications in early evening, adjusted timing of scheduled Seroquel to 10 AM, 3 PM, and 6:30 PM. Will follow and may make further adjustments as needed.    "  - avoid Haldol if possible (had change in respiratory status)    - Psych has also added Namenda (to be titrated)    - still needing a sitter due to getting out of bed    Increase in creatinine superimposed on CKD stage III    - creatinine at baseline during stay. Cr was 1.17 on 3/1. Cr 1.46 on 3/7 (ordered per pharmacy). Encouraging PO fluids and have discussed with nursing. Will repeat BMP 3/11 and can consider IV fluids / further evaluation as needed.     Acute urinary retention requiring Pérez catheter placement  Gross hematuria - resolved   BPH    - pérez catheter placed due to number of times required to straight cath, Developed gross hematuria after pérez placement and ambulation 2/25    - Urology consult due to above   - increased Flomax 0.8mg   - started finasteride   - needs outpatient follow up in 2-3 months with cystoscopy, UA, postvoid    - pérez removed 2/26 late morning due to patient trying to manipulate pérez and would cause more trauma being ripped out    - voiding since pérez removed without significant PVR, continue to monitor     - resumed Eliquis in AM 2/27     Generalized Weakness & Ambulatory Dysfunction    - Physical therapy consultation: recommending TCU    - needs to be sitter free, was sitter free for 2 days but required one on evening of 2/28    -  consult, currently in independent living, working on memory care. Will keep sitter for now until plan for discharge clear.      New LBBB & Chronically Elevated Troponin    - no chest pain and EKG     - Troponin chronically elevated to 50's     Chronic Afib, controlled     - continue home atenolol and Eliquis    - rate controlled on telemetry in the 80's    - removed tele on 2/26 due to agitation     Called daughter Valerio and given update.  PRN labs  No new issues    No labs needed in am          Diet: Snacks/Supplements Adult: Magic Cup; With Meals  Regular Diet Adult  Snacks/Supplements Adult: Ensure Clear; Between Meals    DVT Prophylaxis:  Low Risk/Ambulatory with no VTE prophylaxis indicated  Code Status: No CPR- Do NOT Intubate      Clinically Significant Risk Factors                               # Moderate Malnutrition: based on nutrition assessment and treatment provided per dietitian's recommendations.    # COPD: noted on problem list        Social Drivers of Health            Disposition Plan     Medically Ready for Discharge: Anticipated in 2-4 Days    The patient's care was discussed with the Bedside Nurse, Care Coordinator/, Patient, and Patient's Family.      TRAMAINE [facility]  Hospitalist Service  [facility]  Securely message with [employer] (more info)  Text page via [facility] Paging/Directory   ______________________________________________________________________    Interval History   Patient is pleasantly confused, sitting up in chair and had just eaten breakfast.  Denies any pain or discomfort.    Physical Exam   Vital Signs: Temp: 98.1  F (36.7  C) Temp src: Oral BP: 114/68 Pulse: 74   Resp: 18 SpO2: 96 % O2 Device: None (Room air)    Weight: 124 lbs 12.49 oz    General:  alert and oriented X1, confused at baseline  HEENT: Atraumatic, normocephalic, No scleral icterus or ptosis. No nasal discharges.  [place]. External ears are normal. Moist mucus membranes. No oral lesions.   Neck: No JVD, thyromegaly, lymphadenopathy, or bruits.   Cardio: RRR, positive S1, S2, no M/R/G.   Lungs: CTAB, no W/R/R, equal expansion, no labored breathing, no respiratory distress,  no increased WOB, stridor, pursed lip or accessory muscle use, talking in full sentences  Abdomen: NT, + BS x4.  No masses, organomegaly, guarding or rebound tenderness  Extremities: Warm and well perfused.  No edema, cyanosis, clubbing  Skin: Visualized skin is within normal limits, no rashes, ulcerations or petechiae  Pulses: Dorsalis pedis, posterior tibial: 2+ easily palpable.  Good capillary refill      Medical Decision Making       45 MINUTES  SPENT BY ME on the date of service doing chart review, history, exam, documentation & further activities per the note.      Data         Imaging results reviewed over the past 24 hrs:   No results found for this or any previous visit (from the past 24 hours).

## 2025-03-15 NOTE — PLAN OF CARE
"Goal Outcome Evaluation:  Care from [id number]    Inpatient Progress Note:  For complete assessment see flow sheet documentation.    /65 (BP Location: Right arm)   Pulse 70   Temp 98.1  F (36.7  C) (Oral)   Resp 18   Ht 1.727 m (5' 7.99\")   Wt 59.5 kg (131 lb 1.6 oz)   SpO2 98%   BMI 19.94 kg/m     Alert only to self. Denies pain. Was agitated at start of shift but turned around and became more cooperative. Has been  redirectable with no behaviors. Slept most of the night. Sitter @ bedside.Awaits [facility] placement. Medically stable for discharge. SW following.        Plan of Care Reviewed With: patient    Problem: Adult Inpatient Plan of Care  Goal: Plan of Care Review  Description: The Plan of Care Review/Shift note should be completed every shift.  The Outcome Evaluation is a brief statement about your assessment that the patient is improving, declining, or no change.  This information will be displayed automatically on your shift  note.  Outcome: Progressing  Flowsheets (Taken 3/15/2025 0539)  Plan of Care Reviewed With: patient  Overall Patient Progress: no change  Goal: Patient-Specific Goal (Individualized)  Description: You can add care plan individualizations to a care plan. Examples of Individualization might be:  \"Parent requests to be called daily at 9am for status\", \"I have a hard time hearing out of my right ear\", or \"Do not touch me to wake me up as it startles  me\".  Outcome: Progressing  Goal: Absence of Hospital-Acquired Illness or Injury  Outcome: Progressing  Intervention: Identify and Manage Fall Risk  Recent Flowsheet Documentation  Taken 3/15/2025 0010 by Mera Person, RN  Safety Promotion/Fall Prevention:   activity supervised   mobility aid in reach   nonskid shoes/slippers when out of bed  Intervention: Prevent Skin Injury  Recent Flowsheet Documentation  Taken 3/15/2025 0010 by Mera Person, RN  Body Position: position changed independently  Intervention: Prevent and Manage VTE " (Venous Thromboembolism) Risk  Recent Flowsheet Documentation  Taken 3/15/2025 0010 by Mera Person RN  VTE Prevention/Management: SCDs off (sequential compression devices)  Intervention: Prevent Infection  Recent Flowsheet Documentation  Taken 3/15/2025 0010 by Mera Person RN  Infection Prevention:   cohorting utilized   hand hygiene promoted   rest/sleep promoted   single patient room provided  Goal: Optimal Comfort and Wellbeing  Outcome: Progressing  Intervention: Provide Person-Centered Care  Recent Flowsheet Documentation  Taken 3/15/2025 0010 by Mera Person RN  Trust Relationship/Rapport: care explained  Goal: Readiness for Transition of Care  Outcome: Progressing  Flowsheets (Taken 3/15/2025 0539)  Anticipated Changes Related to Illness: none  Transportation Anticipated: family or friend will provide  Concerns to be Addressed: discharge planning  Intervention: Mutually Develop Transition Plan  Recent Flowsheet Documentation  Taken 3/15/2025 0539 by Mera Person RN  Anticipated Changes Related to Illness: none  Transportation Anticipated: family or friend will provide  Concerns to be Addressed: discharge planning     Problem: Fall Injury Risk  Goal: Absence of Fall and Fall-Related Injury  Outcome: Progressing  Intervention: Identify and Manage Contributors  Recent Flowsheet Documentation  Taken 3/15/2025 0010 by Mera Person RN  Medication Review/Management: medications reviewed  Intervention: Promote Injury-Free Environment  Recent Flowsheet Documentation  Taken 3/15/2025 0010 by Mera Person RN  Safety Promotion/Fall Prevention:   activity supervised   mobility aid in reach   nonskid shoes/slippers when out of bed     Problem: Dementia Signs/Symptoms  Goal: Improved Behavioral Control (Dementia Signs/Symptoms)  Outcome: Progressing  Intervention: Manage Behavior  Recent Flowsheet Documentation  Taken 3/15/2025 0010 by Mera Person RN  Environmental Support: calm environment  promoted  Goal: Improved Mood Symptoms (Dementia Signs/Symptoms)  Outcome: Progressing  Intervention: Optimize Emotion and Mood  Recent Flowsheet Documentation  Taken 3/15/2025 0010 by Mera Person RN  Supportive Measures: active listening utilized  Goal: Optimized Cognitive Function (Dementia Signs/Symptoms)  Outcome: Progressing  Goal: Optimized Oral Intake (Dementia Signs/Symptoms)  Outcome: Progressing  Goal: Improved Sleep (Dementia Signs/Symptoms)  Outcome: Progressing  Goal: Enhanced Social or Functional Skills and Ability (Dementia Signs/Symptoms)  Outcome: Progressing  Intervention: Promote Social and Functional Ability  Recent Flowsheet Documentation  Taken 3/15/2025 0010 by Mera Person RN  Trust Relationship/Rapport: care explained     Problem: Delirium  Goal: Optimal Coping  Outcome: Progressing  Intervention: Optimize Psychosocial Adjustment to Delirium  Recent Flowsheet Documentation  Taken 3/15/2025 0010 by Mera Person RN  Supportive Measures: active listening utilized  Goal: Improved Behavioral Control  Outcome: Progressing  Intervention: Prevent and Manage Agitation  Recent Flowsheet Documentation  Taken 3/15/2025 0010 by Mera Person RN  Environment Familiarity/Consistency: daily routine followed  Intervention: Minimize Safety Risk  Recent Flowsheet Documentation  Taken 3/15/2025 0010 by Mera Person RN  Enhanced Safety Measures: room near unit station  Trust Relationship/Rapport: care explained  Goal: Improved Attention and Thought Clarity  Outcome: Progressing  Intervention: Maximize Cognitive Function  Recent Flowsheet Documentation  Taken 3/15/2025 0010 by Mera Person RN  Sensory Stimulation Regulation:   quiet environment promoted   auditory stimulation provided  Reorientation Measures:   clock in view   reorientation provided  Goal: Improved Sleep  Outcome: Progressing     Problem: Fatigue  Goal: Improved Activity Tolerance  Outcome: Progressing  Intervention:  Promote Improved Energy  Recent Flowsheet Documentation  Taken 3/15/2025 0010 by Mera Person, RN  Activity Management: activity adjusted per tolerance  Environmental Support: calm environment promoted       Overall Patient Progress: no changeOverall Patient Progress: no change

## 2025-03-16 PROCEDURE — 250N000013 HC RX MED GY IP 250 OP 250 PS 637

## 2025-03-16 PROCEDURE — 250N000013 HC RX MED GY IP 250 OP 250 PS 637: Performed by: STUDENT IN AN ORGANIZED HEALTH CARE EDUCATION/TRAINING PROGRAM

## 2025-03-16 PROCEDURE — 120N000001 HC R&B MED SURG/OB

## 2025-03-16 PROCEDURE — 250N000013 HC RX MED GY IP 250 OP 250 PS 637: Performed by: HOSPITALIST

## 2025-03-16 PROCEDURE — 250N000013 HC RX MED GY IP 250 OP 250 PS 637: Performed by: CLINICAL NURSE SPECIALIST

## 2025-03-16 PROCEDURE — 250N000013 HC RX MED GY IP 250 OP 250 PS 637: Performed by: INTERNAL MEDICINE

## 2025-03-16 PROCEDURE — 99231 SBSQ HOSP IP/OBS SF/LOW 25: CPT | Performed by: PHYSICIAN ASSISTANT

## 2025-03-16 RX ADMIN — Medication 1 TABLET: at 09:48

## 2025-03-16 RX ADMIN — Medication 3 MG: at 21:27

## 2025-03-16 RX ADMIN — QUETIAPINE FUMARATE 12.5 MG: 25 TABLET ORAL at 17:02

## 2025-03-16 RX ADMIN — APIXABAN 2.5 MG: 2.5 TABLET, FILM COATED ORAL at 09:49

## 2025-03-16 RX ADMIN — MEMANTINE 5 MG: 5 TABLET ORAL at 20:06

## 2025-03-16 RX ADMIN — QUETIAPINE FUMARATE 25 MG: 25 TABLET ORAL at 20:06

## 2025-03-16 RX ADMIN — APIXABAN 2.5 MG: 2.5 TABLET, FILM COATED ORAL at 20:06

## 2025-03-16 RX ADMIN — PANTOPRAZOLE SODIUM 40 MG: 40 TABLET, DELAYED RELEASE ORAL at 09:48

## 2025-03-16 RX ADMIN — TAMSULOSIN HYDROCHLORIDE 0.8 MG: 0.4 CAPSULE ORAL at 20:05

## 2025-03-16 RX ADMIN — MEMANTINE 5 MG: 5 TABLET ORAL at 09:49

## 2025-03-16 RX ADMIN — LIDOCAINE 2 PATCH: 4 PATCH TOPICAL at 20:07

## 2025-03-16 RX ADMIN — QUETIAPINE FUMARATE 12.5 MG: 25 TABLET ORAL at 09:49

## 2025-03-16 RX ADMIN — ATENOLOL 50 MG: 50 TABLET ORAL at 09:48

## 2025-03-16 RX ADMIN — TRAZODONE HYDROCHLORIDE 50 MG: 50 TABLET ORAL at 21:27

## 2025-03-16 RX ADMIN — FINASTERIDE 5 MG: 5 TABLET, FILM COATED ORAL at 09:49

## 2025-03-16 ASSESSMENT — ACTIVITIES OF DAILY LIVING (ADL)
ADLS_ACUITY_SCORE: 75
ADLS_ACUITY_SCORE: 75
ADLS_ACUITY_SCORE: 80
ADLS_ACUITY_SCORE: 80
ADLS_ACUITY_SCORE: 75
ADLS_ACUITY_SCORE: 80
ADLS_ACUITY_SCORE: 78
ADLS_ACUITY_SCORE: 80
ADLS_ACUITY_SCORE: 75
ADLS_ACUITY_SCORE: 80
ADLS_ACUITY_SCORE: 75
ADLS_ACUITY_SCORE: 80
ADLS_ACUITY_SCORE: 75
ADLS_ACUITY_SCORE: 75
ADLS_ACUITY_SCORE: 80
ADLS_ACUITY_SCORE: 80
ADLS_ACUITY_SCORE: 75
ADLS_ACUITY_SCORE: 75
ADLS_ACUITY_SCORE: 80

## 2025-03-16 NOTE — PROGRESS NOTES
"[facility]    Medicine Progress Note - Hospitalist Service    Date of Admission:  2/23/2025    Assessment & Plan   Kraig Genao is a 89 Male with history of A-fib on Eliquis, CKD stage III, BPH, and suspected dementia presents with generalized weakness and confusion and found to have community-acquired pneumonia.     Having difficulty with TCU placement due to dementia/need for a sitter.  SW has discussed with family and now planning on memory care placement.   Accepted at [facility] for memory care on Wednesday 3/19     Medically stable for discharge  No sitter since 3/15      Acute renal failure    - encouraged PO    - re-check in am, Cr 1.46-> 1.34-> 1.19 with baseline 0.9    - doing well     Community-acquired pneumonia, resolved    - Completed course of rocephin 2 g + doxycycline to complete 7 days    - palliative consulted: life prolonging with limits. No CPR- Do NOT Intubate and NO FEEDING TUBE.     - full respiratory panel: negative     Acute Metabolic and infectious encephalopathy, resolved   Progressive dementia    - has had \"sundowning\" in recent months, worse in the hospital. Had dose of haldol resulting in acute change in respiratory status requiring 10L oxy mask. Had increased sedation with 25 mg Seroquel so adjusted to Seroquel to 12.5 mg TID.    - had a good response for a short time with TID seroquel. Then developed agitation overnight so increased bedtime dose to 25. Continues to be intermittently agitated, but suspect it is related to lack of sleep as patient had poor sleep for last 2 nights.     - Trazodone 50mg at bedtime     - PRN IV/IM zyprexa and IM ziprasidone. Last PRN needed 3/9 around 8PM.     - Seroquel 12.5mg BID + 25mg at bedtime. Given frequent need for PRN medications in early evening, adjusted timing of scheduled Seroquel to 10 AM, 3 PM, and 6:30 PM. Will follow and may make further adjustments as needed.     - avoid Haldol if possible (had change in respiratory " status)    - Psych has also added Namenda (to be titrated)     Increase in creatinine superimposed on CKD stage III    - creatinine at baseline during stay. Cr was 1.17 on 3/1. Cr 1.46 on 3/7 (ordered per pharmacy). Encouraging PO fluids and have discussed with nursing.    - BMP stable on last check on 3/12     Acute urinary retention requiring Pérez catheter placement  Gross hematuria - resolved   BPH    - pérez catheter placed due to number of times required to straight cath, Developed gross hematuria after pérez placement and ambulation 2/25    - Urology consult due to above   - increased Flomax 0.8mg   - started finasteride   - needs outpatient follow up in 2-3 months with cystoscopy, UA, postvoid    - pérez removed 2/26 late morning due to patient trying to manipulate pérez and would cause more trauma being ripped out    - voiding since pérez removed without significant PVR, continue to monitor     - resumed Eliquis in AM 2/27     Generalized Weakness & Ambulatory Dysfunction    - Physical therapy consultation: recommending TCU    - needs to be sitter free, was sitter free for 2 days but required one on evening of 2/28    - SW consult, currently in independent living, working on memory care, currently sitter free      New LBBB & Chronically Elevated Troponin    - no chest pain and EKG     - Troponin chronically elevated to 50's     Chronic Afib, controlled     - continue home atenolol and Eliquis    - rate controlled on telemetry in the 80's    - removed tele on 2/26 due to agitation      PRN labs, no new issues over last day         Diet: Snacks/Supplements Adult: Magic Cup; With Meals  Regular Diet Adult  Snacks/Supplements Adult: Ensure Clear; Between Meals    DVT Prophylaxis: Low Risk/Ambulatory with no VTE prophylaxis indicated  Pérez Catheter: Not present  Lines: None     Cardiac Monitoring: None  Code Status: No CPR- Do NOT Intubate      Clinically Significant Risk Factors                               #  Moderate Malnutrition: based on nutrition assessment and treatment provided per dietitian's recommendations.    # COPD: noted on problem list        Social Drivers of Health            Disposition Plan     Medically Ready for Discharge: Anticipated in 2-4 Days       The patient's care was discussed with the Bedside Nurse and Patient.    April Arias PA-C  Hospitalist Service  [facility]  Securely message with [employer] (more info)  Text page via [facility] Paging/Directory   ______________________________________________________________________    Interval History   Patient is sitting up in chair about to eat breakfast.  Pleasantly confused with no concerns.  Denies any pain, chest pain, shortness of breath.    Physical Exam   Vital Signs: Temp: 97.4  F (36.3  C) Temp src: Oral BP: 106/70 Pulse: 76   Resp: 19 SpO2: 97 % O2 Device: None (Room air)    Weight: 124 lbs 12.49 oz    General Appearance: Sitting up in chair, knows in hospital but uncertain the date, interactive, NAD  Respiratory: CTAB without wheezing or rales   Cardiovascular: RRR without murmur or rub   GI: soft, nontender, nondistended, normoactive bowel sounds   Skin: warm, dry, no lesions in visualized areas     Medical Decision Making       30 MINUTES SPENT BY ME on the date of service doing chart review, history, exam, documentation & further activities per the note.      Data   ------------------------- PAST 24 HR DATA REVIEWED -----------------------------------------------

## 2025-03-16 NOTE — PLAN OF CARE
"Goal Outcome Evaluation:      Plan of Care Reviewed With: patient  Care from [id number]    Inpatient Progress Note:  For complete assessment see flow sheet documentation.    BP (!) 142/83 (BP Location: Right arm)   Pulse 72   Temp 97.8  F (36.6  C) (Oral)   Resp 18   Ht 1.727 m (5' 7.99\")   Wt 56.6 kg (124 lb 12.5 oz)   SpO2 97%   BMI 18.98 kg/m     Alert to self. Denies pain. Restless at night but redirectable. No behaviors. Meds crushed in pudding.Medically stable for discharge. Awaiting [facility] placement. Sitter @ bedside.      Overall Patient Progress: improvingOverall Patient Progress: improving    Problem: Adult Inpatient Plan of Care  Goal: Plan of Care Review  Description: The Plan of Care Review/Shift note should be completed every shift.  The Outcome Evaluation is a brief statement about your assessment that the patient is improving, declining, or no change.  This information will be displayed automatically on your shift  note.  Outcome: Progressing  Flowsheets (Taken 3/16/2025 0446)  Plan of Care Reviewed With: patient  Overall Patient Progress: improving  Goal: Patient-Specific Goal (Individualized)  Description: You can add care plan individualizations to a care plan. Examples of Individualization might be:  \"Parent requests to be called daily at 9am for status\", \"I have a hard time hearing out of my right ear\", or \"Do not touch me to wake me up as it startles  me\".  Outcome: Progressing  Goal: Absence of Hospital-Acquired Illness or Injury  Outcome: Progressing  Goal: Optimal Comfort and Wellbeing  Outcome: Progressing  Goal: Readiness for Transition of Care  Outcome: Progressing  Flowsheets (Taken 3/16/2025 0446)  Anticipated Changes Related to Illness: none  Transportation Anticipated: family or friend will provide  Concerns to be Addressed: developmental  Intervention: Mutually Develop Transition Plan  Recent Flowsheet Documentation  Taken 3/16/2025 0446 by Mera Person RN  Anticipated Changes " Related to Illness: none  Transportation Anticipated: family or friend will provide  Concerns to be Addressed: developmental     Problem: Fall Injury Risk  Goal: Absence of Fall and Fall-Related Injury  Outcome: Progressing     Problem: Dementia Signs/Symptoms  Goal: Improved Behavioral Control (Dementia Signs/Symptoms)  Outcome: Progressing  Goal: Improved Mood Symptoms (Dementia Signs/Symptoms)  Outcome: Progressing  Goal: Optimized Cognitive Function (Dementia Signs/Symptoms)  Outcome: Progressing  Goal: Optimized Oral Intake (Dementia Signs/Symptoms)  Outcome: Progressing  Goal: Improved Sleep (Dementia Signs/Symptoms)  Outcome: Progressing  Goal: Enhanced Social or Functional Skills and Ability (Dementia Signs/Symptoms)  Outcome: Progressing     Problem: Delirium  Goal: Optimal Coping  Outcome: Progressing  Goal: Improved Behavioral Control  Outcome: Progressing  Goal: Improved Attention and Thought Clarity  Outcome: Progressing  Goal: Improved Sleep  Outcome: Progressing     Problem: Fatigue  Goal: Improved Activity Tolerance  Outcome: Progressing

## 2025-03-16 NOTE — PLAN OF CARE
"  Orientation: x1, hx dementia   Neuro: wnl  Pain status: shows no signs of pain   Activity: SBA walker and gait belt  Peripheral edema: wnl  Resp: wnl  Cardiac: wnl  GI: incont  :  incont  Skin: dry skin, lotion applied  LDA: No PIV  Infusions: No PIV  Diet: regular, tray set up  Consults:   Discharge Plan: discharge  [place] Wed 3/19 family will transport.       Goal Outcome Evaluation:      Plan of Care Reviewed With: patient    Overall Patient Progress: improvingOverall Patient Progress: improving           Problem: Adult Inpatient Plan of Care  Goal: Plan of Care Review  Description: The Plan of Care Review/Shift note should be completed every shift.  The Outcome Evaluation is a brief statement about your assessment that the patient is improving, declining, or no change.  This information will be displayed automatically on your shift  note.  Outcome: Progressing  Flowsheets (Taken 3/16/2025 1120)  Plan of Care Reviewed With: patient  Overall Patient Progress: improving  Goal: Patient-Specific Goal (Individualized)  Description: You can add care plan individualizations to a care plan. Examples of Individualization might be:  \"Parent requests to be called daily at 9am for status\", \"I have a hard time hearing out of my right ear\", or \"Do not touch me to wake me up as it startles  me\".  Outcome: Progressing  Goal: Absence of Hospital-Acquired Illness or Injury  Outcome: Progressing  Intervention: Identify and Manage Fall Risk  Recent Flowsheet Documentation  Taken 3/16/2025 1001 by Ro Zavala RN  Safety Promotion/Fall Prevention:   bedside attendant   activity supervised  Intervention: Prevent Skin Injury  Recent Flowsheet Documentation  Taken 3/16/2025 1001 by Ro Zavala RN  Body Position: position changed independently  Goal: Optimal Comfort and Wellbeing  Outcome: Progressing  Intervention: Provide Person-Centered Care  Recent Flowsheet Documentation  Taken 3/16/2025 1001 by Ro Zavala RN  Trust " Relationship/Rapport: care explained  Goal: Readiness for Transition of Care  Outcome: Progressing     Problem: Fall Injury Risk  Goal: Absence of Fall and Fall-Related Injury  Outcome: Progressing  Intervention: Identify and Manage Contributors  Recent Flowsheet Documentation  Taken 3/16/2025 1001 by Ro Zavala RN  Medication Review/Management: medications reviewed  Intervention: Promote Injury-Free Environment  Recent Flowsheet Documentation  Taken 3/16/2025 1001 by Ro Zavala RN  Safety Promotion/Fall Prevention:   bedside attendant   activity supervised     Problem: Dementia Signs/Symptoms  Goal: Improved Behavioral Control (Dementia Signs/Symptoms)  Outcome: Progressing  Goal: Improved Mood Symptoms (Dementia Signs/Symptoms)  Outcome: Progressing  Goal: Optimized Cognitive Function (Dementia Signs/Symptoms)  Outcome: Progressing  Goal: Optimized Oral Intake (Dementia Signs/Symptoms)  Outcome: Progressing  Goal: Improved Sleep (Dementia Signs/Symptoms)  Outcome: Progressing  Goal: Enhanced Social or Functional Skills and Ability (Dementia Signs/Symptoms)  Outcome: Progressing  Intervention: Promote Social and Functional Ability  Recent Flowsheet Documentation  Taken 3/16/2025 1001 by Ro Zavala RN  Trust Relationship/Rapport: care explained     Problem: Delirium  Goal: Optimal Coping  Outcome: Progressing  Goal: Improved Behavioral Control  Outcome: Progressing  Intervention: Minimize Safety Risk  Recent Flowsheet Documentation  Taken 3/16/2025 1001 by Ro Zavala RN  Enhanced Safety Measures: room near unit station  Trust Relationship/Rapport: care explained  Goal: Improved Attention and Thought Clarity  Outcome: Progressing  Intervention: Maximize Cognitive Function  Recent Flowsheet Documentation  Taken 3/16/2025 1001 by Ro Zavala RN  Reorientation Measures:   clock in view   reorientation provided  Goal: Improved Sleep  Outcome: Progressing     Problem: Fatigue  Goal: Improved Activity  Tolerance  Outcome: Progressing  Intervention: Promote Improved Energy  Recent Flowsheet Documentation  Taken 3/16/2025 1001 by Ro Zavala, RN  Activity Management: activity adjusted per tolerance

## 2025-03-17 PROCEDURE — 250N000013 HC RX MED GY IP 250 OP 250 PS 637: Performed by: STUDENT IN AN ORGANIZED HEALTH CARE EDUCATION/TRAINING PROGRAM

## 2025-03-17 PROCEDURE — 250N000013 HC RX MED GY IP 250 OP 250 PS 637: Performed by: HOSPITALIST

## 2025-03-17 PROCEDURE — 99232 SBSQ HOSP IP/OBS MODERATE 35: CPT | Performed by: PHYSICIAN ASSISTANT

## 2025-03-17 PROCEDURE — 250N000013 HC RX MED GY IP 250 OP 250 PS 637: Performed by: INTERNAL MEDICINE

## 2025-03-17 PROCEDURE — 250N000013 HC RX MED GY IP 250 OP 250 PS 637

## 2025-03-17 PROCEDURE — 250N000013 HC RX MED GY IP 250 OP 250 PS 637: Performed by: CLINICAL NURSE SPECIALIST

## 2025-03-17 PROCEDURE — 120N000001 HC R&B MED SURG/OB

## 2025-03-17 RX ADMIN — ATENOLOL 50 MG: 50 TABLET ORAL at 08:33

## 2025-03-17 RX ADMIN — QUETIAPINE FUMARATE 12.5 MG: 25 TABLET ORAL at 16:00

## 2025-03-17 RX ADMIN — QUETIAPINE FUMARATE 12.5 MG: 25 TABLET ORAL at 10:22

## 2025-03-17 RX ADMIN — Medication 3 MG: at 22:47

## 2025-03-17 RX ADMIN — TRAZODONE HYDROCHLORIDE 50 MG: 50 TABLET ORAL at 22:47

## 2025-03-17 RX ADMIN — SENNOSIDES AND DOCUSATE SODIUM 1 TABLET: 50; 8.6 TABLET ORAL at 18:58

## 2025-03-17 RX ADMIN — QUETIAPINE FUMARATE 25 MG: 25 TABLET ORAL at 18:59

## 2025-03-17 RX ADMIN — FINASTERIDE 5 MG: 5 TABLET, FILM COATED ORAL at 08:33

## 2025-03-17 RX ADMIN — MEMANTINE 5 MG: 5 TABLET ORAL at 19:45

## 2025-03-17 RX ADMIN — APIXABAN 2.5 MG: 2.5 TABLET, FILM COATED ORAL at 19:45

## 2025-03-17 RX ADMIN — LIDOCAINE 2 PATCH: 4 PATCH TOPICAL at 19:45

## 2025-03-17 RX ADMIN — MEMANTINE 5 MG: 5 TABLET ORAL at 08:33

## 2025-03-17 RX ADMIN — SENNOSIDES AND DOCUSATE SODIUM 1 TABLET: 50; 8.6 TABLET ORAL at 22:47

## 2025-03-17 RX ADMIN — APIXABAN 2.5 MG: 2.5 TABLET, FILM COATED ORAL at 08:34

## 2025-03-17 RX ADMIN — Medication 1 TABLET: at 08:33

## 2025-03-17 RX ADMIN — TAMSULOSIN HYDROCHLORIDE 0.8 MG: 0.4 CAPSULE ORAL at 19:45

## 2025-03-17 RX ADMIN — PANTOPRAZOLE SODIUM 40 MG: 40 TABLET, DELAYED RELEASE ORAL at 08:34

## 2025-03-17 ASSESSMENT — ACTIVITIES OF DAILY LIVING (ADL)
ADLS_ACUITY_SCORE: 72
ADLS_ACUITY_SCORE: 77
ADLS_ACUITY_SCORE: 72
ADLS_ACUITY_SCORE: 77
ADLS_ACUITY_SCORE: 72
ADLS_ACUITY_SCORE: 77
ADLS_ACUITY_SCORE: 72
ADLS_ACUITY_SCORE: 77
ADLS_ACUITY_SCORE: 72

## 2025-03-17 NOTE — PROGRESS NOTES
"[facility]    Medicine Progress Note - Hospitalist Service    Date of Admission:  2/23/2025    Assessment & Plan   Kraig Genao is a 89 Male with history of A-fib on Eliquis, CKD stage III, BPH, and suspected dementia presents with generalized weakness and confusion and found to have community-acquired pneumonia.     Having difficulty with TCU placement due to dementia/need for a sitter.  SW has discussed with family and now planning on memory care placement.   Accepted at [facility] for memory care on Wednesday 3/19     Medically stable for discharge  No sitter since 3/15      Acute renal failure    - encouraged PO    - re-check in am, Cr 1.46-> 1.34-> 1.19 with baseline 0.9    - doing well     Community-acquired pneumonia, resolved    - Completed course of rocephin 2 g + doxycycline to complete 7 days    - palliative consulted: life prolonging with limits. No CPR- Do NOT Intubate and NO FEEDING TUBE.     - full respiratory panel: negative     Acute Metabolic and infectious encephalopathy, resolved   Progressive dementia    - has had \"sundowning\" in recent months, worse in the hospital. Had dose of haldol resulting in acute change in respiratory status requiring 10L oxy mask. Had increased sedation with 25 mg Seroquel so adjusted to Seroquel to 12.5 mg TID.    - had a good response for a short time with TID seroquel. Then developed agitation overnight so increased bedtime dose to 25. Continues to be intermittently agitated, but suspect it is related to lack of sleep as patient had poor sleep for last 2 nights.     - Trazodone 50mg at bedtime     - PRN IV/IM zyprexa and IM ziprasidone. Last PRN needed 3/9 around 8PM.     - Seroquel 12.5mg BID + 25mg at bedtime. Given frequent need for PRN medications in early evening, adjusted timing of scheduled Seroquel to 10 AM, 3 PM, and 6:30 PM. Will follow and may make further adjustments as needed.     - avoid Haldol if possible (had change in respiratory " status)    - Psych has also added Namenda (to be titrated)     Increase in creatinine superimposed on CKD stage III    - creatinine at baseline during stay. Cr was 1.17 on 3/1. Cr 1.46 on 3/7 (ordered per pharmacy). Encouraging PO fluids and have discussed with nursing.    - BMP stable on last check on 3/12     Acute urinary retention requiring Pérez catheter placement  Gross hematuria - resolved   BPH    - pérez catheter placed due to number of times required to straight cath, Developed gross hematuria after pérez placement and ambulation 2/25    - Urology consult due to above   - increased Flomax 0.8mg   - started finasteride   - needs outpatient follow up in 2-3 months with cystoscopy, UA, postvoid    - pérez removed 2/26 late morning due to patient trying to manipulate pérez and would cause more trauma being ripped out    - voiding since pérez removed without significant PVR, continue to monitor     - resumed Eliquis in AM 2/27     Generalized Weakness & Ambulatory Dysfunction    - Physical therapy consultation: recommending TCU    - needs to be sitter free, was sitter free for 2 days but required one on evening of 2/28    - SW consult, currently in independent living, working on memory care, currently sitter free      New LBBB & Chronically Elevated Troponin    - no chest pain and EKG     - Troponin chronically elevated to 50's     Chronic Afib, controlled     - continue home atenolol and Eliquis    - rate controlled on telemetry in the 80's    - removed tele on 2/26 due to agitation           Diet: Regular Diet Adult  Snacks/Supplements Adult: Ensure Clear; Between Meals  Snacks/Supplements Adult: Gelatein Plus; With Meals    DVT Prophylaxis: Low Risk/Ambulatory with no VTE prophylaxis indicated  Pérez Catheter: Not present  Lines: None     Cardiac Monitoring: None  Code Status: No CPR- Do NOT Intubate      Clinically Significant Risk Factors                               # Moderate Malnutrition: based on  nutrition assessment and treatment provided per dietitian's recommendations.    # COPD: noted on problem list        Social Drivers of Health            Disposition Plan     Medically Ready for Discharge: Ready Now           The patient's care was discussed with the Bedside Nurse, Care Coordinator/, and Patient.    Becky Omalley PA-C  Hospitalist Service  [facility]  Securely message with [employer] (more info)  Text page via [employer] Paging/Directory   ______________________________________________________________________    Interval History   No concerns. Still sitter free. Planning for memory care Wednesday    Physical Exam   Vital Signs: Temp: 97.5  F (36.4  C) Temp src: Oral BP: 128/73 Pulse: 81   Resp: 18 SpO2: 98 % O2 Device: None (Room air)    Weight: 124 lbs 12.49 oz    Patient awake and conversant. Moving all limbs equally. Breathing comfortably on room air.    Medical Decision Making       15 MINUTES SPENT BY ME on the date of service doing chart review, history, exam, documentation & further activities per the note.      Data         Imaging results reviewed over the past 24 hrs:   No results found for this or any previous visit (from the past 24 hours).

## 2025-03-17 NOTE — PROGRESS NOTES
CLINICAL NUTRITION SERVICES - REASSESSMENT NOTE     RECOMMENDATIONS FOR MDs/PROVIDERS TO ORDER:  None.    Malnutrition Status:    Muscle loss sarcopenia vs acute muscle loss from prior poor intake.  Eating well and currently unable to assess for true malnutrition.     Registered Dietitian Interventions:  Continue with current supplements and meal plan.  Request updated weight.     Future/Additional Recommendations:  Monitor intake, labs, weight.     SUBJECTIVE INFORMATION  Assessed patient in room.    CURRENT NUTRITION ORDERS  Diet: Regular  Nutrition Support: none.  Receiving supplements    CURRENT INTAKE/TOLERANCE  Flowsheets indicate 100% of meal intake for past week. Oldest son in room and reports the meals that he has seen, his dad has eaten very well.  Pt reports he likes the Ensure Clear (and there is no stock in the room) and that he eats the jello (hi pro jello). Son had just brought in [employer] and pt had consumed all of the burger and fries and was enjoying the shake.       NEW FINDINGS  Weight: 56.6 kg   Skin/wounds: none  GI symptoms: infrequent stooling documented.  Last BM 3/15  Nutrition-relevant labs: GFR 55 (increased from 43)  Nutrition-relevant medications: Reviewed    MALNUTRITION  % Intake: No decreased intake noted  % Weight Loss:only 2 data points and unknown type of weight for second data point.   Not using the weights to dx.   Subcutaneous Fat Loss: Orbital: Mild and Fat overlying the ribs: Moderate  Muscle Loss:  sarcopenia vs acute muscle loss.  Fluid Accumulation/Edema: None noted  Malnutrition Diagnosis: Unable to determine due to muchimproved appetite and muscle status sarcopenia vs acute muscle loss.   Malnutrition Present on Admission: yes, now resolving    EVALUATION OF THE PROGRESS TOWARD GOALS   Previous Goals  PO intakes of at least 50-75% of meals or supplements TID while admitted.   Evaluation: Met    Previous Nutrition Diagnosis  Inadequate oral intake related to  report of lack of appetite, altered mentation especially in evening as evidenced by verbal report of intake from nursing, pt report of dislikes, likes.   Evaluation: Resolved    NUTRITION DIAGNOSIS  Predicted inadequate nutrient intake kcals and pro  related to dementia and potential for instability of mood as evidenced by admit history.    INTERVENTIONS  Manage composition of oral intake  Medical food supplement therapy    Goals  Patient to consume % of nutritionally adequate meal trays TID, or the equivalent with supplements/snacks.     Monitoring/Evaluation      Progress toward goals will be monitored and evaluated per policy.

## 2025-03-17 NOTE — PLAN OF CARE
"  Problem: Adult Inpatient Plan of Care  Goal: Plan of Care Review  Description: The Plan of Care Review/Shift note should be completed every shift.  The Outcome Evaluation is a brief statement about your assessment that the patient is improving, declining, or no change.  This information will be displayed automatically on your shift  note.  Outcome: Progressing  Flowsheets (Taken 3/17/2025 6343)  Outcome Evaluation: A&O to self, calm and cooperative throughout the day, ambulating Ax1, tolerating regular diet, heart sounds- WNL, lungs- clear, denies pain, plan to discharge to memory care on wednesday  Plan of Care Reviewed With: patient  Overall Patient Progress: improving  Goal: Patient-Specific Goal (Individualized)  Description: You can add care plan individualizations to a care plan. Examples of Individualization might be:  \"Parent requests to be called daily at 9am for status\", \"I have a hard time hearing out of my right ear\", or \"Do not touch me to wake me up as it startles  me\".  Outcome: Progressing  Goal: Absence of Hospital-Acquired Illness or Injury  Outcome: Progressing  Intervention: Prevent Skin Injury  Recent Flowsheet Documentation  Taken 3/17/2025 9251 by Laxmi Monet RN  Body Position: position changed independently  Goal: Optimal Comfort and Wellbeing  Outcome: Progressing  Goal: Readiness for Transition of Care  Outcome: Progressing     Problem: Fall Injury Risk  Goal: Absence of Fall and Fall-Related Injury  Outcome: Progressing     Problem: Dementia Signs/Symptoms  Goal: Improved Behavioral Control (Dementia Signs/Symptoms)  Outcome: Progressing  Goal: Improved Mood Symptoms (Dementia Signs/Symptoms)  Outcome: Progressing  Goal: Optimized Cognitive Function (Dementia Signs/Symptoms)  Outcome: Progressing  Goal: Optimized Oral Intake (Dementia Signs/Symptoms)  Outcome: Progressing  Goal: Improved Sleep (Dementia Signs/Symptoms)  Outcome: Progressing  Goal: Enhanced Social or Functional Skills " and Ability (Dementia Signs/Symptoms)  Outcome: Progressing     Problem: Delirium  Goal: Optimal Coping  Outcome: Progressing  Goal: Improved Behavioral Control  Outcome: Progressing  Goal: Improved Attention and Thought Clarity  Outcome: Progressing  Goal: Improved Sleep  Outcome: Progressing     Problem: Fatigue  Goal: Improved Activity Tolerance  Outcome: Progressing  Intervention: Promote Improved Energy  Recent Flowsheet Documentation  Taken 3/17/2025 0864 by Laxmi Monet, RN  Activity Management: activity adjusted per tolerance       Goal Outcome Evaluation:      Plan of Care Reviewed With: patient    Overall Patient Progress: improvingOverall Patient Progress: improving    Outcome Evaluation: A&O to self, calm and cooperative throughout the day, ambulating Ax1, tolerating regular diet, heart sounds- WNL, lungs- clear, denies pain, plan to discharge to memory care on wednesday

## 2025-03-17 NOTE — PLAN OF CARE
"Goal Outcome Evaluation:  Care from [id number]    Inpatient Progress Note:  For complete assessment see flow sheet documentation.    /77 (BP Location: Right arm)   Pulse 66   Temp 97.5  F (36.4  C) (Oral)   Resp 18   Ht 1.727 m (5' 7.99\")   Wt 56.6 kg (124 lb 12.5 oz)   SpO2 98%   BMI 18.98 kg/m     Alert only to self.Denies pain. Assist x 1 with transfers.Takes meds crushed in pudding.Has slept most of the night.No behaviors.Took off his lidocaine patches.Medically stable for discharge.Has been accepted to a facility with a  unit. Family will transport on Wednesday.Will continue with plan of care.          Plan of Care Reviewed With: patient    Problem: Adult Inpatient Plan of Care  Goal: Plan of Care Review  Description: The Plan of Care Review/Shift note should be completed every shift.  The Outcome Evaluation is a brief statement about your assessment that the patient is improving, declining, or no change.  This information will be displayed automatically on your shift  note.  Outcome: Progressing  Flowsheets (Taken 3/17/2025 0518)  Plan of Care Reviewed With: patient  Overall Patient Progress: improving  Goal: Patient-Specific Goal (Individualized)  Description: You can add care plan individualizations to a care plan. Examples of Individualization might be:  \"Parent requests to be called daily at 9am for status\", \"I have a hard time hearing out of my right ear\", or \"Do not touch me to wake me up as it startles  me\".  Outcome: Progressing  Goal: Absence of Hospital-Acquired Illness or Injury  Outcome: Progressing  Intervention: Identify and Manage Fall Risk  Recent Flowsheet Documentation  Taken 3/16/2025 2011 by Mera Person, RN  Safety Promotion/Fall Prevention:   bedside attendant   activity supervised  Intervention: Prevent Skin Injury  Recent Flowsheet Documentation  Taken 3/16/2025 2011 by Mera Person, RN  Body Position: position changed independently  Goal: Optimal Comfort and " Wellbeing  Outcome: Progressing  Intervention: Provide Person-Centered Care  Recent Flowsheet Documentation  Taken 3/16/2025 2011 by Mera Person RN  Trust Relationship/Rapport: care explained  Goal: Readiness for Transition of Care  Outcome: Progressing  Flowsheets (Taken 3/17/2025 0518)  Anticipated Changes Related to Illness: none  Transportation Anticipated: family or friend will provide  Concerns to be Addressed:   care coordination/care conferences   discharge planning  Intervention: Mutually Develop Transition Plan  Recent Flowsheet Documentation  Taken 3/17/2025 0518 by Mera Person RN  Anticipated Changes Related to Illness: none  Transportation Anticipated: family or friend will provide  Concerns to be Addressed:   care coordination/care conferences   discharge planning     Problem: Fall Injury Risk  Goal: Absence of Fall and Fall-Related Injury  Outcome: Progressing  Intervention: Identify and Manage Contributors  Recent Flowsheet Documentation  Taken 3/16/2025 2011 by Mera Person RN  Medication Review/Management: medications reviewed  Intervention: Promote Injury-Free Environment  Recent Flowsheet Documentation  Taken 3/16/2025 2011 by Mera Person RN  Safety Promotion/Fall Prevention:   bedside attendant   activity supervised     Problem: Dementia Signs/Symptoms  Goal: Improved Behavioral Control (Dementia Signs/Symptoms)  Outcome: Progressing  Goal: Improved Mood Symptoms (Dementia Signs/Symptoms)  Outcome: Progressing  Goal: Optimized Cognitive Function (Dementia Signs/Symptoms)  Outcome: Progressing  Goal: Optimized Oral Intake (Dementia Signs/Symptoms)  Outcome: Progressing  Goal: Improved Sleep (Dementia Signs/Symptoms)  Outcome: Progressing  Goal: Enhanced Social or Functional Skills and Ability (Dementia Signs/Symptoms)  Outcome: Progressing  Intervention: Promote Social and Functional Ability  Recent Flowsheet Documentation  Taken 3/16/2025 2011 by Mera Person RN  Trust  Relationship/Rapport: care explained     Problem: Delirium  Goal: Optimal Coping  Outcome: Progressing  Goal: Improved Behavioral Control  Outcome: Progressing  Intervention: Minimize Safety Risk  Recent Flowsheet Documentation  Taken 3/16/2025 2011 by Mera Person, RN  Enhanced Safety Measures: room near unit station  Trust Relationship/Rapport: care explained  Goal: Improved Attention and Thought Clarity  Outcome: Progressing  Intervention: Maximize Cognitive Function  Recent Flowsheet Documentation  Taken 3/16/2025 2011 by Mera Person, RN  Reorientation Measures:   clock in view   reorientation provided  Goal: Improved Sleep  Outcome: Progressing     Problem: Fatigue  Goal: Improved Activity Tolerance  Outcome: Progressing  Intervention: Promote Improved Energy  Recent Flowsheet Documentation  Taken 3/16/2025 2011 by Mera Person, RN  Activity Management: activity adjusted per tolerance

## 2025-03-18 PROCEDURE — 250N000013 HC RX MED GY IP 250 OP 250 PS 637: Performed by: STUDENT IN AN ORGANIZED HEALTH CARE EDUCATION/TRAINING PROGRAM

## 2025-03-18 PROCEDURE — 99238 HOSP IP/OBS DSCHRG MGMT 30/<: CPT | Performed by: PHYSICIAN ASSISTANT

## 2025-03-18 PROCEDURE — 99207 PR APP CREDIT; MD BILLING SHARED VISIT: CPT | Performed by: PHYSICIAN ASSISTANT

## 2025-03-18 PROCEDURE — 99207 PR NO BILLABLE SERVICE THIS VISIT: CPT | Performed by: PHYSICIAN ASSISTANT

## 2025-03-18 PROCEDURE — 250N000013 HC RX MED GY IP 250 OP 250 PS 637: Performed by: CLINICAL NURSE SPECIALIST

## 2025-03-18 PROCEDURE — 250N000013 HC RX MED GY IP 250 OP 250 PS 637: Performed by: HOSPITALIST

## 2025-03-18 PROCEDURE — 250N000013 HC RX MED GY IP 250 OP 250 PS 637

## 2025-03-18 PROCEDURE — 120N000001 HC R&B MED SURG/OB

## 2025-03-18 PROCEDURE — 250N000013 HC RX MED GY IP 250 OP 250 PS 637: Performed by: INTERNAL MEDICINE

## 2025-03-18 RX ORDER — QUETIAPINE FUMARATE 25 MG/1
25 TABLET, FILM COATED ORAL AT BEDTIME
DISCHARGE
Start: 2025-03-18

## 2025-03-18 RX ORDER — QUETIAPINE FUMARATE 25 MG/1
25 TABLET, FILM COATED ORAL EVERY 6 HOURS PRN
DISCHARGE
Start: 2025-03-18

## 2025-03-18 RX ORDER — FINASTERIDE 5 MG/1
5 TABLET, FILM COATED ORAL DAILY
DISCHARGE
Start: 2025-03-19

## 2025-03-18 RX ORDER — MEMANTINE HYDROCHLORIDE 5 MG/1
5 TABLET ORAL 2 TIMES DAILY
DISCHARGE
Start: 2025-03-18

## 2025-03-18 RX ORDER — ACETAMINOPHEN 325 MG/1
650 TABLET ORAL EVERY 4 HOURS PRN
DISCHARGE
Start: 2025-03-18

## 2025-03-18 RX ORDER — TAMSULOSIN HYDROCHLORIDE 0.4 MG/1
0.8 CAPSULE ORAL DAILY
DISCHARGE
Start: 2025-03-18

## 2025-03-18 RX ORDER — IPRATROPIUM BROMIDE AND ALBUTEROL SULFATE 2.5; .5 MG/3ML; MG/3ML
3 SOLUTION RESPIRATORY (INHALATION) EVERY 4 HOURS PRN
DISCHARGE
Start: 2025-03-18

## 2025-03-18 RX ORDER — POLYETHYLENE GLYCOL 3350 17 G
2 POWDER IN PACKET (EA) ORAL
DISCHARGE
Start: 2025-03-18

## 2025-03-18 RX ORDER — TRAZODONE HYDROCHLORIDE 50 MG/1
50 TABLET ORAL AT BEDTIME
DISCHARGE
Start: 2025-03-18

## 2025-03-18 RX ORDER — AMOXICILLIN 250 MG
1 CAPSULE ORAL 2 TIMES DAILY PRN
DISCHARGE
Start: 2025-03-18

## 2025-03-18 RX ORDER — QUETIAPINE FUMARATE 25 MG/1
12.5 TABLET, FILM COATED ORAL 2 TIMES DAILY
DISCHARGE
Start: 2025-03-18

## 2025-03-18 RX ADMIN — Medication 3 MG: at 22:22

## 2025-03-18 RX ADMIN — MEMANTINE 5 MG: 5 TABLET ORAL at 07:39

## 2025-03-18 RX ADMIN — APIXABAN 2.5 MG: 2.5 TABLET, FILM COATED ORAL at 20:08

## 2025-03-18 RX ADMIN — APIXABAN 2.5 MG: 2.5 TABLET, FILM COATED ORAL at 07:39

## 2025-03-18 RX ADMIN — TAMSULOSIN HYDROCHLORIDE 0.8 MG: 0.4 CAPSULE ORAL at 20:08

## 2025-03-18 RX ADMIN — QUETIAPINE FUMARATE 12.5 MG: 25 TABLET ORAL at 10:37

## 2025-03-18 RX ADMIN — Medication 1 TABLET: at 07:39

## 2025-03-18 RX ADMIN — FINASTERIDE 5 MG: 5 TABLET, FILM COATED ORAL at 07:39

## 2025-03-18 RX ADMIN — ATENOLOL 50 MG: 50 TABLET ORAL at 07:39

## 2025-03-18 RX ADMIN — TRAZODONE HYDROCHLORIDE 50 MG: 50 TABLET ORAL at 22:22

## 2025-03-18 RX ADMIN — LIDOCAINE 2 PATCH: 4 PATCH TOPICAL at 20:10

## 2025-03-18 RX ADMIN — PANTOPRAZOLE SODIUM 40 MG: 40 TABLET, DELAYED RELEASE ORAL at 07:39

## 2025-03-18 RX ADMIN — MEMANTINE 5 MG: 5 TABLET ORAL at 20:08

## 2025-03-18 RX ADMIN — QUETIAPINE FUMARATE 12.5 MG: 25 TABLET ORAL at 16:18

## 2025-03-18 RX ADMIN — QUETIAPINE FUMARATE 25 MG: 25 TABLET ORAL at 20:08

## 2025-03-18 ASSESSMENT — ACTIVITIES OF DAILY LIVING (ADL)
ADLS_ACUITY_SCORE: 76
ADLS_ACUITY_SCORE: 72
ADLS_ACUITY_SCORE: 74
ADLS_ACUITY_SCORE: 72
ADLS_ACUITY_SCORE: 72
ADLS_ACUITY_SCORE: 70
ADLS_ACUITY_SCORE: 76
ADLS_ACUITY_SCORE: 76
ADLS_ACUITY_SCORE: 72
ADLS_ACUITY_SCORE: 72
ADLS_ACUITY_SCORE: 76
ADLS_ACUITY_SCORE: 73
ADLS_ACUITY_SCORE: 72
ADLS_ACUITY_SCORE: 72
ADLS_ACUITY_SCORE: 70
ADLS_ACUITY_SCORE: 74
ADLS_ACUITY_SCORE: 76
ADLS_ACUITY_SCORE: 74
ADLS_ACUITY_SCORE: 76
ADLS_ACUITY_SCORE: 73
ADLS_ACUITY_SCORE: 76

## 2025-03-18 NOTE — PLAN OF CARE
"  PRIMARY DIAGNOSIS: confusion    OUTPATIENT/OBSERVATION GOALS TO BE MET BEFORE DISCHARGE:  1. Pain Status: Pain free.    2. Return to near baseline physical activity: No    3. Cleared for discharge by consultants (if involved): No    Safe discharge environment identified: No  Barriers to discharge: No       Entered by: Judi Cobos RN 03/18/2025 7:38 AM  Patient A&O to self, VSS. RA ambulating Ax1. PIV SL. meds crushed in pudding, tolerating regular diet, heart sounds WNL, lungs clear,  Bladder scan 108ml  denies pain, plan to discharge to memory care on Wednesday, family will transport.    Please review provider order for any additional goals.   Nurse to notify provider when observation goals have been met and patient is ready for discharge.  Problem: Adult Inpatient Plan of Care  Goal: Plan of Care Review  Description: The Plan of Care Review/Shift note should be completed every shift.  The Outcome Evaluation is a brief statement about your assessment that the patient is improving, declining, or no change.  This information will be displayed automatically on your shift  note.  Outcome: Progressing  Goal: Patient-Specific Goal (Individualized)  Description: You can add care plan individualizations to a care plan. Examples of Individualization might be:  \"Parent requests to be called daily at 9am for status\", \"I have a hard time hearing out of my right ear\", or \"Do not touch me to wake me up as it startles  me\".  Outcome: Progressing  Goal: Absence of Hospital-Acquired Illness or Injury  Outcome: Progressing  Goal: Optimal Comfort and Wellbeing  Outcome: Progressing  Goal: Readiness for Transition of Care  Outcome: Progressing     Problem: Fall Injury Risk  Goal: Absence of Fall and Fall-Related Injury  Outcome: Progressing     Problem: Dementia Signs/Symptoms  Goal: Improved Behavioral Control (Dementia Signs/Symptoms)  Outcome: Progressing  Goal: Improved Mood Symptoms (Dementia Signs/Symptoms)  Outcome: " Progressing  Goal: Optimized Cognitive Function (Dementia Signs/Symptoms)  Outcome: Progressing  Goal: Optimized Oral Intake (Dementia Signs/Symptoms)  Outcome: Progressing  Goal: Improved Sleep (Dementia Signs/Symptoms)  Outcome: Progressing  Goal: Enhanced Social or Functional Skills and Ability (Dementia Signs/Symptoms)  Outcome: Progressing     Problem: Delirium  Goal: Optimal Coping  Outcome: Progressing  Goal: Improved Behavioral Control  Outcome: Progressing  Goal: Improved Attention and Thought Clarity  Outcome: Progressing  Goal: Improved Sleep  Outcome: Progressing     Problem: Fatigue  Goal: Improved Activity Tolerance  Outcome: Progressing

## 2025-03-18 NOTE — PROGRESS NOTES
"[facility]    Medicine Progress Note - Hospitalist Service    Date of Admission:  2/23/2025    Assessment & Plan   Kraig Genao is a 89 Male with history of A-fib on Eliquis, CKD stage III, BPH, and suspected dementia presents with generalized weakness and confusion and found to have community-acquired pneumonia.     Having difficulty with TCU placement due to dementia/need for a sitter.  SW has discussed with family and now planning on memory care placement.   Accepted at [facility] for memory care on Wednesday 3/19     Medically stable for discharge  No sitter since 3/15      Acute renal failure    - encouraged PO    - re-check in am, Cr 1.46-> 1.34-> 1.19 with baseline 0.9    - doing well     Community-acquired pneumonia, resolved    - Completed course of rocephin 2 g + doxycycline to complete 7 days    - palliative consulted: life prolonging with limits. No CPR- Do NOT Intubate and NO FEEDING TUBE.     - full respiratory panel: negative     Acute Metabolic and infectious encephalopathy, resolved   Progressive dementia    - has had \"sundowning\" in recent months, worse in the hospital. Had dose of haldol resulting in acute change in respiratory status requiring 10L oxy mask. Had increased sedation with 25 mg Seroquel so adjusted to Seroquel to 12.5 mg TID.    - had a good response for a short time with TID seroquel. Then developed agitation overnight so increased bedtime dose to 25. Continues to be intermittently agitated, but suspect it is related to lack of sleep as patient had poor sleep for last 2 nights.     - Trazodone 50mg at bedtime     - PRN IV/IM zyprexa and IM ziprasidone. Last PRN needed 3/9 around 8PM.     - Seroquel 12.5mg BID + 25mg at bedtime. Given frequent need for PRN medications in early evening, adjusted timing of scheduled Seroquel to 10 AM, 3 PM, and 6:30 PM. Will follow and may make further adjustments as needed.     - avoid Haldol if possible (had change in respiratory " status)    - Psych has also added Namenda (to be titrated)     Increase in creatinine superimposed on CKD stage III    - creatinine at baseline during stay. Cr was 1.17 on 3/1. Cr 1.46 on 3/7 (ordered per pharmacy). Encouraging PO fluids and have discussed with nursing.    - BMP stable on last check on 3/12     Acute urinary retention requiring Pérez catheter placement  Gross hematuria - resolved   BPH    - pérez catheter placed due to number of times required to straight cath, Developed gross hematuria after pérez placement and ambulation 2/25    - Urology consult due to above   - increased Flomax 0.8mg   - started finasteride   - needs outpatient follow up in 2-3 months with cystoscopy, UA, postvoid    - pérez removed 2/26 late morning due to patient trying to manipulate pérez and would cause more trauma being ripped out    - voiding since pérez removed without significant PVR, continue to monitor     - resumed Eliquis in AM 2/27     Generalized Weakness & Ambulatory Dysfunction    - Physical therapy consultation: recommending TCU    - needs to be sitter free, was sitter free for 2 days but required one on evening of 2/28    - SW consult, currently in independent living, working on memory care, currently sitter free      New LBBB & Chronically Elevated Troponin    - no chest pain and EKG     - Troponin chronically elevated to 50's     Chronic Afib, controlled     - continue home atenolol and Eliquis    - rate controlled on telemetry in the 80's    - removed tele on 2/26 due to agitation           Diet: Regular Diet Adult  Snacks/Supplements Adult: Ensure Clear; Between Meals  Snacks/Supplements Adult: Gelatein Plus; With Meals    DVT Prophylaxis: Ambulate every shift  Pérez Catheter: Not present  Lines: None     Cardiac Monitoring: None  Code Status: No CPR- Do NOT Intubate      Clinically Significant Risk Factors                               # Severe Malnutrition: based on nutrition assessment and treatment  provided per dietitian's recommendations.    # COPD: noted on problem list        Social Drivers of Health            Disposition Plan     Medically Ready for Discharge: Ready Now           The patient's care was discussed with the Bedside Nurse, Care Coordinator/, and Patient.    Becky Omalley PA-C  Hospitalist Service  [facility]  Securely message with [employer] (more info)  Text page via [employer] Paging/Directory   ______________________________________________________________________    Interval History   No events overnight. Patient going to memory care tomorrow    Physical Exam   Vital Signs: Temp: 97.8  F (36.6  C) Temp src: Oral BP: (!) 142/85 Pulse: 80   Resp: 18 SpO2: 94 % O2 Device: None (Room air)    Weight: 124 lbs 12.49 oz    Alert not orientated.   Breathing comfortably on room air  Moving all limbs without difficulty    Medical Decision Making       30 MINUTES SPENT BY ME on the date of service doing chart review, history, exam, documentation & further activities per the note.      Data         Imaging results reviewed over the past 24 hrs:   No results found for this or any previous visit (from the past 24 hours).

## 2025-03-18 NOTE — DISCHARGE SUMMARY
[facility]  Hospitalist Discharge Summary      Date of Admission:  2/23/2025  Date of Discharge:  3/19/2025  Discharging Provider: Becky Omalley PA-C  Discharge Service: Hospitalist Service    Discharge Diagnoses   Community-acquired pneumonia  Dementia with behavior disturbance  Urinary retention  ONESIMO    Clinically Significant Risk Factors     # Severe Malnutrition: based on nutrition assessment and treatment provided per dietitian's recommendations.      Follow-ups Needed After Discharge   Follow-up Appointments       Follow Up and recommended labs and tests      Follow up with Nursing home physician.  No follow up labs or test are needed.                Unresulted Labs Ordered in the Past 30 Days of this Admission       No orders found from 1/24/2025 to 2/24/2025.            Discharge Disposition   Discharged to memory care  Condition at discharge: Stable    Hospital Course   Kraig Genao is a 89 year old with history of A-fib on Eliquis, CKD stage III, BPH, and suspected dementia presents with generalized weakness and confusion.  Most of the history is taken from son at bedside given patient is currently confused.  Per son, patient has been exhibiting signs of dementia over the last few months.  Lives in independent living.  Forgetful and has agitation issues at night and often calling family members for help.  Over the last few days, has been much more severe and has been found wandering the halls of his independent living facility.  Also with generalized weakness and ambulatory dysfunction.  Son says that he looks like he is going to fall at any moment but has not had any falls as of yet.  Slight cough but denies shortness of breath, fevers, nausea, vomiting, abdominal flank pain, headache, or neck stiffness.  No lower extremity edema.  No sick contacts.     In the ED, WBC 17.0. Troponin 57. UA negative. Viral respiratory panel negative. CXR concerning for pneumonia. CT head  negative. EKG with new left bundle branch block but does not meet Sgarbossa's criteria. QTc 520. Admitted to medicine.     On admission he was started on ceftriaxone and doxycycline and received a full treatment course with resolving symptoms.      During the admission he had acute metabolic and infectious encephalopathy that improved with pneumonia treatment but also has underlying progressive dementia with sundowning in recent months worse during admission.  On 2/24 he was given a dose of Haldol that he had an adverse reaction requiring 10 L of oxygen by oxime mask and so this was discontinued and placed as an allergy.  He was seen by psychiatry on 3/2 with initiation of Namenda 5 mg daily that can be increased by 5 mg every week up to a total dose of 20 mg.  He is currently on Namenda 5 mg twice daily and tolerating well.  Will defer further increases to providers to evaluate him at his facility.  He was started on scheduled Seroquel twice daily and at bedtime with as needed Seroquel and trazodone at bedtime with improvement of mood.    He also had episode of acute urinary retention with gross hematuria requiring Lieberman catheter placement.  Urology was consulted and he was started on finasteride 5 mg once daily in addition to increasing Flomax from 0.4 mg daily to 0.8 mg daily.  Patient did not tolerate Lieberman catheter well due to continuous manipulation and it was removed shortly after it was placed.  Recommend that nursing staff intermittently check him for urinary retention if he does not seem to be voiding well.    He had acute kidney injury while admitted but this improved with IV fluids.    Patient does continue on atenolol and Eliquis for chronic A-fib.  Attempted to call family on 3/17 and 3/18 prior to discharge without call back.  Would recommend discussing continuation of Eliquis in the setting of elderly male at risk for falling.    Initially patient was recommended to go to TCU but likely would not  have any benefit due to cognitive decline and therefore memory care was recommended.      Consultations This Hospital Stay   PHYSICAL THERAPY ADULT IP CONSULT  CARE MANAGEMENT / SOCIAL WORK IP CONSULT  PALLIATIVE CARE ADULT IP CONSULT  SPIRITUAL HEALTH SERVICES IP CONSULT  UROLOGY IP CONSULT  CARE MANAGEMENT / SOCIAL WORK IP CONSULT  PSYCHIATRY IP CONSULT  SPIRITUAL HEALTH SERVICES IP CONSULT    Code Status   No CPR- Do NOT Intubate    Time Spent on this Encounter   CHRIS Perdue PA-C [facility] OBSERVATION DEPT  [address] [facility] [address]  Phone: [phone]  ______________________________________________________________________    Physical Exam   Vital Signs: Temp: 98.3  F (36.8  C) Temp src: Oral BP: (!) 155/84 Pulse: 86   Resp: 18 SpO2: 98 % O2 Device: None (Room air)    Weight: 124 lbs 12.49 oz            Primary Care Physician   Avril Easley    Discharge Orders      General info for SNF    Length of Stay Estimate: Long Term Care  Condition at Discharge: Stable  Level of care:board and care  Rehabilitation Potential: Poor  Admission H&P remains valid and up-to-date: Yes  Recent Chemotherapy: N/A  Use Nursing Home Standing Orders: Yes     Mantoux instructions    Give two-step Mantoux (PPD) Per Facility Policy Yes     Follow Up and recommended labs and tests    Follow up with Nursing home physician.  No follow up labs or test are needed.     Reason for your hospital stay    You were admitted with generalized weakness and found to have community-acquired pneumonia.  You were treated with a full course of antibiotics with improvement.  Unfortunately you continued to be confused with cognitive decline and were unable to go back home.  You did not qualify for TCU and therefore we are discharging you to a memory care.    We have started you on Seroquel and trazodone to help you with your mood and sleep.  We have started you on finasteride to  help with urination and Namenda to help with your memory.     Activity - Up with nursing assistance     Bladder scan    Recommend intermittent bladder scans to ensure he is not retaining urine especially if he becomes more agitated     No CPR- Do NOT Intubate     Fall precautions     Diet    Follow this diet upon discharge: Current Diet:Orders Placed This Encounter      Snacks/Supplements Adult: Ensure Clear; Between Meals      Snacks/Supplements Adult: Gelatein Plus; With Meals      Regular Diet Adult       Significant Results and Procedures   Most Recent 3 CBC's:  Recent Labs   Lab Test 03/01/25  0951 02/27/25  0617 02/26/25  0537   WBC 7.9 8.6 13.0*   HGB 11.6* 11.6* 12.4*   MCV 98 95 97    229 213     Most Recent 3 BMP's:  Recent Labs   Lab Test 03/12/25  0617 03/11/25  1246 03/07/25  0532 03/04/25  0621 03/01/25  0951    138  --   --  138   POTASSIUM 3.6 4.3 3.8   < > 3.6   CHLORIDE 104 101  --   --  104   CO2 24 25  --   --  25   BUN 40.2* 37.7*  --   --  36.5*   CR 1.19* 1.34* 1.46*  --  1.17   ANIONGAP 10 12  --   --  9   RAZ 8.8 9.0  --   --  8.8   * 99  --   --  115*    < > = values in this interval not displayed.     Most Recent 2 LFT's:  Recent Labs   Lab Test 02/23/25  1902 10/03/24  2156   AST 29 21   ALT 14 12   ALKPHOS 91 85   BILITOTAL 1.7* 0.9     Most Recent 3 INR's:No lab results found.  Most Recent 3 Troponin's:  Recent Labs   Lab Test 02/06/21  0343 02/06/21  0134   TROPI <0.015 <0.015     Most Recent 3 BNP's:No lab results found.  Most Recent D-dimer:  Recent Labs   Lab Test 10/03/24  1949   DD 0.74*     Most Recent Cholesterol Panel:No lab results found.  7-Day Micro Results       No results found for the last 168 hours.        ,   Results for orders placed or performed during the hospital encounter of 02/23/25   CT Head w/o Contrast    Narrative    EXAM: CT HEAD W/O CONTRAST  LOCATION: [facility]  DATE: 2/23/2025    INDICATION: Weakness and  confusion.  COMPARISON: CT 10/27/2022.  TECHNIQUE: Routine CT Head without IV contrast. Multiplanar reformats. Dose reduction techniques were used.    FINDINGS:  INTRACRANIAL CONTENTS: No intracranial hemorrhage, extraaxial collection, or mass effect.  No CT evidence of acute infarct. Mild presumed chronic small vessel ischemic changes. Moderate generalized volume loss. No hydrocephalus. The sella is unremarkable   for technique. The cerebellar tonsils are appropriately positioned.    VISUALIZED ORBITS/SINUSES/MASTOIDS: Prior bilateral cataract surgery. Visualized portions of the orbits are otherwise unremarkable. No paranasal sinus mucosal disease. No middle ear or mastoid effusion.    BONES/SOFT TISSUES: No scalp hematoma. No skull fracture.      Impression    IMPRESSION:  1.  No evidence of an acute intracranial abnormality.  2.  Mild presumed chronic small vessel ischemic change.  3.  Moderate atrophy.     XR Chest 2 Views    Narrative    EXAM: XR CHEST 2 VIEWS  LOCATION: [facility]  DATE: 2/23/2025    INDICATION: altered mental status, leukocytosis   COMPARISON: CTA chest and radiograph 10/3/2024      Impression    IMPRESSION: Hazy opacity in the left mid and lower lung, suspicious for infection. No pleural effusion or pneumothorax. Stable cardiomegaly. Right shoulder replacement.       Discharge Medications   Current Discharge Medication List        START taking these medications    Details   finasteride (PROSCAR) 5 MG tablet Take 1 tablet (5 mg) by mouth daily.    Associated Diagnoses: Benign prostatic hyperplasia, unspecified whether lower urinary tract symptoms present      ipratropium - albuterol 0.5 mg/2.5 mg/3 mL (DUONEB) 0.5-2.5 (3) MG/3ML neb solution Take 1 vial (3 mLs) by nebulization every 4 hours as needed for wheezing or shortness of breath.    Associated Diagnoses: Chronic obstructive pulmonary disease, unspecified COPD type (H)      memantine (NAMENDA) 5 MG tablet Take 1  tablet (5 mg) by mouth 2 times daily.    Associated Diagnoses: Cognitive decline      nicotine (COMMIT) 2 MG lozenge Place 1 lozenge (2 mg) inside cheek every hour as needed for nicotine withdrawal symptoms.    Associated Diagnoses: Tobacco dependence syndrome      !! QUEtiapine (SEROQUEL) 25 MG tablet Take 0.5 tablets (12.5 mg) by mouth 2 times daily.    Associated Diagnoses: Dementia due to medical condition with behavioral disturbance (H)      !! QUEtiapine (SEROQUEL) 25 MG tablet Take 1 tablet (25 mg) by mouth at bedtime.    Associated Diagnoses: Dementia due to medical condition with behavioral disturbance (H)      !! QUEtiapine (SEROQUEL) 25 MG tablet Take 1 tablet (25 mg) by mouth every 6 hours as needed.    Associated Diagnoses: Dementia due to medical condition with behavioral disturbance (H)      senna-docusate (SENOKOT-S/PERICOLACE) 8.6-50 MG tablet Take 1 tablet by mouth 2 times daily as needed for constipation.    Associated Diagnoses: Constipation, unspecified constipation type      traZODone (DESYREL) 50 MG tablet Take 1 tablet (50 mg) by mouth at bedtime.    Associated Diagnoses: Dementia due to medical condition with behavioral disturbance (H)       !! - Potential duplicate medications found. Please discuss with provider.        CONTINUE these medications which have CHANGED    Details   acetaminophen (TYLENOL) 325 MG tablet Take 2 tablets (650 mg) by mouth every 4 hours as needed for mild pain or other (and adjunct with moderate or severe pain or per patient request).    Associated Diagnoses: Mild pain      apixaban ANTICOAGULANT (ELIQUIS) 2.5 MG tablet Take 1 tablet (2.5 mg) by mouth 2 times daily.    Associated Diagnoses: Longstanding persistent atrial fibrillation (H)      tamsulosin (FLOMAX) 0.4 MG capsule Take 2 capsules (0.8 mg) by mouth daily.    Associated Diagnoses: Benign prostatic hyperplasia with lower urinary tract symptoms, symptom details unspecified           CONTINUE these  medications which have NOT CHANGED    Details   albuterol (PROAIR HFA/PROVENTIL HFA/VENTOLIN HFA) 108 (90 Base) MCG/ACT inhaler Inhale 2 puffs into the lungs every 4 hours as needed for shortness of breath, wheezing or cough  Qty: 18 g, Refills: 0    Comments: Pharmacy may dispense brand covered by insurance (Proair, or proventil or ventolin or generic albuterol inhaler)  Associated Diagnoses: Moderate chronic obstructive pulmonary disease (H)      artificial saliva (BIOTENE MT) AERS spray Take 1 spray by mouth 4 times daily as needed for dry mouth.      atenolol (TENORMIN) 50 MG tablet Take 50 mg by mouth daily.      Cholecalciferol (VITAMIN D3 PO) Take 1,000 Units by mouth daily      cyanocobalamin (VITAMIN B-12) 500 MCG tablet Take 500 mcg by mouth daily.      fluticasone (FLONASE) 50 MCG/ACT nasal spray Spray 1 spray into both nostrils 2 times daily.      ketoconazole (NIZORAL) 2 % external shampoo Apply topically three times a week.      Lidocaine (LIDOCARE) 4 % Patch Place 2 patches onto the skin every 24 hours. To prevent lidocaine toxicity, patient should be patch free for 12 hrs daily.      loratadine (CLARITIN) 10 MG tablet Take 10 mg by mouth daily as needed for allergies.      melatonin 3 MG tablet Take 3 mg by mouth every evening.      Multiple Vitamins-Minerals (ICAPS AREDS 2 PO) Take 1 capsule by mouth 2 times daily.      pantoprazole (PROTONIX) 40 MG EC tablet Take 40 mg by mouth daily.      tiotropium (SPIRIVA RESPIMAT) 2.5 MCG/ACT inhaler Inhale 2 puffs into the lungs daily.           STOP taking these medications       gabapentin (NEURONTIN) 400 MG capsule Comments:   Reason for Stopping:             Allergies   Allergies   Allergen Reactions    No Known Allergies

## 2025-03-18 NOTE — PLAN OF CARE
"Orientation: x1 self, hx dementia  Neuro: wnl  Pain status: denies   Activity: assist x1 walker gait  Peripheral edema: wnl  Resp: wnl  Cardiac: wnl  GI: incont  :  incont  Skin: dry skin, scattered brusing  LDA: no PIV  Infusions: no PIV  Diet: regular, tray set up  Consults: SW  Discharge Plan: discharge MICHELLE Faye wed 3/19, family transport 10 am          Goal Outcome Evaluation:           Overall Patient Progress: improvingOverall Patient Progress: improving    Outcome Evaluation: AOx1 to self hx dementia, sitter maintained.      Problem: Adult Inpatient Plan of Care  Goal: Plan of Care Review  Description: The Plan of Care Review/Shift note should be completed every shift.  The Outcome Evaluation is a brief statement about your assessment that the patient is improving, declining, or no change.  This information will be displayed automatically on your shift  note.  Outcome: Progressing  Flowsheets (Taken 3/18/2025 1032)  Outcome Evaluation: AOx1 to self hx dementia, sitter maintained.  Overall Patient Progress: improving  Goal: Patient-Specific Goal (Individualized)  Description: You can add care plan individualizations to a care plan. Examples of Individualization might be:  \"Parent requests to be called daily at 9am for status\", \"I have a hard time hearing out of my right ear\", or \"Do not touch me to wake me up as it startles  me\".  Outcome: Progressing  Goal: Absence of Hospital-Acquired Illness or Injury  Outcome: Progressing  Goal: Optimal Comfort and Wellbeing  Outcome: Progressing  Goal: Readiness for Transition of Care  Outcome: Progressing     Problem: Fall Injury Risk  Goal: Absence of Fall and Fall-Related Injury  Outcome: Progressing     Problem: Dementia Signs/Symptoms  Goal: Improved Behavioral Control (Dementia Signs/Symptoms)  Outcome: Progressing  Goal: Improved Mood Symptoms (Dementia Signs/Symptoms)  Outcome: Progressing  Goal: Optimized Cognitive Function (Dementia Signs/Symptoms)  Outcome: " Progressing  Goal: Optimized Oral Intake (Dementia Signs/Symptoms)  Outcome: Progressing  Goal: Improved Sleep (Dementia Signs/Symptoms)  Outcome: Progressing  Goal: Enhanced Social or Functional Skills and Ability (Dementia Signs/Symptoms)  Outcome: Progressing     Problem: Delirium  Goal: Optimal Coping  Outcome: Progressing  Goal: Improved Behavioral Control  Outcome: Progressing  Goal: Improved Attention and Thought Clarity  Outcome: Progressing  Goal: Improved Sleep  Outcome: Progressing     Problem: Fatigue  Goal: Improved Activity Tolerance  Outcome: Progressing

## 2025-03-18 NOTE — PROGRESS NOTES
Care Management Follow Up    Length of Stay (days): 22    Expected Discharge Date: 03/19/2025     Concerns to be Addressed: care coordination/care conferences, discharge planning     Patient plan of care discussed at interdisciplinary rounds: Yes    Anticipated Discharge Disposition:  [facility]    Anticipated Discharge Services:  [facility]- PT/OT  Anticipated Discharge DME:  none    Patient/family educated on Medicare website which has current facility and service quality ratings:  yes  Education Provided on the Discharge Plan:  yes  Patient/Family in Agreement with the Plan:  yes    Referrals Placed by CM/SW:  post acute care/facilities  Private pay costs discussed: Not applicable    Discussed  Partnership in Safe Discharge Planning  document with patient/family: No     Handoff Completed: No, handoff not indicated or clinically appropriate    Additional Information:  8:41 AM   MEDHAT called patients daughter, Valerio, to discuss transport time for tomorrow. Valerio states she thinks it will be on the earlier side but she needs to discuss with her brothers and call back.     1:45 PM   Family plans to transport at 10AM on 3/19/25 (tomorrow). MEDHAT discussed meds with patient's facility; [facility]. MEDHAT received VM from Hu ([phone]) from the [facility] wanting to verify meds. MEDHAT called Hu, who was driving. Hu requested a recent MAR be sent to [phone]. MEDHAT faxed MAR.     Next Steps: Patient to discharge to memory care tomorrow. Needs discharge note.     PRISCILA Quintero   Social Work Care Manager   [facility]   [phone]

## 2025-03-19 VITALS
SYSTOLIC BLOOD PRESSURE: 139 MMHG | RESPIRATION RATE: 16 BRPM | BODY MASS INDEX: 18.91 KG/M2 | DIASTOLIC BLOOD PRESSURE: 74 MMHG | HEART RATE: 94 BPM | HEIGHT: 68 IN | WEIGHT: 124.78 LBS | OXYGEN SATURATION: 94 % | TEMPERATURE: 98.1 F

## 2025-03-19 PROCEDURE — 250N000013 HC RX MED GY IP 250 OP 250 PS 637: Performed by: STUDENT IN AN ORGANIZED HEALTH CARE EDUCATION/TRAINING PROGRAM

## 2025-03-19 PROCEDURE — 250N000013 HC RX MED GY IP 250 OP 250 PS 637: Performed by: HOSPITALIST

## 2025-03-19 PROCEDURE — 250N000013 HC RX MED GY IP 250 OP 250 PS 637: Performed by: INTERNAL MEDICINE

## 2025-03-19 PROCEDURE — 250N000013 HC RX MED GY IP 250 OP 250 PS 637

## 2025-03-19 RX ADMIN — QUETIAPINE FUMARATE 12.5 MG: 25 TABLET ORAL at 09:30

## 2025-03-19 RX ADMIN — ATENOLOL 50 MG: 50 TABLET ORAL at 09:30

## 2025-03-19 RX ADMIN — APIXABAN 2.5 MG: 2.5 TABLET, FILM COATED ORAL at 09:30

## 2025-03-19 RX ADMIN — MEMANTINE 5 MG: 5 TABLET ORAL at 09:30

## 2025-03-19 RX ADMIN — PANTOPRAZOLE SODIUM 40 MG: 40 TABLET, DELAYED RELEASE ORAL at 09:30

## 2025-03-19 RX ADMIN — Medication 1 TABLET: at 09:30

## 2025-03-19 RX ADMIN — FINASTERIDE 5 MG: 5 TABLET, FILM COATED ORAL at 09:30

## 2025-03-19 ASSESSMENT — ACTIVITIES OF DAILY LIVING (ADL)
ADLS_ACUITY_SCORE: 73
ADLS_ACUITY_SCORE: 77
ADLS_ACUITY_SCORE: 73

## 2025-03-19 NOTE — PLAN OF CARE
"Evaluation Outcome:    Pt AOX1,forgetful sometimes. Hard of hearing.RA , Meds crushed and given as scheduled.Pain controlled with Lidocane patch at lower back.Assist of 1 and inde at baseline.Plan is to discharge to [facility] with family transport.   Problem: Adult Inpatient Plan of Care  Goal: Plan of Care Review  Description: The Plan of Care Review/Shift note should be completed every shift.  The Outcome Evaluation is a brief statement about your assessment that the patient is improving, declining, or no change.  This information will be displayed automatically on your shift  note.  Outcome: Progressing  Flowsheets (Taken 3/19/2025 0458)  Outcome Evaluation: Pt AOX1,forgetful sometimes. Hard of hearing.RA , Meds crushed and given as scheduled.Pain controlled with Lidocane patch at lower back.Assist of 1 and inde at baseline.Plan is to discharge to [facility] today at 10am.  Plan of Care Reviewed With: patient  Overall Patient Progress: improving  Goal: Patient-Specific Goal (Individualized)  Description: You can add care plan individualizations to a care plan. Examples of Individualization might be:  \"Parent requests to be called daily at 9am for status\", \"I have a hard time hearing out of my right ear\", or \"Do not touch me to wake me up as it startles  me\".  Outcome: Progressing  Goal: Absence of Hospital-Acquired Illness or Injury  Outcome: Progressing  Intervention: Identify and Manage Fall Risk  Recent Flowsheet Documentation  Taken 3/18/2025 2020 by El Buck, RN  Safety Promotion/Fall Prevention:   activity supervised   assistive device/personal items within reach   bedside attendant   clutter free environment maintained   room door open   room near nurse's station  Intervention: Prevent Skin Injury  Recent Flowsheet Documentation  Taken 3/18/2025 2020 by El Buck, RN  Body Position: position changed independently  Intervention: Prevent and Manage VTE (Venous Thromboembolism) Risk  Recent Flowsheet " Documentation  Taken 3/18/2025 2020 by El Buck RN  VTE Prevention/Management: SCDs off (sequential compression devices)  Intervention: Prevent Infection  Recent Flowsheet Documentation  Taken 3/18/2025 2020 by El Buck RN  Infection Prevention:   single patient room provided   hand hygiene promoted  Goal: Optimal Comfort and Wellbeing  Outcome: Progressing  Intervention: Provide Person-Centered Care  Recent Flowsheet Documentation  Taken 3/18/2025 2020 by El Buck RN  Trust Relationship/Rapport: care explained  Goal: Readiness for Transition of Care  Outcome: Progressing     Problem: Fall Injury Risk  Goal: Absence of Fall and Fall-Related Injury  Outcome: Progressing  Intervention: Identify and Manage Contributors  Recent Flowsheet Documentation  Taken 3/18/2025 2020 by El Buck RN  Medication Review/Management: medications reviewed  Intervention: Promote Injury-Free Environment  Recent Flowsheet Documentation  Taken 3/18/2025 2020 by El Buck RN  Safety Promotion/Fall Prevention:   activity supervised   assistive device/personal items within reach   bedside attendant   clutter free environment maintained   room door open   room near nurse's station     Problem: Dementia Signs/Symptoms  Goal: Improved Behavioral Control (Dementia Signs/Symptoms)  Outcome: Progressing  Goal: Improved Mood Symptoms (Dementia Signs/Symptoms)  Outcome: Progressing  Goal: Optimized Cognitive Function (Dementia Signs/Symptoms)  Outcome: Progressing  Goal: Optimized Oral Intake (Dementia Signs/Symptoms)  Outcome: Progressing  Goal: Improved Sleep (Dementia Signs/Symptoms)  Outcome: Progressing  Goal: Enhanced Social or Functional Skills and Ability (Dementia Signs/Symptoms)  Outcome: Progressing  Intervention: Promote Social and Functional Ability  Recent Flowsheet Documentation  Taken 3/18/2025 2020 by El Buck RN  Trust Relationship/Rapport: care explained     Problem: Delirium  Goal:  Optimal Coping  Outcome: Progressing  Goal: Improved Behavioral Control  Outcome: Progressing  Intervention: Minimize Safety Risk  Recent Flowsheet Documentation  Taken 3/18/2025 2020 by El Buck, RN  Enhanced Safety Measures:  at bedside  Trust Relationship/Rapport: care explained  Goal: Improved Attention and Thought Clarity  Outcome: Progressing  Goal: Improved Sleep  Outcome: Progressing  Intervention: Promote Sleep  Recent Flowsheet Documentation  Taken 3/18/2025 2020 by El Buck, RN  Sleep/Rest Enhancement: awakenings minimized     Problem: Fatigue  Goal: Improved Activity Tolerance  Outcome: Progressing  Intervention: Promote Improved Energy  Recent Flowsheet Documentation  Taken 3/18/2025 2020 by El Buck, RN  Sleep/Rest Enhancement: awakenings minimized  Activity Management: activity adjusted per tolerance

## 2025-03-19 NOTE — PLAN OF CARE
Patient's After Visit Summary was reviewed with patient and/or family. Hard copy of packet sent with family for for [facility] .   Family verbalized understanding of After Visit Summary, recommended follow up and was given an opportunity to ask questions.   Discharge medications sent home with patient/family: No, Not applicable   Discharged with family.      No IV access. All personal belongings returned and sent with patient. Escorted to exit with staff via wheelchair.

## 2025-03-19 NOTE — PROGRESS NOTES
Care Management Discharge Note    Discharge Date: 03/19/2025       Discharge Disposition:  [facility]    Discharge Services: none     Discharge DME: none     Discharge Transportation: family or friend will provide at 1000    Patient/Family in Agreement with the Plan:  yes    Handoff Referral Completed: No, handoff not indicated or clinically appropriate    Additional Information:  SW following for discharge planning to [facility]. Patient has orders to discharge today to [facility] in [place]. Arrangements were set up yesterday for patient to discharge today at 1000 with family transport. Orders were faxed to [facility] at fax: [phone].          Yakelin Figueroa RN BSN CM  Inpatient Care Coordination  [facility]  [phone]

## 2025-03-19 NOTE — PLAN OF CARE
"INPATIENT PLAN OF CARE PROGRESS NOTE    Goal Outcome Evaluation:      Plan of Care Reviewed With: patient, child    Overall Patient Progress: improvingOverall Patient Progress: improving    Outcome Evaluation: Alert to self. [place], hearing aides in place. Up w/ Ax1 w/ gait belt and walker, ambulated in hallway, tolerated well. Denies pain. Pleasant and cooperative this AM. No IV access. Plan to discharge to [facility] today at ~10:00 AM, family will provide transportation.      Problem: Adult Inpatient Plan of Care  Goal: Plan of Care Review  Description: The Plan of Care Review/Shift note should be completed every shift.  The Outcome Evaluation is a brief statement about your assessment that the patient is improving, declining, or no change.  This information will be displayed automatically on your shift  note.  3/19/2025 1114 by Nelly Ortega RN  Outcome: Met  Flowsheets (Taken 3/19/2025 1114)  Outcome Evaluation: Alert to self. [place], hearing aides in place. Up w/ Ax1 w/ gait belt and walker, ambulated in hallway, tolerated well. Denies pain. Pleasant and cooperative this AM. No IV access. Plan to discharge to [facility] today at ~10:00 AM, family will provide transportation.  Plan of Care Reviewed With:   patient   child  Overall Patient Progress: improving  3/19/2025 1114 by Nelly Ortega RN  Reactivated  Flowsheets (Taken 3/19/2025 1114)  Outcome Evaluation: Alert to self. [place], hearing aides in place. Up w/ Ax1 w/ gait belt and walker, ambulated in hallway, tolerated well. Denies pain. Pleasant and cooperative this AM. No IV access. Plan to discharge to [facility] today at ~10:00 AM, family will provide transportation.  Plan of Care Reviewed With:   patient   child  Overall Patient Progress: improving  Goal: Patient-Specific Goal (Individualized)  Description: You can add care plan individualizations to a care plan. Examples of Individualization might be:  \"Parent requests to be called daily at 9am for " "status\", \"I have a hard time hearing out of my right ear\", or \"Do not touch me to wake me up as it startles  me\".  3/19/2025 1114 by Nelly Ortega RN  Outcome: Met  3/19/2025 1114 by Nelly Ortega RN  Reactivated  Goal: Absence of Hospital-Acquired Illness or Injury  3/19/2025 1114 by Nelly Ortega RN  Outcome: Met  3/19/2025 1114 by Nelly Ortega RN  Reactivated  Intervention: Identify and Manage Fall Risk  Recent Flowsheet Documentation  Taken 3/19/2025 0900 by Nelly Ortega RN  Safety Promotion/Fall Prevention:   activity supervised   assistive device/personal items within reach   mobility aid in reach   nonskid shoes/slippers when out of bed   patient and family education   clutter free environment maintained   bedside attendant   room door open   room near nurse's station   room organization consistent   safety round/check completed   supervised activity  Intervention: Prevent Skin Injury  Recent Flowsheet Documentation  Taken 3/19/2025 0900 by Nelly Ortega RN  Body Position: supine, head elevated  Skin Protection:   adhesive use limited   tubing/devices free from skin contact  Intervention: Prevent and Manage VTE (Venous Thromboembolism) Risk  Recent Flowsheet Documentation  Taken 3/19/2025 0900 by Nelly Ortega RN  VTE Prevention/Management: SCDs off (sequential compression devices)  Intervention: Prevent Infection  Recent Flowsheet Documentation  Taken 3/19/2025 0900 by Nelly Ortega RN  Infection Prevention:   rest/sleep promoted   single patient room provided   hand hygiene promoted   equipment surfaces disinfected  Goal: Optimal Comfort and Wellbeing  3/19/2025 1114 by Nelly Ortega RN  Outcome: Met  3/19/2025 1114 by Nelly Ortega RN  Reactivated  Intervention: Monitor Pain and Promote Comfort  Recent Flowsheet Documentation  Taken 3/19/2025 0934 by Nelly Ortega RN  Pain Management Interventions:   rest   repositioned  Taken 3/19/2025 0900 by " Nelly Ortega, RN  Pain Management Interventions:   rest   repositioned  Intervention: Provide Person-Centered Care  Recent Flowsheet Documentation  Taken 3/19/2025 0900 by Nelly Ortega, RN  Trust Relationship/Rapport: care explained  Goal: Readiness for Transition of Care  3/19/2025 1114 by Nelly Ortega, RN  Outcome: Met  3/19/2025 1114 by Nelly Ortega, RN  Reactivated

## 2025-03-21 NOTE — PROGRESS NOTES
March 21, 2025  10:44 AM       SW received call from [facility] asking for home care orders; SW stated they needed to go through Primary Care. [facility]AT [facility].  Hu would like therapy notes, but will work on obtaining home care PT/OT for patient. MEDHAT faxed therapy notes from stay to Hu at [phone]    SAMEER Zee on 3/21/2025 at 10:44 AM

## 2025-05-01 ENCOUNTER — OFFICE VISIT (OUTPATIENT)
Dept: UROLOGY | Facility: CLINIC | Age: OVER 89
End: 2025-05-01
Payer: COMMERCIAL

## 2025-05-01 DIAGNOSIS — Z53.9 NO SHOW: Primary | ICD-10-CM

## 2025-05-01 NOTE — LETTER
5/1/2025       RE: Kraig Genao  [address] [facility] [address] [place] [id number]     Dear Colleague,    Thank you for referring your patient, Kraig Genao, to the [facility] [place] [facility]. Please see a copy of my visit note below.    No show    Recommend patient return for cystoscopy if having any ongoing hematuria or other urinary issues      Eugene Lau MD   [facility]rology  [phone] clinic phone      Again, thank you for allowing me to participate in the care of your patient.      Sincerely,    Eugene Lau MD

## 2025-05-01 NOTE — PROGRESS NOTES
No show    Recommend patient return for cystoscopy if having any ongoing hematuria or other urinary issues      Eugene Lau MD   [facility] Urology  [phone] clinic phone

## 2025-08-16 ENCOUNTER — HEALTH MAINTENANCE LETTER (OUTPATIENT)
Age: OVER 89
End: 2025-08-16

## (undated) DEVICE — ENDO BITE BLOCK ADULT OLYMPUS LATEX FREE MAJ-1632

## (undated) DEVICE — GRASPING DEVICE RAPTOR RAT TOOTH 00711177

## (undated) DEVICE — KIT ENDO TURNOVER/PROCEDURE W/CLEAN A SCOPE LINERS 103888

## (undated) DEVICE — ENDO FORCEP ENDOJAW BIOPSY 2.8MMX160CM FB-220K

## (undated) DEVICE — ENDO CATH ESOPH BALLOON 12-13.5-15MMX180CM CRE FIXED WIRE

## (undated) RX ORDER — FENTANYL CITRATE 50 UG/ML
INJECTION, SOLUTION INTRAMUSCULAR; INTRAVENOUS
Status: DISPENSED
Start: 2023-12-23

## (undated) RX ORDER — FENTANYL CITRATE 50 UG/ML
INJECTION, SOLUTION INTRAMUSCULAR; INTRAVENOUS
Status: DISPENSED
Start: 2018-02-16